# Patient Record
Sex: FEMALE | Race: WHITE | NOT HISPANIC OR LATINO | Employment: OTHER | URBAN - NONMETROPOLITAN AREA
[De-identification: names, ages, dates, MRNs, and addresses within clinical notes are randomized per-mention and may not be internally consistent; named-entity substitution may affect disease eponyms.]

---

## 2017-01-03 ENCOUNTER — ANTICOAGULATION - GICH (OUTPATIENT)
Dept: INTERNAL MEDICINE | Facility: OTHER | Age: 72
End: 2017-01-03

## 2017-01-03 DIAGNOSIS — Z79.01 LONG TERM CURRENT USE OF ANTICOAGULANT: ICD-10-CM

## 2017-01-03 LAB — INR - HISTORICAL: 2.7

## 2017-01-19 ENCOUNTER — COMMUNICATION - GICH (OUTPATIENT)
Dept: INTERNAL MEDICINE | Facility: OTHER | Age: 72
End: 2017-01-19

## 2017-01-19 DIAGNOSIS — E78.00 PURE HYPERCHOLESTEROLEMIA: ICD-10-CM

## 2017-01-19 DIAGNOSIS — I50.30 DIASTOLIC CONGESTIVE HEART FAILURE (H): ICD-10-CM

## 2017-01-19 DIAGNOSIS — E78.1 PURE HYPERGLYCERIDEMIA: ICD-10-CM

## 2017-01-20 ENCOUNTER — HISTORY (OUTPATIENT)
Dept: FAMILY MEDICINE | Facility: OTHER | Age: 72
End: 2017-01-20

## 2017-01-20 ENCOUNTER — OFFICE VISIT - GICH (OUTPATIENT)
Dept: FAMILY MEDICINE | Facility: OTHER | Age: 72
End: 2017-01-20

## 2017-01-20 DIAGNOSIS — S90.129A: ICD-10-CM

## 2017-01-24 ENCOUNTER — HISTORIC RESULTS (OUTPATIENT)
Dept: ADMINISTRATIVE | Age: 72
End: 2017-01-24

## 2017-02-02 ENCOUNTER — ANTICOAGULATION - GICH (OUTPATIENT)
Dept: INTERNAL MEDICINE | Facility: OTHER | Age: 72
End: 2017-02-02

## 2017-02-02 DIAGNOSIS — Z79.01 LONG TERM CURRENT USE OF ANTICOAGULANT: ICD-10-CM

## 2017-02-02 LAB — INR - HISTORICAL: 2.1

## 2017-02-06 ENCOUNTER — COMMUNICATION - GICH (OUTPATIENT)
Dept: INTERNAL MEDICINE | Facility: OTHER | Age: 72
End: 2017-02-06

## 2017-02-06 DIAGNOSIS — F33.42 MAJOR DEPRESSIVE DISORDER, RECURRENT, IN FULL REMISSION (H): ICD-10-CM

## 2017-02-06 DIAGNOSIS — I50.30 DIASTOLIC CONGESTIVE HEART FAILURE (H): ICD-10-CM

## 2017-02-06 DIAGNOSIS — E78.1 PURE HYPERGLYCERIDEMIA: ICD-10-CM

## 2017-02-06 DIAGNOSIS — I48.0 PAROXYSMAL ATRIAL FIBRILLATION (H): ICD-10-CM

## 2017-02-06 DIAGNOSIS — N39.41 URGE INCONTINENCE: ICD-10-CM

## 2017-02-06 DIAGNOSIS — E78.00 PURE HYPERCHOLESTEROLEMIA: ICD-10-CM

## 2017-02-07 ENCOUNTER — AMBULATORY - GICH (OUTPATIENT)
Dept: SCHEDULING | Facility: OTHER | Age: 72
End: 2017-02-07

## 2017-02-14 ENCOUNTER — ANTICOAGULATION - GICH (OUTPATIENT)
Dept: INTERNAL MEDICINE | Facility: OTHER | Age: 72
End: 2017-02-14

## 2017-02-14 ENCOUNTER — HISTORY (OUTPATIENT)
Dept: PEDIATRICS | Facility: OTHER | Age: 72
End: 2017-02-14

## 2017-02-14 ENCOUNTER — OFFICE VISIT - GICH (OUTPATIENT)
Dept: PEDIATRICS | Facility: OTHER | Age: 72
End: 2017-02-14

## 2017-02-14 DIAGNOSIS — R05.9 COUGH: ICD-10-CM

## 2017-02-14 DIAGNOSIS — Z79.01 LONG TERM CURRENT USE OF ANTICOAGULANT: ICD-10-CM

## 2017-02-14 DIAGNOSIS — R69 ILLNESS: ICD-10-CM

## 2017-02-14 LAB — INR - HISTORICAL: 3.6

## 2017-02-14 ASSESSMENT — PATIENT HEALTH QUESTIONNAIRE - PHQ9: SUM OF ALL RESPONSES TO PHQ QUESTIONS 1-9: 0

## 2017-03-03 ENCOUNTER — ANTICOAGULATION - GICH (OUTPATIENT)
Dept: INTERNAL MEDICINE | Facility: OTHER | Age: 72
End: 2017-03-03

## 2017-03-03 DIAGNOSIS — I48.0 PAROXYSMAL ATRIAL FIBRILLATION (H): ICD-10-CM

## 2017-03-03 DIAGNOSIS — Z79.01 LONG TERM CURRENT USE OF ANTICOAGULANT: ICD-10-CM

## 2017-03-03 LAB — INR - HISTORICAL: 3.5

## 2017-03-27 ENCOUNTER — ANTICOAGULATION - GICH (OUTPATIENT)
Dept: INTERNAL MEDICINE | Facility: OTHER | Age: 72
End: 2017-03-27

## 2017-03-27 DIAGNOSIS — I48.0 PAROXYSMAL ATRIAL FIBRILLATION (H): ICD-10-CM

## 2017-03-27 DIAGNOSIS — Z79.01 LONG TERM CURRENT USE OF ANTICOAGULANT: ICD-10-CM

## 2017-03-27 LAB — INR - HISTORICAL: 2.3

## 2017-04-19 ENCOUNTER — COMMUNICATION - GICH (OUTPATIENT)
Dept: INTERNAL MEDICINE | Facility: OTHER | Age: 72
End: 2017-04-19

## 2017-04-19 DIAGNOSIS — I48.0 PAROXYSMAL ATRIAL FIBRILLATION (H): ICD-10-CM

## 2017-04-19 DIAGNOSIS — I10 ESSENTIAL (PRIMARY) HYPERTENSION: ICD-10-CM

## 2017-04-24 ENCOUNTER — ANTICOAGULATION - GICH (OUTPATIENT)
Dept: INTERNAL MEDICINE | Facility: OTHER | Age: 72
End: 2017-04-24

## 2017-04-24 DIAGNOSIS — Z79.01 LONG TERM CURRENT USE OF ANTICOAGULANT: ICD-10-CM

## 2017-04-24 DIAGNOSIS — I48.0 PAROXYSMAL ATRIAL FIBRILLATION (H): ICD-10-CM

## 2017-04-24 LAB — INR - HISTORICAL: 2.2

## 2017-05-17 ENCOUNTER — COMMUNICATION - GICH (OUTPATIENT)
Dept: INTERNAL MEDICINE | Facility: OTHER | Age: 72
End: 2017-05-17

## 2017-05-17 DIAGNOSIS — M25.519 PAIN IN SHOULDER: ICD-10-CM

## 2017-05-18 ENCOUNTER — HOSPITAL ENCOUNTER (OUTPATIENT)
Dept: RADIOLOGY | Facility: OTHER | Age: 72
End: 2017-05-18
Attending: INTERNAL MEDICINE

## 2017-05-18 ENCOUNTER — HISTORY (OUTPATIENT)
Dept: RADIOLOGY | Facility: OTHER | Age: 72
End: 2017-05-18

## 2017-05-18 DIAGNOSIS — Z12.31 ENCOUNTER FOR SCREENING MAMMOGRAM FOR MALIGNANT NEOPLASM OF BREAST: ICD-10-CM

## 2017-05-19 ENCOUNTER — AMBULATORY - GICH (OUTPATIENT)
Dept: ORTHOPEDICS | Facility: OTHER | Age: 72
End: 2017-05-19

## 2017-05-19 DIAGNOSIS — M25.511 PAIN IN RIGHT SHOULDER: ICD-10-CM

## 2017-05-22 ENCOUNTER — AMBULATORY - GICH (OUTPATIENT)
Dept: RADIOLOGY | Facility: OTHER | Age: 72
End: 2017-05-22

## 2017-05-22 ENCOUNTER — ANTICOAGULATION - GICH (OUTPATIENT)
Dept: INTERNAL MEDICINE | Facility: OTHER | Age: 72
End: 2017-05-22

## 2017-05-22 ENCOUNTER — AMBULATORY - GICH (OUTPATIENT)
Dept: SCHEDULING | Facility: OTHER | Age: 72
End: 2017-05-22

## 2017-05-22 DIAGNOSIS — R92.8 OTHER ABNORMAL AND INCONCLUSIVE FINDINGS ON DIAGNOSTIC IMAGING OF BREAST: ICD-10-CM

## 2017-05-22 DIAGNOSIS — Z79.01 LONG TERM CURRENT USE OF ANTICOAGULANT: ICD-10-CM

## 2017-05-22 DIAGNOSIS — I48.0 PAROXYSMAL ATRIAL FIBRILLATION (H): ICD-10-CM

## 2017-05-22 LAB — INR - HISTORICAL: 1.9

## 2017-05-23 ENCOUNTER — HOSPITAL ENCOUNTER (OUTPATIENT)
Dept: RADIOLOGY | Facility: OTHER | Age: 72
End: 2017-05-23
Attending: INTERNAL MEDICINE

## 2017-05-23 ENCOUNTER — AMBULATORY - GICH (OUTPATIENT)
Dept: RADIOLOGY | Facility: OTHER | Age: 72
End: 2017-05-23

## 2017-05-23 ENCOUNTER — OFFICE VISIT - GICH (OUTPATIENT)
Dept: ORTHOPEDICS | Facility: OTHER | Age: 72
End: 2017-05-23

## 2017-05-23 ENCOUNTER — AMBULATORY - GICH (OUTPATIENT)
Dept: SCHEDULING | Facility: OTHER | Age: 72
End: 2017-05-23

## 2017-05-23 ENCOUNTER — HISTORY (OUTPATIENT)
Dept: ORTHOPEDICS | Facility: OTHER | Age: 72
End: 2017-05-23

## 2017-05-23 DIAGNOSIS — M75.81 OTHER SHOULDER LESIONS, RIGHT SHOULDER: ICD-10-CM

## 2017-05-23 DIAGNOSIS — M19.019 PRIMARY OSTEOARTHRITIS OF SHOULDER: ICD-10-CM

## 2017-05-23 DIAGNOSIS — M75.41 IMPINGEMENT SYNDROME OF RIGHT SHOULDER: ICD-10-CM

## 2017-05-23 DIAGNOSIS — R92.8 OTHER ABNORMAL AND INCONCLUSIVE FINDINGS ON DIAGNOSTIC IMAGING OF BREAST: ICD-10-CM

## 2017-05-23 DIAGNOSIS — M25.511 PAIN IN RIGHT SHOULDER: ICD-10-CM

## 2017-05-23 DIAGNOSIS — M25.519 PAIN IN SHOULDER: ICD-10-CM

## 2017-05-24 ENCOUNTER — COMMUNICATION - GICH (OUTPATIENT)
Dept: INTERNAL MEDICINE | Facility: OTHER | Age: 72
End: 2017-05-24

## 2017-05-24 ENCOUNTER — AMBULATORY - GICH (OUTPATIENT)
Dept: RADIOLOGY | Facility: OTHER | Age: 72
End: 2017-05-24

## 2017-05-24 DIAGNOSIS — M75.41 IMPINGEMENT SYNDROME OF RIGHT SHOULDER: ICD-10-CM

## 2017-05-24 DIAGNOSIS — M19.019 PRIMARY OSTEOARTHRITIS OF SHOULDER: ICD-10-CM

## 2017-05-24 DIAGNOSIS — M75.81 OTHER SHOULDER LESIONS, RIGHT SHOULDER: ICD-10-CM

## 2017-05-31 ENCOUNTER — COMMUNICATION - GICH (OUTPATIENT)
Dept: INTERNAL MEDICINE | Facility: OTHER | Age: 72
End: 2017-05-31

## 2017-06-06 ENCOUNTER — HOSPITAL ENCOUNTER (OUTPATIENT)
Dept: RADIOLOGY | Facility: OTHER | Age: 72
End: 2017-06-06
Attending: ORTHOPAEDIC SURGERY

## 2017-06-06 ENCOUNTER — ANTICOAGULATION - GICH (OUTPATIENT)
Dept: INTERNAL MEDICINE | Facility: OTHER | Age: 72
End: 2017-06-06

## 2017-06-06 DIAGNOSIS — Z79.01 LONG TERM CURRENT USE OF ANTICOAGULANT: ICD-10-CM

## 2017-06-06 DIAGNOSIS — M75.81 OTHER SHOULDER LESIONS, RIGHT SHOULDER: ICD-10-CM

## 2017-06-06 DIAGNOSIS — M19.019 PRIMARY OSTEOARTHRITIS OF SHOULDER: ICD-10-CM

## 2017-06-06 DIAGNOSIS — M75.41 IMPINGEMENT SYNDROME OF RIGHT SHOULDER: ICD-10-CM

## 2017-06-06 DIAGNOSIS — I48.0 PAROXYSMAL ATRIAL FIBRILLATION (H): ICD-10-CM

## 2017-06-06 LAB — INR - HISTORICAL: 1.1

## 2017-06-07 ENCOUNTER — COMMUNICATION - GICH (OUTPATIENT)
Dept: INTERNAL MEDICINE | Facility: OTHER | Age: 72
End: 2017-06-07

## 2017-06-07 DIAGNOSIS — N39.41 URGE INCONTINENCE: ICD-10-CM

## 2017-06-15 ENCOUNTER — ANTICOAGULATION - GICH (OUTPATIENT)
Dept: INTERNAL MEDICINE | Facility: OTHER | Age: 72
End: 2017-06-15

## 2017-06-15 ENCOUNTER — OFFICE VISIT - GICH (OUTPATIENT)
Dept: ORTHOPEDICS | Facility: OTHER | Age: 72
End: 2017-06-15

## 2017-06-15 ENCOUNTER — HISTORY (OUTPATIENT)
Dept: ORTHOPEDICS | Facility: OTHER | Age: 72
End: 2017-06-15

## 2017-06-15 DIAGNOSIS — M75.41 IMPINGEMENT SYNDROME OF RIGHT SHOULDER: ICD-10-CM

## 2017-06-15 DIAGNOSIS — M19.019 PRIMARY OSTEOARTHRITIS OF SHOULDER: ICD-10-CM

## 2017-06-15 DIAGNOSIS — Z79.01 LONG TERM CURRENT USE OF ANTICOAGULANT: ICD-10-CM

## 2017-06-15 DIAGNOSIS — I48.0 PAROXYSMAL ATRIAL FIBRILLATION (H): ICD-10-CM

## 2017-06-15 DIAGNOSIS — M75.121 COMPLETE TEAR OF RIGHT ROTATOR CUFF: ICD-10-CM

## 2017-06-15 LAB — INR - HISTORICAL: 1.5

## 2017-06-29 ENCOUNTER — COMMUNICATION - GICH (OUTPATIENT)
Dept: INTERNAL MEDICINE | Facility: OTHER | Age: 72
End: 2017-06-29

## 2017-06-29 ENCOUNTER — ANTICOAGULATION - GICH (OUTPATIENT)
Dept: INTERNAL MEDICINE | Facility: OTHER | Age: 72
End: 2017-06-29

## 2017-06-29 DIAGNOSIS — Z79.01 LONG TERM CURRENT USE OF ANTICOAGULANT: ICD-10-CM

## 2017-06-29 DIAGNOSIS — F33.42 MAJOR DEPRESSIVE DISORDER, RECURRENT, IN FULL REMISSION (H): ICD-10-CM

## 2017-06-29 DIAGNOSIS — I48.0 PAROXYSMAL ATRIAL FIBRILLATION (H): ICD-10-CM

## 2017-06-29 LAB — INR - HISTORICAL: 1.7

## 2017-07-13 ENCOUNTER — ANTICOAGULATION - GICH (OUTPATIENT)
Dept: INTERNAL MEDICINE | Facility: OTHER | Age: 72
End: 2017-07-13

## 2017-07-13 DIAGNOSIS — I48.0 PAROXYSMAL ATRIAL FIBRILLATION (H): ICD-10-CM

## 2017-07-13 DIAGNOSIS — Z79.01 LONG TERM CURRENT USE OF ANTICOAGULANT: ICD-10-CM

## 2017-07-13 LAB — INR - HISTORICAL: 3.3

## 2017-07-14 ENCOUNTER — HISTORY (OUTPATIENT)
Dept: PEDIATRICS | Facility: OTHER | Age: 72
End: 2017-07-14

## 2017-07-14 ENCOUNTER — AMBULATORY - GICH (OUTPATIENT)
Dept: PEDIATRICS | Facility: OTHER | Age: 72
End: 2017-07-14

## 2017-07-14 ENCOUNTER — OFFICE VISIT - GICH (OUTPATIENT)
Dept: PEDIATRICS | Facility: OTHER | Age: 72
End: 2017-07-14

## 2017-07-14 DIAGNOSIS — I45.81 LONG QT SYNDROME: ICD-10-CM

## 2017-07-14 DIAGNOSIS — I10 ESSENTIAL (PRIMARY) HYPERTENSION: ICD-10-CM

## 2017-07-14 DIAGNOSIS — I50.30 DIASTOLIC CONGESTIVE HEART FAILURE (H): ICD-10-CM

## 2017-07-14 DIAGNOSIS — Z11.59 ENCOUNTER FOR SCREENING FOR OTHER VIRAL DISEASES (CODE): ICD-10-CM

## 2017-07-14 DIAGNOSIS — M89.9 DISORDER OF BONE: ICD-10-CM

## 2017-07-14 DIAGNOSIS — E78.00 PURE HYPERCHOLESTEROLEMIA: ICD-10-CM

## 2017-07-14 DIAGNOSIS — N39.41 URGE INCONTINENCE: ICD-10-CM

## 2017-07-14 DIAGNOSIS — K21.9 GASTRO-ESOPHAGEAL REFLUX DISEASE WITHOUT ESOPHAGITIS: ICD-10-CM

## 2017-07-14 DIAGNOSIS — Z13.6 ENCOUNTER FOR SCREENING FOR CARDIOVASCULAR DISORDERS: ICD-10-CM

## 2017-07-14 DIAGNOSIS — N95.1 FEMALE CLIMACTERIC STATE: ICD-10-CM

## 2017-07-14 DIAGNOSIS — R73.01 IMPAIRED FASTING GLUCOSE: ICD-10-CM

## 2017-07-14 DIAGNOSIS — D64.9 ANEMIA: ICD-10-CM

## 2017-07-14 DIAGNOSIS — R09.89 OTHER SPECIFIED SYMPTOMS AND SIGNS INVOLVING THE CIRCULATORY AND RESPIRATORY SYSTEMS: ICD-10-CM

## 2017-07-14 DIAGNOSIS — E55.9 VITAMIN D DEFICIENCY: ICD-10-CM

## 2017-07-14 DIAGNOSIS — E78.1 PURE HYPERGLYCERIDEMIA: ICD-10-CM

## 2017-07-14 DIAGNOSIS — M85.80 OTHER SPECIFIED DISORDERS OF BONE DENSITY AND STRUCTURE, UNSPECIFIED SITE (CODE): ICD-10-CM

## 2017-07-14 DIAGNOSIS — Z12.11 ENCOUNTER FOR SCREENING FOR MALIGNANT NEOPLASM OF COLON: ICD-10-CM

## 2017-07-14 DIAGNOSIS — Z95.0 PRESENCE OF CARDIAC PACEMAKER: ICD-10-CM

## 2017-07-14 DIAGNOSIS — F33.42 MAJOR DEPRESSIVE DISORDER, RECURRENT, IN FULL REMISSION (H): ICD-10-CM

## 2017-07-14 DIAGNOSIS — I48.0 PAROXYSMAL ATRIAL FIBRILLATION (H): ICD-10-CM

## 2017-07-14 LAB
ABSOLUTE BASOPHILS - HISTORICAL: 0 THOU/CU MM
ABSOLUTE EOSINOPHILS - HISTORICAL: 0.1 THOU/CU MM
ABSOLUTE IMMATURE GRANULOCYTES(METAS,MYELOS,PROS) - HISTORICAL: 0 THOU/CU MM
ABSOLUTE LYMPHOCYTES - HISTORICAL: 2 THOU/CU MM (ref 0.9–2.9)
ABSOLUTE MONOCYTES - HISTORICAL: 0.7 THOU/CU MM
ABSOLUTE NEUTROPHILS - HISTORICAL: 4.5 THOU/CU MM (ref 1.7–7)
ANION GAP - HISTORICAL: 12 (ref 5–18)
BASOPHILS # BLD AUTO: 0.5 %
BUN SERPL-MCNC: 22 MG/DL (ref 7–25)
BUN/CREAT RATIO - HISTORICAL: 25
CALCIUM SERPL-MCNC: 9.1 MG/DL (ref 8.6–10.3)
CHLORIDE SERPLBLD-SCNC: 103 MMOL/L (ref 98–107)
CHOL/HDL RATIO - HISTORICAL: 2.72
CHOLESTEROL TOTAL: 215 MG/DL
CO2 SERPL-SCNC: 25 MMOL/L (ref 21–31)
CREAT SERPL-MCNC: 0.89 MG/DL (ref 0.7–1.3)
EOSINOPHIL NFR BLD AUTO: 1.9 %
ERYTHROCYTE [DISTWIDTH] IN BLOOD BY AUTOMATED COUNT: 14.7 % (ref 11.5–15.5)
ESTIMATED AVERAGE GLUCOSE: 111 MG/DL
GFR IF NOT AFRICAN AMERICAN - HISTORICAL: >60 ML/MIN/1.73M2
GLUCOSE SERPL-MCNC: 126 MG/DL (ref 70–105)
HCT VFR BLD AUTO: 37.4 % (ref 33–51)
HDLC SERPL-MCNC: 79 MG/DL (ref 23–92)
HEMOGLOBIN A1C MONITORING (POCT) - HISTORICAL: 5.5 % (ref 4–6.2)
HEMOGLOBIN: 11.8 G/DL (ref 12–16)
IMMATURE GRANULOCYTES(METAS,MYELOS,PROS) - HISTORICAL: 0.3 %
LDLC SERPL CALC-MCNC: 117 MG/DL
LYMPHOCYTES NFR BLD AUTO: 27.3 % (ref 20–44)
MCH RBC QN AUTO: 29.1 PG (ref 26–34)
MCHC RBC AUTO-ENTMCNC: 31.6 G/DL (ref 32–36)
MCV RBC AUTO: 92 FL (ref 80–100)
MONOCYTES NFR BLD AUTO: 8.9 %
NEUTROPHILS NFR BLD AUTO: 61.1 % (ref 42–72)
NON-HDL CHOLESTEROL - HISTORICAL: 136 MG/DL
PATIENT STATUS - HISTORICAL: ABNORMAL
PLATELET # BLD AUTO: 216 THOU/CU MM (ref 140–440)
PMV BLD: 10.7 FL (ref 6.5–11)
POTASSIUM SERPL-SCNC: 4 MMOL/L (ref 3.5–5.1)
RED BLOOD COUNT - HISTORICAL: 4.05 MIL/CU MM (ref 4–5.2)
SODIUM SERPL-SCNC: 140 MMOL/L (ref 133–143)
TRIGL SERPL-MCNC: 96 MG/DL
VITAMIN D TOTAL - HISTORICAL: 15.9 NG/ML
WHITE BLOOD COUNT - HISTORICAL: 7.3 THOU/CU MM (ref 4.5–11)

## 2017-07-14 ASSESSMENT — PATIENT HEALTH QUESTIONNAIRE - PHQ9: SUM OF ALL RESPONSES TO PHQ QUESTIONS 1-9: 1

## 2017-07-17 ENCOUNTER — COMMUNICATION - GICH (OUTPATIENT)
Dept: SURGERY | Facility: OTHER | Age: 72
End: 2017-07-17

## 2017-07-17 DIAGNOSIS — Z12.11 ENCOUNTER FOR SCREENING FOR MALIGNANT NEOPLASM OF COLON: ICD-10-CM

## 2017-07-17 LAB — HEPATITIS C ANTIBODY CATEGORY - HISTORICAL: NORMAL

## 2017-07-19 ENCOUNTER — COMMUNICATION - GICH (OUTPATIENT)
Dept: INTERNAL MEDICINE | Facility: OTHER | Age: 72
End: 2017-07-19

## 2017-07-20 ENCOUNTER — HOSPITAL ENCOUNTER (OUTPATIENT)
Dept: RADIOLOGY | Facility: OTHER | Age: 72
End: 2017-07-20
Attending: INTERNAL MEDICINE

## 2017-07-20 DIAGNOSIS — R09.89 OTHER SPECIFIED SYMPTOMS AND SIGNS INVOLVING THE CIRCULATORY AND RESPIRATORY SYSTEMS: ICD-10-CM

## 2017-07-20 DIAGNOSIS — Z13.6 ENCOUNTER FOR SCREENING FOR CARDIOVASCULAR DISORDERS: ICD-10-CM

## 2017-07-20 DIAGNOSIS — M89.9 DISORDER OF BONE: ICD-10-CM

## 2017-07-20 DIAGNOSIS — M85.80 OTHER SPECIFIED DISORDERS OF BONE DENSITY AND STRUCTURE, UNSPECIFIED SITE (CODE): ICD-10-CM

## 2017-07-24 ENCOUNTER — COMMUNICATION - GICH (OUTPATIENT)
Dept: PEDIATRICS | Facility: OTHER | Age: 72
End: 2017-07-24

## 2017-07-24 ENCOUNTER — COMMUNICATION - GICH (OUTPATIENT)
Dept: FAMILY MEDICINE | Facility: OTHER | Age: 72
End: 2017-07-24

## 2017-07-24 DIAGNOSIS — I48.0 PAROXYSMAL ATRIAL FIBRILLATION (H): ICD-10-CM

## 2017-07-24 DIAGNOSIS — Z79.01 LONG TERM CURRENT USE OF ANTICOAGULANT: ICD-10-CM

## 2017-07-24 DIAGNOSIS — N39.41 URGE INCONTINENCE: ICD-10-CM

## 2017-07-25 ENCOUNTER — AMBULATORY - GICH (OUTPATIENT)
Dept: PEDIATRICS | Facility: OTHER | Age: 72
End: 2017-07-25

## 2017-07-26 ENCOUNTER — COMMUNICATION - GICH (OUTPATIENT)
Dept: INTERNAL MEDICINE | Facility: OTHER | Age: 72
End: 2017-07-26

## 2017-07-31 ENCOUNTER — COMMUNICATION - GICH (OUTPATIENT)
Dept: INTERNAL MEDICINE | Facility: OTHER | Age: 72
End: 2017-07-31

## 2017-07-31 DIAGNOSIS — I48.0 PAROXYSMAL ATRIAL FIBRILLATION (H): ICD-10-CM

## 2017-07-31 DIAGNOSIS — Z79.01 LONG TERM CURRENT USE OF ANTICOAGULANT: ICD-10-CM

## 2017-08-01 ENCOUNTER — COMMUNICATION - GICH (OUTPATIENT)
Dept: INTERNAL MEDICINE | Facility: OTHER | Age: 72
End: 2017-08-01

## 2017-08-01 ENCOUNTER — ANTICOAGULATION - GICH (OUTPATIENT)
Dept: INTERNAL MEDICINE | Facility: OTHER | Age: 72
End: 2017-08-01

## 2017-08-01 DIAGNOSIS — I48.0 PAROXYSMAL ATRIAL FIBRILLATION (H): ICD-10-CM

## 2017-08-01 DIAGNOSIS — Z79.01 LONG TERM CURRENT USE OF ANTICOAGULANT: ICD-10-CM

## 2017-08-01 LAB — INR - HISTORICAL: 1.6

## 2017-08-03 ENCOUNTER — HISTORY (OUTPATIENT)
Dept: SURGERY | Facility: OTHER | Age: 72
End: 2017-08-03

## 2017-08-07 ENCOUNTER — COMMUNICATION - GICH (OUTPATIENT)
Dept: INTERNAL MEDICINE | Facility: OTHER | Age: 72
End: 2017-08-07

## 2017-08-07 DIAGNOSIS — E55.9 VITAMIN D DEFICIENCY: ICD-10-CM

## 2017-08-09 ENCOUNTER — ANTICOAGULATION - GICH (OUTPATIENT)
Dept: INTERNAL MEDICINE | Facility: OTHER | Age: 72
End: 2017-08-09

## 2017-08-09 DIAGNOSIS — Z79.01 LONG TERM CURRENT USE OF ANTICOAGULANT: ICD-10-CM

## 2017-08-09 DIAGNOSIS — I48.0 PAROXYSMAL ATRIAL FIBRILLATION (H): ICD-10-CM

## 2017-08-09 LAB — INR - HISTORICAL: 1.2

## 2017-08-10 ENCOUNTER — COMMUNICATION - GICH (OUTPATIENT)
Dept: SURGERY | Facility: OTHER | Age: 72
End: 2017-08-10

## 2017-08-10 ENCOUNTER — TRANSFERRED RECORDS (OUTPATIENT)
Dept: MULTI SPECIALTY CLINIC | Facility: CLINIC | Age: 72
End: 2017-08-10

## 2017-08-10 ENCOUNTER — HISTORY (OUTPATIENT)
Dept: SURGERY | Facility: OTHER | Age: 72
End: 2017-08-10

## 2017-08-10 ENCOUNTER — SURGERY (OUTPATIENT)
Dept: SURGERY | Facility: OTHER | Age: 72
End: 2017-08-10

## 2017-08-10 ENCOUNTER — HOSPITAL ENCOUNTER (OUTPATIENT)
Dept: SURGERY | Facility: OTHER | Age: 72
Discharge: HOME | End: 2017-08-10
Attending: SURGERY | Admitting: SURGERY

## 2017-08-14 ENCOUNTER — COMMUNICATION - GICH (OUTPATIENT)
Dept: INTERNAL MEDICINE | Facility: OTHER | Age: 72
End: 2017-08-14

## 2017-08-14 ENCOUNTER — ANTICOAGULATION - GICH (OUTPATIENT)
Dept: INTERNAL MEDICINE | Facility: OTHER | Age: 72
End: 2017-08-14

## 2017-08-14 DIAGNOSIS — I48.0 PAROXYSMAL ATRIAL FIBRILLATION (H): ICD-10-CM

## 2017-08-14 DIAGNOSIS — Z79.01 LONG TERM CURRENT USE OF ANTICOAGULANT: ICD-10-CM

## 2017-08-14 LAB — INR - HISTORICAL: 1.4

## 2017-08-15 ENCOUNTER — ANTICOAGULATION - GICH (OUTPATIENT)
Dept: INTERNAL MEDICINE | Facility: OTHER | Age: 72
End: 2017-08-15

## 2017-08-15 DIAGNOSIS — I48.0 PAROXYSMAL ATRIAL FIBRILLATION (H): ICD-10-CM

## 2017-08-15 DIAGNOSIS — Z79.01 LONG TERM CURRENT USE OF ANTICOAGULANT: ICD-10-CM

## 2017-08-15 LAB — INR - HISTORICAL: 1.6

## 2017-08-16 ENCOUNTER — SURGERY (OUTPATIENT)
Dept: SURGERY | Facility: OTHER | Age: 72
End: 2017-08-16

## 2017-08-16 ENCOUNTER — ANTICOAGULATION - GICH (OUTPATIENT)
Dept: INTERNAL MEDICINE | Facility: OTHER | Age: 72
End: 2017-08-16

## 2017-08-16 DIAGNOSIS — I48.0 PAROXYSMAL ATRIAL FIBRILLATION (H): ICD-10-CM

## 2017-08-16 DIAGNOSIS — Z79.01 LONG TERM CURRENT USE OF ANTICOAGULANT: ICD-10-CM

## 2017-08-16 LAB — INR - HISTORICAL: 1.9

## 2017-08-17 ENCOUNTER — ANTICOAGULATION - GICH (OUTPATIENT)
Dept: INTERNAL MEDICINE | Facility: OTHER | Age: 72
End: 2017-08-17

## 2017-08-17 DIAGNOSIS — Z79.01 LONG TERM CURRENT USE OF ANTICOAGULANT: ICD-10-CM

## 2017-08-17 DIAGNOSIS — I48.0 PAROXYSMAL ATRIAL FIBRILLATION (H): ICD-10-CM

## 2017-08-17 LAB — INR - HISTORICAL: 2

## 2017-08-18 ENCOUNTER — ANTICOAGULATION - GICH (OUTPATIENT)
Dept: INTERNAL MEDICINE | Facility: OTHER | Age: 72
End: 2017-08-18

## 2017-08-18 DIAGNOSIS — I48.0 PAROXYSMAL ATRIAL FIBRILLATION (H): ICD-10-CM

## 2017-08-18 DIAGNOSIS — Z79.01 LONG TERM CURRENT USE OF ANTICOAGULANT: ICD-10-CM

## 2017-08-18 LAB — INR - HISTORICAL: 2.1

## 2017-09-05 ENCOUNTER — ANTICOAGULATION - GICH (OUTPATIENT)
Dept: INTERNAL MEDICINE | Facility: OTHER | Age: 72
End: 2017-09-05

## 2017-09-05 DIAGNOSIS — Z79.01 LONG TERM CURRENT USE OF ANTICOAGULANT: ICD-10-CM

## 2017-09-05 DIAGNOSIS — I48.0 PAROXYSMAL ATRIAL FIBRILLATION (H): ICD-10-CM

## 2017-09-05 LAB — INR - HISTORICAL: 2.1

## 2017-09-21 ENCOUNTER — AMBULATORY - GICH (OUTPATIENT)
Dept: SCHEDULING | Facility: OTHER | Age: 72
End: 2017-09-21

## 2017-10-03 ENCOUNTER — ANTICOAGULATION - GICH (OUTPATIENT)
Dept: INTERNAL MEDICINE | Facility: OTHER | Age: 72
End: 2017-10-03

## 2017-10-03 DIAGNOSIS — I48.0 PAROXYSMAL ATRIAL FIBRILLATION (H): ICD-10-CM

## 2017-10-03 DIAGNOSIS — Z79.01 LONG TERM CURRENT USE OF ANTICOAGULANT: ICD-10-CM

## 2017-10-03 LAB — INR - HISTORICAL: 1.9

## 2017-10-13 ENCOUNTER — OFFICE VISIT - GICH (OUTPATIENT)
Dept: PEDIATRICS | Facility: OTHER | Age: 72
End: 2017-10-13

## 2017-10-13 ENCOUNTER — HISTORY (OUTPATIENT)
Dept: PEDIATRICS | Facility: OTHER | Age: 72
End: 2017-10-13

## 2017-10-13 DIAGNOSIS — I10 ESSENTIAL (PRIMARY) HYPERTENSION: ICD-10-CM

## 2017-10-13 DIAGNOSIS — Z01.818 ENCOUNTER FOR OTHER PREPROCEDURAL EXAMINATION: ICD-10-CM

## 2017-10-13 DIAGNOSIS — Z98.890 OTHER SPECIFIED POSTPROCEDURAL STATES: ICD-10-CM

## 2017-10-13 DIAGNOSIS — I48.0 PAROXYSMAL ATRIAL FIBRILLATION (H): ICD-10-CM

## 2017-10-13 DIAGNOSIS — Z86.79 PERSONAL HISTORY OF OTHER DISEASES OF THE CIRCULATORY SYSTEM (CODE): ICD-10-CM

## 2017-10-13 DIAGNOSIS — I50.30 DIASTOLIC CONGESTIVE HEART FAILURE (H): ICD-10-CM

## 2017-10-13 DIAGNOSIS — E55.9 VITAMIN D DEFICIENCY: ICD-10-CM

## 2017-10-13 DIAGNOSIS — I45.81 LONG QT SYNDROME: ICD-10-CM

## 2017-10-13 DIAGNOSIS — Z95.0 PRESENCE OF CARDIAC PACEMAKER: ICD-10-CM

## 2017-10-13 LAB
ANION GAP - HISTORICAL: 11 (ref 5–18)
BUN SERPL-MCNC: 17 MG/DL (ref 7–25)
BUN/CREAT RATIO - HISTORICAL: 20
CALCIUM SERPL-MCNC: 9.4 MG/DL (ref 8.6–10.3)
CHLORIDE SERPLBLD-SCNC: 104 MMOL/L (ref 98–107)
CO2 SERPL-SCNC: 27 MMOL/L (ref 21–31)
CREAT SERPL-MCNC: 0.83 MG/DL (ref 0.7–1.3)
GFR IF NOT AFRICAN AMERICAN - HISTORICAL: >60 ML/MIN/1.73M2
GLUCOSE SERPL-MCNC: 81 MG/DL (ref 70–105)
INR - HISTORICAL: 2.8
POTASSIUM SERPL-SCNC: 4.7 MMOL/L (ref 3.5–5.1)
PROTIME - HISTORICAL: 34.1 SEC (ref 11.9–15.2)
SODIUM SERPL-SCNC: 142 MMOL/L (ref 133–143)
VITAMIN D TOTAL - HISTORICAL: 32.6 NG/ML

## 2017-10-13 ASSESSMENT — PATIENT HEALTH QUESTIONNAIRE - PHQ9: SUM OF ALL RESPONSES TO PHQ QUESTIONS 1-9: 0

## 2017-10-29 ENCOUNTER — HOSPITAL ENCOUNTER (OUTPATIENT)
Dept: LAB | Facility: OTHER | Age: 72
End: 2017-10-29
Attending: INTERNAL MEDICINE | Admitting: INTERNAL MEDICINE

## 2017-10-29 DIAGNOSIS — I48.0 PAROXYSMAL ATRIAL FIBRILLATION (H): ICD-10-CM

## 2017-10-29 LAB
INR - HISTORICAL: 1.2
PROTIME - HISTORICAL: 14.1 SEC (ref 11.9–15.2)

## 2017-11-01 ENCOUNTER — COMMUNICATION - GICH (OUTPATIENT)
Dept: INTERNAL MEDICINE | Facility: OTHER | Age: 72
End: 2017-11-01

## 2017-11-02 ENCOUNTER — ANTICOAGULATION - GICH (OUTPATIENT)
Dept: INTERNAL MEDICINE | Facility: OTHER | Age: 72
End: 2017-11-02

## 2017-11-02 DIAGNOSIS — I48.0 PAROXYSMAL ATRIAL FIBRILLATION (H): ICD-10-CM

## 2017-11-02 DIAGNOSIS — Z79.01 LONG TERM CURRENT USE OF ANTICOAGULANT: ICD-10-CM

## 2017-11-02 LAB — INR - HISTORICAL: 1

## 2017-11-06 ENCOUNTER — ANTICOAGULATION - GICH (OUTPATIENT)
Dept: INTERNAL MEDICINE | Facility: OTHER | Age: 72
End: 2017-11-06

## 2017-11-06 DIAGNOSIS — Z79.01 LONG TERM CURRENT USE OF ANTICOAGULANT: ICD-10-CM

## 2017-11-06 DIAGNOSIS — I48.0 PAROXYSMAL ATRIAL FIBRILLATION (H): ICD-10-CM

## 2017-11-06 LAB — INR - HISTORICAL: 1.7

## 2017-11-07 ENCOUNTER — ANTICOAGULATION - GICH (OUTPATIENT)
Dept: INTERNAL MEDICINE | Facility: OTHER | Age: 72
End: 2017-11-07

## 2017-11-07 DIAGNOSIS — I48.0 PAROXYSMAL ATRIAL FIBRILLATION (H): ICD-10-CM

## 2017-11-07 DIAGNOSIS — Z79.01 LONG TERM CURRENT USE OF ANTICOAGULANT: ICD-10-CM

## 2017-11-07 LAB — INR - HISTORICAL: 2.6

## 2017-11-21 ENCOUNTER — ANTICOAGULATION - GICH (OUTPATIENT)
Dept: INTERNAL MEDICINE | Facility: OTHER | Age: 72
End: 2017-11-21

## 2017-11-21 DIAGNOSIS — I48.0 PAROXYSMAL ATRIAL FIBRILLATION (H): ICD-10-CM

## 2017-11-21 DIAGNOSIS — Z79.01 LONG TERM CURRENT USE OF ANTICOAGULANT: ICD-10-CM

## 2017-11-21 LAB — INR - HISTORICAL: 2.7

## 2017-12-07 ENCOUNTER — TRANSFERRED RECORDS (OUTPATIENT)
Dept: HEALTH INFORMATION MANAGEMENT | Facility: OTHER | Age: 72
End: 2017-12-07
Payer: COMMERCIAL

## 2017-12-19 ENCOUNTER — ANTICOAGULATION - GICH (OUTPATIENT)
Dept: INTERNAL MEDICINE | Facility: OTHER | Age: 72
End: 2017-12-19

## 2017-12-19 DIAGNOSIS — Z79.01 LONG TERM CURRENT USE OF ANTICOAGULANT: ICD-10-CM

## 2017-12-19 DIAGNOSIS — I48.0 PAROXYSMAL ATRIAL FIBRILLATION (H): ICD-10-CM

## 2017-12-19 LAB — INR - HISTORICAL: 2.5

## 2017-12-27 NOTE — PROGRESS NOTES
Patient Information     Patient Name MRN Sex Zoey Ortega [id number] Female 1945      Progress Notes by Bibiana Medrano R.T. (ARRT) at 2017  9:18 AM     Author:  [facility] Service:  (none) Author Type:  [employer] - Registered Radiologic Technologist     Filed:  2017  9:18 AM Date of Service:  2017  9:18 AM Status:  Signed     :  Bibiana Medrano R.T. (ARRT) ([facility] - Registered Radiologic Technologist)            [place] Protocol    A. Pre-procedure verification complete yes  1-relevant information / documentation available, reviewed and properly matched to the patient; 2-consent accurate and complete, 3-equipment and supplies available    B. Site marking complete Yes  Site marked if not in continuous attendance with patient    C. TIME OUT completed yes  Time Out was conducted just prior to starting procedure to verify the eight required elements: 1-patient identity, 2-consent accurate and complete, 3-position, 4-correct side/site marked (if applicable), 5-procedure, 6-relevant images / results properly labeled and displayed (if applicable), 7-antibiotics / irrigation fluids (if applicable), 8-safety precautions.

## 2017-12-27 NOTE — PROGRESS NOTES
Patient Information     Patient Name MRN Sex Zoey Ortega [id number] Female 1945      Progress Notes by Zena Green R.T. ([facility]) at 2017 10:30 AM     Author:  Zena Green R.T. ([facility] Service:  (none) Author Type:  RadTech - Registered Radiologic Technologist     Filed:  2017 11:27 AM Date of Service:  2017 10:30 AM Status:  Signed     :  Zena Green R.T. (RORYT) ([employer] - Registered Radiologic Technologist)            Falls Risk Criteria:    Age 65 and older or under age 4        Sensory deficits    Poor vision    Use of ambulatory aides    Impaired judgment    Unable to walk independently    Meets High Risk criteria for falls:  Yes               1.  Do you have dizziness or vertigo?    no                    2.  Do you need help standing or walking?   no                 3.  Have you fallen within the last 6 months?    no           4.  Has the patient been fasting?      yes       If any risks are marked Yes, the following interventions are utilized:    Do not leave patient unattended     Assist patient in the dressing room and bathroom    Have ambulatory aides available throughout procedure    Involve patient s family if available

## 2017-12-28 NOTE — TELEPHONE ENCOUNTER
Patient Information     Patient Name MRN Zoey Richard [id number] Female 1945      Telephone Encounter by Melissa Gonsalves at 2017  1:34 PM     Author:  Melissa Gonsalves Service:  (none) Author Type:  (none)     Filed:  2017  1:34 PM Encounter Date:  2017 Status:  Signed     :  Melissa Gonsalves            TDE - PT WOULD LIKE TO CANCEL SURGERY, PLEASE CALL PT IN REGARDS TO THIS CONCERN.        Melissa SAMUEL  ....................  2017   1:34 PM

## 2017-12-28 NOTE — TELEPHONE ENCOUNTER
Patient Information     Patient Name MRN Sex Zoey Ortega [id number] Female 1945      Telephone Encounter by Janessa Olguin RN at 2017 12:56 PM     Author:  Janessa Olguin RN Service:  (none) Author Type:  NURS- Registered Nurse     Filed:  2017 12:59 PM Encounter Date:  2017 Status:  Signed     :  Janessa Olguin RN (NURS- Registered Nurse)            Anticoagulant    Office visit in the past 12 months.    Last visit with Vijay Mackay MD   was on:17  Next visit with Vijay Mackay MD   is on: No future appointment listed with this provider  Next visit with Family Practice is on: No future appointment listed in this department    Lab tests:  PT/INR at least monthly    INR (no units)    Date Value   2017 3.3 (H)     HEMOGLOBIN                (g/dL)    Date Value   2017 11.8 (L)         Prescription refilled per RN Medication Refill Policy.................... Janessa Olguin RN ....................  2017   12:57 PM

## 2017-12-28 NOTE — PROGRESS NOTES
Patient Information     Patient Name MRN Sex Zoey Ortega [id number] Female 1945      Progress Notes by Bibiana Medrano R.T. (ARRT) at 2017  9:17 AM     Author:  [facility]. [facility] Service:  (none) Author Type:  [employer] - Registered Radiologic Technologist     Filed:  2017  9:18 AM Date of Service:  2017  9:17 AM Status:  Signed     :  Bibiana Medrano R.T. (ARRT) ([facility] - Registered Radiologic Technologist)            Falls Risk Criteria:    Age 65 and older or under age 4        Sensory deficits    Poor vision    Use of ambulatory aides    Impaired judgment    Unable to walk independently    Meets High Risk criteria for falls:  Yes             1.  Do you have dizziness or vertigo?    no                    2.  Do you need help standing or walking?   no                 3.  Have you fallen within the last 6 months?    no           4.  Has the patient been fasting?      no       If any risks are marked Yes, the following interventions are utilized:    Do not leave patient unattended     Assist patient in the dressing room and bathroom    Have ambulatory aides available throughout procedure    Involve patient s family if available

## 2017-12-28 NOTE — PROGRESS NOTES
Patient Information     Patient Name MRN Sex Zoey Ortega [id number] Female 1945      Progress Notes by Vijay Mackay MD at 10/13/2017 10:30 AM     Author:  Vijay Mackay MD Service:  (none) Author Type:  Physician     Filed:  10/13/2017  4:44 PM Encounter Date:  10/13/2017 Status:  Signed     :  Vijay Mackay MD (Physician)            See H&P

## 2017-12-28 NOTE — PROGRESS NOTES
Patient Information     Patient Name MRN Zoey Richard [id number] Female 1945      Progress Notes by Vijay Mackay MD at 2017  3:30 PM     Author:  Vijay Mackay MD Service:  (none) Author Type:  Physician     Filed:  2017  4:21 PM Encounter Date:  2017 Status:  Signed     :  Vijay Mackay MD (Physician)            Subjective  Zoey Jacobs is a 72 y.o. female who presents for medication management. She has a history of long QT syndrome, atrial fibrillation status post ablation and has a pacemaker and follows with Dr. Walker. She continues on warfarin anticoagulation. History of hyperlipidemia and continues on Lipitor which has been stable. History of hypertension stable with lisinopril. History of major depressive disorder which has been stable many years with Prozac. History of gastroesophageal reflux disease stable with omeprazole. History of stress urinary incontinence which has been stable on oxybutynin. She does daily weightbearing exercise. She's not been on a calcium or vitamin D supplement. She's got about an inch shorter over the last year. She thinks she is due for colonoscopy. No chest pains or dyspnea on exertion.    Problem List/PMH: reviewed in EMR, and made relevant updates today.  Medications: reviewed in EMR, and made relevant updates today.  Allergies: reviewed in EMR, and made relevant updates today.    Social Hx:  Social History       Substance Use Topics         Smoking status:  Former Smoker      Packs/day: 0.50      Years: 30.00      Types: Cigarettes      Start date: 1968      Quit date: 2002      Smokeless tobacco:  Never Used      Alcohol use  6.0 oz/week     10 Glasses of wine per week      Social History Narrative    Patient is a retired RN. She moved here from [place], [place] in the . Her  was an ED doc and worked in [employer] before retiring. They lived on [address]. Patient's   suddenly of a likely  "arrhythmia in his mid 60s. Patient's son was bipolar and ended up committing suicide after being convicted for murder. The patient has a daughter that works for [employer]. They recently traveled to the [facility] for a [employer] and have plans to go on a cruise in [place] to find polar bears.                I reviewed social history and made relevant updates today.    Family Hx:   Family History       Problem   Relation Age of Onset     Hypertension  Mother      Hyperlipidemia  Mother      elevated cholesterol       Stroke  Mother      She  at age 86.  She had stroke as a complication of carotid endarterectomy surgery.        Arthritis  Father      Cancer  Father      skin cancer       Stroke  Father      He is 87, has a history of CVA       Other  Father      Alzheimer's       Cancer-breast  Maternal Aunt 50     Other  Sister       of long QT syndrome.       Psychiatric illness  Son      Bipolar,  due to suicide       Cancer-colon  No Family History        Objective  Vitals: reviewed in EMR.  /66  Pulse 70  Ht 1.7 m (5' 6.93\")  Wt 84.1 kg (185 lb 6.4 oz)  SpO2 98%  Breastfeeding? No  BMI 29.1 kg/m2    Gen: Pleasant female, NAD.  HEENT: MMM, no OP erythema.   Neck: Supple, no JVD, faint carotid bruit auscultated on the left. No carotid bruit on the right.  CV: RRR no m/r/g.   Pulm: CTAB no w/r/r  Neuro: Grossly intact  Msk: No lower extremity edema.  Skin: No concerning lesions.  Psychiatric: Normal affect and insight. Does not appear anxious or depressed.    Diabetes Labs  Lab Results      Component  Value Date/Time    CHOL 199 2016 08:40 AM    HDL 67 2016 08:40 AM    LDLCHOL 103 (H) 2016 08:40 AM    TRIGLYCERIDE 147 2016 08:40 AM    CREATININE 0.76 2016 08:40 AM     bone density scan dated May 2, 2013 was personally reviewed with the patient today.    Assessment    ICD-10-CM    1. Osteopenia, unspecified " location M85.80 XR DXA BONE DENSITY 2 SITES AXIAL      VITAMIN D 25 (DEFICIENCY)      VITAMIN D 25 (DEFICIENCY)   2. HYPERCHOLESTEROLEMIA E78.00 atorvastatin (LIPITOR) 40 mg tablet      LIPID PANEL      LIPID PANEL   3. (HFpEF) heart failure with preserved ejection fraction (HC) I50.30 atorvastatin (LIPITOR) 40 mg tablet      BASIC METABOLIC PANEL      BASIC METABOLIC PANEL   4. Hypertriglyceridemia E78.1 atorvastatin (LIPITOR) 40 mg tablet      LIPID PANEL      LIPID PANEL   5. Vaginal dryness, menopausal N95.1 estradiol (ESTRACE) 0.1 mg/g vaginal cream   6. Major depressive disorder, recurrent, in full remission (HC) F33.42 FLUoxetine (PROZAC) 20 mg capsule   7. Gastroesophageal reflux disease, esophagitis presence not specified K21.9 omeprazole (PRILOSEC) 20 mg Delayed-Release capsule   8. Urge urinary incontinence N39.41 oxybutynin XL (DITROPAN XL) 10 mg CR tablet   9. Hypertension I10 lisinopril (PRINIVIL; ZESTRIL) 5 mg tablet   10. Paroxysmal atrial fibrillation (HC) I48.0 nadolol (CORGARD) 80 mg tablet   11. Screening for AAA (abdominal aortic aneurysm) Z13.6 US ABD AORTA SCREENING   12. Need for hepatitis C screening test Z11.59 ANTI HCV      ANTI HCV   13. Bruit of left carotid artery R09.89 US CAROTID DUPLEX BILATERAL   14. Long Q-T syndrome I45.81    15. Pacemaker Z95.0    16. Anemia, unspecified type D64.9 CBC WITH DIFFERENTIAL      CBC WITH DIFFERENTIAL      CBC WITH AUTO DIFFERENTIAL   17. Disorder of bone  M89.9 XR DXA BONE DENSITY 2 SITES AXIAL   18. Colon cancer screening Z12.11 COLONOSCOPY SCREENING-GICH       Plan   -- Expected clinical course discussed   -- Medications and their side effects discussed  Patient Instructions    -- Start Calcium + vitamin D   -- Daily weightbearing exercise   -- Schedule bone density scan     -- Anticoagulation clinic nurses to assist with bridging   -- Last warfarin dose 6 days before surgery, hold starting 5 days before surgery   -- Start enoxaparin 3 days before  surgery   -- Check INR day before surgery, if INR > 1.5 contact MD   -- Last dose of enoxaparin is the morning the day before surgery, hold starting evening before surgery   -- Resume warfarin and enoxaparin postoperatively   -- Stop enoxaparin when INR is therapeutic     -- Call for a prescription for Lovenox prior to your colonoscopy        Signed, Vijay Mackay MD  Internal Medicine & Pediatrics

## 2017-12-28 NOTE — PROGRESS NOTES
"Patient Information     Patient Name MRN Sex Zoey Ortega [id number] Female 1945      Progress Notes by Alexis Miranda DO at 6/15/2017  1:30 PM     Author:  Alexis Miranda DO Service:  (none) Author Type:  Physician     Filed:  6/15/2017  1:57 PM Encounter Date:  6/15/2017 Status:  Signed     :  Alexis Miranda DO (Physician)            PROGRESS NOTE    SUBJECTIVE:  Zoey Jacobs is here for evaluation in regards to right shoulder. She is still having the discomfort and does have to utilize ice at night. She underwent her CT arthrogram due to her pacemaker. Overall symptoms have worsened but she is still able to manage. She does utilize medications and ice as needed. And she has continued work on her range of motion exercises. Describes the pain as dull and achy.    OBJECTIVE:  /80  Ht 1.727 m (5' 8\")  Wt 79.4 kg (175 lb)  BMI 26.61 kg/m2 Body mass index is 26.61 kg/(m^2).  General Appearance: Pleasant female in good appearance, mood and affect.  Alert and orientated times three ( time, date and location).    Skin: Normal    Shoulder:  Motion: Patient has slight restricted motion in the last 5  of flexion and abduction. Passively I can stretch it all the way out. When checking her internal and external rotation there is restriction to her back pocket. External rotation does go to 50  with discomfort.  Hawkin's Sign: positive  Neer's Sign: positive  Cross body adduction:  positive  Drop arm test: positive  Weakness at waist level: positive  Bicipital testing: positive  Lift off test:  negative  Esquivel's test:  negative    Elbow:  Flexion: Normal  Extension: Normal    Hand:  Sensation: Normal  Radial and ulnar blood flow:  Normal    Eyes: Pupils are round.    Ears: Hearing: Intact    Heart: Good capillary refill and her hands.    Lungs: Clear.     Radiographic images from ,  where independently reviewed and discussed with the patient.      Xray: "     There is increased acromial hook with what appears to be postsurgical changes with impingement about the right shoulder. There is acromioclavicular arthritic changes. Humeral head is in appropriate position.    Exam: XR SHOULDER 3 VIEWS RIGHT  History: Right shoulder pain, unspecified chronicity  Technique: 3 views.  Findings: No acute fracture or dislocation is seen. Acromiohumeral distance is fairly normal. There is some mild degenerative change in the AC joint with some anterior hooking of the undersurface of the acromion process.    Impression: Mild degenerative change in the AC joint.  Electronically Signed By: Breanna Vizcarra M.D. on 5/23/2017 1:55 PM    CT ARTHROGRAM SHOULDER RIGHT  HISTORY: 72 yearsFemale with right shoulder pain weakness and limited range of motion. Right rotator cuff tendinitis  TECHNIQUE: After the administration of dilute intra-articular contrast, axial CT imaging of the right shoulder was performed. Coronal and sagittal reconstructions were obtained.  COMPARISON: None  FINDINGS: There is abnormal communication of contrast to the subdeltoid subacromial bursa. There is a full-thickness tear involving the supraspinatus tendon. Tendon elements are retracted to the superior convexity of the humeral head. The tear measures 1.4 cm AP dimension. There are rotator cuff is otherwise intact.  The biceps tendon and biceps labral anchor are intact. The labrum is intact.  Cartilage thickness of the glenohumeral articulation is normal.  There is mild/moderate osteoarthritic change of the acromioclavicular articulation with mild inferior osteophytic spurring of the distal clavicle.  IMPRESSION: Full-thickness rotator cuff tear of the supraspinatus tendon. Tendon elements are retracted to the superior convexity of the humeral head. AP dimension of the tear is is 1.4 cm.  There is mild-to-moderate osteoarthritic change of the acromioclavicular articulation.  Electronically Signed By: Davon MENDES  MAUREEN Le on 6/6/2017 12:00 PM    IMPRESSION:  Right rotator cuff tear.  Right impingement syndrome.  Right acromioclavicular arthritis.  History of pacemaker and she is on Coumadin    PLAN:  Risks, benefits, conservative, surgical and alternatives to treatment where discussed and the patient would like to proceed with consideration of surgery.  She continue with her range of motion exercises.  I utilized the model, poster board and handout help her understand shoulder anatomy and pathology especially in light of her CT arthrogram findings.  Follow-up after above.  Questions and concerns answered.  Preoperative education was performed today and she was given handouts.    I have discussed options with Zoey Jacobs the treatment for shoulder rotator cuff tear, which have included observation, physical therapy, corticosteroid injection versus shoulder rotator cuff repair. I discussed pros and cons of each approach, and at this point, Zoey Jacobs would like to proceed with rotator cuff shoulder surgery. We discussed surgery would be an outpatient procedure, you would be able to go home following the surgery. We will plan on arthroscopic versus mini open rotator cuff repair with anything else that needs to be done.  Surgical anesthesia would be general anesthesia with possible interscalene block to control pain following surgery.   I discussed following surgery Zoey Jacobs would be in a sling for eight weeks following surgery with gentle motion of the elbow, wrist maneuvers after surgery.  At four weeks following surgery  further motion and strengthening with the shoulder with physical therapy will commence and it is a step wise approach.   Full recovery from rotator cuff surgery may take a year. The goal will be pain relief. Complications were discussed including continued pain, stiffness in the shoulder, rare chance of neurovascular damage, potential chance of infection. If deep   Infection were to  "occur, further surgery may be needed with repeat washout in the operating room with possible need for treatment with antibiotics.    If tolerated use of of an EC-ASA 325mg is recommended for 30 days after surgery.    Risks, benefits, conservative, surgical, and alternatives of treatment were thoroughly outlined. No guarantees were given. Risks which do include, but are not limited to:  Scar, infection, decreased motion, damage to blood vessels, nerves and tendons, failure or need for further treatment, reaction to medications and anesthesia, blood clots, and the possibility of death where discussed.  She did verbalize an understanding of the above and that if a biceps tenotomy is performed they would have a \"sidney sign\" since the long head of the biceps would sit lower.  All questions and concerns were addressed.    Patient is set up for right shoulder arthroscopy with SAD,DCE, rotator cuff repair, biceps tenotomy and a block if it is felt appropriate after discussing with anesthesia.    Zoey Jacobs will need pre op clearance for management of cardiac disease and anti-coagulant utilization.    Follow up after surgery will be 3-7 days    All questions where answered to the patients satisfaction.    Alexis Miranda D.O.  Orthopaedic Surgeon    [address] [address]  Phone [phone] (KNEE)  Fax [phone]    This document was created using computer generated templates and voice activated software.    1:54 PM 6/15/2017            "

## 2017-12-28 NOTE — TELEPHONE ENCOUNTER
Patient Information     Patient Name MRN Zoey Richard [id number] Female 1945      Telephone Encounter by Radha James at 2017  4:15 PM     Author:  Radha James Service:  (none) Author Type:  (none)     Filed:  2017  4:24 PM Encounter Date:  2017 Status:  Signed     :  Radha James            Screening Questions for the Scheduling of Screening Colonoscopies   (If Colonoscopy is diagnostic, Provider should review the chart before scheduling.)  Are you younger than 50 or older than 80?  NO  Do you take aspirin or fish oil?  NO (if yes, tell patient to stop 1 week prior to Colonoscopy)  Do you take warfarin (Coumadin), clopidogrel (Plavix), apixaban (Eliquis), dabigatram (Pradaxa), rivaroxaban (Xarelto) or any blood thinner? WARFARIN   Do you use oxygen at home?  NO   Do you have kidney disease? NO NO  Are you on dialysis? NO   Have you had a stroke or heart attack in the last year? NO   Have you had a stent in your heart or any blood vessel in the last year? NO  Have you had a transplant of any organ? NO   Have you had a colonoscopy or upper endoscopy (EGD) before? YES          When?   - [facility]   Date of scheduled Colonoscopy. 08/10/2017  Provider [place]    [facility]  (PHONE # [phone])

## 2017-12-28 NOTE — OR POSTOP
Patient Information     Patient Name MRN Sex Zoey Ortega [id number] Female 1945      OR PostOp by Carrie Sherman RN at 8/10/2017 10:40 AM     Author:  Carrie Sherman RN Service:  (none) Author Type:  NURS- Registered Nurse     Filed:  8/10/2017 10:41 AM Date of Service:  8/10/2017 10:40 AM Status:  Signed     :  Carrie Sherman RN (NURS- Registered Nurse)            Discharge Note    Data:  Zoey Jacobs has been discharged home at 1033 via ambulatory accompanied by Registered Nurse.      Action:  Written discharge/follow-up instructions were provided to patient and friend. Prescriptions : None.  Belongings sent with patient. Medications from home sent with patient/family: Not Applicable  Equipment none .     Response:  Patient verbalized understanding of discharge instructions, reason for discharge, and necessary follow-up appointments.

## 2017-12-28 NOTE — TELEPHONE ENCOUNTER
Patient Information     Patient Name MRN Sex Zoey Ortega [id number] Female 1945      Telephone Encounter by Maryuri Vines RN at 2017  3:43 PM     Author:  Maryuri Vines RN Service:  (none) Author Type:  NURS- Registered Nurse     Filed:  2017  3:46 PM Encounter Date:  2017 Status:  Signed     :  Maryuri Vines RN (NURS- Registered Nurse)            Called Zoey, she had her shoulder surgery and is back on Warfarin and Lovenox.  Wanted to know when to check her INR.  [facility] appointment made for tomorrow, 10/02/17.

## 2017-12-28 NOTE — TELEPHONE ENCOUNTER
Patient Information     Patient Name MRN Sex Zoey Ortega [id number] Female 1945      Telephone Encounter by Elsa Vines RN at 2017  2:54 PM     Author:  Elsa Vines RN Service:  (none) Author Type:  NURS- Registered Nurse     Filed:  2017  3:07 PM Encounter Date:  2017 Status:  Signed     :  Elsa Vines RN (NURS- Registered Nurse)            Called Zoey, she would like Warfarin RX sent to [facility].  Requests 6 months refill.  Anticoagulant    Office visit in the past 12 months.    Last visit with JOSE PAK was on: 2017 in [facility]  Next visit with JOSE PAK is on: No future appointment listed with this provider  Next visit with Internal Medicine is on: 2017 in [facility]    Lab tests:  PT/INR at least monthly    INR (no units)    Date Value   2017 3.3 (H)     HEMOGLOBIN                (g/dL)    Date Value   2017 11.8 (L)     Prescription refilled per RN Medication Refill Policy.................... ELSA VINES RN ....................  2017   3:03 PM

## 2017-12-28 NOTE — OR ANESTHESIA
Patient Information     Patient Name MRN Sex Zoey Ortega [id number] Female 1945      OR Anesthesia by Bulmaro Ramsay CRNA at 8/10/2017  9:19 AM     Author:  Bulmaro Ramsay CRNA Service:  (none) Author Type:  NURS- Nurse Anesthetist     Filed:  8/10/2017  9:19 AM Date of Service:  8/10/2017  9:19 AM Status:  Signed     :  Bulmaro Ramsay CRNA (NURS- Nurse Anesthetist)            ANESTHESIAPREOP      PREANESTHETIC EXAM    Zoey Jacobs is a 72 y.o. female    /94  Pulse 70  Temp 97.7  F (36.5  C)  Resp 18  SpO2 98%  Breastfeeding? No  There is no height or weight on file to calculate BMI.    ALLERGIES    Ciprofloxacin; Neomycin; Sulfa (sulfonamide antibiotics); Tobramycin; Tape  [adhesive]; and Unknown-follow up needed (include details in comments)      PAST MEDICAL HISTORY    Past Medical History:     Diagnosis  Date     AC (acromioclavicular) joint arthritis 2017     Atrial fibrillation (HC)      Complete tear of right rotator cuff 6/15/2017     Depression      H/O scarlet fever     As a child      HTN (hypertension)      Hx of pregnancy     G2, P2      Hypercholesteremia      Impingement syndrome of right shoulder 2017     Insomnia      Right rotator cuff tendinitis 2017       Patient Active Problem List     Diagnosis  Code     HYPERCHOLESTEROLEMIA E78.00     VAGINITIS, ATROPHIC N95.2     DYSPHAGIA UNSPECIFIED R13.10     Major depressive disorder, recurrent episode, unspecified F33.9     CONJUNCTIVITIS, ALLERGIC, ACUTE H10.45     EDEMA OF EYELID, BILAT H02.849     Long Q-T syndrome I45.81     Insomnia, unspecified G47.00     Urge urinary incontinence N39.41     ACP (advance care planning) Z71.89     Abdominal pain R10.9     ED (stress urinary incontinence, female) N39.3     Cystocele N81.10     Hypertension I10     Pacemaker Z95.0     S/P ablation of atrial fibrillation Z98.890, Z86.79     (HFpEF) heart failure with preserved ejection fraction  (HC) I50.30     Anticoagulation monitoring, INR range 2-3 Z79.01     Paroxysmal atrial fibrillation (HC) I48.0     Right rotator cuff tendinitis M75.81     Impingement syndrome of right shoulder M75.41     AC (acromioclavicular) joint arthritis M19.019     Complete tear of right rotator cuff M75.121     Impaired fasting glucose R73.01     Vitamin D deficiency E55.9     Osteoporosis M81.0     Special screening for malignant neoplasms, colon Z12.11       Family History       Problem   Relation Age of Onset     Hypertension  Mother      Hyperlipidemia  Mother      elevated cholesterol       Stroke  Mother      She  at age 86.  She had stroke as a complication of carotid endarterectomy surgery.        Arthritis  Father      Cancer  Father      skin cancer       Stroke  Father      He is 87, has a history of CVA       Other  Father      Alzheimer's       Cancer-breast  Maternal Aunt 50     Other  Sister       of long QT syndrome.       Psychiatric illness  Son      Bipolar,  due to suicide       Cancer-colon  No Family History        Past Surgical History:      Procedure  Laterality Date     Arthroscopic surgery      Left knee       CARDIAC ELECTROPHYSIOLOGY STUDY AND ABLATION       COLONOSCOPY SCREENING      Normal, follow up in 10 years       EP STUDY /ABLATION      Cardiac ablation       PACEMAKER PLACEMENT      Post ablation, due to junctional rythym        ROTATOR CUFF REPAIR      Right shoulder       TONSIL AND ADENOIDECTOMY      As a child       TUBAL LIGATION         Major Anesthetic Reactions: none    PMH/PSH Reviewed      History     Smoking Status       Former Smoker      Packs/day: 0.50     Years: 30.00     Types: Cigarettes     Start date: 1968     Quit date: 2002   Smokeless Tobacco       Never Used      History    Alcohol Use      6.0 oz/week     10 Glasses of wine per week       Medications have been reviewed in coordination with proposed intra-procedure  medications.    Prescriptions Prior to Admission       Medication  Sig Dispense Refill     atorvastatin (LIPITOR) 40 mg tablet TAKE 0.5 TABLET BY MOUTH AT BEDTIME 45 tablet 4     enoxaparin (LOVENOX) 40 mg/0.4 mL injection Inject 40 mg subcutaneous every 12 hours. 12 Syringe 1     ergocalciferol (VITAMIN D2) 50,000 unit capsule Take 1 capsule by mouth once weekly. 12 capsule 0     estradiol (ESTRACE) 0.1 mg/g vaginal cream Uses 2 grams every other night 1 Tube 99     FLUoxetine (PROZAC) 20 mg capsule Take 1 capsule by mouth every morning. 90 capsule 4     lisinopril (PRINIVIL; ZESTRIL) 5 mg tablet Take 1 tablet by mouth once daily. 90 tablet 2     nadolol (CORGARD) 80 mg tablet Take 1 tablet by mouth 2 times daily. 180 tablet 4     naproxen sodium (ALEVE) 220 mg cap Take 1 tablet by mouth 2 times daily.       omeprazole (PRILOSEC) 20 mg Delayed-Release capsule Take 1 capsule by mouth once daily before a meal. (breakfast) 90 capsule 4     oxybutynin XL (DITROPAN XL) 10 mg CR tablet Take 1 tablet by mouth once daily 90 tablet 4     warfarin (COUMADIN) 5 mg tablet Take 1 tablet by mouth two  days a week and one-half  tablet by mouth five days a week 60 tablet 1       Recent Labs  No results found for this visit on 08/10/17.    NPO Status Noted:  Yes    Airway Class:  2    ASA Physical Status: 3    ANESTHETIC PLAN    Anesthetic Plan: Mac    The risks, benefits, and alternatives of the procedure were discussed.    Postop Note: Please see the chart for the postop note.    Bulmaro Ramsay CRNA ....................  8/10/2017   9:19 AM

## 2017-12-28 NOTE — TELEPHONE ENCOUNTER
Patient Information     Patient Name MRN Sex Zoey Ortega [id number] Female 1945      Telephone Encounter by Sheryl Mattson RN at 2017 10:17 AM     Author:  Sheryl Mattson RN Service:  (none) Author Type:  NURS- Registered Nurse     Filed:  2017 10:26 AM Encounter Date:  2017 Status:  Signed     :  Sheryl Mattson RN (NURS- Registered Nurse)            [facility] requested refill. Per system, Vitamin D noted as refilled on 17 for 12 capsules, take once weekly.  Refaxed RX to [facility]. Sheryl Mattson RN ....................  2017   10:23 AM

## 2017-12-28 NOTE — TELEPHONE ENCOUNTER
Patient Information     Patient Name MRN Zoey Richard [id number] Female 1945      Telephone Encounter by Vijay Mcakay MD at 8/3/2017  8:08 AM     Author:  Vijay Mackay MD Service:  (none) Author Type:  Physician     Filed:  8/3/2017  8:09 AM Encounter Date:  2017 Status:  Signed     :  Vijay Mackay MD (Physician)            Based on her CHADS2-VASc score of 4 (heart failure, hypertension, female gender, age) I recommend bridging anticoagulation with Lovenox. The dosing of 40 mg twice a day is correct.      ICD-10-CM    1. Paroxysmal atrial fibrillation (HC) I48.0 enoxaparin (LOVENOX) 40 mg/0.4 mL injection     Orders Placed This Encounter       enoxaparin (LOVENOX) 40 mg/0.4 mL injection      Sig: Inject 40 mg subcutaneous every 12 hours.     Dispense:  12 Syringe     Refill:  1        -- Anticoagulation clinic nurses to assist with bridging   -- Last warfarin dose 6 days before surgery, hold starting 5 days before surgery   -- Start enoxaparin 3 days before surgery   -- Check INR day before surgery, if INR > 1.5 contact MD   -- Last dose of enoxaparin is the morning the day before surgery, hold starting evening before surgery   -- Resume warfarin and enoxaparin postoperatively   -- Stop enoxaparin when INR is therapeutic    Signed, Vijay Mackay MD  Internal Medicine & Pediatrics

## 2017-12-28 NOTE — PATIENT INSTRUCTIONS
Patient Information     Patient Name MRN Zoey Richard [id number] Female 1945      Patient Instructions by Maryuri Vines RN at 2017 10:45 AM     Author:  Maryuri Vines RN Service:  (none) Author Type:  NURS- Registered Nurse     Filed:  2017 10:49 AM Encounter Date:  2017 Status:  Signed     :  Maryuri Vines RN (NURS- Registered Nurse)            2017 Details    Sun Mon Tue Wed Thu Fri Sat         1               2               3                 4               5               6               7               8               9               10                 11               12               13               14               15               16               17                 18               19               20               21               22               23               24                 25               26               27               28               29      2.5 mg   See details      30      5 mg           Date Details    This INR check               How to take your warfarin dose     To take:  2.5 mg Take half of a 5 mg tablet.    To take:  5 mg Take one of the 5 mg tablets.           2017 Details    Sun Mon Tue Wed Thu Fri Sat           1      2.5 mg           2      2.5 mg         3      5 mg         4      2.5 mg         5      5 mg         6      2.5 mg         7      5 mg         8      2.5 mg           9      2.5 mg         10      5 mg         11      2.5 mg         12      5 mg         13      2.5 mg         14               15                 16               17               18               19               20               21               22                 23               24               25               26               27               28               29                 30               31                     Date Details   No additional details    Date of next INR:  2017         How to take your warfarin dose      To take:  2.5 mg Take half of a 5 mg tablet.    To take:  5 mg Take one of the 5 mg tablets.             Description          Take extra 2.5 tomorrow and resume dose. Recheck in 2 weeks. Janessa Olguin RN    6/15/2017  1:21 PM                                  Anticoagulation Summary as of 6/29/2017     INR goal 2.0-3.0    Today's INR 1.7!    Next INR check 7/13/2017          Call your Anticoagulation Clinic at Dept: [phone]   if:   1. Any medications are started, stopped, or there is a change in dose.  2. You experience any bleeding that is not easily stopped or if it is recurrent.  3. You notice an increase in bruising or any bruising that does not heal.  4. You are scheduled for surgery, colonoscopy, dental extraction or any other procedure where you may need to stop your Coumadin (warfarin).

## 2017-12-28 NOTE — OR ANESTHESIA
Patient Information     Patient Name MRN Sex     Zoey Jacobs [id number] Female 1945      OR Anesthesia by Bulmaro Ramsay CRNA at 8/10/2017 10:13 AM     Author:  Bulmaro Ramsay CRNA Service:  (none) Author Type:  NURS- Nurse Anesthetist     Filed:  8/10/2017 10:14 AM Date of Service:  8/10/2017 10:13 AM Status:  Signed     :  Bulmaro Ramsay CRNA (NURS- Nurse Anesthetist)            Anesthesia Post Operative Care Note    Name: Zoey Jacobs  MRN:   [id number]  :    1945       Procedure Done:  See Surgeon Note   Case Cancelled for Anesthetic Reason:  No      Anesthesia Technique    Anesthetic Type:  MAC       MAC Type:  NC     Oral Trauma:  No    Intraoperative Course   Hemodynamics:  Stable    Ventilation Normal:  Yes Lung Sounds:  Normal      PACU Course        Nondepolarizer Used:       Reversed: N/A   Hemodynamics:  Stable      Hydration: Euvolemic   Temperature:  36.1 - 38.3      Mental Status:  Awake, alert, follows commands   Pain Management:  Adequate   Regional Block:  No   Anesthesia Complications:  None      Vital Signs:  Temp: 97.7  F (36.5  C)  Pulse: 70  BP: 145/61  Resp: 18  SpO2: 99 %    O2 Device: Room Air                  Active Lines:  Patient Lines/Drains/Airways Status    Active Line     Name: Placement date: Placement time: Site: Days:    PERIPHERAL VAD Right Wrist 22 08/10/17   0900   Wrist   less than 1                Intake & Output:       Labs:  No results for input(s): FE5VYISYBDA, VTV9VQHEDKFY, PHARTERIAL, KNW0DOYQPFHR, F7GCCSXGDSQK in the last 24 hours.    No results for input(s): MAGNESIUM in the last 24 hours.    No results for input(s): GLUCOSEMETER in the last 720 hours.        Bulmaro Ramsay CRNA ....................  8/10/2017   10:13 AM

## 2017-12-28 NOTE — PATIENT INSTRUCTIONS
Patient Information     Patient Name MRN Zoey Richard [id number] Female 1945      Patient Instructions by Janessa Olguin RN at 2017  3:30 PM     Author:  Janessa Olguin RN Service:  (none) Author Type:  NURS- Registered Nurse     Filed:  2017  3:25 PM Encounter Date:  2017 Status:  Signed     :  Janessa Olguin RN (NURS- Registered Nurse)            2017 Details    Sun Mon Tue Wed Thu Fri Sat       1               2               3               4               5                 6               7               8               9               10               11               12                 13               14               15               16               17               18      2.5 mg   See details      19      2.5 mg           20      2.5 mg         21      5 mg         22      2.5 mg         23      2.5 mg         24      5 mg         25      2.5 mg         26      2.5 mg           27      2.5 mg         28      5 mg         29      2.5 mg         30      2.5 mg         31      5 mg            Date Details    This INR check               How to take your warfarin dose     To take:  2.5 mg Take half of a 5 mg tablet.    To take:  5 mg Take one of the 5 mg tablets.           2017 Details    Sun Mon Tue Wed Thu Fri Sat          1      2.5 mg         2                 3               4               5               6               7               8               9                 10               11               12               13               14               15               16                 17               18               19               20               21               22               23                 24               25               26               27               28               29               30                Date Details   No additional details    Date of next INR:  2017         How to take your warfarin dose      To take:  2.5 mg Take half of a 5 mg tablet.             Description          Resume previous dose and recheck in 2 weeks. Janessa Olguin RN    8/18/2017  3:25 PM    Received phone call today 8/16/17, from [facility] in [place]. Patient is scheduled for surgery on 10/30/17 with Dr. Vences. INR needs to be 1.5 or less per RN who left message. It will be up to the primary doctor to decide if the patient will be bridged with Lovenox.                               Anticoagulation Summary as of 8/18/2017     INR goal 2.0-3.0    Today's INR 2.1    Next INR check 9/1/2017          Call your Anticoagulation Clinic at Dept: [phone]   if:   1. Any medications are started, stopped, or there is a change in dose.  2. You experience any bleeding that is not easily stopped or if it is recurrent.  3. You notice an increase in bruising or any bruising that does not heal.  4. You are scheduled for surgery, colonoscopy, dental extraction or any other procedure where you may need to stop your Coumadin (warfarin).

## 2017-12-28 NOTE — TELEPHONE ENCOUNTER
Patient Information     Patient Name MRN Sex Zoey Ortega [id number] Female 1945      Telephone Encounter by Maryuri Delgado at 2017  9:22 AM     Author:  Maryuri Delgado Service:  (none) Author Type:  (none)     Filed:  2017  9:30 AM Encounter Date:  2017 Status:  Signed     :  Maryuri Delgado            Please call patient - she has questions on dosing recommendations.

## 2017-12-28 NOTE — TELEPHONE ENCOUNTER
Patient Information     Patient Name MRN Zoey Richard [id number] Female 1945      Telephone Encounter by Taylor Corral at 2017 11:28 AM     Author:  Taylor Corral Service:  (none) Author Type:  (none)     Filed:  2017 11:30 AM Encounter Date:  2017 Status:  Signed     :  Taylor Corral            Spoke with patient she would like her Vitamin D 50,000 order sent to [facility]. Taylor Corral LPN......................2017 11:29 AM

## 2017-12-28 NOTE — TELEPHONE ENCOUNTER
Patient Information     Patient Name MRN Zoey Richard [id number] Female 1945      Telephone Encounter by Cee Emanuel RN at 2017 10:05 AM     Author:  Cee Emanuel RN Service:  (none) Author Type:  NURS- Registered Nurse     Filed:  2017 10:06 AM Encounter Date:  2017 Status:  Signed     :  Cee Emanuel RN (NURS- Registered Nurse)            Office visit in the past 12 months or per provider note.    Last visit with JOSE PAK was on: 2017 in [facility]  Next visit with JOSE PAK is on: No future appointment listed with this provider  Next visit with Internal Medicine is on: 06/15/2017 in [facility]    Max refill for 12 months from last office visit or per provider note.  Prescription refilled per RN Medication Refill Policy.................... CEE EMANUEL RN ....................  2017   10:05 AM

## 2017-12-28 NOTE — TELEPHONE ENCOUNTER
Patient Information     Patient Name MRN Zoey Richard [id number] Female 1945      Telephone Encounter by Janessa Olguin RN at 2017  3:42 PM     Author:  Janessa Olguin RN Service:  (none) Author Type:  NURS- Registered Nurse     Filed:  2017  3:47 PM Encounter Date:  2017 Status:  Signed     :  Janessa Olguin RN (NURS- Registered Nurse)            Spoke with patient about what Dr Haywood had replied.  Says she was told several years back she would need bridging. She is on warfarin for Afib. Told her Dr Mackay will be back on 8/3/17. Will send message to him if he wants Lovenox ordered for colonoscopy. Patient will wait for reply. Janessa Olguin RN    2017  3:45 PM

## 2017-12-28 NOTE — TELEPHONE ENCOUNTER
Patient Information     Patient Name MRN Zoey Richard [id number] Female 1945      Telephone Encounter by Janessa Olguin RN at 2017  2:07 PM     Author:  Janessa Olguin RN Service:  (none) Author Type:  NURS- Registered Nurse     Filed:  2017  2:20 PM Encounter Date:  2017 Status:  Signed     :  Janessa Olguin RN (NURS- Registered Nurse)            Patient is having colonoscopy done on 8/10/17 will need Lovenox to start on 17 for bridging. Dr Mackay told patient in OV visit on 17 she will need bridging for colonoscopy.  Please review and sign. Janessa Olguin RN    2017  2:08 PM

## 2017-12-28 NOTE — TELEPHONE ENCOUNTER
Patient Information     Patient Name MRN Sex Zoey Ortega [id number] Female 1945      Telephone Encounter by Fatoumata Schmitt RN at 2017  3:50 PM     Author:  Fatoumata Schmitt RN Service:  (none) Author Type:  NURS- Registered Nurse     Filed:  2017  3:57 PM Encounter Date:  2017 Status:  Signed     :  Fatoumata Schmitt RN (NURS- Registered Nurse)            prozac refilled on 17 #90 x 1 refills, refill request to early and patient due for annual review  Letter sent and My Chart message sent  Unable to complete prescription refill per RN Medication Refill Policy.................... FATOUMATA SCHMITT RN ....................  2017   3:52 PM

## 2017-12-28 NOTE — TELEPHONE ENCOUNTER
Patient Information     Patient Name MRN Zoey Richard [id number] Female 1945      Telephone Encounter by Ludin Haywood MD at 2017  2:50 PM     Author:  Ludin Haywood MD Service:  (none) Author Type:  Physician     Filed:  2017  2:53 PM Encounter Date:  2017 Status:  Signed     :  Ludin Haywood MD (Physician)            Unless I am missing something, it appears patient is only on warfarin due to atrial fibrillation.    I spoke with Dr. Cox, she states patient does not need Lovenox bridging prior to colonoscopy -- if warfarin is only used for atrial fibrillation.  She advised patient to hold warfarin for 3 doses, I believe this is the standard [facility] and [facility] colonoscopy protocol.    Dr. Cox stated that Lovenox shots prior to colonoscopy are only needed if patient has a mechanical heart valve.    Ludin Haywood MD

## 2017-12-28 NOTE — TELEPHONE ENCOUNTER
Patient Information     Patient Name MRN Zoey Richard [id number] Female 1945      Telephone Encounter by Letty Pedroza RN at 2017  2:10 PM     Author:  Letty Pedroza RN Service:  (none) Author Type:  NURS- Registered Nurse     Filed:  2017  2:10 PM Encounter Date:  2017 Status:  Signed     :  Letty Pedroza RN (NURS- Registered Nurse)            Request received from [facility] 17. Request responded to on 17 will disregard. LETTY PEDROZA RN ....................  2017   2:10 PM

## 2017-12-28 NOTE — PROCEDURES
Patient Information     Patient Name MRN Sex Zoey Ortega [id number] Female 1945      Procedures by Faith Cox MD at 8/10/2017 10:00 AM     Author:  Faith Cox MD Service:  (none) Author Type:  Physician     Filed:  8/10/2017 10:02 AM Date of Service:  8/10/2017 10:00 AM Status:  Signed     :  Faith Cox MD (Physician)        Pre-procedure Diagnoses:    1. Special screening for malignant neoplasms, colon [Z12.11]           Post-procedure Diagnoses:    1. Special screening for malignant neoplasms, colon [Z12.11]           Procedures:    1. COLONOSCOPY SCREENING [880917 (Custom)]               PROCEDURE NOTE    SURGEON: Faith Cox MD.    PRE-OP DIAGNOSIS:  Screening Colonoscopy      POST-OP DIAGNOSIS: normal colon    PROCEDURE:  Screening colonoscopy    ESTIMATED BLOOD LOSS: none    COMPLICATIONS:  None    SPECIMEN:  none    ANESTHESIA:  See anesthesia note, anesthesia requested due to: age more than 70    INDICATION FOR THE PROCEDURE: The patient is a 72 y.o. female. The patient has no complaints. I explained to the patient the risks, benefits and alternatives to screening colonoscopy for evaluating the colon for colon polyps and colon cancer. We specifically discussed the risks of bleeding, infection, perforation, potential inability to reach the cecum and the risks of sedation. The patient's questions were answered and the patient wished to proceed. Informed consent paperwork was completed.    PROCEDURE: The patient was taken to the endoscopy suite. Appropriate monitors were attached. The patient was placed in the left lateral decubitus position.Timeout was performed confirming the patient's identity and procedure to be performed.  After appropriate sedation was confirmed, digital rectal exam was performed.  There was normal tone and no gross abnormality was noted. The lubricated colonoscope was introduced into the anus the colon was insufflated with air. The prep quality was  adequate. Under direct visualization the scope was advanced to the cecum. The ileocecal valve was intubated and the terminal ileum inspected. No gross abnormality was noted. The scope was withdrawn back into the cecum. The mucosa of colon was inspected while withdrawing the scope. No abnormalities were noted. The scope was retroflexed in the rectum and the anorectal junction was inspected. No abnormalities were noted. The scope was returned to a neutral position and the colon was decompressed. The scope was removed. The patient tolerated the procedure with no immediately apparent complication. The patient was taken to recovery in stable condition.      FOLLOW UP:  RECOMMEND high fiber diet, follow up: No routine screening due to age greater than 80 at next screening interval.      Faith Cox MD

## 2017-12-28 NOTE — TELEPHONE ENCOUNTER
Patient Information     Patient Name MRN Sex Zoey Ortega [id number] Female 1945      Telephone Encounter by Michell Valladares at 2017  1:56 PM     Author:  Michell Valladares Service:  (none) Author Type:  (none)     Filed:  2017  1:57 PM Encounter Date:  2017 Status:  Signed     :  Michell Valladares            Pt will be going elsewhere to have her surgery. Went ahead and cancelled all appointments regarding this.     Michell Valladares CMA 2017 1:57 PM

## 2017-12-29 NOTE — PATIENT INSTRUCTIONS
Patient Information     Patient Name MRN Zoey Richard [id number] Female 1945      Patient Instructions by Janessa Olguin RN at 2017  1:00 PM     Author:  Janessa Olguin RN Service:  (none) Author Type:  NURS- Registered Nurse     Filed:  2017 12:59 PM Encounter Date:  2017 Status:  Signed     :  Janessa Olguin RN (NURS- Registered Nurse)            2017 Details    Sun Mon Tue Wed Thu Fri Sat        1               2               3               4                 5               6               7               8               9               10               11                 12               13               14               15               16               17               18                 19               20               21      2.5 mg   See details      22      5 mg         23      2.5 mg         24      5 mg         25      2.5 mg           26      2.5 mg         27      5 mg         28      2.5 mg         29      5 mg         30      2.5 mg            Date Details    This INR check               How to take your warfarin dose     To take:  2.5 mg Take half of a 5 mg tablet.    To take:  5 mg Take one of the 5 mg tablets.           2017 Details    Sun Mon Tue Wed Thu Fri Sat          1      5 mg         2      2.5 mg           3      2.5 mg         4      5 mg         5      2.5 mg         6      5 mg         7      2.5 mg         8      5 mg         9      2.5 mg           10      2.5 mg         11      5 mg         12      2.5 mg         13      5 mg         14      2.5 mg         15      5 mg         16      2.5 mg           17      2.5 mg         18      5 mg         19      2.5 mg         20               21               22               23                 24               25               26               27               28               29               30                 31                      Date Details   No additional  details    Date of next INR:  12/19/2017         How to take your warfarin dose     To take:  2.5 mg Take half of a 5 mg tablet.    To take:  5 mg Take one of the 5 mg tablets.             Description           Continue same dose and recheck in 4 weeks. ..............Janessa Olguin RN   11/21/2017    12:59 PM                                      Anticoagulation Summary as of 11/21/2017     INR goal 2.0-3.0    Today's INR 2.7    Next INR check 12/19/2017          Call your Anticoagulation Clinic at Dept: [phone]   if:   1. Any medications are started, stopped, or there is a change in dose.  2. You experience any bleeding that is not easily stopped or if it is recurrent.  3. You notice an increase in bruising or any bruising that does not heal.  4. You are scheduled for surgery, colonoscopy, dental extraction or any other procedure where you may need to stop your Coumadin (warfarin).  .

## 2017-12-29 NOTE — H&P
Patient Information     Patient Name MRN Sex Zoey Ortega [id number] Female 1945      H&P by Faith Cox MD at 8/10/2017  9:22 AM     Author:  Faith Cox MD Service:  (none) Author Type:  Physician     Filed:  8/10/2017  9:23 AM Date of Service:  8/10/2017  9:22 AM Status:  Signed     :  Faith Cox MD (Physician)            PRE-PROCEDURE NOTE    CHIEF COMPLAINT / REASON FOR PROCEDURE:  Need for screening colonoscopy.    PERTINENT HISTORY   Patient with no complaints. Previous colonoscopy -no polyps. No diarrhea, constipation, abdominal pain or rectal bleeding. No family history of colon polyps or colon cancer.    Past Medical History:     Diagnosis  Date     AC (acromioclavicular) joint arthritis 2017     Atrial fibrillation (HC)      Complete tear of right rotator cuff 6/15/2017     Depression      H/O scarlet fever     As a child      HTN (hypertension)      Hx of pregnancy     G2, P2      Hypercholesteremia      Impingement syndrome of right shoulder 2017     Insomnia      Right rotator cuff tendinitis 2017     Past Surgical History:      Procedure  Laterality Date     Arthroscopic surgery      Left knee       CARDIAC ELECTROPHYSIOLOGY STUDY AND ABLATION       COLONOSCOPY SCREENING      Normal, follow up in 10 years       EP STUDY /ABLATION      Cardiac ablation       PACEMAKER PLACEMENT      Post ablation, due to junctional rythym        ROTATOR CUFF REPAIR      Right shoulder       TONSIL AND ADENOIDECTOMY      As a child       TUBAL LIGATION       Other:  None  Bleeding tendencies:  Yes- on coumadin    Relevant Family History:  None    Relevant Social History:  None    A relevant review of systems was performed and was negative.    ALLERGIES/SENSITIVITIES:   Allergies      Allergen   Reactions     Ciprofloxacin  Other - Describe In Comment Field     Patient reports she has Long QT syndrome      Neomycin  Itching     Swelling , redness       Sulfa (Sulfonamide Antibiotics)  Nausea And Vomiting     Abdominal pain      Tobramycin  Rash     Tape  [Adhesive]  Rash     Reaction also to EKG lead pads.       Unknown-Follow Up Needed (Include Details In Comments)  Cardiac Arrest     Please verify with pharmacist prior to prescribing any medications due to her QT Syndrome         CURRENT MEDICATIONS:    Current Facility-Administered Medications        Medication  Dose Route Frequency Last Rate     lactated Ringers infusion  25 mL/hr Intravenous continuous 25 mL/hr (08/10/17 0900)     lidocaine (1%) injection 0.1-1 mL  0.1-1 mL Intra-Dermal one time prn       sodium chloride 0.9% 5 mL syringe (NORMAL SALINE)  5 mL Intravenous Each Time PRN        Prior to Admission medications          Medication Sig Start Date End Date Taking? Last Dose Authorizing Provider   atorvastatin (LIPITOR) 40 mg tablet TAKE 0.5 TABLET BY MOUTH AT BEDTIME 7/14/17 8/9/2017 at 2200 Vijay Mackay MD   enoxaparin (LOVENOX) 40 mg/0.4 mL injection Inject 40 mg subcutaneous every 12 hours. 8/3/17   8/9/2017 at 0700 Vijay Mackay MD   ergocalciferol (VITAMIN D2) 50,000 unit capsule Take 1 capsule by mouth once weekly. 8/7/17    Vijay Mackay MD   estradiol (ESTRACE) 0.1 mg/g vaginal cream Uses 2 grams every other night 7/14/17   More than one month ago at Unknown time Vijay Mackay MD   FLUoxetine (PROZAC) 20 mg capsule Take 1 capsule by mouth every morning. 7/14/17   8/10/2017 at 0700 Vijay Mackay MD   lisinopril (PRINIVIL; ZESTRIL) 5 mg tablet Take 1 tablet by mouth once daily. 7/14/17 8/9/2017 at 2200 Vijay Mackay MD   nadolol (CORGARD) 80 mg tablet Take 1 tablet by mouth 2 times daily. 7/14/17   8/10/2017 at 0700 Vijay Mackay MD   naproxen sodium (ALEVE) 220 mg cap Take 1 tablet by mouth 2 times daily. 7/14/17   8/2/17am Vijay Mackay MD   omeprazole (PRILOSEC) 20 mg Delayed-Release capsule Take 1 capsule by mouth once daily before a meal. (breakfast)  7/14/17   8/10/2017 at 0700 Vijay Mackay MD   oxybutynin XL (DITROPAN XL) 10 mg CR tablet Take 1 tablet by mouth once daily 7/25/17   8/10/2017 at 0700 Vijay Mackay MD   warfarin (COUMADIN) 5 mg tablet Take 1 tablet by mouth two  days a week and one-half  tablet by mouth five days a week 7/31/17 8/4/2017 at 1800 Vijay Mackay MD       PRE-SEDATION ASSESSMENT:    Lung Exam:  Normal  Heart Exam:  Normal    Comment(s):      IMPRESSION:  Need for screening colonoscopy.    PLAN:  I discussed screening colonoscopy with the patient. Anesthesia coverage requested due to age more than 70.    Faith Cox MD

## 2017-12-29 NOTE — PATIENT INSTRUCTIONS
Patient Information     Patient Name MRN Zoey Richard [id number] Female 1945      Patient Instructions by Janessa Olguin RN at 2017  3:15 PM     Author:  Janessa Olguin RN Service:  (none) Author Type:  NURS- Registered Nurse     Filed:  2017  3:16 PM Encounter Date:  2017 Status:  Signed     :  Janessa Olguin RN (NURS- Registered Nurse)            2017 Details    Sun  Fri Sat       1               2               3               4               5                 6               7               8               9      Hold   See details      10      5 mg         11      5 mg         12      5 mg           13      2.5 mg         14      5 mg         15               16               17               18               19                 20               21               22               23               24               25               26                 27               28               29               30               31                  Date Details    This INR check       Date of next INR:  2017         How to take your warfarin dose     To take:  2.5 mg Take half of a 5 mg tablet.    To take:  5 mg Take one of the 5 mg tablets.    Hold Do not take your warfarin dose. The details table to the right may include additional information.                  Description          Resume dose after procedure taking 5 mg x 3 days and 2.5 mg x 1 day recheck on 17. Janessa Olguin RN    2017  3:15 PM                                    Anticoagulation Summary as of 2017     INR goal 2.0-3.0    Today's INR 1.2    Next INR check 2017          Call your Anticoagulation Clinic at Dept: [phone]   if:   1. Any medications are started, stopped, or there is a change in dose.  2. You experience any bleeding that is not easily stopped or if it is recurrent.  3. You notice an increase in bruising or any bruising that does not heal.  4. You  are scheduled for surgery, colonoscopy, dental extraction or any other procedure where you may need to stop your Coumadin (warfarin).

## 2017-12-29 NOTE — PATIENT INSTRUCTIONS
Patient Information     Patient Name MRN Zoey Richard [id number] Female 1945      Patient Instructions by Elsa Vines RN at 2017 11:30 AM     Author:  Elsa Vines RN Service:  (none) Author Type:  NURS- Registered Nurse     Filed:  2017 11:36 AM Encounter Date:  2017 Status:  Signed     :  Elsa Vines RN (NURS- Registered Nurse)            2017 Details    Sun  Fri Sat           1                 2               3               4               5               6               7               8                 9               10               11               12               13      2.5 mg   See details      14      5 mg         15      2.5 mg           16      2.5 mg         17      5 mg         18      2.5 mg         19      2.5 mg         20      2.5 mg         21      5 mg         22      2.5 mg           23      2.5 mg         24      5 mg         25      2.5 mg         26      2.5 mg         27      2.5 mg         28               29                 30               31                     Date Details    This INR check       Date of next INR:  2017         How to take your warfarin dose     To take:  2.5 mg Take half of a 5 mg tablet.    To take:  5 mg Take one of the 5 mg tablets.             Description          Increase Warfarin/Coumadin and recheck INR in 2 weeks.  ELSA VINES RN ....................  2017   10:49 AM                                  Anticoagulation Summary as of 2017     INR goal 2.0-3.0    Today's INR 3.3!    Next INR check 2017          Call your Anticoagulation Clinic at Dept: [phone]   if:   1. Any medications are started, stopped, or there is a change in dose.  2. You experience any bleeding that is not easily stopped or if it is recurrent.  3. You notice an increase in bruising or any bruising that does not heal.  4. You are scheduled for surgery, colonoscopy, dental  extraction or any other procedure where you may need to stop your Coumadin (warfarin).

## 2017-12-29 NOTE — PATIENT INSTRUCTIONS
Patient Information     Patient Name MRN Zoey Richard [id number] Female 1945      Patient Instructions by Janessa Olguin RN at 6/15/2017  1:15 PM     Author:  Janessa Olguin RN Service:  (none) Author Type:  NURS- Registered Nurse     Filed:  6/15/2017  1:21 PM Encounter Date:  6/15/2017 Status:  Signed     :  Janessa Olguin RN (NURS- Registered Nurse)            2017 Details    Sun  Fri Sat         1               2               3                 4               5               6               7               8               9               10                 11               12               13               14               15      5 mg   See details      16      5 mg         17      2.5 mg           18      2.5 mg         19      5 mg         20      2.5 mg         21      2.5 mg         22      5 mg         23      2.5 mg         24      2.5 mg           25      2.5 mg         26      5 mg         27      2.5 mg         28      2.5 mg         29      5 mg         30                 Date Details   06/15 This INR check       Date of next INR:  2017         How to take your warfarin dose     To take:  2.5 mg Take half of a 5 mg tablet.    To take:  5 mg Take one of the 5 mg tablets.             Description          Take extra 2.5 tomorrow and resume dose. Recheck in 2 weeks. Janessa Olguin RN    6/15/2017  1:21 PM                                  Anticoagulation Summary as of 6/15/2017     INR goal 2.0-3.0    Today's INR 1.5    Next INR check 2017          Call your Anticoagulation Clinic at Dept: [phone]   if:   1. Any medications are started, stopped, or there is a change in dose.  2. You experience any bleeding that is not easily stopped or if it is recurrent.  3. You notice an increase in bruising or any bruising that does not heal.  4. You are scheduled for surgery, colonoscopy, dental extraction or any other procedure where you may need  to stop your Coumadin (warfarin).

## 2017-12-29 NOTE — PATIENT INSTRUCTIONS
Patient Information     Patient Name MRN Zoey Richard [id number] Female 1945      Patient Instructions by Vijay Mackay MD at 10/13/2017 10:30 AM     Author:  Vijay Mackay MD Service:  (none) Author Type:  Physician     Filed:  10/13/2017 10:52 AM Encounter Date:  10/13/2017 Status:  Signed     :  Vijay Mackay MD (Physician)             -- Hold aspirin, Advil/ibuprofen, Aleve/naproxen for 7 days before surgery   -- Acetaminophen (Tylenol) is okay   -- Hold vitamins and herbal remedies for 7 days before surgery     -- Anticoagulation clinic nurses to assist with bridging   -- Last warfarin dose 6 days before surgery, hold starting 5 days before surgery   -- Start enoxaparin 3 days before surgery   -- Check INR day before surgery, if INR > 1.5 contact MD   -- Last dose of enoxaparin is the morning the day before surgery, hold starting evening before surgery   -- Resume warfarin and enoxaparin postoperatively   -- Stop enoxaparin when INR is therapeutic

## 2017-12-29 NOTE — PATIENT INSTRUCTIONS
Patient Information     Patient Name MRN Zoey Richard [id number] Female 1945      Patient Instructions by Janessa Olguin RN at 2017  1:45 PM     Author:  Janessa Olguin RN Service:  (none) Author Type:  NURS- Registered Nurse     Filed:  2017  1:49 PM Encounter Date:  2017 Status:  Signed     :  Janessa Olguin RN (NURS- Registered Nurse)            2017 Details    Sun Mon Tue Wed Thu Fri Sat       1      2.5 mg   See details      2      2.5 mg         3      2.5 mg         4      5 mg         5      Hold           6      Hold         7      Hold         8      Hold         9      Hold         10      5 mg         11      5 mg         12      2.5 mg           13      2.5 mg         14      5 mg         15               16               17               18               19                 20               21               22               23               24               25               26                 27               28               29               30               31                  Date Details    This INR check       Date of next INR:  2017         How to take your warfarin dose     To take:  2.5 mg Take half of a 5 mg tablet.    To take:  5 mg Take one of the 5 mg tablets.    Hold Do not take your warfarin dose. The details table to the right may include additional information.                  Description          Stop taking warfarin  and start Lovenox on  last dose of lovenox  resume lovenox and warfarin on 8/10/17. Recheck INR 17. Janessa Olguin RN    2017  1:48 PM                                  Anticoagulation Summary as of 2017     INR goal 2.0-3.0    Today's INR 1.6    Next INR check 2017          Call your Anticoagulation Clinic at Dept: [phone]   if:   1. Any medications are started, stopped, or there is a change in dose.  2. You experience any bleeding that is not easily stopped or if it is  recurrent.  3. You notice an increase in bruising or any bruising that does not heal.  4. You are scheduled for surgery, colonoscopy, dental extraction or any other procedure where you may need to stop your Coumadin (warfarin).

## 2017-12-29 NOTE — PATIENT INSTRUCTIONS
Patient Information     Patient Name MRN Sex Zoey Ortega [id number] Female 1945      Patient Instructions by Janessa Olguin RN at 2017  9:00 AM     Author:  Janessa Olguin RN Service:  (none) Author Type:  NURS- Registered Nurse     Filed:  2017  9:02 AM Encounter Date:  2017 Status:  Signed     :  Janessa Olguin RN (NURS- Registered Nurse)            2017 Details    Sun Mon Tue Wed Thu Fri Sat         1               2               3                 4               5               6      2.5 mg   See details      7      2.5 mg         8      5 mg         9      2.5 mg         10      2.5 mg           11      2.5 mg         12      5 mg         13      2.5 mg         14      2.5 mg         15      5 mg         16               17                 18               19               20               21               22               23               24                 25               26               27               28               29               30                 Date Details    This INR check       Date of next INR:  6/15/2017         How to take your warfarin dose     To take:  2.5 mg Take half of a 5 mg tablet.    To take:  5 mg Take one of the 5 mg tablets.             Description          Continue same Warfarin dose and recheck in 1 month. Janessa Olguin RN    2017  11:20 AM                              Anticoagulation Summary as of 2017     INR goal 2.0-3.0    Today's INR 1.1    Next INR check 6/15/2017          Call your Anticoagulation Clinic at Dept: [phone]   if:   1. Any medications are started, stopped, or there is a change in dose.  2. You experience any bleeding that is not easily stopped or if it is recurrent.  3. You notice an increase in bruising or any bruising that does not heal.  4. You are scheduled for surgery, colonoscopy, dental extraction or any other procedure where you may need to stop your Coumadin (warfarin).

## 2017-12-29 NOTE — PATIENT INSTRUCTIONS
Patient Information     Patient Name MRN Zoey Richard [id number] Female 1945      Patient Instructions by Janessa Olguin RN at 2017 11:15 AM     Author:  Janessa Olguin RN Service:  (none) Author Type:  NURS- Registered Nurse     Filed:  2017 11:26 AM Encounter Date:  2017 Status:  Signed     :  Janessa Olguin RN (NURS- Registered Nurse)            2017 Details    Sun  Thu Fri Sat        1               2               3               4                 5               6               7      2.5 mg   See details      8      5 mg         9      2.5 mg         10      5 mg         11      2.5 mg           12      2.5 mg         13      5 mg         14      2.5 mg         15      5 mg         16      2.5 mg         17      5 mg         18      2.5 mg           19      2.5 mg         20      5 mg         21      2.5 mg         22               23               24               25                 26               27               28               29               30                  Date Details    This INR check       Date of next INR:  2017         How to take your warfarin dose     To take:  2.5 mg Take half of a 5 mg tablet.    To take:  5 mg Take one of the 5 mg tablets.             Description           Continue Lovenox tomorrow then d/c. Take 5 mg x 3 days and 2.5 mg x 4 days and recheck in 2 weeks. Janessa Olguin RN    2017  11:25 AM                                    Anticoagulation Summary as of 2017     INR goal 2.0-3.0    Today's INR 2.6    Next INR check 2017          Call your Anticoagulation Clinic at Dept: [phone]   if:   1. Any medications are started, stopped, or there is a change in dose.  2. You experience any bleeding that is not easily stopped or if it is recurrent.  3. You notice an increase in bruising or any bruising that does not heal.  4. You are scheduled for surgery, colonoscopy, dental  extraction or any other procedure where you may need to stop your Coumadin (warfarin).

## 2017-12-29 NOTE — PATIENT INSTRUCTIONS
Patient Information     Patient Name MRN Sex Zoey Ortega [id number] Female 1945      Patient Instructions by Janessa Olguin RN at 8/15/2017  3:30 PM     Author:  Janessa Olguin RN Service:  (none) Author Type:  NURS- Registered Nurse     Filed:  8/15/2017  3:36 PM Encounter Date:  8/15/2017 Status:  Signed     :  Janessa Olguin RN (NURS- Registered Nurse)            2017 Details    Sun  Sat       1               2               3               4               5                 6               7               8               9               10               11               12                 13               14               15      5 mg   See details      16      2.5 mg         17               18               19                 20               21               22               23               24               25               26                 27               28               29               30               31                  Date Details   08/15 This INR check       Date of next INR:  2017         How to take your warfarin dose     To take:  2.5 mg Take half of a 5 mg tablet.    To take:  5 mg Take one of the 5 mg tablets.             Description          Take 5 mg today and recheck on Wed. Janessa Olguin RN    8/15/2017  3:35 PM                                    Anticoagulation Summary as of 8/15/2017     INR goal 2.0-3.0    Today's INR 1.6    Next INR check 2017          Call your Anticoagulation Clinic at Dept: [phone]   if:   1. Any medications are started, stopped, or there is a change in dose.  2. You experience any bleeding that is not easily stopped or if it is recurrent.  3. You notice an increase in bruising or any bruising that does not heal.  4. You are scheduled for surgery, colonoscopy, dental extraction or any other procedure where you may need to stop your Coumadin (warfarin).

## 2017-12-29 NOTE — PATIENT INSTRUCTIONS
Patient Information     Patient Name MRN Zoey Richard [id number] Female 1945      Patient Instructions by Janessa Olguin RN at 2017  3:45 PM     Author:  Janessa Olguin RN Service:  (none) Author Type:  NURS- Registered Nurse     Filed:  2017  3:49 PM Encounter Date:  2017 Status:  Signed     :  Janessa Olguin RN (NURS- Registered Nurse)            2017 Details    Sun Mon Tue Wed Thu Fri Sat          1               2                 3               4               5      2.5 mg   See details      6      2.5 mg         7      5 mg         8      2.5 mg         9      2.5 mg           10      2.5 mg         11      5 mg         12      2.5 mg         13      2.5 mg         14      5 mg         15      2.5 mg         16      2.5 mg           17      2.5 mg         18      5 mg         19      2.5 mg         20      2.5 mg         21      5 mg         22      2.5 mg         23      2.5 mg           24      2.5 mg         25      5 mg         26      2.5 mg         27      2.5 mg         28      5 mg         29      2.5 mg         30      2.5 mg          Date Details    This INR check               How to take your warfarin dose     To take:  2.5 mg Take half of a 5 mg tablet.    To take:  5 mg Take one of the 5 mg tablets.           2017 Details    Sun Mon Tue Wed Thu Fri Sat     1      2.5 mg         2      5 mg         3      2.5 mg         4               5               6               7                 8               9               10               11               12               13               14                 15               16               17               18               19               20               21                 22               23               24               25               26               27               28                 29               30               31                    Date Details   No additional details     Date of next INR:  10/3/2017         How to take your warfarin dose     To take:  2.5 mg Take half of a 5 mg tablet.    To take:  5 mg Take one of the 5 mg tablets.             Description          Continue same Warfarin dose and recheck in 1 month.  Janessa Olguin RN   9/5/2017    3:49 PM     Received phone call today 8/16/17, from  in [place]. Patient is scheduled for surgery on 10/30/17 with Dr. Vences. INR needs to be 1.5 or less per RN who left message. It will be up to the primary doctor to decide if the patient will be bridged with Lovenox.                               Anticoagulation Summary as of 9/5/2017     INR goal 2.0-3.0    Today's INR 2.1    Next INR check 10/3/2017          Call your Anticoagulation Clinic at Dept: [phone]   if:   1. Any medications are started, stopped, or there is a change in dose.  2. You experience any bleeding that is not easily stopped or if it is recurrent.  3. You notice an increase in bruising or any bruising that does not heal.  4. You are scheduled for surgery, colonoscopy, dental extraction or any other procedure where you may need to stop your Coumadin (warfarin).

## 2017-12-29 NOTE — PATIENT INSTRUCTIONS
Patient Information     Patient Name MRN Zoey Richard [id number] Female 1945      Patient Instructions by Janessa Olguin RN at 10/3/2017  1:30 PM     Author:  Janessa Olguin RN Service:  (none) Author Type:  NURS- Registered Nurse     Filed:  10/3/2017  1:31 PM Encounter Date:  10/3/2017 Status:  Signed     :  Janessa Olguin RN (NURS- Registered Nurse)            2017 Details    Sun Mon Tue Wed Thu Fri Sat     1               2               3      2.5 mg   See details      4      5 mg         5      2.5 mg         6      5 mg         7      2.5 mg           8      2.5 mg         9      5 mg         10      2.5 mg         11      5 mg         12      2.5 mg         13      5 mg         14                 15               16               17               18               19               20               21                 22               23               24               25               26               27               28                 29               30               31                    Date Details   10/03 This INR check       Date of next INR:  10/13/2017         How to take your warfarin dose     To take:  2.5 mg Take half of a 5 mg tablet.    To take:  5 mg Take one of the 5 mg tablets.             Description          Increase dose and recheck in 2 weeks. .............Janessa Olguin RN.......... 10/3/2017  1:31 PM       Received phone call today 17, from [facility] in [place]. Patient is scheduled for surgery on 10/30/17 with Dr. Vences. INR needs to be 1.5 or less per RN who left message. It will be up to the primary doctor to decide if the patient will be bridged with Lovenox.                               Anticoagulation Summary as of 10/3/2017     INR goal 2.0-3.0    Today's INR 1.9    Next INR check 10/13/2017          Call your Anticoagulation Clinic at Dept: [phone]   if:   1. Any medications are started, stopped, or there is a change in  dose.  2. You experience any bleeding that is not easily stopped or if it is recurrent.  3. You notice an increase in bruising or any bruising that does not heal.  4. You are scheduled for surgery, colonoscopy, dental extraction or any other procedure where you may need to stop your Coumadin (warfarin).

## 2017-12-29 NOTE — H&P
"Patient Information     Patient Name MRN Sex Zoey Ortega [id number] Female 1945      H&P by Vijay Mackay MD at 10/13/2017 10:30 AM     Author:  Vijay Mackay MD Service:  (none) Author Type:  Physician     Filed:  10/13/2017  4:44 PM Encounter Date:  10/13/2017 Status:  Signed     :  Vijay Mackay MD (Physician)            PREOPERATIVE HISTORY & PHYSICAL  Date of Exam: 10/13/2017  Chief Complaint     Patient presents with       Pre-Op Exam      10/30/17 Dr. Traore Right rotator cuff repair       Nursing Notes:   Carla Beckford  10/13/2017 10:27 AM  Signed  Date of Surgery: 10/30/17  Type of Surgery: Right Rotator Cuff Repair  Surgeon: Dr. Alonzo Traore  Hospital:  [facility]  Fax: does not have.    Fever/Chills or other infectious symptoms in past month: NO  >10lb weight loss in past two months: NO  O2 SAT: 98%    Health Care Directive/Code status:  Yes  Hx of blood transfusions:   (NO)   Td up to date:  YES,   History of VRE/MRSA:  (NO) Date: N/A    Preoperative Evaluation: Obstructive Sleep Apnea screening    S: Snore -  Do you snore loudly? (louder than talking or loud enough to be heard through closed doors)(NO)  T: Tired - Do you often feel tired, fatigued, or sleepy during the daytime?(NO)  O: Observed - Has anyone ever observed you stop breathing during your sleep?(NO)  P: Pressure - Do you have or are you being treated for high blood pressure?(YES)  B: BMI - BMI greater than 35kg/m2?(NO)  A: Age - Age over 50 years old?(YES)  N: Neck - Neck circumference greater than 40 cm?(NO)  G: Gender - Gender: Male?(NO)    Total number of \"YES\" responses:  2    Scoring: Low risk of TAMMY 0-2  At Risk of TAMMY: >3 High Risk of TAMMY: 5-8    Carla Beckford LPN...................  10/13/2017   10:20 AM        HPI:  I was asked to see Ms. Zoey Jacobs by Dr. Sutton for preoperative management of atrial fibrillation.    Zoey Jacobs is a 72 y.o. female with a " history of atrial fibrillation, long QT syndrome, diastolic dysfunction here for preoperative examination. The most physically active she has been over the past 2 weeks was: Silver sneakers without chest pains.    Patient Active Problem List       Diagnosis  Date Noted     Pacemaker  01/07/2015     Priority: High      Placed after first ablation in 2011        Long Q-T syndrome  11/13/2012     Priority: High      PLEASE VERIFY WITH PHARMACIST PRIOR TO PRESCRIBING ANY MEDICATIONS DUE TO HER QT SYNDROME. Followed at [facility]        Special screening for malignant neoplasms, colon  08/09/2017     Osteoporosis  07/25/2017     Impaired fasting glucose  07/14/2017     Vitamin D deficiency  07/14/2017     Complete tear of right rotator cuff  06/15/2017     Right rotator cuff tendinitis  05/23/2017     Impingement syndrome of right shoulder  05/23/2017     AC (acromioclavicular) joint arthritis  05/23/2017     Paroxysmal atrial fibrillation (HC)  01/19/2016     Anticoagulation monitoring, INR range 2-3  05/21/2015     S/P ablation of atrial fibrillation  01/07/2015     (HFpEF) heart failure with preserved ejection fraction (HC)  01/07/2015     Hypertension  04/16/2014     ED (stress urinary incontinence, female)  04/15/2014     Cystocele  04/15/2014     Abdominal pain  03/17/2014     ACP (advance care planning)  01/02/2014     Urge urinary incontinence  04/29/2013     Insomnia, unspecified  01/07/2013     Major depressive disorder, recurrent episode, unspecified       controlled          EDEMA OF EYELID, BILAT  03/03/2011     CONJUNCTIVITIS, ALLERGIC, ACUTE  02/28/2011     DYSPHAGIA UNSPECIFIED  01/19/2011     HYPERCHOLESTEROLEMIA       VAGINITIS, ATROPHIC       Past Medical History:     Diagnosis  Date     AC (acromioclavicular) joint arthritis 5/23/2017     Atrial fibrillation (HC)      Complete tear of right rotator cuff 6/15/2017     Depression      H/O scarlet fever     As a child      HTN (hypertension)      Hx of  pregnancy     G2, P2      Hypercholesteremia      Impingement syndrome of right shoulder 5/23/2017     Insomnia      Right rotator cuff tendinitis 5/23/2017     Past Surgical History:      Procedure  Laterality Date     Arthroscopic surgery  2004    Left knee       CARDIAC ELECTROPHYSIOLOGY STUDY AND ABLATION  2015     COLONOSCOPY SCREENING  2007    Normal, follow up in 10 years       COLONOSCOPY SCREENING  08/10/2017    no follow up due to age being greater than 80 at next screening interval       EP STUDY /ABLATION  2011    Cardiac ablation       PACEMAKER PLACEMENT  2011    Post ablation, due to junctional rythym        ROTATOR CUFF REPAIR  2004    Right shoulder       TONSIL AND ADENOIDECTOMY      As a child       TUBAL LIGATION       Current Outpatient Prescriptions       Medication  Sig Dispense Refill     atorvastatin (LIPITOR) 40 mg tablet TAKE 0.5 TABLET BY MOUTH AT BEDTIME 45 tablet 4     calcium carbonate (CALCIUM 500) 500 mg calcium (1,250 mg) tablet Take 1 tablet by mouth once daily with a meal.  0     cyanocobalamin (VITAMIN B-12) 1,000 mcg tablet Take 1 tablet by mouth once daily.  0     enoxaparin (LOVENOX) 40 mg/0.4 mL injection Inject 40 mg subcutaneous every 12 hours. 12 Syringe 1     ergocalciferol (VITAMIN D2) 50,000 unit capsule Take 1 capsule by mouth once weekly. 12 capsule 0     estradiol (ESTRACE) 0.1 mg/g vaginal cream Uses 2 grams every other night 1 Tube 99     FLUoxetine (PROZAC) 20 mg capsule Take 1 capsule by mouth every morning. 90 capsule 4     lisinopril (PRINIVIL; ZESTRIL) 5 mg tablet Take 1 tablet by mouth once daily. 90 tablet 2     nadolol (CORGARD) 80 mg tablet Take 1 tablet by mouth 2 times daily. 180 tablet 4     naproxen sodium (ALEVE) 220 mg cap Take 1 tablet by mouth 2 times daily.       omeprazole (PRILOSEC) 20 mg Delayed-Release capsule Take 1 capsule by mouth once daily before a meal. (breakfast) 90 capsule 4     oxybutynin XL (DITROPAN XL) 10 mg CR tablet Take 1  tablet by mouth once daily 90 tablet 4     warfarin (COUMADIN) 5 mg tablet Take 1 tablet by mouth 3  days a week and one-half  tablet by mouth 4 days a week 60 tablet 1     No current facility-administered medications for this visit.      Medications have been reviewed by me and are current to the best of my knowledge and ability.    Allergies      Allergen   Reactions     Ciprofloxacin  Other - Describe In Comment Field     Patient reports she has Long QT syndrome      Neomycin  Itching     Swelling , redness      Sulfa (Sulfonamide Antibiotics)  Nausea And Vomiting     Abdominal pain      Tobramycin  Rash     Tape  [Adhesive]  Rash     Reaction also to EKG lead pads.       Unknown-Follow Up Needed (Include Details In Comments)  Cardiac Arrest     Please verify with pharmacist prior to prescribing any medications due to her QT Syndrome      Family History       Problem   Relation Age of Onset     Hypertension  Mother      Hyperlipidemia  Mother      elevated cholesterol       Stroke  Mother      She  at age 86.  She had stroke as a complication of carotid endarterectomy surgery.        Arthritis  Father      Cancer  Father      skin cancer       Stroke  Father      He is 87, has a history of CVA       Other  Father      Alzheimer's       Cancer-breast  Maternal Aunt 50     Other  Sister       of long QT syndrome.       Psychiatric illness  Son      Bipolar,  due to suicide       Cancer-colon  No Family History      Anesthesia Malignant Hyperthermia  No Family History      Social History       Substance Use Topics         Smoking status:  Former Smoker      Packs/day: 0.50      Years: 30.00      Types: Cigarettes      Start date: 1968      Quit date: 2002      Smokeless tobacco:  Never Used      Alcohol use  6.0 oz/week     10 Glasses of wine per week        REVIEW OF SYSTEMS:  Review of Systems:  Constitutional: Normal  Eyes: Normal  Ears/nose/mouth/throat: Normal  Cardiology/vascular:  "Normal  Respiratory: Normal  Psychiatric: Normal  GI: Normal  : Normal  Musculoskeletal: She's been having right shoulder pain for which she is going to have surgery.  Neurological: Normal  Endocrine: Normal  Hematological/lymphatic: Normal  Integumentary: Normal  Allergy/immunizations: Normal    EXAM:   Vitals: reviewed in EMR.  /74  Pulse 94  Temp 96.9  F (36.1  C) (Tympanic)   Resp 18  Ht 1.702 m (5' 7\")  Wt 83.8 kg (184 lb 12.8 oz)  SpO2 98%  BMI 28.94 kg/m2    Gen: Pleasant female, NAD.  HEENT: MMM, no OP erythema.   Neck: Supple, no JVD, no bruits.  CV: RRR no m/r/g.   Pulm: CTAB no w/r/r  Neuro: Grossly intact  Msk: No lower extremity edema.  Skin: No concerning lesions.  Psychiatric: Normal affect and insight. Does not appear anxious or depressed.      DIAGNOSTICS:   EKG:  Normal sinus rhythm  prolongation of QTc, 511 ms    Labs:  Results for orders placed or performed in visit on 10/13/17      BASIC METABOLIC PANEL      Result  Value Ref Range    SODIUM 142 133 - 143 mmol/L    POTASSIUM 4.7 3.5 - 5.1 mmol/L    CHLORIDE 104 98 - 107 mmol/L    CO2,TOTAL 27 21 - 31 mmol/L    ANION GAP 11 5 - 18                    GLUCOSE 81 70 - 105 mg/dL    CALCIUM 9.4 8.6 - 10.3 mg/dL    BUN 17 7 - 25 mg/dL    CREATININE 0.83 0.70 - 1.30 mg/dL    BUN/CREAT RATIO           20                    GFR if African American >60 >60 ml/min/1.73m2    GFR if not African American >60 >60 ml/min/1.73m2   VITAMIN D 25 (DEFICIENCY)      Result  Value Ref Range    VITAMIN D TOTAL AFL 32.6   ng/mL   PROTIME-INR      Result  Value Ref Range    INR 2.8 (H) <1.3    PROTIME 34.1 (H) 11.9 - 15.2 sec         IMPRESSION:     ICD-10-CM    1. Preop examination Z01.818 NH ELECTROCARDIOGRAM TRACING   2. Paroxysmal atrial fibrillation (HC) I48.0 enoxaparin (LOVENOX) 40 mg/0.4 mL injection      PROTIME-INR      PROTIME-INR      EKG 12 LEAD UNIT PERFORMED      PROTIME-INR   3. Long Q-T syndrome I45.81 BASIC METABOLIC PANEL      EKG 12 LEAD " UNIT PERFORMED      BASIC METABOLIC PANEL   4. Pacemaker Z95.0    5. Hypertension I10 BASIC METABOLIC PANEL      BASIC METABOLIC PANEL   6. S/P ablation of atrial fibrillation Z98.890      Z86.79    7. (HFpEF) heart failure with preserved ejection fraction (HC) I50.30    8. Vitamin D deficiency E55.9 VITAMIN D 25 (DEFICIENCY)      VITAMIN D 25 (DEFICIENCY)     For above listed surgery and anesthesia:   Patient is at increased risk for perioperative complications based on atrial fibrillation, chronic warfarin anticoagulation, long QT syndrome, status post pacemaker placement.  Her CHADS2-VASc score is 3 (hypertension, female, age). I recommend bridging anticoagulation to reduce the risk of stroke. We discussed risk of stroke related to holding anticoagulation with atrial fibrillation.    RECOMMENDATIONS:   proceed without further diagnostic evaluation  Patient is on chronic pain medications: No.  Patient is on antiplatelet/anticoagulation: No.  Other medications that need adjustment perioperatively: Yes,  and plan is see below.   -- I'd recommend consideration of pharmacy consult with new medications during hospitalization given long QT syndrome  Patient Instructions    -- Hold aspirin, Advil/ibuprofen, Aleve/naproxen for 7 days before surgery   -- Acetaminophen (Tylenol) is okay   -- Hold vitamins and herbal remedies for 7 days before surgery     -- Anticoagulation clinic nurses to assist with bridging   -- Last warfarin dose 6 days before surgery, hold starting 5 days before surgery   -- Start enoxaparin 3 days before surgery   -- Check INR day before surgery, if INR > 1.5 contact MD   -- Last dose of enoxaparin is the morning the day before surgery, hold starting evening before surgery   -- Resume warfarin and enoxaparin postoperatively   -- Stop enoxaparin when INR is therapeutic      Signed, Vijay Mackay MD  Internal Medicine & Pediatrics    Cc Dr. Traore, [facility] orthopedics department

## 2017-12-29 NOTE — PATIENT INSTRUCTIONS
Patient Information     Patient Name MRN Zoey Richard [id number] Female 1945      Patient Instructions by Elsa Vines RN at 2017 11:15 AM     Author:  Elsa Vines RN Service:  (none) Author Type:  NURS- Registered Nurse     Filed:  2017 11:18 AM Encounter Date:  2017 Status:  Signed     :  Elsa Vines RN (NURS- Registered Nurse)            2017 Details    Sun  Fri Sat       1               2               3               4               5                 6               7               8               9               10               11               12                 13               14               15               16               17      5 mg   See details      18      5 mg         19                 20               21               22               23               24               25               26                 27               28               29               30               31                  Date Details    This INR check       Date of next INR:  2017         How to take your warfarin dose     To take:  5 mg Take one of the 5 mg tablets.             Description          Take 5 mg today and recheck tomorrow.  ELSA VINES RN ....................  2017   11:17 AM  Received phone call today 17, from [facility] in [place]. Patient is scheduled for surgery on 10/30/17 with Dr. Vences. INR needs to be 1.5 or less per RN who left message. It will be up to the primary doctor to decide if the patient will be bridged with Lovenox.                               Anticoagulation Summary as of 2017     INR goal 2.0-3.0    Today's INR 2.0    Next INR check 2017          Call your Anticoagulation Clinic at Dept: [phone]   if:   1. Any medications are started, stopped, or there is a change in dose.  2. You experience any bleeding that is not easily stopped or if it is recurrent.  3. You  notice an increase in bruising or any bruising that does not heal.  4. You are scheduled for surgery, colonoscopy, dental extraction or any other procedure where you may need to stop your Coumadin (warfarin).

## 2017-12-29 NOTE — PATIENT INSTRUCTIONS
Patient Information     Patient Name MRN Zoey Richard [id number] Female 1945      Patient Instructions by Elsa Vines RN at 2017 11:45 AM     Author:  Elsa Vines RN Service:  (none) Author Type:  NURS- Registered Nurse     Filed:  2017 11:45 AM Encounter Date:  2017 Status:  Signed     :  Elsa Vines RN (NURS- Registered Nurse)            2017 Details    Sun  Fri Sat        1               2               3               4                 5               6      7.5 mg   See details      7      2.5 mg         8               9               10               11                 12               13               14               15               16               17               18                 19               20               21               22               23               24               25                 26               27               28               29               30                  Date Details    This INR check       Date of next INR:  2017         How to take your warfarin dose     To take:  2.5 mg Take half of a 5 mg tablet.    To take:  7.5 mg Take one and a half of the 5 mg tablets.             Description          Take 7.5 mg today recheck INR tomorrow.  Continue Lovenox.  ELSA VINES RN ....................  2017   11:44 AM                                  Anticoagulation Summary as of 2017     INR goal 2.0-3.0    Today's INR 1.7!    Next INR check 2017          Call your Anticoagulation Clinic at Dept: [phone]   if:   1. Any medications are started, stopped, or there is a change in dose.  2. You experience any bleeding that is not easily stopped or if it is recurrent.  3. You notice an increase in bruising or any bruising that does not heal.  4. You are scheduled for surgery, colonoscopy, dental extraction or any other procedure where you may need to stop your Coumadin  (warfarin).

## 2017-12-29 NOTE — PATIENT INSTRUCTIONS
Patient Information     Patient Name MRN Sex Zoey Ortega [id number] Female 1945      Patient Instructions by Janessa Olguin RN at 2017  3:45 PM     Author:  Janessa Olguin RN Service:  (none) Author Type:  NURS- Registered Nurse     Filed:  2017  3:53 PM Encounter Date:  2017 Status:  Signed     :  Janessa Olguin RN (NURS- Registered Nurse)            2017 Details    Sun  Fri Sat       1               2               3               4               5                 6               7               8               9               10               11               12                 13               14      7.5 mg   See details      15      2.5 mg         16               17               18               19                 20               21               22               23               24               25               26                 27               28               29               30               31                  Date Details    This INR check       Date of next INR:  8/15/2017         How to take your warfarin dose     To take:  2.5 mg Take half of a 5 mg tablet.    To take:  7.5 mg Take one and a half of the 5 mg tablets.             Description          Take 7.5 mg today and recheck on Tues. Janessa Olguin RN    2017  3:51 PM                                      Anticoagulation Summary as of 2017     INR goal 2.0-3.0    Today's INR 1.4    Next INR check 8/15/2017          Call your Anticoagulation Clinic at Dept: [phone]   if:   1. Any medications are started, stopped, or there is a change in dose.  2. You experience any bleeding that is not easily stopped or if it is recurrent.  3. You notice an increase in bruising or any bruising that does not heal.  4. You are scheduled for surgery, colonoscopy, dental extraction or any other procedure where you may need to stop your Coumadin (warfarin).

## 2017-12-29 NOTE — PATIENT INSTRUCTIONS
Patient Information     Patient Name MRN Sex Zoey Ortega [id number] Female 1945      Patient Instructions by Vijay Mackay MD at 2017  3:30 PM     Author:  Vijay Mackay MD  Service:  (none) Author Type:  Physician     Filed:  2017  4:00 PM  Encounter Date:  2017 Status:  Addendum     :  Vijay Mackay MD (Physician)        Related Notes: Original Note by Vijay Mackay MD (Physician) filed at 2017  3:48 PM             -- Start Calcium + vitamin D   -- Daily weightbearing exercise   -- Schedule bone density scan     -- Anticoagulation clinic nurses to assist with bridging   -- Last warfarin dose 6 days before surgery, hold starting 5 days before surgery   -- Start enoxaparin 3 days before surgery   -- Check INR day before surgery, if INR > 1.5 contact MD   -- Last dose of enoxaparin is the morning the day before surgery, hold starting evening before surgery   -- Resume warfarin and enoxaparin postoperatively   -- Stop enoxaparin when INR is therapeutic     -- Call for a prescription for Lovenox prior to your colonoscopy

## 2017-12-29 NOTE — PATIENT INSTRUCTIONS
Patient Information     Patient Name MRN Sex Zoey Ortega [id number] Female 1945      Patient Instructions by Janessa Olguin RN at 2017  8:15 AM     Author:  Janessa Olguin RN  Service:  (none) Author Type:  NURS- Registered Nurse     Filed:  2017  8:20 AM  Encounter Date:  2017 Status:  Addendum     :  Janessa Olguin RN (NURS- Registered Nurse)        Related Notes: Original Note by Janessa Olguin RN (NURS- Registered Nurse) filed at 2017  8:20 AM            2017 Details    Sun Mon Tue Wed Thu Fri Sat       1               2               3               4               5                 6               7               8               9               10               11               12                 13               14               15               16      2.5 mg   See details      17      2.5 mg         18               19                 20               21               22               23               24               25               26                 27               28               29               30               31                  Date Details    This INR check       Date of next INR:  2017         How to take your warfarin dose     To take:  2.5 mg Take half of a 5 mg tablet.             Description          Take 2.5 mg today and recheck tomorrow. Lashaun Vu, RN at 0818.                                 Anticoagulation Summary as of 2017     INR goal 2.0-3.0    Today's INR 1.9    Next INR check 2017          Call your Anticoagulation Clinic at Dept: [phone]   if:   1. Any medications are started, stopped, or there is a change in dose.  2. You experience any bleeding that is not easily stopped or if it is recurrent.  3. You notice an increase in bruising or any bruising that does not heal.  4. You are scheduled for surgery, colonoscopy, dental extraction or any other procedure where you may need to stop your  Coumadin (warfarin).

## 2017-12-30 NOTE — NURSING NOTE
"Patient Information     Patient Name MRN Zoey Richard [id number] Female 1945      Nursing Note by Carla Beckford at 10/13/2017 10:30 AM     Author:  Carla Beckford Service:  (none) Author Type:  (none)     Filed:  10/13/2017 10:27 AM Encounter Date:  10/13/2017 Status:  Signed     :  Carla Beckford            Date of Surgery: 10/30/17  Type of Surgery: Right Rotator Cuff Repair  Surgeon: Dr. Alonzo Traore  Hospital:  [facility]  Fax: does not have.    Fever/Chills or other infectious symptoms in past month: NO  >10lb weight loss in past two months: NO  O2 SAT: 98%    Health Care Directive/Code status:  Yes  Hx of blood transfusions:   (NO)   Td up to date:  YES,   History of VRE/MRSA:  (NO) Date: N/A    Preoperative Evaluation: Obstructive Sleep Apnea screening    S: Snore -  Do you snore loudly? (louder than talking or loud enough to be heard through closed doors)(NO)  T: Tired - Do you often feel tired, fatigued, or sleepy during the daytime?(NO)  O: Observed - Has anyone ever observed you stop breathing during your sleep?(NO)  P: Pressure - Do you have or are you being treated for high blood pressure?(YES)  B: BMI - BMI greater than 35kg/m2?(NO)  A: Age - Age over 50 years old?(YES)  N: Neck - Neck circumference greater than 40 cm?(NO)  G: Gender - Gender: Male?(NO)    Total number of \"YES\" responses:  2    Scoring: Low risk of TAMMY 0-2  At Risk of TAMMY: >3 High Risk of TAMMY: 5-8    Carla Beckford LPN...................  10/13/2017   10:20 AM            "

## 2017-12-30 NOTE — NURSING NOTE
Patient Information     Patient Name MRN Zoey Richard [id number] Female 1945      Nursing Note by Carla Larkin at 2017  3:30 PM     Author:  Carla Larkin Service:  (none) Author Type:  (none)     Filed:  2017  3:34 PM Encounter Date:  2017 Status:  Signed     :  Carla Larkin            Patient presents for an annual medication review.  Carla Larkin CMA (AAMA)................ 2017 3:25 PM

## 2017-12-30 NOTE — NURSING NOTE
Patient Information     Patient Name MRN Sex Zoey Ortega [id number] Female 1945      Nursing Note by Michell Valladares at 6/15/2017  1:30 PM     Author:  Michell Valladares Service:  (none) Author Type:  (none)     Filed:  6/15/2017  1:28 PM Encounter Date:  6/15/2017 Status:  Signed     :  Michell Valladares            Pt presents for a follow up on her right shoulder. Here to review her CT results.    Michell Valladares CMA 6/15/2017 1:27 PM

## 2018-01-02 NOTE — PATIENT INSTRUCTIONS
Patient Information     Patient Name MRN Zoey Richard [id number] Female 1945      Patient Instructions by Janessa Olguin RN at 1/3/2017 12:30 PM     Author:  Janessa Olguin RN Service:  (none) Author Type:  NURS- Registered Nurse     Filed:  1/3/2017 12:25 PM Encounter Date:  1/3/2017 Status:  Signed     :  Janessa Olguin RN (NURS- Registered Nurse)            2017 Details    Sun Mon Tue Wed Thu Fri Sat     1               2               3      2.5 mg   See details      4      2.5 mg         5      2.5 mg         6      5 mg         7      2.5 mg           8      2.5 mg         9      5 mg         10      2.5 mg         11      2.5 mg         12      2.5 mg         13      5 mg         14      2.5 mg           15      2.5 mg         16      5 mg         17      2.5 mg         18      2.5 mg         19      2.5 mg         20      5 mg         21      2.5 mg           22      2.5 mg         23      5 mg         24      2.5 mg         25      2.5 mg         26      2.5 mg         27      5 mg         28      2.5 mg           29      2.5 mg         30      5 mg         31      2.5 mg              Date Details    This INR check       Date of next INR:  2017         How to take your warfarin dose     To take:  2.5 mg Take half of a 5 mg tablet.    To take:  5 mg Take one of the 5 mg tablets.             Description          Continue same Warfarin dose and recheck in 1 month. Janessa Olguin RN    1/3/2017  12:24 PM                                        Anticoagulation Summary as of 1/3/2017     INR goal 2.0-3.0    Today's INR 2.7    Next INR check 2017          Call your Anticoagulation Clinic at Dept: [phone]   if:   1. Any medications are started, stopped, or there is a change in dose.  2. You experience any bleeding that is not easily stopped or if it is recurrent.  3. You notice an increase in bruising or any bruising that does not heal.  You are  scheduled for surgery, colonoscopy, dental extraction or any other procedure where you may need to stop your Coumadin (warfarin).

## 2018-01-03 NOTE — PATIENT INSTRUCTIONS
Patient Information     Patient Name MRN Zoey Richard [id number] Female 1945      Patient Instructions by Greta Castañeda at 3/3/2017 12:30 PM     Author:  Greta Castañeda Service:  (none) Author Type:  NURS- Registered Nurse     Filed:  3/3/2017 12:33 PM Encounter Date:  3/3/2017 Status:  Signed     :  Greta Castañeda (NURS- Registered Nurse)            2017 Details    Sun  Fri Sat        1               2               3      2.5 mg   See details      4      2.5 mg           5      2.5 mg         6      2.5 mg         7      2.5 mg         8      2.5 mg         9      5 mg         10      2.5 mg         11      2.5 mg           12      2.5 mg         13      5 mg         14      2.5 mg         15      2.5 mg         16      5 mg         17      2.5 mg         18      2.5 mg           19      2.5 mg         20      5 mg         21      2.5 mg         22      2.5 mg         23      5 mg         24      2.5 mg         25                 26               27               28               29               30               31                 Date Details    This INR check       Date of next INR:  3/24/2017         How to take your warfarin dose     To take:  2.5 mg Take half of a 5 mg tablet.    To take:  5 mg Take one of the 5 mg tablets.             Description          Take 2.5mg on 3/6 and then resume normal dosing- (5mg on Monday and  & 2.5mg rest of the days)  Recheck in 3 weeks. Greta Castañeda RN    3/3/2017  12:32 PM                                Anticoagulation Summary as of 3/3/2017     INR goal 2.0-3.0    Today's INR 3.5    Next INR check 3/24/2017          Call your Anticoagulation Clinic at Dept: [phone]   if:   1. Any medications are started, stopped, or there is a change in dose.  2. You experience any bleeding that is not easily stopped or if it is recurrent.  3. You notice an increase in bruising or any bruising that does not  heal.  You are scheduled for surgery, colonoscopy, dental extraction or any other procedure where you may need to stop your Coumadin (warfarin).

## 2018-01-03 NOTE — PATIENT INSTRUCTIONS
Patient Information     Patient Name MRN Zoey Richard [id number] Female 1945      Patient Instructions by Maryuri Vines RN at 2017 12:00 PM     Author:  Maryuri Vines RN Service:  (none) Author Type:  NURS- Registered Nurse     Filed:  2017 12:24 PM Encounter Date:  2017 Status:  Signed     :  Maryuri Vines RN (NURS- Registered Nurse)            2017 Details    Sun Mon Tue Wed Thu Fri Sat        1               2      2.5 mg   See details      3      5 mg         4      2.5 mg           5      2.5 mg         6      5 mg         7      2.5 mg         8      2.5 mg         9      2.5 mg         10      5 mg         11      2.5 mg           12      2.5 mg         13      5 mg         14      2.5 mg         15      2.5 mg         16      2.5 mg         17      5 mg         18      2.5 mg           19      2.5 mg         20      5 mg         21      2.5 mg         22      2.5 mg         23      2.5 mg         24      5 mg         25      2.5 mg           26      2.5 mg         27      5 mg         28      2.5 mg              Date Details    This INR check               How to take your warfarin dose     To take:  2.5 mg Take half of a 5 mg tablet.    To take:  5 mg Take one of the 5 mg tablets.           2017 Details    Sun Mon Tue Wed Thu Fri Sat        1      2.5 mg         2      2.5 mg         3               4                 5               6               7               8               9               10               11                 12               13               14               15               16               17               18                 19               20               21               22               23               24               25                 26               27               28               29               30               31                 Date Details   No additional details    Date of next INR:  3/2/2017          How to take your warfarin dose     To take:  2.5 mg Take half of a 5 mg tablet.             Description          Continue same Warfarin dose and recheck in 1 month.   ELSA RAZO RN ....................  2/2/2017   12:24 PM                                  Anticoagulation Summary as of 2/2/2017     INR goal 2.0-3.0    Today's INR 2.1    Next INR check 3/2/2017          Call your Anticoagulation Clinic at Dept: [phone]   if:   1. Any medications are started, stopped, or there is a change in dose.  2. You experience any bleeding that is not easily stopped or if it is recurrent.  3. You notice an increase in bruising or any bruising that does not heal.  You are scheduled for surgery, colonoscopy, dental extraction or any other procedure where you may need to stop your Coumadin (warfarin).

## 2018-01-03 NOTE — NURSING NOTE
Patient Information     Patient Name MRN Zoey Richard [id number] Female 1945      Nursing Note by Carla Beckford at 2017  3:15 PM     Author:  Carla Beckford Service:  (none) Author Type:  (none)     Filed:  2017  3:27 PM Encounter Date:  2017 Status:  Signed     :  Carla Beckford            Patient presents to clinic for F/U on visit for walking pneumonia on 17.  Carla Beckford LPN ....................  2017   3:18 PM

## 2018-01-03 NOTE — PROGRESS NOTES
Patient Information     Patient Name MRN Sex Zoey Ortega [id number] Female 1945      Progress Notes by Stormy Greenfield NP at 2017  3:15 PM     Author:  Stormy Greenfield NP  Service:  (none) Author Type:  PHYS- Nurse Practitioner     Filed:  2017  4:25 PM  Encounter Date:  2017 Status:  Addendum     :  Stormy Greenfield NP (PHYS- Nurse Practitioner)        Related Notes: Original Note by Stormy Greenfield NP (PHYS- Nurse Practitioner) filed at 2017  4:24 PM            SUBJECTIVE:    Zoey Jacobs is a 71 y.o. female who presents for Toe pain and bruising.     Toe Pain/problem    The incident occurred 5 to 7 days ago (7 days ago). The incident occurred at home. There was no injury mechanism (denies trauma or shoes that are too tight). The pain is present in the right toes and left toes. The quality of the pain is described as aching (Slight ache). The pain is at a severity of 1/10 (when touched). The patient is experiencing no pain. The pain has been intermittent since onset. Pertinent negatives include no inability to bear weight, loss of motion, loss of sensation, muscle weakness, numbness or tingling. She reports no foreign bodies present. Nothing aggravates the symptoms. She has tried nothing for the symptoms. The treatment provided no relief.     Noted the Great toes had red/bruised spots on the tip. Then it moved to the 3rd toe on the LT foot. No trauma, no injury. No drainage, no redness, no treatment.     Current Outpatient Prescriptions on File Prior to Visit       Medication  Sig Dispense Refill     atorvastatin (LIPITOR) 40 mg tablet TAKE 0.5 TABLET BY MOUTH AT BEDTIME 45 tablet 1     codeine-guaFENesin (ROBITUSSIN AC) ( mg in 5 mL) Take 10 mL by mouth every 4 hours if needed. 150 mL 0     estradiol (ESTRACE) 0.1 mg/g vaginal cream Uses 2 grams every other night 1 Tube 99     FLUoxetine (PROZAC) 20 mg capsule Take 1 capsule by mouth every morning.  90 capsule 4     lisinopril (PRINIVIL; ZESTRIL) 5 mg tablet Take 1 tablet by mouth once daily. 90 tablet 3     nadolol (CORGARD) 80 mg tablet Take 1 tablet by mouth 2 times daily. 180 tablet 3     NAPROXEN SODIUM (ALEVE ORAL) Take 1 tablet by mouth 2 times daily.       naproxen sodium (ALEVE) 220 mg cap Take  by mouth.       omeprazole (PRILOSEC) 20 mg Delayed-Release capsule Take 1 capsule by mouth once daily before a meal. (breakfast) 90 capsule 4     oxybutynin XL (DITROPAN XL) 10 mg CR tablet Take 1 tablet by mouth once daily. 90 tablet 4     sucralfate (CARAFATE) 1 gram tablet Take 1 tablet by mouth 4 times daily before meals and at bedtime. 300 tablet 11     warfarin (COUMADIN) 5 mg tablet Take 5 mg x two days/week (Mondays and Fridays), and 2.5 mg x five days/week. 90 tablet 0     No current facility-administered medications on file prior to visit.        REVIEW OF SYSTEMS:  Review of Systems   Neurological: Negative for tingling and numbness.       OBJECTIVE:  /70  Temp 98.7  F (37.1  C) (Tympanic)  Resp 16    EXAM:   Physical Exam   Constitutional: She is well-developed, well-nourished, and in no distress.   HENT:   Head: Normocephalic and atraumatic.   Eyes: Conjunctivae are normal.   Cardiovascular: Normal rate.    Pulmonary/Chest: Effort normal. No respiratory distress.   Musculoskeletal:   Bilateral Great toes: have bruises on the tips of them. The LT 3rd toe has the same appearance on the tip. Appears bruised. Mildly tender on the skin. No drainage noted.    Skin: Skin is warm and dry.   Nursing note and vitals reviewed.      ASSESSMENT/PLAN:    ICD-10-CM    1. Contusion of toe without damage to nail, unspecified toe, initial encounter S90.129A AMB CONSULT TO PODIATRY/ANKLE AND FOOT SURGERY        Plan:  Advised that is looks like the toes have been in shoes that are too tight or continuous rubbing of the toes. She denies this, states that is not what it is. Went over that it does not look  infectious. Advised she see a podiatrist on Monday if not better. Change shoes, no injury to the toes.   I explained my diagnostic considerations and recommendations to the patient, who voiced understanding and agreement with the treatment plan. All questions were answered. We discussed potential side effects of any prescribed or recommended therapies, as well as expectations for response to treatments. She was advised to contact our office if there is no improvement or worsening of conditions or symptoms.  If s/s worsen or persist, patient will either come back or follow up with PCP.       KHANG GOFF NP ....................  1/20/2017   4:23 PM

## 2018-01-03 NOTE — PROGRESS NOTES
Patient Information     Patient Name MRN Sex Zoey Ortega [id number] Female 1945      Progress Notes by Vijay Mackay MD at 2017  3:15 PM     Author:  Vijay Mackay MD Service:  (none) Author Type:  Physician     Filed:  2017  5:50 PM Encounter Date:  2017 Status:  Signed     :  Vijay Mackay MD (Physician)            Subjective  Zoey Jacobs is a 71 y.o. female who presents for follow-up urgent care. On 2017 she became ill and was seen by Dr. Bernardino Jasso at the [facility] in [facility]. See scanned documents. She had developed increasing cough associated with low-grade fevers with MAXIMUM TEMPERATURE 99 Fahrenheit. Sinus drainage has been present. Chest x-ray which was obtained revealed hazy densities, and she was prescribed doxycycline. She is feeling better, on day 8 of 10 for doxycycline. She continues to cough and has sinus drainage. She tells me the pharmacist verified doxycycline is okay to use with prolonged QT syndrome.    Problem List/PMH: reviewed in EMR, and made relevant updates today.  Medications: reviewed in EMR, and made relevant updates today.  Allergies: reviewed in EMR, and made relevant updates today.    Social Hx:  Social History        Substance Use Topics          Smoking status:   Former Smoker      Packs/day:  1.00      Types:  Cigarettes      Start date:  1968      Quit date:  2002      Smokeless tobacco:   Never Used      Alcohol use   4.2 oz/week     7 Standard drinks or equivalent per week        Comment: glass of wine every night       Social History Narrative    Patient is a retired RN. She moved here from [place], [place] in the . Her  was an ED doc and worked in [employer] before retiring. They lived on [address]. Patient's   suddenly of a likely arrhythmia in his mid 60s. Patient's son was bipolar and ended up committing suicide after being convicted for murder. The  patient has a daughter that works for [employer]. They recently traveled to the [facility] for a safari and have plans to go on a cruise in [place] to find polar bears.                I reviewed social history and made relevant updates today.    Family Hx:   Family History       Problem   Relation Age of Onset     Hypertension  Mother      Hyperlipidemia  Mother      elevated cholesterol       Stroke  Mother      She  at age 86.  She had stroke as a complication of carotid endarterectomy surgery.        Arthritis  Father      Cancer  Father      skin cancer       Stroke  Father      He is 87, has a history of CVA       Other  Father      Alzheimer's       Cancer-breast  Maternal Aunt 50     Other  Sister       of long QT syndrome.       Psychiatric illness  Son      Bipolar,  due to suicide       Cancer-colon  No Family History        Objective  Vitals: reviewed in EMR.    Visit Vitals       /80     Pulse 80     Temp 96.5  F (35.8  C) (Tympanic)     Wt 80.3 kg (177 lb)     SpO2 95%     Breastfeeding No     BMI 26.91 kg/m2       Gen: Pleasant female, NAD.  HEENT: MMM, no OP erythema.   Neck: Supple, no JVD, no bruits.  CV: RRR no m/r/g.   Pulm: Clear with end expiratory wheezing. No increased work of breathing  GI: Soft, NT, ND. No HSM. No masses. Bowel sounds present.  Neuro: Grossly intact  Msk: No lower extremity edema.  Skin: No concerning lesions.  Psychiatric: Normal affect and insight. Does not appear anxious or depressed.      Assessment    ICD-10-CM    1. Influenza-like illness R69    2. Cough R05 MA PULSE OXIMETRY SINGLE DETERMINATION       While I don't have the chest x-ray or lab work to review, based on clinical history it is certainly possible that she had influenza now with post-influenza syndrome with some wheezing and cough. Differential diagnosis also includes community-acquired pneumonia, COPD, allergic rhinosinusitis with  reactive airway disease, gastroesophageal reflux disease with aspiration, others. Either way I recommend continuing and completing the antibiotics with close follow-up if symptoms persist or worsen.    Plan   -- Expected clinical course discussed   -- Medications and their side effects discussed  Patient Instructions    -- Consider albuterol if cough/wheezing persists   -- Consider pulmonary function testing     -- Use nasal saline spray and/or Neti pot (with distilled water)   -- Salt water gargle a few times per day for sore throat   -- For nasal congestion, okay to use Afrin 2 sprays both nostrils daily, no more than 3-4 days then stop as can cause rebound congestion   -- Drink warm liquids (eg apple juice, tea, chicken soup)   -- Look for benzocaine sore throat drops   -- Honey mixed with hot water or tea for cough   -- Over-the-counter cough/cold medications not recommended   -- Okay to use acetaminophen (Tylenol) and ibuprofen (Advil)   -- Watch for dehydration, try to stay hydrated   -- If symptoms are not improving over 7-10 days, or worse at any point return for evaluation.          Signed, Vijay Mackay MD  Internal Medicine & Pediatrics

## 2018-01-03 NOTE — TELEPHONE ENCOUNTER
Patient Information     Patient Name MRN Sex Zoey Ortega [id number] Female 1945      Telephone Encounter by Cee Emanuel RN at 2017  3:23 PM     Author:  Cee Emanuel RN Service:  (none) Author Type:  NURS- Registered Nurse     Filed:  2017  3:27 PM Encounter Date:  2017 Status:  Signed     :  Cee Emanuel RN (NURS- Registered Nurse)            Anticoagulant    Office visit in the past 12 months.    Last visit with JOSE PAK was on: 2016 in [facility]  Next visit with JOSE PAK is on: No future appointment listed with this provider  Next visit with Internal Medicine is on: 2017 in [facility]    Lab tests:  PT/INR at least monthly    INR (no units)    Date Value   2017 2.1 (H)     HEMOGLOBIN                (g/dL)    Date Value   2016 11.7 (L)     No components found for: CBC      Max refills 6 months.  Statins    Office visit in the past 12 months.      Lab testing requirements:   Repeat lipids 6-8 weeks after dosage or drug change.    Last Lipids:  Chol: 199    2016  T    2016  HDL:   67    2016  LDL:  103    2016  LDL DIRECT:  No results found in past 5 years    .    Concommitant use of fibrates and statins-If it is an addition to the medication list, review note and/or discuss with provider.  If already on medication list, refill.    Max refills 12 months from last office visit.    Depression-in adults 18 and over  SSRI    Office visit in the past 12 months or as indicated in chart.  Should have clinic visit 1-2 months after initial prescription.      Max refills 12 months from last office visit or per providers notes.  Office visit in the past 12 months or per provider note.      Max refill for 12 months from last office visit or per provider note.  Prescription refilled per RN Medication Refill Policy.................... CEE EMANUEL RN ....................  2017   3:24  PM

## 2018-01-03 NOTE — ADDENDUM NOTE
Patient Information     Patient Name MRN Zoey Richard [id number] Female 1945      Addendum Note by Khang Greenfield NP at 2017  4:25 PM     Author:  Khang Greenfield NP Service:  (none) Author Type:  PHYS- Nurse Practitioner     Filed:  2017  4:25 PM Encounter Date:  2017 Status:  Signed     :  Khang Greenfield NP (PHYS- Nurse Practitioner)       Addended by: KHANG GREENFIELD on: 2017 04:25 PM        Modules accepted: Orders

## 2018-01-03 NOTE — PATIENT INSTRUCTIONS
Patient Information     Patient Name MRN Zoey Richard [id number] Female 1945      Patient Instructions by Janessa Olguin RN at 2017 11:00 AM     Author:  Janessa Olguin RN Service:  (none) Author Type:  NURS- Registered Nurse     Filed:  2017 11:06 AM Encounter Date:  2017 Status:  Signed     :  Janessa Olguin RN (NURS- Registered Nurse)            2017 Details    Sun Mon Tue Wed Thu Fri Sat        1               2               3               4                 5               6               7               8               9               10               11                 12               13               14      Hold   See details      15      2.5 mg         16      2.5 mg         17      5 mg         18      2.5 mg           19      2.5 mg         20      5 mg         21      2.5 mg         22      2.5 mg         23      2.5 mg         24      5 mg         25      2.5 mg           26      2.5 mg         27      5 mg         28      2.5 mg              Date Details    This INR check               How to take your warfarin dose     To take:  2.5 mg Take half of a 5 mg tablet.    To take:  5 mg Take one of the 5 mg tablets.    Hold Do not take your warfarin dose. The details table to the right may include additional information.                2017 Details    Sun Mon Tue Wed Thu Fri Sat        1      2.5 mg         2      2.5 mg         3      5 mg         4                 5               6               7               8               9               10               11                 12               13               14               15               16               17               18                 19               20               21               22               23               24               25                 26               27               28               29               30               31                 Date Details   No  additional details    Date of next INR:  3/3/2017         How to take your warfarin dose     To take:  2.5 mg Take half of a 5 mg tablet.    To take:  5 mg Take one of the 5 mg tablets.             Description          Continue same Warfarin dose and recheck in 1 month.   ELSA RAZO RN ....................  2/2/2017   12:24 PM                                  Anticoagulation Summary as of 2/14/2017     INR goal 2.0-3.0    Today's INR 3.6    Next INR check 3/3/2017          Call your Anticoagulation Clinic at Dept: [phone]   if:   1. Any medications are started, stopped, or there is a change in dose.  2. You experience any bleeding that is not easily stopped or if it is recurrent.  3. You notice an increase in bruising or any bruising that does not heal.  You are scheduled for surgery, colonoscopy, dental extraction or any other procedure where you may need to stop your Coumadin (warfarin).

## 2018-01-03 NOTE — PATIENT INSTRUCTIONS
Patient Information     Patient Name MRN Zoey Richard [id number] Female 1945      Patient Instructions by Vijay Mackay MD at 2017  3:15 PM     Author:  Vijay Mackay MD Service:  (none) Author Type:  Physician     Filed:  2017  3:42 PM Encounter Date:  2017 Status:  Signed     :  Vijay Mackay MD (Physician)             -- Consider albuterol if cough/wheezing persists   -- Consider pulmonary function testing     -- Use nasal saline spray and/or Neti pot (with distilled water)   -- Salt water gargle a few times per day for sore throat   -- For nasal congestion, okay to use Afrin 2 sprays both nostrils daily, no more than 3-4 days then stop as can cause rebound congestion   -- Drink warm liquids (eg apple juice, tea, chicken soup)   -- Look for benzocaine sore throat drops   -- Honey mixed with hot water or tea for cough   -- Over-the-counter cough/cold medications not recommended   -- Okay to use acetaminophen (Tylenol) and ibuprofen (Advil)   -- Watch for dehydration, try to stay hydrated   -- If symptoms are not improving over 7-10 days, or worse at any point return for evaluation.

## 2018-01-03 NOTE — PATIENT INSTRUCTIONS
Patient Information     Patient Name MRN Zoey Richard [id number] Female 1945      Patient Instructions by Stormy Greenfield NP at 2017  3:15 PM     Author:  Stormy Greenfield NP Service:  (none) Author Type:  PHYS- Nurse Practitioner     Filed:  2017  3:38 PM Encounter Date:  2017 Status:  Signed     :  Stormy Greenfield NP (PHYS- Nurse Practitioner)            Thank you for choosing [facility] and [facility] for your care.     You are advised to contact our office if there is no improvement or if there is worsening of conditions or symptoms, either come back or follow up with your primary care provider.     You were seen in the [facility]. This is for urgent care needs. If you have other questions or concerns please see your primary care provider.     See a Podiatrist on Monday or Tuesday     Stormy Greenfield RN, MSN, FNP  [facility]

## 2018-01-03 NOTE — TELEPHONE ENCOUNTER
Patient Information     Patient Name MRN Zoey Richard [id number] Female 1945      Telephone Encounter by Cee Emanuel RN at 2017 10:44 AM     Author:  Cee Emanuel RN Service:  (none) Author Type:  NURS- Registered Nurse     Filed:  2017 10:45 AM Encounter Date:  2017 Status:  Signed     :  Cee Emanuel RN (NURS- Registered Nurse)            Statins    Office visit in the past 12 months.    Last visit with JOSE PAK was on: 2016 in [facility]  Next visit with JOSE PAK is on: No future appointment listed with this provider  Next visit with Internal Medicine is on: 2017 in [facility]    Lab testing requirements: .  Repeat lipids 6-8 weeks after dosage or drug change.    Last Lipids:  Chol: 199    2016  T    2016  HDL:   67    2016  LDL:  103    2016  LDL DIRECT:  No results found in past 5 years    .    Concommitant use of fibrates and statins-If it is an addition to the medication list, review note and/or discuss with provider.  If already on medication list, refill.    Max refills 12 months from last office visit.    Prescription refilled per RN Medication Refill Policy.................... CEE EMANUEL RN ....................  2017   10:44 AM

## 2018-01-04 NOTE — TELEPHONE ENCOUNTER
Patient Information     Patient Name MRN Zoey Richard [id number] Female 1945      Telephone Encounter by Cee Emanuel RN at 2017  3:06 PM     Author:  Cee Emanuel RN Service:  (none) Author Type:  NURS- Registered Nurse     Filed:  2017  3:09 PM Encounter Date:  2017 Status:  Signed     :  Cee Emanuel RN (NURS- Registered Nurse)            Beta Blockers   Patient is switching to [facility].  Office visit in the past 12 months or per provider note.    Last visit with JOSE PAK was on: 2017 in [facility]  Next visit with JOSE PAK is on: No future appointment listed with this provider  Next visit with Internal Medicine is on: 2017 in [facility]    Max refill for 12 months from last office visit or per provider note.  Ace Inhibitors    Office visit in the past 12 months or per provider note.      Lab test requirements:  Creatinine and Potassium annually, if ordering lab, order BMP.  CREATININE (mg/dL)    Date Value   2016 0.76     POTASSIUM (mmol/L)    Date Value   2016 4.2       Max refill for 12 months from last office visit or per provider note  Prescription refilled per RN Medication Refill Policy.................... CEE EMANUEL RN ....................  2017   3:07 PM

## 2018-01-04 NOTE — PATIENT INSTRUCTIONS
Patient Information     Patient Name MRN Zoey Richard [id number] Female 1945      Patient Instructions by Maryuri Vines RN at 2017 11:45 AM     Author:  Maryuri Vines RN Service:  (none) Author Type:  NURS- Registered Nurse     Filed:  2017 11:47 AM Encounter Date:  2017 Status:  Signed     :  Maryuri Vines RN (NURS- Registered Nurse)            2017 Details    Sun Mon Tue Wed Thu Fri Sat           1                 2               3               4               5               6               7               8                 9               10               11               12               13               14               15                 16               17               18               19               20               21               22                 23               24      5 mg   See details      25      2.5 mg         26      2.5 mg         27      5 mg         28      2.5 mg         29      2.5 mg           30      2.5 mg                Date Details    This INR check               How to take your warfarin dose     To take:  2.5 mg Take half of a 5 mg tablet.    To take:  5 mg Take one of the 5 mg tablets.           May 2017 Details    Sun Mon Tue Wed Thu Fri Sat      1      5 mg         2      2.5 mg         3      2.5 mg         4      5 mg         5      2.5 mg         6      2.5 mg           7      2.5 mg         8      5 mg         9      2.5 mg         10      2.5 mg         11      5 mg         12      2.5 mg         13      2.5 mg           14      2.5 mg         15      5 mg         16      2.5 mg         17      2.5 mg         18      5 mg         19      2.5 mg         20      2.5 mg           21      2.5 mg         22      5 mg         23               24               25               26               27                 28               29               30               31                   Date Details   No additional  details    Date of next INR:  5/22/2017         How to take your warfarin dose     To take:  2.5 mg Take half of a 5 mg tablet.    To take:  5 mg Take one of the 5 mg tablets.             Description          Continue same Warfarin dose and recheck in 1 month. ELSA RAZO RN ....................  4/24/2017   11:47 AM                              Anticoagulation Summary as of 4/24/2017     INR goal 2.0-3.0    Today's INR 2.2    Next INR check 5/22/2017          Call your Anticoagulation Clinic at Dept: [phone]   if:   1. Any medications are started, stopped, or there is a change in dose.  2. You experience any bleeding that is not easily stopped or if it is recurrent.  3. You notice an increase in bruising or any bruising that does not heal.  4. You are scheduled for surgery, colonoscopy, dental extraction or any other procedure where you may need to stop your Coumadin (warfarin).

## 2018-01-04 NOTE — PATIENT INSTRUCTIONS
Patient Information     Patient Name MRN Zoey Richard [id number] Female 1945      Patient Instructions by Letty Pedroza RN at 3/27/2017  1:15 PM     Author:  Letty Pedroza RN Service:  (none) Author Type:  NURS- Registered Nurse     Filed:  3/27/2017  1:37 PM Encounter Date:  3/27/2017 Status:  Signed     :  Letty Pedroza RN (NURS- Registered Nurse)            2017 Details    Sun Mon Tue Wed Thu Fri Sat        1               2               3               4                 5               6               7               8             9               10               11                 12               13               14               15               16               17               18                 19               20               21               22               23               24               25                 26               27      5 mg   See details      28      2.5 mg         29      2.5 mg         30      5 mg         31      2.5 mg           Date Details    This INR check 2.3               How to take your warfarin dose     To take:  2.5 mg Take half of a 5 mg tablet.    To take:  5 mg Take one of the 5 mg tablets.           2017 Details    Sun Mon Tue Wed Thu Fri Sat           1      2.5 mg           2      2.5 mg         3      5 mg         4      2.5 mg         5      2.5 mg         6      5 mg         7      2.5 mg         8      2.5 mg           9      2.5 mg         10      5 mg         11      2.5 mg         12      2.5 mg         13      5 mg         14      2.5 mg         15      2.5 mg           16      2.5 mg         17      5 mg         18      2.5 mg         19      2.5 mg         20      5 mg         21      2.5 mg         22      2.5 mg           23      2.5 mg         24      5 mg         25               26               27               28               29                 30                      Date Details   No additional details     Date of next INR:  4/24/2017         How to take your warfarin dose     To take:  2.5 mg Take half of a 5 mg tablet.    To take:  5 mg Take one of the 5 mg tablets.             Description          Continue same Warfarin dose and recheck in 1 month.  PANKAJ ALLEN RN   3/27/2017    1:37 PM                                Anticoagulation Summary as of 3/27/2017     INR goal 2.0-3.0    Today's INR 2.3    Next INR check 4/24/2017          Call your Anticoagulation Clinic at Dept: [phone]   if:   1. Any medications are started, stopped, or there is a change in dose.  2. You experience any bleeding that is not easily stopped or if it is recurrent.  3. You notice an increase in bruising or any bruising that does not heal.  You are scheduled for surgery, colonoscopy, dental extraction or any other procedure where you may need to stop your Coumadin (warfarin).

## 2018-01-05 NOTE — TELEPHONE ENCOUNTER
Patient Information     Patient Name MRN Zoey Richard [id number] Female 1945      Telephone Encounter by Carla Beckford at 2017  1:24 PM     Author:  Carla Beckford Service:  (none) Author Type:  (none)     Filed:  2017  1:25 PM Encounter Date:  2017 Status:  Signed     :  Carla Beckford            Patient notified of the information below after verification of name and date of birth.   Carla Beckford LPN ....................  2017   1:25 PM

## 2018-01-05 NOTE — PROGRESS NOTES
Patient Information     Patient Name MRN Sex Zoey Ortega [id number] Female 1945      Progress Notes by Alexis Miranda DO at 2017  1:45 PM     Author:  Alexis Miranda DO Service:  (none) Author Type:  Physician     Filed:  2017  2:21 PM Encounter Date:  2017 Status:  Signed     :  Alexis Miranda DO (Physician)            Zoey Jacobs was seen in consultation for Vijay Mackay MD for a chief complaint of pain with right shoulder.    CHIEF COMPLAINT: Zoey Jacobs is a 72 y.o.  female  Chief Complaint     Patient presents with       Consult      rt shoulder pain         HISTORY OF PRESENTING INJURY   History of presenting injury, for the past 6 months patient has had increased pain about her right shoulder. This is the shoulder that she had surgery on  by Dr. Roberts. The pain is more bothersome with increased activities and with things overhead or with reaching. She has tried ice/massage/heat with minimal relief. She is here today to discuss options.  Description of pain:  moderate aching, discomfort and increased pain with activity   Radiation of pain: no  Pain course: gradually worsening  Worse with: overhead reaching, pushing and pulling  Improved by: OTC meds and rest  History of injection: No  Any PT: No    PAST MEDICAL HISTORY:  Past Medical History:     Diagnosis  Date     Atrial fibrillation (HC)      Depression      H/O scarlet fever     As a child      HTN (hypertension)      Hx of pregnancy     G2, P2      Hypercholesteremia      Insomnia        PAST SURGICAL HISTORY:  Past Surgical History:      Procedure  Laterality Date     Arthroscopic surgery      Left knee       COLONOSCOPY SCREENING  2007    Normal, follow up in 10 years       EP STUDY /ABLATION  2011    Cardiac ablation       PACEMAKER PLACEMENT      Post ablation, due to junctional rythym        ROTATOR CUFF REPAIR  2004    Right shoulder       TONSIL AND  ADENOIDECTOMY      As a child       TUBAL LIGATION         ORTHOPEDIC FRACTURES AND BROKEN BONES:  As above.    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       Unknown-Follow Up Needed (Include Details In Comments)  Cardiac Arrest     Please verify with pharmacist prior to prescribing any medications due to her QT Syndrome        CURRENT MEDICATIONS:  Current Outpatient Prescriptions       Medication  Sig Dispense Refill     atorvastatin (LIPITOR) 40 mg tablet TAKE 0.5 TABLET BY MOUTH AT BEDTIME 45 tablet 1     estradiol (ESTRACE) 0.1 mg/g vaginal cream Uses 2 grams every other night 1 Tube 99     FLUoxetine (PROZAC) 20 mg capsule Take 1 capsule by mouth every morning. 90 capsule 1     lisinopril (PRINIVIL; ZESTRIL) 5 mg tablet Take 1 tablet by mouth once daily. 90 tablet 2     nadolol (CORGARD) 80 mg tablet Take 1 tablet by mouth 2 times daily. 180 tablet 2     naproxen sodium (ALEVE) 220 mg cap Take  by mouth.       omeprazole (PRILOSEC) 20 mg Delayed-Release capsule Take 1 capsule by mouth once daily before a meal. (breakfast) 90 capsule 4     oxybutynin XL (DITROPAN XL) 10 mg CR tablet Take 1 tablet by mouth once daily. 90 tablet 1     warfarin (COUMADIN) 5 mg tablet Take 5 mg x two days/week (Mondays and Thursdays), and 2.5 mg x five days/week. 90 tablet 0     No current facility-administered medications for this visit.      Medications have been reviewed by me and are current to the best of my knowledge and ability.      SOCIAL HISTORY:  Marital Status:   Children: Yes  Occupation: Retired nurse.  Alcohol use:  Yes  Tobacco use: Smoker: no, smoked for 20 years and  "quit.  Are you or have you used illicit drugs:  no    FAMILY HISTORY:  Family History       Problem   Relation Age of Onset     Hypertension  Mother      Hyperlipidemia  Mother      elevated cholesterol       Stroke  Mother      She  at age 86.  She had stroke as a complication of carotid endarterectomy surgery.        Arthritis  Father      Cancer  Father      skin cancer       Stroke  Father      He is 87, has a history of CVA       Other  Father      Alzheimer's       Cancer-breast  Maternal Aunt 50     Other  Sister       of long QT syndrome.       Psychiatric illness  Son      Bipolar,  due to suicide       Cancer-colon  No Family History        REVIEW OF SYSTEMS:  The review of systems as documented in the HPI and on the intake questionnaire, completed by the patient on 2017 have been reviewed by myself and the pertinent positives and negatives addressed.  The remainder of the complete review of systems was non-contributory.    PHYSICAL EXAM:   /60  Ht 1.727 m (5' 8\")  Wt 79.4 kg (175 lb)  BMI 26.61 kg/m2 Body mass index is 26.61 kg/(m^2).    General Appearance: Pleasant female in good appearance, mood and affect.  Alert and orientated times three ( time, date and location).    Skin: Normal    Shoulder:  Motion: Patient has slight restricted motion in the last 5  of flexion and abduction. Passively I can stretch it all the way out. When checking her internal and external rotation there is restriction to her back pocket. External rotation does go to 50  with discomfort.  Hawkin's Sign: positive  Neer's Sign: positive  Cross body adduction:  positive  Drop arm test: positive  Weakness at waist level: positive  Bicipital testing: positive  Lift off test:  negative  Esquivel's test:  negative    Elbow:  Flexion: Normal  Extension: Normal    Hand:  Sensation: Normal  Radial and ulnar blood flow:  Normal    Eyes: Pupils are round.    Ears: Hearing: Intact    Heart: Good capillary refill and " her hands.    Lungs: Clear.     Radiographic images from 5/23 where independently reviewed and discussed with the patient.      Xray:     There is increased acromial hook with what appears to be postsurgical changes with impingement about the right shoulder. There is acromioclavicular arthritic changes. Humeral head is in appropriate position.    Exam: XR SHOULDER 3 VIEWS RIGHT  History: Right shoulder pain, unspecified chronicity  Technique: 3 views.  Findings: No acute fracture or dislocation is seen. Acromiohumeral distance is fairly normal. There is some mild degenerative change in the AC joint with some anterior hooking of the undersurface of the acromion process.    Impression: Mild degenerative change in the AC joint.  Electronically Signed By: Breanna Vizcarra M.D. on 5/23/2017 1:55 PM    IMPRESSION:  Right rotator cuff tendinitis with probable partial tear.  Right impingement syndrome.  Right acromioclavicular arthritis.    PLAN:  Risks, benefits, conservative, surgical and alternatives to treatment where discussed and the patient would like to proceed with conservative measures and further workup.  She was instructed on range of motion exercises.  I will order a CT arthrogram due to her pacemaker. She is on Coumadin.  I utilized the model, poster board and handout help her understand shoulder anatomy and pathology.  Follow-up after above.  Questions and concerns answered.    Alexis Miranda D.O.  Orthopaedic Surgeon    [facility] and [address] [place] [address]  Phone [phone] (KNEE)  Fax [phone]    This document was created using computer generated templates and voice activated software.    2:14 PM 5/23/2017

## 2018-01-05 NOTE — NURSING NOTE
Patient Information     Patient Name MRN Sex Zoey Ortega [id number] Female 1945      Nursing Note by Michell Valladares at 2017  1:45 PM     Author:  Michell Valladares Service:  (none) Author Type:  (none)     Filed:  2017  1:11 PM Encounter Date:  2017 Status:  Signed     :  Michell Valladares            Pt presents for a consult on her right shoulder pain. Referred by Dr. Mackay.    Michell Valladares CMA 2017 1:10 PM

## 2018-01-05 NOTE — PROGRESS NOTES
Patient Information     Patient Name MRN Sex Zoey Ortega [id number] Female 1945      Progress Notes by Tiera Linda R.T. (ARRT) at 2017  3:26 PM     Author:  Tiera Linda R.T. (ARRT) Service:  (none) Author Type:  (none)     Filed:  2017  3:26 PM Date of Service:  2017  3:26 PM Status:  Signed     :  Tiera Linda R.T. (RORYT) ([facility] - Registered Radiologic Technologist)            Falls Risk Criteria:    Age 65 and older or under age 4        Sensory deficits    Poor vision    Use of ambulatory aides    Impaired judgment    Unable to walk independently    Meets High Risk criteria for falls:  Yes               1.  Do you have dizziness or vertigo?    no                    2.  Do you need help standing or walking?   no                 3.  Have you fallen within the last 6 months?    no           4.  Has the patient been fasting?      no       If any risks are marked Yes, the following interventions are utilized:    Do not leave patient unattended     Assist patient in the dressing room and bathroom    Have ambulatory aides available throughout procedure    Involve patient s family if available

## 2018-01-05 NOTE — TELEPHONE ENCOUNTER
Patient Information     Patient Name MRN Zoey Richard [id number] Female 1945      Telephone Encounter by Vijay Mackay MD at 2017  1:17 PM     Author:  Vijay Mackay MD Service:  (none) Author Type:  Physician     Filed:  2017  1:20 PM Encounter Date:  2017 Status:  Signed     :  Vijay Mackay MD (Physician)            On atrial fibrillation with warfarin anticoagulation for CHADS2-VASc score of 3, however status post ablation with low burden of atrial fibrillation. Please relay that a break in anticoagulation does lead to an increased risk of stroke during that period of time.     -- I think it's okay for her to hold her warfarin for 5 days as instructed, resume when you get home.   -- Any concerns she should schedule an office visit to discuss    Vijay Anderson MD  Internal Medicine & Pediatrics

## 2018-01-05 NOTE — TELEPHONE ENCOUNTER
Patient Information     Patient Name MRN Zoey Richard [id number] Female 1945      Telephone Encounter by Mayra Harris at 2017  9:40 AM     Author:  Mayra Harris Service:  (none) Author Type:  NURS- Student Practical Nurse     Filed:  2017  9:41 AM Encounter Date:  2017 Status:  Signed     :  Mayra Harris (NURS- Student Practical Nurse)            Called and informed patient that referral for ortho was sent and they would be contacting her to schedule apt.  Mayra Harris LPN............................ 2017 9:41 AM

## 2018-01-05 NOTE — TELEPHONE ENCOUNTER
Patient Information     Patient Name MRN Sex Zoey Ortega [id number] Female 1945      Telephone Encounter by Sheryl Domingo RN at 2017  2:47 PM     Author:  Sheryl Domingo RN Service:  (none) Author Type:  NURS- Registered Nurse     Filed:  2017  2:51 PM Encounter Date:  2017 Status:  Signed     :  Sheryl Domingo RN (NURS- Registered Nurse)            Geri from [facility] called to let us know the pt is having a arthrogram injection on 17 at 0945.  She is holding her warfarin for 5 days prior to this.  She needs to have her INR checked just before injection and it needs to be 1.4 or below.  Geri was wanting us to schedule pt for just before procedure.  Let scheduling know and they called pt and made an appointment for pt at 0900 on 17.    Sheryl Domingo RN 2017 2:50 PM

## 2018-01-05 NOTE — PATIENT INSTRUCTIONS
Patient Information     Patient Name MRN Zoey Richard [id number] Female 1945      Patient Instructions by Janessa Olguin RN at 2017 11:45 AM     Author:  Janessa Olguin RN Service:  (none) Author Type:  NURS- Registered Nurse     Filed:  2017 11:20 AM Encounter Date:  2017 Status:  Signed     :  Janessa Olguin RN (NURS- Registered Nurse)            May 2017 Details    Sun Mon Tue Wed Thu Fri Sat      1               2               3               4               5               6                 7               8               9               10               11               12               13                 14               15               16               17               18               19               20                 21               22      5 mg   See details      23      2.5 mg         24      2.5 mg         25      5 mg         26      2.5 mg         27      2.5 mg           28      2.5 mg         29      5 mg         30      2.5 mg         31      2.5 mg             Date Details    This INR check               How to take your warfarin dose     To take:  2.5 mg Take half of a 5 mg tablet.    To take:  5 mg Take one of the 5 mg tablets.           2017 Details    Sun Mon Tue Wed Thu Fri Sat         1      5 mg         2      2.5 mg         3      2.5 mg           4      2.5 mg         5      5 mg         6      2.5 mg         7      2.5 mg         8      5 mg         9      2.5 mg         10      2.5 mg           11      2.5 mg         12      5 mg         13      2.5 mg         14      2.5 mg         15      5 mg         16      2.5 mg         17      2.5 mg           18      2.5 mg         19      5 mg         20               21               22               23               24                 25               26               27               28               29               30                 Date Details   No additional details    Date  of next INR:  6/19/2017         How to take your warfarin dose     To take:  2.5 mg Take half of a 5 mg tablet.    To take:  5 mg Take one of the 5 mg tablets.             Description          Continue same Warfarin dose and recheck in 1 month. Janessa Olguin RN    5/22/2017  11:20 AM                              Anticoagulation Summary as of 5/22/2017     INR goal 2.0-3.0    Today's INR 1.9    Next INR check 6/19/2017          Call your Anticoagulation Clinic at Dept: [phone]   if:   1. Any medications are started, stopped, or there is a change in dose.  2. You experience any bleeding that is not easily stopped or if it is recurrent.  3. You notice an increase in bruising or any bruising that does not heal.  4. You are scheduled for surgery, colonoscopy, dental extraction or any other procedure where you may need to stop your Coumadin (warfarin).

## 2018-01-05 NOTE — TELEPHONE ENCOUNTER
Patient Information     Patient Name MRN Zoey Richard [id number] Female 1945      Telephone Encounter by Carla Beckford at 2017  8:29 AM     Author:  Carla Beckford Service:  (none) Author Type:  (none)     Filed:  2017  8:35 AM Encounter Date:  2017 Status:  Signed     :  Carla Beckford            Patient calling regarding right shoulder pain that has been going on for over 6 months.  Rates pain at a 2/3 when not doing much and after raking then the pain is at a 7/8.  She is wondering if she should come see you or if she could have a referral for ortho?  Carla Beckford LPN ....................  2017   8:35 AM

## 2018-01-05 NOTE — TELEPHONE ENCOUNTER
Patient Information     Patient Name MRN Sex Zoey Ortega [id number] Female 1945      Telephone Encounter by Gosselin, Norma J at 2017 11:05 AM     Author:  Gosselin, Norma J Service:  (none) Author Type:  (none)     Filed:  2017 11:09 AM Encounter Date:  2017 Status:  Signed     :  Gosselin, Norma J            CT arthrogram of right shoulder on . They instructed her to hold her Coumadin for 5 days prior. she wanted to know if you agree with this.  What are your thoughts?  Norma J Gosselin LPN....................  2017   11:06 AM

## 2018-01-07 ENCOUNTER — COMMUNICATION - GICH (OUTPATIENT)
Dept: PEDIATRICS | Facility: OTHER | Age: 73
End: 2018-01-07

## 2018-01-07 DIAGNOSIS — I10 ESSENTIAL (PRIMARY) HYPERTENSION: ICD-10-CM

## 2018-01-09 ENCOUNTER — COMMUNICATION - GICH (OUTPATIENT)
Dept: INTERNAL MEDICINE | Facility: OTHER | Age: 73
End: 2018-01-09

## 2018-01-09 DIAGNOSIS — I48.0 PAROXYSMAL ATRIAL FIBRILLATION (H): ICD-10-CM

## 2018-01-09 DIAGNOSIS — Z79.01 LONG TERM CURRENT USE OF ANTICOAGULANT: ICD-10-CM

## 2018-01-15 ENCOUNTER — ANTICOAGULATION - GICH (OUTPATIENT)
Dept: INTERNAL MEDICINE | Facility: OTHER | Age: 73
End: 2018-01-15

## 2018-01-15 DIAGNOSIS — I48.0 PAROXYSMAL ATRIAL FIBRILLATION (H): ICD-10-CM

## 2018-01-15 DIAGNOSIS — Z79.01 LONG TERM CURRENT USE OF ANTICOAGULANT: ICD-10-CM

## 2018-01-15 LAB — INR - HISTORICAL: 2

## 2018-01-26 VITALS — DIASTOLIC BLOOD PRESSURE: 70 MMHG | SYSTOLIC BLOOD PRESSURE: 132 MMHG | TEMPERATURE: 98.7 F | RESPIRATION RATE: 16 BRPM

## 2018-01-26 VITALS
HEIGHT: 68 IN | SYSTOLIC BLOOD PRESSURE: 150 MMHG | DIASTOLIC BLOOD PRESSURE: 60 MMHG | WEIGHT: 175 LBS | BODY MASS INDEX: 26.52 KG/M2

## 2018-01-26 VITALS
HEIGHT: 67 IN | WEIGHT: 184.8 LBS | RESPIRATION RATE: 18 BRPM | DIASTOLIC BLOOD PRESSURE: 80 MMHG | SYSTOLIC BLOOD PRESSURE: 136 MMHG | OXYGEN SATURATION: 95 % | BODY MASS INDEX: 27.52 KG/M2 | SYSTOLIC BLOOD PRESSURE: 144 MMHG | DIASTOLIC BLOOD PRESSURE: 74 MMHG | SYSTOLIC BLOOD PRESSURE: 132 MMHG | WEIGHT: 175 LBS | OXYGEN SATURATION: 98 % | HEIGHT: 68 IN | DIASTOLIC BLOOD PRESSURE: 80 MMHG | BODY MASS INDEX: 26.52 KG/M2 | HEART RATE: 94 BPM | HEART RATE: 80 BPM | TEMPERATURE: 96.9 F | TEMPERATURE: 96.5 F | BODY MASS INDEX: 29 KG/M2 | WEIGHT: 177 LBS

## 2018-01-26 VITALS
SYSTOLIC BLOOD PRESSURE: 140 MMHG | WEIGHT: 185.4 LBS | BODY MASS INDEX: 29.1 KG/M2 | DIASTOLIC BLOOD PRESSURE: 66 MMHG | OXYGEN SATURATION: 98 % | HEART RATE: 70 BPM | HEIGHT: 67 IN

## 2018-01-31 ASSESSMENT — PATIENT HEALTH QUESTIONNAIRE - PHQ9
SUM OF ALL RESPONSES TO PHQ QUESTIONS 1-9: 0
SUM OF ALL RESPONSES TO PHQ QUESTIONS 1-9: 1
SUM OF ALL RESPONSES TO PHQ QUESTIONS 1-9: 0

## 2018-02-02 DIAGNOSIS — I48.0 PAROXYSMAL ATRIAL FIBRILLATION (H): Primary | ICD-10-CM

## 2018-02-11 PROBLEM — Z79.01 LONG TERM (CURRENT) USE OF ANTICOAGULANTS: Status: ACTIVE | Noted: 2018-02-11

## 2018-02-11 PROBLEM — I48.91 UNSPECIFIED ATRIAL FIBRILLATION: Status: ACTIVE | Noted: 2018-02-11

## 2018-02-12 NOTE — PATIENT INSTRUCTIONS
Patient Information     Patient Name MRN Zoey Richard [id number] Female 1945      Patient Instructions by Janessa Olguin RN at 2017 11:15 AM     Author:  Janessa Olguin RN Service:  (none) Author Type:  NURS- Registered Nurse     Filed:  2017 11:15 AM Encounter Date:  2017 Status:  Signed     :  Janessa Olguin RN (NURS- Registered Nurse)            2017 Details    Sun Mon Tue Wed Thu Fri Sat          1               2                 3               4               5               6               7               8               9                 10               11               12               13               14               15               16                 17               18               19      2.5 mg   See details      20      5 mg         21      2.5 mg         22      5 mg         23      2.5 mg           24      2.5 mg         25      5 mg         26      2.5 mg         27      5 mg         28      2.5 mg         29      5 mg         30      2.5 mg           31      2.5 mg                Date Details    This INR check               How to take your warfarin dose     To take:  2.5 mg Take half of a 5 mg tablet.    To take:  5 mg Take one of the 5 mg tablets.           2018 Details    Sun Mon Tue Wed Thu Fri Sat      1      5 mg         2      2.5 mg         3      5 mg         4      2.5 mg         5      5 mg         6      2.5 mg           7      2.5 mg         8      5 mg         9      2.5 mg         10      5 mg         11      2.5 mg         12      5 mg         13      2.5 mg           14      2.5 mg         15      5 mg         16      2.5 mg         17               18               19               20                 21               22               23               24               25               26               27                 28               29               30               31                   Date Details    No additional details    Date of next INR:  1/16/2018         How to take your warfarin dose     To take:  2.5 mg Take half of a 5 mg tablet.    To take:  5 mg Take one of the 5 mg tablets.             Description           Continue same dose and recheck in 4 weeks. ..............Janessa Olguin RN    12/19/2017  11:14 AM                                        Anticoagulation Summary as of 12/19/2017     INR goal 2.0-3.0    Today's INR 2.5    Next INR check 1/16/2018          Call your Anticoagulation Clinic at Dept: [phone]   if:   1. Any medications are started, stopped, or there is a change in dose.  2. You experience any bleeding that is not easily stopped or if it is recurrent.  3. You notice an increase in bruising or any bruising that does not heal.  4. You are scheduled for surgery, colonoscopy, dental extraction or any other procedure where you may need to stop your Coumadin (warfarin).

## 2018-02-12 NOTE — TELEPHONE ENCOUNTER
Patient Information     Patient Name MRN Zoey Richard [id number] Female 1945      Telephone Encounter by Janessa Olguin RN at 2018  2:01 PM     Author:  Janessa Olguin RN Service:  (none) Author Type:  NURS- Registered Nurse     Filed:  2018  2:03 PM Encounter Date:  2018 Status:  Signed     :  Janessa Olguin RN (NURS- Registered Nurse)            Anticoagulant    Office visit in the past 12 months.    Last visit with JOSE PAK was on: 10/13/2017 in [facility]  Next visit with JOSE PAK is on: No future appointment listed with this provider  Next visit with Internal Medicine is on: 01/15/2018 in [facility]    Lab tests:  PT/INR at least monthly    INR (no units)    Date Value   2017 2.5 (H)     HEMOGLOBIN                (g/dL)    Date Value   2017 11.8 (L)           Prescription refilled per RN Medication Refill Policy.................... Janessa Olguin RN ....................  2018   2:02 PM

## 2018-02-12 NOTE — TELEPHONE ENCOUNTER
Patient Information     Patient Name MRN Sex Zoey Ortega [id number] Female 1945      Telephone Encounter by Sheryl Mattson RN at 1/10/2018  9:29 AM     Author:  Sheryl Mattson RN Service:  (none) Author Type:  NURS- Registered Nurse     Filed:  1/10/2018  9:34 AM Encounter Date:  2018 Status:  Signed     :  Sheryl Mattson RN (NURS- Registered Nurse)              Ace Inhibitors    Office visit in the past 12 months or per provider note.    Last visit with JSOE PAK was on: 10/13/2017 in [facility]  Next visit with JOSE PAK is on: No future appointment listed with this provider  Next visit with Internal Med Pediatrics is on: No future appointment listed in this department    Lab test requirements:  Creatinine and Potassium annually, if ordering lab, order BMP.  CREATININE (mg/dL)    Date Value   10/13/2017 0.83     POTASSIUM (mmol/L)    Date Value   10/13/2017 4.7       Max refill for 12 months from last office visit or per provider note.   Mail order pharmacy requesting earlier. Chart reviewed and request addressed.   Prescription refilled per RN Medication Refill Policy.................... Sheryl Mattson RN ....................  1/10/2018   9:30 AM

## 2018-02-13 ENCOUNTER — TRANSFERRED RECORDS (OUTPATIENT)
Dept: HEALTH INFORMATION MANAGEMENT | Facility: OTHER | Age: 73
End: 2018-02-13

## 2018-02-13 ENCOUNTER — HISTORIC RESULTS (OUTPATIENT)
Dept: ADMINISTRATIVE | Age: 73
End: 2018-02-13

## 2018-02-13 ENCOUNTER — DOCUMENTATION ONLY (OUTPATIENT)
Dept: FAMILY MEDICINE | Facility: OTHER | Age: 73
End: 2018-02-13

## 2018-02-13 PROBLEM — M75.41 IMPINGEMENT SYNDROME OF RIGHT SHOULDER: Status: ACTIVE | Noted: 2017-05-23

## 2018-02-13 PROBLEM — E78.00 HYPERCHOLESTEROLEMIA: Status: ACTIVE | Noted: 2018-02-13

## 2018-02-13 PROBLEM — Z12.11 SPECIAL SCREENING FOR MALIGNANT NEOPLASMS, COLON: Status: ACTIVE | Noted: 2017-08-09

## 2018-02-13 PROBLEM — M75.81 RIGHT ROTATOR CUFF TENDINITIS: Status: ACTIVE | Noted: 2017-05-23

## 2018-02-13 PROBLEM — M19.019 AC (ACROMIOCLAVICULAR) JOINT ARTHRITIS: Status: ACTIVE | Noted: 2017-05-23

## 2018-02-13 PROBLEM — M75.121 COMPLETE TEAR OF RIGHT ROTATOR CUFF: Status: ACTIVE | Noted: 2017-06-15

## 2018-02-13 PROBLEM — F33.9 MAJOR DEPRESSIVE DISORDER, RECURRENT EPISODE (H): Status: ACTIVE | Noted: 2018-02-13

## 2018-02-13 PROBLEM — I48.0 PAROXYSMAL ATRIAL FIBRILLATION (H): Status: ACTIVE | Noted: 2018-02-11

## 2018-02-13 PROBLEM — R73.01 IMPAIRED FASTING GLUCOSE: Status: ACTIVE | Noted: 2017-07-14

## 2018-02-13 PROBLEM — M81.0 OSTEOPOROSIS: Status: ACTIVE | Noted: 2017-07-25

## 2018-02-13 PROBLEM — E55.9 VITAMIN D DEFICIENCY: Status: ACTIVE | Noted: 2017-07-14

## 2018-02-13 PROBLEM — Z79.01 ANTICOAGULATION MONITORING, INR RANGE 2-3: Status: ACTIVE | Noted: 2018-02-11

## 2018-02-13 PROBLEM — N95.2 VAGINITIS, ATROPHIC: Status: ACTIVE | Noted: 2018-02-13

## 2018-02-13 RX ORDER — ERGOCALCIFEROL 1.25 MG/1
50000 CAPSULE, LIQUID FILLED ORAL WEEKLY
COMMUNITY
Start: 2017-08-07 | End: 2018-11-08 | Stop reason: DRUGHIGH

## 2018-02-13 RX ORDER — COVID-19 ANTIGEN TEST
1 KIT MISCELLANEOUS 2 TIMES DAILY WITH MEALS
COMMUNITY
Start: 2017-07-14 | End: 2021-09-13

## 2018-02-13 RX ORDER — IBUPROFEN 200 MG
500 CAPSULE ORAL
COMMUNITY
Start: 2017-10-13 | End: 2023-03-09 | Stop reason: DRUGHIGH

## 2018-02-13 RX ORDER — ATORVASTATIN CALCIUM 40 MG/1
0.5 TABLET, FILM COATED ORAL AT BEDTIME
COMMUNITY
Start: 2017-07-14 | End: 2018-06-15

## 2018-02-13 RX ORDER — OXYBUTYNIN CHLORIDE 10 MG/1
10 TABLET, EXTENDED RELEASE ORAL DAILY
COMMUNITY
Start: 2017-07-25 | End: 2018-08-29

## 2018-02-13 RX ORDER — WARFARIN SODIUM 5 MG/1
TABLET ORAL
COMMUNITY
Start: 2018-01-09 | End: 2018-07-12

## 2018-02-13 RX ORDER — ESTRADIOL 0.1 MG/G
2 CREAM VAGINAL
COMMUNITY
Start: 2017-07-14 | End: 2018-11-08

## 2018-02-13 RX ORDER — NADOLOL 80 MG/1
80 TABLET ORAL
COMMUNITY
Start: 2017-07-14 | End: 2018-11-08

## 2018-02-13 RX ORDER — LISINOPRIL 5 MG/1
5 TABLET ORAL DAILY
COMMUNITY
Start: 2018-01-10 | End: 2018-05-08

## 2018-02-13 NOTE — PATIENT INSTRUCTIONS
Patient Information     Patient Name MRN Zoey Richard [id number] Female 1945      Patient Instructions by Maryuri Vines RN at 1/15/2018 12:00 PM     Author:  Maryuri Vines RN Service:  (none) Author Type:  NURS- Registered Nurse     Filed:  1/15/2018 12:02 PM Encounter Date:  1/15/2018 Status:  Signed     :  Maryuri Vines RN (NURS- Registered Nurse)            2018 Details    Sun Mon Tue Wed Thu Fri Sat      1               2               3               4               5               6                 7               8               9               10               11               12               13                 14               15      5 mg   See details      16      5 mg         17      5 mg         18      2.5 mg         19      5 mg         20      2.5 mg           21      2.5 mg         22      5 mg         23      2.5 mg         24      5 mg         25      2.5 mg         26      5 mg         27      2.5 mg           28      2.5 mg         29      5 mg         30      2.5 mg         31      5 mg             Date Details   01/15 This INR check               How to take your warfarin dose     To take:  2.5 mg Take half of a 5 mg tablet.    To take:  5 mg Take one of the 5 mg tablets.           2018 Details    Sun Mon Tue Wed Thu Fri Sat         1      2.5 mg         2      5 mg         3      2.5 mg           4      2.5 mg         5      5 mg         6      2.5 mg         7      5 mg         8      2.5 mg         9               10                 11               12               13               14               15               16               17                 18               19               20               21               22               23               24                 25               26               27               28                   Date Details   No additional details    Date of next INR:  2018         How to take your  warfarin dose     To take:  2.5 mg Take half of a 5 mg tablet.    To take:  5 mg Take one of the 5 mg tablets.             Description           Take extra Warfarin tomorrow (5 mg instead of 2.5 mg), then continue same dose and recheck in three weeks.  ELSA RAZO RN ....................  1/15/2018   12:01 PM                                    Anticoagulation Summary as of 1/15/2018     INR goal 2.0-3.0    Today's INR 2.0    Next INR check 2/8/2018          Call your Anticoagulation Clinic at Dept: [phone]   if:   1. Any medications are started, stopped, or there is a change in dose.  2. You experience any bleeding that is not easily stopped or if it is recurrent.  3. You notice an increase in bruising or any bruising that does not heal.  4. You are scheduled for surgery, colonoscopy, dental extraction or any other procedure where you may need to stop your Coumadin (warfarin).

## 2018-02-21 ENCOUNTER — ANTICOAGULATION THERAPY VISIT (OUTPATIENT)
Dept: ANTICOAGULATION | Facility: OTHER | Age: 73
End: 2018-02-21
Attending: INTERNAL MEDICINE
Payer: MEDICARE

## 2018-02-21 DIAGNOSIS — Z79.01 ANTICOAGULATION MONITORING, INR RANGE 2-3: ICD-10-CM

## 2018-02-21 DIAGNOSIS — I48.0 PAROXYSMAL ATRIAL FIBRILLATION (H): ICD-10-CM

## 2018-02-21 LAB — INR POINT OF CARE: 4.6 (ref 2–3)

## 2018-02-21 PROCEDURE — 36416 COLLJ CAPILLARY BLOOD SPEC: CPT | Mod: ZL

## 2018-02-21 PROCEDURE — 99207 ZZC NO CHARGE NURSE ONLY: CPT

## 2018-02-21 PROCEDURE — 85610 PROTHROMBIN TIME: CPT | Mod: QW,ZL

## 2018-02-21 NOTE — MR AVS SNAPSHOT
Zoey DON Reynaldo   2/21/2018 1:15 PM   Anticoagulation Therapy Visit    Description:  72 year old female   Provider:  [facility]   Department:  [facility]           INR as of 2/21/2018     Today's INR 4.6!      Anticoagulation Summary as of 2/21/2018     INR goal 2.0-3.0   Today's INR 4.6!   Full instructions 2/21: Hold; 2/23: 2.5 mg; Otherwise 5 mg on Mon, Fri; 2.5 mg all other days   Next INR check 2/26/2018    Indications   Anticoagulation monitoring  INR range 2-3 [Z79.01]  Paroxysmal atrial fibrillation (H) [I48.0]         Description     Hold today then 2.5 mg x 4 day and recheck on 2/26/18. Janessa Olguin RN    2/21/2018  1:22 PM        February 2018 Details    Sun Mon Tue Wed Thu Fri Sat         1               2               3                 4               5               6               7               8               9               10                 11               12               13               14               15               16               17                 18               19               20               21      Hold   See details      22      2.5 mg         23      2.5 mg         24      2.5 mg           25      2.5 mg         26            27               28                   Date Details   02/21 This INR check       Date of next INR:  2/26/2018         How to take your warfarin dose     To take:  2.5 mg Take 0.5 of a 5 mg tablet.    To take:  5 mg Take 1 of the 5 mg tablets.    Hold Do not take your warfarin dose. See the Details table to the right for additional instructions.

## 2018-02-21 NOTE — PROGRESS NOTES
ANTICOAGULATION FOLLOW-UP CLINIC VISIT    Patient Name:  Zoey Jacobs  Date:  2/21/2018  Contact Type:  Face to Face    SUBJECTIVE:     Patient Findings     Positives Other complaints (had cardioversion done on 2/16/18 warfarin dose was increased)           OBJECTIVE    INR Protime   Date Value Ref Range Status   02/21/2018 4.6 (A) 2 - 3 Final       ASSESSMENT / PLAN  INR assessment SUPRA    Recheck INR In: 5 DAYS    INR Location Clinic      Anticoagulation Summary as of 2/21/2018     INR goal 2.0-3.0   Today's INR 4.6!   Maintenance plan 5 mg (5 mg x 1) on Mon, Fri; 2.5 mg (5 mg x 0.5) all other days   Full instructions 2/21: Hold; 2/23: 2.5 mg; Otherwise 5 mg on Mon, Fri; 2.5 mg all other days   Weekly total 22.5 mg   Plan last modified Janessa Olguin RN (2/21/2018)   Next INR check 2/26/2018   Target end date Indefinite    Indications   Anticoagulation monitoring  INR range 2-3 [Z79.01]  Paroxysmal atrial fibrillation (H) [I48.0]         Anticoagulation Episode Summary     INR check location     Preferred lab     Send INR reminders to  INR    Comments       Anticoagulation Care Providers     Provider Role Specialty Phone number    Vijay Mackay MD Responsible Pediatrics [phone]            See the Encounter Report to view Anticoagulation Flowsheet and Dosing Calendar (Go to Encounters tab in chart review, and find the Anticoagulation Therapy Visit)        Janessa Olguin, RN

## 2018-02-26 ENCOUNTER — ANTICOAGULATION THERAPY VISIT (OUTPATIENT)
Dept: ANTICOAGULATION | Facility: OTHER | Age: 73
End: 2018-02-26
Attending: INTERNAL MEDICINE
Payer: MEDICARE

## 2018-02-26 DIAGNOSIS — Z79.01 ANTICOAGULATION MONITORING, INR RANGE 2-3: ICD-10-CM

## 2018-02-26 DIAGNOSIS — I48.0 PAROXYSMAL ATRIAL FIBRILLATION (H): ICD-10-CM

## 2018-02-26 LAB — INR POINT OF CARE: 2.6 (ref 2–3)

## 2018-02-26 PROCEDURE — 85610 PROTHROMBIN TIME: CPT | Mod: QW,ZL

## 2018-02-26 PROCEDURE — 99207 ZZC NO CHARGE NURSE ONLY: CPT

## 2018-02-26 PROCEDURE — 36416 COLLJ CAPILLARY BLOOD SPEC: CPT | Mod: ZL

## 2018-02-26 NOTE — MR AVS SNAPSHOT
Zoey ARIAN Jacobs   2/26/2018 11:45 AM   Anticoagulation Therapy Visit    Description:  73 year old female   Provider:  [facility]   Department:  [facility]           INR as of 2/26/2018     Today's INR 2.6      Anticoagulation Summary as of 2/26/2018     INR goal 2.0-3.0   Today's INR 2.6   Full instructions 5 mg on Fri; 2.5 mg all other days   Next INR check 3/5/2018    Indications   Anticoagulation monitoring  INR range 2-3 [Z79.01]  Paroxysmal atrial fibrillation (H) [I48.0]         Description     Decrease Warfarin/Coumadin and recheck INR in 1 week.  Maryuri Vines ....................  2/26/2018   11:58 AM        February 2018 Details    Sun Mon Tue Wed Thu Fri Sat         1               2               3                 4               5               6               7               8               9               10                 11               12               13               14               15               16               17                 18               19               20               21               22               23               24                 25               26      2.5 mg   See details      27      2.5 mg         28      2.5 mg             Date Details   02/26 This INR check               How to take your warfarin dose     To take:  2.5 mg Take 0.5 of a 5 mg tablet.           March 2018 Details    Sun Mon Tue Wed Thu Fri Sat         1      2.5 mg         2      5 mg         3      2.5 mg           4      2.5 mg         5            6               7               8               9               10                 11               12               13               14               15               16               17                 18               19               20               21               22               23               24                 25               26               27               28               29               30               31                 Date Details   No additional details    Date of next INR:  3/5/2018         How to take your warfarin dose     To take:  2.5 mg Take 0.5 of a 5 mg tablet.    To take:  5 mg Take 1 of the 5 mg tablets.

## 2018-02-26 NOTE — PROGRESS NOTES
ANTICOAGULATION FOLLOW-UP CLINIC VISIT    Patient Name:  Zoey Jacobs  Date:  2/26/2018  Contact Type:  Face to Face    SUBJECTIVE:     Patient Findings     Positives No Problem Findings           OBJECTIVE    INR Protime   Date Value Ref Range Status   02/26/2018 2.6 2.0 - 3.0 Final       ASSESSMENT / PLAN  INR assessment THER    Recheck INR In: 1 WEEK    INR Location Clinic      Anticoagulation Summary as of 2/26/2018     INR goal 2.0-3.0   Today's INR 2.6   Maintenance plan 5 mg (5 mg x 1) on Fri; 2.5 mg (5 mg x 0.5) all other days   Full instructions 5 mg on Fri; 2.5 mg all other days   Weekly total 20 mg   Plan last modified Maryuri Vines RN (2/26/2018)   Next INR check 3/5/2018   Priority INR   Target end date Indefinite    Indications   Anticoagulation monitoring  INR range 2-3 [Z79.01]  Paroxysmal atrial fibrillation (H) [I48.0]         Anticoagulation Episode Summary     INR check location     Preferred lab     Send INR reminders to  INR    Comments       Anticoagulation Care Providers     Provider Role Specialty Phone number    Vijay Mackay MD [facility] [phone]            See the Encounter Report to view Anticoagulation Flowsheet and Dosing Calendar (Go to Encounters tab in chart review, and find the Anticoagulation Therapy Visit)        Maryuri Vines RN

## 2018-02-28 ENCOUNTER — HISTORIC RESULTS (OUTPATIENT)
Dept: ADMINISTRATIVE | Age: 73
End: 2018-02-28

## 2018-03-05 ENCOUNTER — ANTICOAGULATION THERAPY VISIT (OUTPATIENT)
Dept: ANTICOAGULATION | Facility: OTHER | Age: 73
End: 2018-03-05
Attending: INTERNAL MEDICINE
Payer: MEDICARE

## 2018-03-05 DIAGNOSIS — I48.0 PAROXYSMAL ATRIAL FIBRILLATION (H): ICD-10-CM

## 2018-03-05 DIAGNOSIS — Z79.01 ANTICOAGULATION MONITORING, INR RANGE 2-3: ICD-10-CM

## 2018-03-05 LAB — INR POINT OF CARE: 1.9 (ref 2–3)

## 2018-03-05 PROCEDURE — 85610 PROTHROMBIN TIME: CPT | Mod: QW,ZL

## 2018-03-05 PROCEDURE — 99207 ZZC NO CHARGE NURSE ONLY: CPT

## 2018-03-05 PROCEDURE — 36416 COLLJ CAPILLARY BLOOD SPEC: CPT | Mod: ZL

## 2018-03-05 NOTE — PROGRESS NOTES
ANTICOAGULATION FOLLOW-UP CLINIC VISIT    Patient Name:  Zoey Jacobs  Date:  3/5/2018  Contact Type:  Face to Face    SUBJECTIVE:     Patient Findings     Positives No Problem Findings           OBJECTIVE    INR Protime   Date Value Ref Range Status   03/05/2018 1.9 (A) 2 - 3 Final       ASSESSMENT / PLAN  INR assessment SUB    Recheck INR In: 2 WEEKS    INR Location Clinic      Anticoagulation Summary as of 3/5/2018     INR goal 2.0-3.0   Today's INR 1.9!   Maintenance plan 5 mg (5 mg x 1) on Mon, Fri; 2.5 mg (5 mg x 0.5) all other days   Full instructions 5 mg on Mon, Fri; 2.5 mg all other days   Weekly total 22.5 mg   Plan last modified Janessa Olguin, GENEVA (3/5/2018)   Next INR check 3/19/2018   Priority INR   Target end date Indefinite    Indications   Anticoagulation monitoring  INR range 2-3 [Z79.01]  Paroxysmal atrial fibrillation (H) [I48.0]         Anticoagulation Episode Summary     INR check location     Preferred lab     Send INR reminders to  INR    Comments       Anticoagulation Care Providers     Provider Role Specialty Phone number    Vijay Mackay MD [facility] [phone]            See the Encounter Report to view Anticoagulation Flowsheet and Dosing Calendar (Go to Encounters tab in chart review, and find the Anticoagulation Therapy Visit)        Janessa Olguin, RN

## 2018-03-05 NOTE — MR AVS SNAPSHOT
Zoey Jacobs   3/5/2018 11:15 AM   Anticoagulation Therapy Visit    Description:  73 year old female   Provider:  [facility]   Department:  [facility]           INR as of 3/5/2018     Today's INR 1.9!      Anticoagulation Summary as of 3/5/2018     INR goal 2.0-3.0   Today's INR 1.9!   Full instructions 5 mg on Mon, Fri; 2.5 mg all other days   Next INR check 3/19/2018    Indications   Anticoagulation monitoring  INR range 2-3 [Z79.01]  Paroxysmal atrial fibrillation (H) [I48.0]         Description     Increase dose and recheck in 2 weeks. .............Janessa Olguin RN.......... 3/5/2018  11:14 AM        Your next Anticoagulation Clinic appointment(s)     Mar 05, 2018 11:15 AM CST   Anticoagulation Visit with [facility]   [facility] ([facility])    [address]  [facility] [address]   [phone]              March 2018 Details    Sun Mon Tue Wed Thu Fri Sat         1               2               3                 4               5      5 mg   See details      6      2.5 mg         7      2.5 mg         8      2.5 mg         9      5 mg         10      2.5 mg           11      2.5 mg         12      5 mg         13      2.5 mg         14      2.5 mg         15      2.5 mg         16      5 mg         17      2.5 mg           18      2.5 mg         19            20               21               22               23               24                 25               26               27               28               29               30               31                Date Details   03/05 This INR check       Date of next INR:  3/19/2018         How to take your warfarin dose     To take:  2.5 mg Take 0.5 of a 5 mg tablet.    To take:  5 mg Take 1 of the 5 mg tablets.

## 2018-03-19 ENCOUNTER — ANTICOAGULATION THERAPY VISIT (OUTPATIENT)
Dept: ANTICOAGULATION | Facility: OTHER | Age: 73
End: 2018-03-19
Attending: INTERNAL MEDICINE
Payer: MEDICARE

## 2018-03-19 ENCOUNTER — MEDICAL CORRESPONDENCE (OUTPATIENT)
Dept: HEALTH INFORMATION MANAGEMENT | Facility: OTHER | Age: 73
End: 2018-03-19

## 2018-03-19 DIAGNOSIS — I48.0 PAROXYSMAL ATRIAL FIBRILLATION (H): ICD-10-CM

## 2018-03-19 DIAGNOSIS — Z79.01 ANTICOAGULATION MONITORING, INR RANGE 2-3: ICD-10-CM

## 2018-03-19 LAB — INR POINT OF CARE: 2 (ref 3–3)

## 2018-03-19 PROCEDURE — 99207 ZZC NO CHARGE NURSE ONLY: CPT

## 2018-03-19 PROCEDURE — 85610 PROTHROMBIN TIME: CPT | Mod: QW,ZL

## 2018-03-19 PROCEDURE — 36416 COLLJ CAPILLARY BLOOD SPEC: CPT | Mod: ZL

## 2018-03-19 NOTE — MR AVS SNAPSHOT
Zoey ARIAN Jacobs   3/19/2018 11:45 AM   Anticoagulation Therapy Visit    Description:  73 year old female   Provider:  [facility]   Department:  [facility]           INR as of 3/19/2018     Today's INR 2.0      Anticoagulation Summary as of 3/19/2018     INR goal 2.0-3.0   Today's INR 2.0   Full instructions 5 mg on Mon, Wed, Fri; 2.5 mg all other days   Next INR check 4/9/2018    Indications   Anticoagulation monitoring  INR range 2-3 [Z79.01]  Paroxysmal atrial fibrillation (H) [I48.0]         Description     Increase Warfarin/Coumadin and recheck INR in 3 weeks.   Maryuri Vines ....................  3/19/2018   11:47 AM          March 2018 Details    Sun Mon Tue Wed Thu Fri Sat         1               2               3                 4               5               6               7               8               9               10                 11               12               13               14               15               16               17                 18               19      5 mg   See details      20      2.5 mg         21      5 mg         22      2.5 mg         23      5 mg         24      2.5 mg           25      2.5 mg         26      5 mg         27      2.5 mg         28      5 mg         29      2.5 mg         30      5 mg         31      2.5 mg          Date Details   03/19 This INR check               How to take your warfarin dose     To take:  2.5 mg Take 0.5 of a 5 mg tablet.    To take:  5 mg Take 1 of the 5 mg tablets.           April 2018 Details    Sun Mon Tue Wed Thu Fri Sat     1      2.5 mg         2      5 mg         3      2.5 mg         4      5 mg         5      2.5 mg         6      5 mg         7      2.5 mg           8      2.5 mg         9            10               11               12               13               14                 15               16               17               18               19               20               21                  22               23               24               25               26               27               28                 29               30                     Date Details   No additional details    Date of next INR:  4/9/2018         How to take your warfarin dose     To take:  2.5 mg Take 0.5 of a 5 mg tablet.    To take:  5 mg Take 1 of the 5 mg tablets.

## 2018-03-19 NOTE — PROGRESS NOTES
ANTICOAGULATION FOLLOW-UP CLINIC VISIT    Patient Name:  Zoey Jacobs  Date:  3/19/2018  Contact Type:  Face to Face    SUBJECTIVE:     Patient Findings     Positives No Problem Findings           OBJECTIVE    INR Protime   Date Value Ref Range Status   03/19/2018 2.0 (A) 3.0 - 3.0 Final       ASSESSMENT / PLAN  INR assessment THER    Recheck INR In: 3 WEEKS    INR Location Clinic      Anticoagulation Summary as of 3/19/2018     INR goal 2.0-3.0   Today's INR 2.0   Maintenance plan 5 mg (5 mg x 1) on Mon, Wed, Fri; 2.5 mg (5 mg x 0.5) all other days   Full instructions 5 mg on Mon, Wed, Fri; 2.5 mg all other days   Weekly total 25 mg   Plan last modified Maryuri Vines RN (3/19/2018)   Next INR check 4/9/2018   Priority INR   Target end date Indefinite    Indications   Anticoagulation monitoring  INR range 2-3 [Z79.01]  Paroxysmal atrial fibrillation (H) [I48.0]         Anticoagulation Episode Summary     INR check location     Preferred lab     Send INR reminders to  INR    Comments       Anticoagulation Care Providers     Provider Role Specialty Phone number    Vijay Mackay MD [facility] [phone]            See the Encounter Report to view Anticoagulation Flowsheet and Dosing Calendar (Go to Encounters tab in chart review, and find the Anticoagulation Therapy Visit)        Maryuri Vines RN

## 2018-03-29 ENCOUNTER — HOSPITAL ENCOUNTER (OUTPATIENT)
Dept: NUCLEAR MEDICINE | Facility: OTHER | Age: 73
End: 2018-03-29
Attending: INTERNAL MEDICINE
Payer: MEDICARE

## 2018-03-29 ENCOUNTER — HOSPITAL ENCOUNTER (OUTPATIENT)
Dept: GENERAL RADIOLOGY | Facility: OTHER | Age: 73
Discharge: HOME | End: 2018-03-29
Attending: INTERNAL MEDICINE | Admitting: INTERNAL MEDICINE
Payer: MEDICARE

## 2018-03-29 DIAGNOSIS — I27.20 PULMONARY HYPERTENSION (H): ICD-10-CM

## 2018-03-29 DIAGNOSIS — I27.20 PULMONARY HYPERTENSION (H): Primary | ICD-10-CM

## 2018-03-29 LAB
ALBUMIN SERPL-MCNC: 4.1 G/DL (ref 3.5–5.7)
ALP SERPL-CCNC: 144 U/L (ref 34–104)
ALT SERPL W P-5'-P-CCNC: 42 U/L (ref 7–52)
AST SERPL W P-5'-P-CCNC: 55 U/L (ref 13–39)
BILIRUB DIRECT SERPL-MCNC: 0.1 MG/DL (ref 0–0.2)
BILIRUB SERPL-MCNC: 0.6 MG/DL (ref 0.3–1)
CREAT SERPL-MCNC: 0.87 MG/DL (ref 0.6–1.2)
GFR SERPL CREATININE-BSD FRML MDRD: 64 ML/MIN/1.7M2
NT-PROBNP SERPL-MCNC: 309 PG/ML (ref 0–100)
POTASSIUM SERPL-SCNC: 4 MMOL/L (ref 3.5–5.1)
PROT SERPL-MCNC: 6.3 G/DL (ref 6.4–8.9)

## 2018-03-29 PROCEDURE — 78582 LUNG VENTILAT&PERFUS IMAGING: CPT

## 2018-03-29 PROCEDURE — 34300033 ZZH RX 343

## 2018-03-29 PROCEDURE — 86038 ANTINUCLEAR ANTIBODIES: CPT

## 2018-03-29 PROCEDURE — 83880 ASSAY OF NATRIURETIC PEPTIDE: CPT | Mod: GZ

## 2018-03-29 PROCEDURE — 80076 HEPATIC FUNCTION PANEL: CPT

## 2018-03-29 PROCEDURE — 71046 X-RAY EXAM CHEST 2 VIEWS: CPT

## 2018-03-29 PROCEDURE — 84132 ASSAY OF SERUM POTASSIUM: CPT

## 2018-03-29 PROCEDURE — 82565 ASSAY OF CREATININE: CPT

## 2018-03-29 PROCEDURE — A9567 TECHNETIUM TC-99M AEROSOL: HCPCS

## 2018-03-29 PROCEDURE — 86039 ANTINUCLEAR ANTIBODIES (ANA): CPT

## 2018-03-29 PROCEDURE — 36415 COLL VENOUS BLD VENIPUNCTURE: CPT

## 2018-03-29 PROCEDURE — A9540 TC99M MAA: HCPCS

## 2018-03-29 RX ADMIN — KIT FOR THE PREPARATION OF TECHNETIUM TC 99M PENTETATE 32.1 MILLICURIE: 20 INJECTION, POWDER, LYOPHILIZED, FOR SOLUTION INTRAVENOUS; RESPIRATORY (INHALATION) at 11:10

## 2018-03-29 RX ADMIN — Medication 5.16 MILLICURIE: at 11:35

## 2018-03-30 LAB
ANA PAT SER IF-IMP: ABNORMAL
ANA SER QL IF: POSITIVE
ANA TITR SER IF: ABNORMAL {TITER}

## 2018-04-04 ENCOUNTER — HOSPITAL ENCOUNTER (OUTPATIENT)
Dept: RESPIRATORY THERAPY | Facility: OTHER | Age: 73
Discharge: HOME | End: 2018-04-04
Attending: INTERNAL MEDICINE | Admitting: INTERNAL MEDICINE
Payer: MEDICARE

## 2018-04-04 DIAGNOSIS — I27.20 PULMONARY HYPERTENSION (H): Primary | ICD-10-CM

## 2018-04-04 PROCEDURE — 94729 DIFFUSING CAPACITY: CPT | Mod: 26 | Performed by: INTERNAL MEDICINE

## 2018-04-04 PROCEDURE — 94726 PLETHYSMOGRAPHY LUNG VOLUMES: CPT

## 2018-04-04 PROCEDURE — 94010 BREATHING CAPACITY TEST: CPT | Performed by: INTERNAL MEDICINE

## 2018-04-04 PROCEDURE — 94729 DIFFUSING CAPACITY: CPT

## 2018-04-04 PROCEDURE — 94200 LUNG FUNCTION TEST (MBC/MVV): CPT | Mod: XU

## 2018-04-04 PROCEDURE — 94726 PLETHYSMOGRAPHY LUNG VOLUMES: CPT | Mod: 26 | Performed by: INTERNAL MEDICINE

## 2018-04-04 PROCEDURE — 94200 LUNG FUNCTION TEST (MBC/MVV): CPT | Mod: 26 | Performed by: INTERNAL MEDICINE

## 2018-04-04 PROCEDURE — 40000275 ZZH STATISTIC RCP TIME EA 10 MIN

## 2018-04-04 PROCEDURE — 94010 BREATHING CAPACITY TEST: CPT

## 2018-04-05 LAB
DLCOCOR-%PRED-PRE: 103 %
DLCOCOR-PRE: 22.27 ML/MIN/MMHG
DLCOUNC-%PRED-PRE: 96 %
DLCOUNC-PRE: 20.85 ML/MIN/MMHG
DLCOUNC-PRED: 21.6 ML/MIN/MMHG
ERV-%PRED-PRE: 8 %
ERV-PRE: 0.06 L
ERV-PRED: 0.67 L
EXPTIME-PRE: 7.59 SEC
FEF2575-%PRED-PRE: 66 %
FEF2575-PRE: 1.27 L/SEC
FEF2575-PRED: 1.9 L/SEC
FEFMAX-%PRED-PRE: 97 %
FEFMAX-PRE: 5.83 L/SEC
FEFMAX-PRED: 5.96 L/SEC
FEV1-%PRED-PRE: 77 %
FEV1-PRE: 1.84 L
FEV1FEV6-PRE: 74 %
FEV1FEV6-PRED: 79 %
FEV1FVC-PRE: 75 %
FEV1FVC-PRED: 75 %
FEV1SVC-PRE: 67 %
FEV1SVC-PRED: 69 %
FIFMAX-PRE: 1.55 L/SEC
FRCPLETH-%PRED-PRE: 85 %
FRCPLETH-PRE: 2.48 L
FRCPLETH-PRED: 2.91 L
FVC-%PRED-PRE: 78 %
FVC-PRE: 2.44 L
FVC-PRED: 3.1 L
GAW-%PRED-PRE: 37 %
GAW-PRE: 0.39 L/S/CMH2O
GAW-PRED: 1.03 L/S/CMH2O
IC-%PRED-PRE: 97 %
IC-PRE: 2.7 L
IC-PRED: 2.77 L
MVV-%PRED-PRE: 90 %
MVV-PRE: 82 L/MIN
MVV-PRED: 91 L/MIN
RVPLETH-%PRED-PRE: 106 %
RVPLETH-PRE: 2.42 L
RVPLETH-PRED: 2.27 L
SGAW-%PRED-PRE: 152 %
SGAW-PRE: 0.15 1/CMH2O*S
SGAW-PRED: 0.1 1/CMH2O*S
SRAW-%PRED-PRE: 164 %
SRAW-PRE: 7.85 CMH2O*S
SRAW-PRED: 4.76 CMH2O*S
TLCPLETH-%PRED-PRE: 93 %
TLCPLETH-PRE: 5.17 L
TLCPLETH-PRED: 5.53 L
VA-%PRED-PRE: 80 %
VA-PRE: 4.56 L
VC-%PRED-PRE: 80 %
VC-PRE: 2.76 L
VC-PRED: 3.44 L

## 2018-04-09 ENCOUNTER — ANTICOAGULATION THERAPY VISIT (OUTPATIENT)
Dept: ANTICOAGULATION | Facility: OTHER | Age: 73
End: 2018-04-09
Attending: INTERNAL MEDICINE
Payer: MEDICARE

## 2018-04-09 DIAGNOSIS — Z79.01 ANTICOAGULATION MONITORING, INR RANGE 2-3: ICD-10-CM

## 2018-04-09 DIAGNOSIS — I48.0 PAROXYSMAL ATRIAL FIBRILLATION (H): ICD-10-CM

## 2018-04-09 LAB — INR POINT OF CARE: 2.5 (ref 2–3)

## 2018-04-09 PROCEDURE — 85610 PROTHROMBIN TIME: CPT | Mod: QW,ZL

## 2018-04-09 PROCEDURE — 36416 COLLJ CAPILLARY BLOOD SPEC: CPT | Mod: ZL

## 2018-04-09 NOTE — PROGRESS NOTES
ANTICOAGULATION FOLLOW-UP CLINIC VISIT    Patient Name:  Zoey Jacobs  Date:  4/9/2018  Contact Type:  Face to Face    SUBJECTIVE:     Patient Findings     Positives No Problem Findings           OBJECTIVE    INR Protime   Date Value Ref Range Status   04/09/2018 2.5 2.0 - 3.0 Final       ASSESSMENT / PLAN  INR assessment THER    Recheck INR In: 4 WEEKS    INR Location Clinic      Anticoagulation Summary as of 4/9/2018     INR goal 2.0-3.0   Today's INR 2.5   Maintenance plan 5 mg (5 mg x 1) on Mon, Wed, Fri; 2.5 mg (5 mg x 0.5) all other days   Full instructions 5 mg on Mon, Wed, Fri; 2.5 mg all other days   Weekly total 25 mg   No change documented Maryuri Vines RN   Plan last modified Maryuri Vines RN (3/19/2018)   Next INR check 5/7/2018   Priority INR   Target end date Indefinite    Indications   Anticoagulation monitoring  INR range 2-3 [Z79.01]  Paroxysmal atrial fibrillation (H) [I48.0]         Anticoagulation Episode Summary     INR check location     Preferred lab     Send INR reminders to  INR    Comments       Anticoagulation Care Providers     Provider Role Specialty Phone number    Vijay Mackay MD Responsible Pediatrics [phone]            See the Encounter Report to view Anticoagulation Flowsheet and Dosing Calendar (Go to Encounters tab in chart review, and find the Anticoagulation Therapy Visit)        Maryuri Vines RN

## 2018-04-09 NOTE — MR AVS SNAPSHOT
Zoey DON Reynaldo   4/9/2018 11:30 AM   Anticoagulation Therapy Visit    Description:  73 year old female   Provider:  [facility]   Department:  [facility]           INR as of 4/9/2018     Today's INR 2.5      Anticoagulation Summary as of 4/9/2018     INR goal 2.0-3.0   Today's INR 2.5   Full instructions 5 mg on Mon, Wed, Fri; 2.5 mg all other days   Next INR check 5/7/2018    Indications   Anticoagulation monitoring  INR range 2-3 [Z79.01]  Paroxysmal atrial fibrillation (H) [I48.0]         Description     Continue same dose of Coumadin/Warfarinand recheck INR in 4 weeks.          April 2018 Details    Sun Mon Tue Wed Thu Fri Sat     1               2               3               4               5               6               7                 8               9      5 mg   See details      10      2.5 mg         11      5 mg         12      2.5 mg         13      5 mg         14      2.5 mg           15      2.5 mg         16      5 mg         17      2.5 mg         18      5 mg         19      2.5 mg         20      5 mg         21      2.5 mg           22      2.5 mg         23      5 mg         24      2.5 mg         25      5 mg         26      2.5 mg         27      5 mg         28      2.5 mg           29      2.5 mg         30      5 mg               Date Details   04/09 This INR check               How to take your warfarin dose     To take:  2.5 mg Take 0.5 of a 5 mg tablet.    To take:  5 mg Take 1 of the 5 mg tablets.           May 2018 Details    Sun Mon Tue Wed Thu Fri Sat       1      2.5 mg         2      5 mg         3      2.5 mg         4      5 mg         5      2.5 mg           6      2.5 mg         7            8               9               10               11               12                 13               14               15               16               17               18               19                 20               21               22               23                24               25               26                 27               28               29               30               31                  Date Details   No additional details    Date of next INR:  5/7/2018         How to take your warfarin dose     To take:  2.5 mg Take 0.5 of a 5 mg tablet.    To take:  5 mg Take 1 of the 5 mg tablets.

## 2018-05-07 ENCOUNTER — ANTICOAGULATION THERAPY VISIT (OUTPATIENT)
Dept: ANTICOAGULATION | Facility: OTHER | Age: 73
End: 2018-05-07
Attending: INTERNAL MEDICINE
Payer: MEDICARE

## 2018-05-07 LAB — INR POINT OF CARE: 2.9 (ref 2–3)

## 2018-05-07 PROCEDURE — 85610 PROTHROMBIN TIME: CPT | Mod: QW

## 2018-05-07 PROCEDURE — 99207 ZZC NO CHARGE NURSE ONLY: CPT

## 2018-05-07 NOTE — MR AVS SNAPSHOT
Zoey ARIAN Jacobs   5/7/2018 11:30 AM   Anticoagulation Therapy Visit    Description:  73 year old female   Provider:  [facility]   Department:  [facility]           INR as of 5/7/2018     Today's INR 2.9      Anticoagulation Summary as of 5/7/2018     INR goal 2.0-3.0   Today's INR 2.9   Full instructions 5/14: 2.5 mg; Otherwise 5 mg on Mon, Wed, Fri; 2.5 mg all other days   Next INR check 5/21/2018    Indications   Anticoagulation monitoring  INR range 2-3 [Z79.01]  Paroxysmal atrial fibrillation (H) [I48.0]         Description     Continue same dose of Coumadin/Warfarinand recheck INR in 4 weeks. Having procedure on 05/16/18, needs INR less than 3.0, so will take 1/2 dose on 05/14/16.        May 2018 Details    Sun Mon Tue Wed Thu Fri Sat       1               2               3               4               5                 6               7      5 mg   See details      8      2.5 mg         9      5 mg         10      2.5 mg         11      5 mg         12      2.5 mg           13      2.5 mg         14      2.5 mg         15      2.5 mg         16      5 mg         17      2.5 mg         18      5 mg         19      2.5 mg           20      2.5 mg         21            22               23               24               25               26                 27               28               29               30               31                  Date Details   05/07 This INR check       Date of next INR:  5/21/2018         How to take your warfarin dose     To take:  2.5 mg Take 0.5 of a 5 mg tablet.    To take:  5 mg Take 1 of the 5 mg tablets.

## 2018-05-07 NOTE — PROGRESS NOTES
ANTICOAGULATION FOLLOW-UP CLINIC VISIT    Patient Name:  Zoey Jacobs  Date:  5/7/2018  Contact Type:  Face to Face    SUBJECTIVE:     Patient Findings     Positives No Problem Findings           OBJECTIVE    INR Protime   Date Value Ref Range Status   05/07/2018 2.9 2.0 - 3.0 Final       ASSESSMENT / PLAN  INR assessment THER    Recheck INR In: 3 WEEKS    INR Location Clinic      Anticoagulation Summary as of 5/7/2018     INR goal 2.0-3.0   Today's INR 2.9   Maintenance plan 5 mg (5 mg x 1) on Mon, Wed, Fri; 2.5 mg (5 mg x 0.5) all other days   Full instructions 5/14: 2.5 mg; Otherwise 5 mg on Mon, Wed, Fri; 2.5 mg all other days   Weekly total 25 mg   Plan last modified Maryuri Vines RN (3/19/2018)   Next INR check 5/28/2018   Priority INR   Target end date Indefinite    Indications   Anticoagulation monitoring  INR range 2-3 [Z79.01]  Paroxysmal atrial fibrillation (H) [I48.0]         Anticoagulation Episode Summary     INR check location     Preferred lab     Send INR reminders to  INR    Comments       Anticoagulation Care Providers     Provider Role Specialty Phone number    Vijay Mackay MD Responsible Pediatrics [phone]            See the Encounter Report to view Anticoagulation Flowsheet and Dosing Calendar (Go to Encounters tab in chart review, and find the Anticoagulation Therapy Visit)        Maryuri Vines RN

## 2018-05-08 DIAGNOSIS — I10 ESSENTIAL HYPERTENSION: Primary | ICD-10-CM

## 2018-05-11 RX ORDER — LISINOPRIL 5 MG/1
TABLET ORAL
Qty: 90 TABLET | Refills: 1 | Status: SHIPPED | OUTPATIENT
Start: 2018-05-11 | End: 2018-10-03

## 2018-05-11 NOTE — TELEPHONE ENCOUNTER
Lisinopril  LOV-10/13/2017  Last creatinine and potassium- 03/29/2018  Prescription refilled per RN Medication RefillPolicy.................... Megan Berkowitz .Amarilis..................  5/11/2018   3:03 PM

## 2018-05-15 ENCOUNTER — TRANSFERRED RECORDS (OUTPATIENT)
Dept: HEALTH INFORMATION MANAGEMENT | Facility: OTHER | Age: 73
End: 2018-05-15

## 2018-05-30 ENCOUNTER — HOSPITAL ENCOUNTER (OUTPATIENT)
Dept: MAMMOGRAPHY | Facility: OTHER | Age: 73
Discharge: HOME | End: 2018-05-30
Attending: INTERNAL MEDICINE | Admitting: INTERNAL MEDICINE
Payer: MEDICARE

## 2018-05-30 DIAGNOSIS — Z12.31 VISIT FOR SCREENING MAMMOGRAM: ICD-10-CM

## 2018-05-30 PROCEDURE — 77067 SCR MAMMO BI INCL CAD: CPT

## 2018-06-04 ENCOUNTER — ANTICOAGULATION THERAPY VISIT (OUTPATIENT)
Dept: ANTICOAGULATION | Facility: OTHER | Age: 73
End: 2018-06-04
Attending: INTERNAL MEDICINE
Payer: MEDICARE

## 2018-06-04 DIAGNOSIS — I48.0 PAROXYSMAL ATRIAL FIBRILLATION (H): ICD-10-CM

## 2018-06-04 DIAGNOSIS — Z79.01 ANTICOAGULATION MONITORING, INR RANGE 2-3: ICD-10-CM

## 2018-06-04 LAB — INR POINT OF CARE: 2.8 (ref 0.86–1.14)

## 2018-06-04 PROCEDURE — 85610 PROTHROMBIN TIME: CPT | Mod: QW,ZL

## 2018-06-04 NOTE — PROGRESS NOTES
ANTICOAGULATION FOLLOW-UP CLINIC VISIT    Patient Name:  Zoey Jacobs  Date:  6/4/2018  Contact Type:  Face to Face    SUBJECTIVE:     Patient Findings     Positives No Problem Findings           OBJECTIVE    INR Protime   Date Value Ref Range Status   06/04/2018 2.8 (A) 0.86 - 1.14 Final       ASSESSMENT / PLAN  INR assessment THER    Recheck INR In: 4 WEEKS    INR Location Clinic      Anticoagulation Summary as of 6/4/2018     INR goal 2.0-3.0   Today's INR 2.8   Warfarin maintenance plan 5 mg (5 mg x 1) on Mon, Wed, Fri; 2.5 mg (5 mg x 0.5) all other days   Full warfarin instructions 5 mg on Mon, Wed, Fri; 2.5 mg all other days   Weekly warfarin total 25 mg   No change documented Daphne Guevara RN   Plan last modified Maryuri Vines RN (3/19/2018)   Next INR check 7/2/2018   Priority INR   Target end date Indefinite    Indications   Anticoagulation monitoring  INR range 2-3 [Z79.01]  Paroxysmal atrial fibrillation (H) [I48.0]         Anticoagulation Episode Summary     INR check location     Preferred lab     Send INR reminders to  INR    Comments       Anticoagulation Care Providers     Provider Role Specialty Phone number    Vijay Mackay MD Responsible Pediatrics [phone]            See the Encounter Report to view Anticoagulation Flowsheet and Dosing Calendar (Go to Encounters tab in chart review, and find the Anticoagulation Therapy Visit)        Daphne Guevara, RN

## 2018-06-04 NOTE — MR AVS SNAPSHOT
Zoey Jaocbs   6/4/2018 12:00 PM   Anticoagulation Therapy Visit    Description:  73 year old female   Provider:  [facility]   Department:  [facility]           INR as of 6/4/2018     Today's INR 2.8      Anticoagulation Summary as of 6/4/2018     INR goal 2.0-3.0   Today's INR 2.8   Full warfarin instructions 5 mg on Mon, Wed, Fri; 2.5 mg all other days   Next INR check 7/2/2018    Indications   Anticoagulation monitoring  INR range 2-3 [Z79.01]  Paroxysmal atrial fibrillation (H) [I48.0]         Description     Continue same dose and recheck in 1 month 7/2/2018.  Daphne Guevara RN  ....................  6/4/2018   11:57 AM        Your next Anticoagulation Clinic appointment(s)     Jun 04, 2018 12:00 PM CDT   Anticoagulation Visit with [facility]   [facility] ([facility]    [address]  [facility] [address]   [phone]              June 2018 Details    Sun Mon Tue Wed Thu Fri Sat          1               2                 3               4      5 mg   See details      5      2.5 mg         6      5 mg         7      2.5 mg         8      5 mg         9      2.5 mg           10      2.5 mg         11      5 mg         12      2.5 mg         13      5 mg         14      2.5 mg         15      5 mg         16      2.5 mg           17      2.5 mg         18      5 mg         19      2.5 mg         20      5 mg         21      2.5 mg         22      5 mg         23      2.5 mg           24      2.5 mg         25      5 mg         26      2.5 mg         27      5 mg         28      2.5 mg         29      5 mg         30      2.5 mg          Date Details   06/04 This INR check               How to take your warfarin dose     To take:  2.5 mg Take 0.5 of a 5 mg tablet.    To take:  5 mg Take 1 of the 5 mg tablets.           July 2018 Details    Sun Mon Tue Wed Thu Fri Sat     1      2.5 mg         2            3               4               5                6               7                 8               9               10               11               12               13               14                 15               16               17               18               19               20               21                 22               23               24               25               26               27               28                 29               30               31                    Date Details   No additional details    Date of next INR:  7/2/2018         How to take your warfarin dose     To take:  2.5 mg Take 0.5 of a 5 mg tablet.    To take:  5 mg Take 1 of the 5 mg tablets.

## 2018-06-15 DIAGNOSIS — E78.00 HYPERCHOLESTEROLEMIA: Primary | ICD-10-CM

## 2018-06-18 RX ORDER — ATORVASTATIN CALCIUM 40 MG/1
TABLET, FILM COATED ORAL
Qty: 45 TABLET | Refills: 0 | Status: SHIPPED | OUTPATIENT
Start: 2018-06-18 | End: 2018-10-03

## 2018-06-18 NOTE — TELEPHONE ENCOUNTER
Prescription approved per [facility] Refill Protocol.  LOV: 10/13/17  Last lipid 7/14/17  Fatoumata Schmitt RN on 6/18/2018 at 2:52 PM

## 2018-07-02 ENCOUNTER — ANTICOAGULATION THERAPY VISIT (OUTPATIENT)
Dept: ANTICOAGULATION | Facility: OTHER | Age: 73
End: 2018-07-02
Attending: INTERNAL MEDICINE
Payer: MEDICARE

## 2018-07-02 DIAGNOSIS — I48.0 PAROXYSMAL ATRIAL FIBRILLATION (H): ICD-10-CM

## 2018-07-02 DIAGNOSIS — Z79.01 ANTICOAGULATION MONITORING, INR RANGE 2-3: ICD-10-CM

## 2018-07-02 LAB — INR POINT OF CARE: 2.5 (ref 2–3)

## 2018-07-02 PROCEDURE — 85610 PROTHROMBIN TIME: CPT | Mod: QW

## 2018-07-02 NOTE — MR AVS SNAPSHOT
Zoey ARIAN Jacobs   7/2/2018 12:00 PM   Anticoagulation Therapy Visit    Description:  73 year old female   Provider:  [facility]   Department:  [facility]           INR as of 7/2/2018     Today's INR 2.5      Anticoagulation Summary as of 7/2/2018     INR goal 2.0-3.0   Today's INR 2.5   Full warfarin instructions 5 mg on Mon, Wed, Fri; 2.5 mg all other days   Next INR check 7/30/2018    Indications   Anticoagulation monitoring  INR range 2-3 [Z79.01]  Paroxysmal atrial fibrillation (H) [I48.0]         Description     Continue same dose of Coumadin/Warfarinand recheck INR in 4 weeks.  Maryuri Vines ....................  7/2/2018   12:08 PM        July 2018 Details    Sun Mon Tue Wed Thu Fri Sat     1               2      5 mg   See details      3      2.5 mg         4      5 mg         5      2.5 mg         6      5 mg         7      2.5 mg           8      2.5 mg         9      5 mg         10      2.5 mg         11      5 mg         12      2.5 mg         13      5 mg         14      2.5 mg           15      2.5 mg         16      5 mg         17      2.5 mg         18      5 mg         19      2.5 mg         20      5 mg         21      2.5 mg           22      2.5 mg         23      5 mg         24      2.5 mg         25      5 mg         26      2.5 mg         27      5 mg         28      2.5 mg           29      2.5 mg         30            31                    Date Details   07/02 This INR check       Date of next INR:  7/30/2018         How to take your warfarin dose     To take:  2.5 mg Take 0.5 of a 5 mg tablet.    To take:  5 mg Take 1 of the 5 mg tablets.

## 2018-07-02 NOTE — PROGRESS NOTES
ANTICOAGULATION FOLLOW-UP CLINIC VISIT    Patient Name:  Zoey Jacobs  Date:  7/2/2018  Contact Type:  Face to Face    SUBJECTIVE:     Patient Findings     Positives Change in medications (Started Sildenafil)           OBJECTIVE    INR Protime   Date Value Ref Range Status   07/02/2018 2.5 2.0 - 3.0 Final       ASSESSMENT / PLAN  INR assessment THER    Recheck INR In: 4 WEEKS    INR Location Clinic      Anticoagulation Summary as of 7/2/2018     INR goal 2.0-3.0   Today's INR 2.5   Warfarin maintenance plan 5 mg (5 mg x 1) on Mon, Wed, Fri; 2.5 mg (5 mg x 0.5) all other days   Full warfarin instructions 5 mg on Mon, Wed, Fri; 2.5 mg all other days   Weekly warfarin total 25 mg   No change documented aMryuri Vines RN   Plan last modified Maryuri Vines RN (3/19/2018)   Next INR check 7/30/2018   Priority INR   Target end date Indefinite    Indications   Anticoagulation monitoring  INR range 2-3 [Z79.01]  Paroxysmal atrial fibrillation (H) [I48.0]         Anticoagulation Episode Summary     INR check location     Preferred lab     Send INR reminders to  INR    Comments       Anticoagulation Care Providers     Provider Role Specialty Phone number    Vijay Mackay MD Responsible Pediatrics [phone]            See the Encounter Report to view Anticoagulation Flowsheet and Dosing Calendar (Go to Encounters tab in chart review, and find the Anticoagulation Therapy Visit)        Maryuri Vines RN

## 2018-07-12 DIAGNOSIS — I48.0 PAROXYSMAL ATRIAL FIBRILLATION (H): Primary | ICD-10-CM

## 2018-07-12 RX ORDER — WARFARIN SODIUM 5 MG/1
TABLET ORAL
Qty: 90 TABLET | Refills: 1 | Status: SHIPPED | OUTPATIENT
Start: 2018-07-12 | End: 2018-11-08

## 2018-07-12 NOTE — TELEPHONE ENCOUNTER
Prescription approved per [facility] Refill Protocol.  Janessa Olguin RN    7/12/2018  1:04 PM

## 2018-07-23 NOTE — PROGRESS NOTES
Patient Information     Patient Name  Zoey Jacobs MRN  [id number] Sex  Female   1945      Letter by Ramirez Schmitt at      Author:  Ramirez Schmitt Service:  (none) Author Type:  (none)    Filed:   Date of Service:   Status:  (Other)       [facility]  [address]  [place] [address]  [phone]         Zoey Jacobs   [address] [place] [address]      May 24, 2017  Date of Breast Imagin2017  3:10 PM    Dear Ms. Jacobs:    We are pleased to inform you that the result of your recent breast imaging examination is normal/benign (not cancer).    A report of your results was sent to your health care provider(s).    Your images will become part of your medical file here at [facility] and will be available for your continuing care. You are responsible for informing any new health care provider or breast imaging facility of the date and location of this examination.    Although mammography is the most accurate method for early detection, not all cancers are found through mammography. If you notice any new changes in your breast(s) please inform your health care provider without delay.    Thank you for choosing [facility] to participate in your healthcare needs.         [facility] Recommendations for Early Breast Cancer Detection   in Women without Symptoms  When to start having mammograms to screen for breast cancer, and how often to have them, is a personal decision. It should be based on your preferences, your values and your risk for developing breast cancer. [facility] recommends that you and your health care provider together determine when mammograms are right for you.    [facility] recommends the following guidelines for women who have an average risk for breast cancer, based on American Cancer Society guidelines:    Age 40 to 44: Mammograms are optional.      Age 45 to 54: Have a mammogram every year.           Age 55 and older: Have a mammogram every year, or transition to having one every 2 years. Continue to have mammograms as long as your health is good.    If you have a higher than average risk for breast cancer, your health care provider may recommend a different schedule.

## 2018-07-24 NOTE — PROGRESS NOTES
Patient Information     Patient Name  Zoey Jacobs MRN  [id number] Sex  Female   1945      Letter by Sheryl Oliver MD at      Author:  Sheryl Oliver MD Service:  (none) Author Type:  (none)    Filed:   Date of Service:   Status:  (Other)       [facility]  [address]  [place] [address]  [phone]         Zoey Jacobs   [address] [place] [address]      May 22, 2017  Date of Breast Imagin2017  3:44 PM    Dear Ms. Jacobs:    Your recent breast imaging examination showed a finding that requires additional imaging studies for a complete evaluation. Most findings are benign (not cancer). Please call [phone] to schedule an appointment for these tests if you have not already done so.    A report of your results was sent to your health care provider(s).    Your images will become part of your medical file here at [facility] and will be available for your continuing care. You are responsible for informing any new health care provider or breast imaging facility of the date and location of this examination.    Although mammography is the most accurate method for early detection, not all cancers are found through mammography. If you notice any new changes in your breast(s) please inform your health care provider without delay.    Thank you for choosing [facility] to participate in your healthcare needs.       [facility] Recommendations for Early Breast Cancer Detection   in Women without Symptoms  When to start having mammograms to screen for breast cancer, and how often to have them, is a personal decision. It should be based on your preferences, your values and your risk for developing breast cancer. [facility] recommends that you and your health care provider together determine when mammograms are right for you.    [facility] recommends the following  guidelines for women who have an average risk for breast cancer, based on American Cancer Society guidelines:    Age 40 to 44: Mammograms are optional.     Age 45 to 54: Have a mammogram every year.    Age 55 and older: Have a mammogram every year, or transition to having one every 2 years. Continue to have mammograms as long as your health is good.    If you have a higher than average risk for breast cancer, your health care provider may recommend a different schedule.

## 2018-07-24 NOTE — PROGRESS NOTES
Patient Information     Patient Name  Zoey Jacobs MRN  [id number] Sex  Female   1945      Letter by Faith Cox MD at      Author:  Faith Cox MD Service:  (none) Author Type:  (none)    Filed:   Encounter Date:  8/10/2017 Status:  (Other)           Zoey Jacobs  [address] [place] [address]          August 10, 2017    Dear Ms. Jacobs:    This letter is in regards to your colonoscopy that was done by Faith Cox MD on 8/10/17.  Your colonoscopy was normal.  There were no polyps or other abnormalities found. No follow up necessary due to age being greater than 80 at next screening interval unless you have problems.      Dr. Faith Cox MD also recommends a high fiber diet. A fiber supplement such as Metamucil, FiberCon, or Citrucel is recommended. Generic forms of these supplements are fine. These are available over the counter.     If you have any questions, please call the Surgery department at [phone].  A copy of your colonoscopy will be placed in your electronic chart for your primary care provider's review.    Sincerely,    General Surgery    Reviewed and electronically signed by provider.

## 2018-07-24 NOTE — PROGRESS NOTES
Patient Information     Patient Name  Zoey Jacobs MRN  [id number] Sex  Female   1945      Letter by Vijay Mackay MD at      Author:  Vijay Mackay MD Service:  (none) Author Type:  (none)    Filed:   Encounter Date:  2017 Status:  (Other)           Zoey Jacobs  [address] [place] [address]          2017    Dear Ms. Jacobs:    A refill of Prozac was requested by your pharmacy.    Additional refills require an office visit with Vijay Mackay MD for annual review.   Please call the clinic at [phone] to schedule your appointment.    Thank you,    The Refill Nurse  [facility]

## 2018-08-13 ENCOUNTER — ANTICOAGULATION THERAPY VISIT (OUTPATIENT)
Dept: ANTICOAGULATION | Facility: OTHER | Age: 73
End: 2018-08-13
Attending: INTERNAL MEDICINE
Payer: MEDICARE

## 2018-08-13 DIAGNOSIS — Z79.01 ANTICOAGULATION MONITORING, INR RANGE 2-3: ICD-10-CM

## 2018-08-13 DIAGNOSIS — I48.0 PAROXYSMAL ATRIAL FIBRILLATION (H): ICD-10-CM

## 2018-08-13 LAB — INR POINT OF CARE: 2.3 (ref 2–3)

## 2018-08-13 PROCEDURE — 85610 PROTHROMBIN TIME: CPT | Mod: QW

## 2018-08-13 NOTE — PROGRESS NOTES
ANTICOAGULATION FOLLOW-UP CLINIC VISIT    Patient Name:  Zoey Jacobs  Date:  8/13/2018  Contact Type:  Face to Face    SUBJECTIVE:     Patient Findings     Positives No Problem Findings           OBJECTIVE    INR Protime   Date Value Ref Range Status   08/13/2018 2.3 2.0 - 3.0 Final       ASSESSMENT / PLAN  INR assessment THER    Recheck INR In: 4 WEEKS    INR Location Clinic      Anticoagulation Summary as of 8/13/2018     INR goal 2.0-3.0   Today's INR 2.3   Warfarin maintenance plan 5 mg (5 mg x 1) on Mon, Wed, Fri; 2.5 mg (5 mg x 0.5) all other days   Full warfarin instructions 5 mg on Mon, Wed, Fri; 2.5 mg all other days   Weekly warfarin total 25 mg   No change documented Maryuri Vines RN   Plan last modified Maryuri Vines RN (3/19/2018)   Next INR check 9/10/2018   Priority INR   Target end date Indefinite    Indications   Anticoagulation monitoring  INR range 2-3 [Z79.01]  Paroxysmal atrial fibrillation (H) [I48.0]         Anticoagulation Episode Summary     INR check location     Preferred lab     Send INR reminders to  INR    Comments       Anticoagulation Care Providers     Provider Role Specialty Phone number    Vijay Mackay MD Responsible Pediatrics [phone]            See the Encounter Report to view Anticoagulation Flowsheet and Dosing Calendar (Go to Encounters tab in chart review, and find the Anticoagulation Therapy Visit)        Maryuri Vines RN

## 2018-08-13 NOTE — MR AVS SNAPSHOT
Zoey ARIAN Jacobs   8/13/2018 2:45 PM   Anticoagulation Therapy Visit    Description:  73 year old female   Provider:  [facility]   Department:  [facility]           INR as of 8/13/2018     Today's INR 2.3      Anticoagulation Summary as of 8/13/2018     INR goal 2.0-3.0   Today's INR 2.3   Full warfarin instructions 5 mg on Mon, Wed, Fri; 2.5 mg all other days   Next INR check 9/10/2018    Indications   Anticoagulation monitoring  INR range 2-3 [Z79.01]  Paroxysmal atrial fibrillation (H) [I48.0]         Description     Continue same dose of Coumadin/Warfarinand recheck INR in 4 weeks.  Maryuri Vines ....................  8/13/2018   2:46 PM          August 2018 Details    Sun Mon Tue Wed Thu Fri Sat        1               2               3               4                 5               6               7               8               9               10               11                 12               13      5 mg   See details      14      2.5 mg         15      5 mg         16      2.5 mg         17      5 mg         18      2.5 mg           19      2.5 mg         20      5 mg         21      2.5 mg         22      5 mg         23      2.5 mg         24      5 mg         25      2.5 mg           26      2.5 mg         27      5 mg         28      2.5 mg         29      5 mg         30      2.5 mg         31      5 mg           Date Details   08/13 This INR check               How to take your warfarin dose     To take:  2.5 mg Take 0.5 of a 5 mg tablet.    To take:  5 mg Take 1 of the 5 mg tablets.           September 2018 Details    Sun Mon Tue Wed Thu Fri Sat           1      2.5 mg           2      2.5 mg         3      5 mg         4      2.5 mg         5      5 mg         6      2.5 mg         7      5 mg         8      2.5 mg           9      2.5 mg         10            11               12               13               14               15                 16               17               18                19               20               21               22                 23               24               25               26               27               28               29                 30                      Date Details   No additional details    Date of next INR:  9/10/2018         How to take your warfarin dose     To take:  2.5 mg Take 0.5 of a 5 mg tablet.    To take:  5 mg Take 1 of the 5 mg tablets.

## 2018-09-10 ENCOUNTER — ANTICOAGULATION THERAPY VISIT (OUTPATIENT)
Dept: ANTICOAGULATION | Facility: OTHER | Age: 73
End: 2018-09-10
Attending: INTERNAL MEDICINE
Payer: MEDICARE

## 2018-09-10 DIAGNOSIS — I48.0 PAROXYSMAL ATRIAL FIBRILLATION (H): ICD-10-CM

## 2018-09-10 DIAGNOSIS — Z79.01 ANTICOAGULATION MONITORING, INR RANGE 2-3: ICD-10-CM

## 2018-09-10 LAB — INR POINT OF CARE: 2.2 (ref 2–3)

## 2018-09-10 PROCEDURE — 85610 PROTHROMBIN TIME: CPT | Mod: QW

## 2018-09-10 NOTE — PROGRESS NOTES
ANTICOAGULATION FOLLOW-UP CLINIC VISIT    Patient Name:  Zoey Jacobs  Date:  9/10/2018  Contact Type:  Face to Face    SUBJECTIVE:     Patient Findings     Positives No Problem Findings           OBJECTIVE    INR Protime   Date Value Ref Range Status   09/10/2018 2.2 2.0 - 3.0 Final       ASSESSMENT / PLAN  INR assessment THER    Recheck INR In: 4 WEEKS    INR Location Clinic      Anticoagulation Summary as of 9/10/2018     INR goal 2.0-3.0   Today's INR 2.2   Warfarin maintenance plan 5 mg (5 mg x 1) on Mon, Wed, Fri; 2.5 mg (5 mg x 0.5) all other days   Full warfarin instructions 5 mg on Mon, Wed, Fri; 2.5 mg all other days   Weekly warfarin total 25 mg   Plan last modified Maryuri Vines RN (3/19/2018)   Next INR check 10/8/2018   Priority INR   Target end date Indefinite    Indications   Anticoagulation monitoring  INR range 2-3 [Z79.01]  Paroxysmal atrial fibrillation (H) [I48.0]         Anticoagulation Episode Summary     INR check location     Preferred lab     Send INR reminders to  INR    Comments       Anticoagulation Care Providers     Provider Role Specialty Phone number    Vijay Mackay MD [facility] Pediatrics [phone]            See the Encounter Report to view Anticoagulation Flowsheet and Dosing Calendar (Go to Encounters tab in chart review, and find the Anticoagulation Therapy Visit)        Maryuri Vines RN

## 2018-09-10 NOTE — MR AVS SNAPSHOT
Zoey ARIAN Jacobs   9/10/2018 12:00 PM   Anticoagulation Therapy Visit    Description:  73 year old female   Provider:  [facility]   Department:  [facility]           INR as of 9/10/2018     Today's INR 2.2      Anticoagulation Summary as of 9/10/2018     INR goal 2.0-3.0   Today's INR 2.2   Full warfarin instructions 5 mg on Mon, Wed, Fri; 2.5 mg all other days   Next INR check 10/8/2018    Indications   Anticoagulation monitoring  INR range 2-3 [Z79.01]  Paroxysmal atrial fibrillation (H) [I48.0]         Description     Continue same dose of Coumadin/Warfarinand recheck INR in 4 weeks.  Maryuri Vines ....................  9/10/2018   12:05 PM          September 2018 Details    Sun Mon Tue Wed Thu Fri Sat           1                 2               3               4               5               6               7               8                 9               10      5 mg   See details      11      2.5 mg         12      5 mg         13      2.5 mg         14      5 mg         15      2.5 mg           16      2.5 mg         17      5 mg         18      2.5 mg         19      5 mg         20      2.5 mg         21      5 mg         22      2.5 mg           23      2.5 mg         24      5 mg         25      2.5 mg         26      5 mg         27      2.5 mg         28      5 mg         29      2.5 mg           30      2.5 mg                Date Details   09/10 This INR check               How to take your warfarin dose     To take:  2.5 mg Take 0.5 of a 5 mg tablet.    To take:  5 mg Take 1 of the 5 mg tablets.           October 2018 Details    Sun Mon Tue Wed Thu Fri Sat      1      5 mg         2      2.5 mg         3      5 mg         4      2.5 mg         5      5 mg         6      2.5 mg           7      2.5 mg         8            9               10               11               12               13                 14               15               16               17               18                19               20                 21               22               23               24               25               26               27                 28               29               30               31                   Date Details   No additional details    Date of next INR:  10/8/2018         How to take your warfarin dose     To take:  2.5 mg Take 0.5 of a 5 mg tablet.    To take:  5 mg Take 1 of the 5 mg tablets.

## 2018-10-03 DIAGNOSIS — I10 ESSENTIAL HYPERTENSION: ICD-10-CM

## 2018-10-03 DIAGNOSIS — N39.41 URGE URINARY INCONTINENCE: ICD-10-CM

## 2018-10-03 DIAGNOSIS — E78.00 HYPERCHOLESTEROLEMIA: ICD-10-CM

## 2018-10-03 NOTE — LETTER
October 8, 2018      Zoey Jacobs  [address] [place] [address]      A refill request was received from your pharmacy for Lisinopril, Lipitor and Oxybutynin.    Additional refills require an office visit with Dr. Mackay for annual review and labs.    Please call [phone] to schedule appointment.      Sincerely,      Refill Nurse

## 2018-10-08 ENCOUNTER — ANTICOAGULATION THERAPY VISIT (OUTPATIENT)
Dept: ANTICOAGULATION | Facility: OTHER | Age: 73
End: 2018-10-08
Attending: INTERNAL MEDICINE
Payer: MEDICARE

## 2018-10-08 DIAGNOSIS — I48.0 PAROXYSMAL ATRIAL FIBRILLATION (H): ICD-10-CM

## 2018-10-08 DIAGNOSIS — Z79.01 ANTICOAGULATION MONITORING, INR RANGE 2-3: ICD-10-CM

## 2018-10-08 LAB — INR POINT OF CARE: 2.6 (ref 2–3)

## 2018-10-08 PROCEDURE — 85610 PROTHROMBIN TIME: CPT | Mod: QW

## 2018-10-08 RX ORDER — OXYBUTYNIN CHLORIDE 10 MG/1
TABLET, EXTENDED RELEASE ORAL
Qty: 90 TABLET | OUTPATIENT
Start: 2018-10-08

## 2018-10-08 RX ORDER — ATORVASTATIN CALCIUM 40 MG/1
TABLET, FILM COATED ORAL
Qty: 45 TABLET | Refills: 0 | Status: SHIPPED | OUTPATIENT
Start: 2018-10-08 | End: 2018-11-08

## 2018-10-08 RX ORDER — LISINOPRIL 5 MG/1
TABLET ORAL
Qty: 90 TABLET | Refills: 0 | Status: SHIPPED | OUTPATIENT
Start: 2018-10-08 | End: 2018-11-08

## 2018-10-08 NOTE — MR AVS SNAPSHOT
Zoey ARIAN Jacobs   10/8/2018 12:00 PM   Anticoagulation Therapy Visit    Description:  73 year old female   Provider:  [facility]   Department:  [facility]           INR as of 10/8/2018     Today's INR 2.6      Anticoagulation Summary as of 10/8/2018     INR goal 2.0-3.0   Today's INR 2.6   Full warfarin instructions 5 mg on Mon, Wed, Fri; 2.5 mg all other days   Next INR check 11/5/2018    Indications   Anticoagulation monitoring  INR range 2-3 [Z79.01]  Paroxysmal atrial fibrillation (H) [I48.0]         Description     Continue same dose of Coumadin/Warfarinand recheck INR in 4 weeks.  Maryuri Vines ....................  10/8/2018   12:03 PM          October 2018 Details    Sun Mon Tue Wed Thu Fri Sat      1               2               3               4               5               6                 7               8      5 mg   See details      9      2.5 mg         10      5 mg         11      2.5 mg         12      5 mg         13      2.5 mg           14      2.5 mg         15      5 mg         16      2.5 mg         17      5 mg         18      2.5 mg         19      5 mg         20      2.5 mg           21      2.5 mg         22      5 mg         23      2.5 mg         24      5 mg         25      2.5 mg         26      5 mg         27      2.5 mg           28      2.5 mg         29      5 mg         30      2.5 mg         31      5 mg             Date Details   10/08 This INR check               How to take your warfarin dose     To take:  2.5 mg Take 0.5 of a 5 mg tablet.    To take:  5 mg Take 1 of the 5 mg tablets.           November 2018 Details    Sun Mon Tue Wed Thu Fri Sat         1      2.5 mg         2      5 mg         3      2.5 mg           4      2.5 mg         5            6               7               8               9               10                 11               12               13               14               15               16               17                 18                19               20               21               22               23               24                 25               26               27               28               29               30                 Date Details   No additional details    Date of next INR:  11/5/2018         How to take your warfarin dose     To take:  2.5 mg Take 0.5 of a 5 mg tablet.    To take:  5 mg Take 1 of the 5 mg tablets.

## 2018-10-08 NOTE — PROGRESS NOTES
ANTICOAGULATION FOLLOW-UP CLINIC VISIT    Patient Name:  Zoey Jacobs  Date:  10/8/2018  Contact Type:  Face to Face    SUBJECTIVE:     Patient Findings     Positives No Problem Findings           OBJECTIVE    INR Protime   Date Value Ref Range Status   10/08/2018 2.6 2.0 - 3.0 Final       ASSESSMENT / PLAN  INR assessment THER    Recheck INR In: 4 WEEKS    INR Location Clinic      Anticoagulation Summary as of 10/8/2018     INR goal 2.0-3.0   Today's INR 2.6   Warfarin maintenance plan 5 mg (5 mg x 1) on Mon, Wed, Fri; 2.5 mg (5 mg x 0.5) all other days   Full warfarin instructions 5 mg on Mon, Wed, Fri; 2.5 mg all other days   Weekly warfarin total 25 mg   No change documented Maryuri Vines RN   Plan last modified Maryuri Vines RN (3/19/2018)   Next INR check 11/5/2018   Priority INR   Target end date Indefinite    Indications   Anticoagulation monitoring  INR range 2-3 [Z79.01]  Paroxysmal atrial fibrillation (H) [I48.0]         Anticoagulation Episode Summary     INR check location     Preferred lab     Send INR reminders to  INR    Comments       Anticoagulation Care Providers     Provider Role Specialty Phone number    Vijay Mackay MD Responsible Pediatrics [phone]            See the Encounter Report to view Anticoagulation Flowsheet and Dosing Calendar (Go to Encounters tab in chart review, and find the Anticoagulation Therapy Visit)        Maryuri Vines RN

## 2018-10-08 NOTE — TELEPHONE ENCOUNTER
Prescription approved per [facility] Refill Protocol.    LOV:10/13/17    Patient is due for annual review and labs.    Letter sent   Graciela refill given.    Oxybutynin refilled on 8/30/18 #[phone]  Fatoumata Schmitt RN on 10/8/2018 at 9:47 AM

## 2018-11-05 ENCOUNTER — ANTICOAGULATION THERAPY VISIT (OUTPATIENT)
Dept: ANTICOAGULATION | Facility: OTHER | Age: 73
End: 2018-11-05
Attending: INTERNAL MEDICINE
Payer: MEDICARE

## 2018-11-05 DIAGNOSIS — I48.0 PAROXYSMAL ATRIAL FIBRILLATION (H): ICD-10-CM

## 2018-11-05 DIAGNOSIS — Z79.01 ANTICOAGULATION MONITORING, INR RANGE 2-3: ICD-10-CM

## 2018-11-05 LAB — INR POINT OF CARE: 2.5 (ref 2–3)

## 2018-11-05 PROCEDURE — 85610 PROTHROMBIN TIME: CPT | Mod: QW,ZL

## 2018-11-05 NOTE — MR AVS SNAPSHOT
Zoey ARIAN Jacobs   11/5/2018 12:00 PM   Anticoagulation Therapy Visit    Description:  73 year old female   Provider:  [facility]   Department:  [facility]           INR as of 11/5/2018     Today's INR 2.5      Anticoagulation Summary as of 11/5/2018     INR goal 2.0-3.0   Today's INR 2.5   Full warfarin instructions 5 mg on Mon, Wed, Fri; 2.5 mg all other days   Next INR check 12/10/2018    Indications   Anticoagulation monitoring  INR range 2-3 [Z79.01]  Paroxysmal atrial fibrillation (H) [I48.0]         Description     Continue same dose of Coumadin/Warfarinand recheck INR in 5 weeks.  Maryuri Vines ....................  11/5/2018   12:07 PM          November 2018 Details    Sun Mon Tue Wed Thu Fri Sat         1               2               3                 4               5      5 mg   See details      6      2.5 mg         7      5 mg         8      2.5 mg         9      5 mg         10      2.5 mg           11      2.5 mg         12      5 mg         13      2.5 mg         14      5 mg         15      2.5 mg         16      5 mg         17      2.5 mg           18      2.5 mg         19      5 mg         20      2.5 mg         21      5 mg         22      2.5 mg         23      5 mg         24      2.5 mg           25      2.5 mg         26      5 mg         27      2.5 mg         28      5 mg         29      2.5 mg         30      5 mg           Date Details   11/05 This INR check               How to take your warfarin dose     To take:  2.5 mg Take 0.5 of a 5 mg tablet.    To take:  5 mg Take 1 of the 5 mg tablets.           December 2018 Details    Sun Mon Tue Wed Thu Fri Sat           1      2.5 mg           2      2.5 mg         3      5 mg         4      2.5 mg         5      5 mg         6      2.5 mg         7      5 mg         8      2.5 mg           9      2.5 mg         10            11               12               13               14               15                 16                17               18               19               20               21               22                 23               24               25               26               27               28               29                 30               31                     Date Details   No additional details    Date of next INR:  12/10/2018         How to take your warfarin dose     To take:  2.5 mg Take 0.5 of a 5 mg tablet.    To take:  5 mg Take 1 of the 5 mg tablets.

## 2018-11-08 ENCOUNTER — OFFICE VISIT (OUTPATIENT)
Dept: PEDIATRICS | Facility: OTHER | Age: 73
End: 2018-11-08
Attending: INTERNAL MEDICINE
Payer: COMMERCIAL

## 2018-11-08 VITALS
HEART RATE: 76 BPM | SYSTOLIC BLOOD PRESSURE: 136 MMHG | WEIGHT: 191 LBS | BODY MASS INDEX: 29.98 KG/M2 | RESPIRATION RATE: 16 BRPM | DIASTOLIC BLOOD PRESSURE: 72 MMHG | TEMPERATURE: 97.6 F | HEIGHT: 67 IN

## 2018-11-08 DIAGNOSIS — E78.00 HYPERCHOLESTEROLEMIA: ICD-10-CM

## 2018-11-08 DIAGNOSIS — N39.41 URGE URINARY INCONTINENCE: ICD-10-CM

## 2018-11-08 DIAGNOSIS — I27.20 PULMONARY HYPERTENSION (H): ICD-10-CM

## 2018-11-08 DIAGNOSIS — F39 MOOD DISORDER (H): ICD-10-CM

## 2018-11-08 DIAGNOSIS — I50.30 (HFPEF) HEART FAILURE WITH PRESERVED EJECTION FRACTION (H): Primary | ICD-10-CM

## 2018-11-08 DIAGNOSIS — I48.0 PAROXYSMAL ATRIAL FIBRILLATION (H): ICD-10-CM

## 2018-11-08 DIAGNOSIS — R73.01 IMPAIRED FASTING GLUCOSE: ICD-10-CM

## 2018-11-08 DIAGNOSIS — I10 ESSENTIAL HYPERTENSION: ICD-10-CM

## 2018-11-08 DIAGNOSIS — Z13.29 SCREENING FOR THYROID DISORDER: ICD-10-CM

## 2018-11-08 DIAGNOSIS — I45.81 LONG Q-T SYNDROME: ICD-10-CM

## 2018-11-08 DIAGNOSIS — Z13.0 SCREENING, ANEMIA, DEFICIENCY, IRON: ICD-10-CM

## 2018-11-08 DIAGNOSIS — K21.9 GASTROESOPHAGEAL REFLUX DISEASE, ESOPHAGITIS PRESENCE NOT SPECIFIED: ICD-10-CM

## 2018-11-08 PROCEDURE — G0463 HOSPITAL OUTPT CLINIC VISIT: HCPCS

## 2018-11-08 PROCEDURE — 99214 OFFICE O/P EST MOD 30 MIN: CPT | Performed by: INTERNAL MEDICINE

## 2018-11-08 RX ORDER — WARFARIN SODIUM 5 MG/1
TABLET ORAL
Qty: 90 TABLET | Refills: 1 | Status: SHIPPED | OUTPATIENT
Start: 2018-11-08 | End: 2019-08-09

## 2018-11-08 RX ORDER — SILDENAFIL CITRATE 20 MG/1
TABLET ORAL 3 TIMES DAILY
COMMUNITY
Start: 2018-11-01 | End: 2024-08-15

## 2018-11-08 RX ORDER — OXYBUTYNIN CHLORIDE 10 MG/1
10 TABLET, EXTENDED RELEASE ORAL DAILY
Qty: 90 TABLET | Refills: 3 | Status: SHIPPED | OUTPATIENT
Start: 2018-11-08 | End: 2019-11-04

## 2018-11-08 RX ORDER — LISINOPRIL 5 MG/1
5 TABLET ORAL DAILY
Qty: 90 TABLET | Refills: 3 | Status: SHIPPED | OUTPATIENT
Start: 2018-11-08 | End: 2019-09-27

## 2018-11-08 RX ORDER — NADOLOL 80 MG/1
TABLET ORAL
Qty: 135 TABLET | Refills: 3 | Status: SHIPPED | OUTPATIENT
Start: 2018-11-08 | End: 2019-04-06

## 2018-11-08 RX ORDER — ATORVASTATIN CALCIUM 40 MG/1
TABLET, FILM COATED ORAL
Qty: 45 TABLET | Refills: 3 | Status: SHIPPED | OUTPATIENT
Start: 2018-11-08 | End: 2019-11-04

## 2018-11-08 RX ORDER — FUROSEMIDE 20 MG
20 TABLET ORAL 2 TIMES DAILY
Qty: 180 TABLET | Refills: 3 | Status: SHIPPED | OUTPATIENT
Start: 2018-11-08 | End: 2019-09-27

## 2018-11-08 ASSESSMENT — PAIN SCALES - GENERAL: PAINLEVEL: NO PAIN (0)

## 2018-11-08 NOTE — MR AVS SNAPSHOT
After Visit Summary   11/8/2018    Zoey Jacobs    MRN: [id number]           Patient Information     Date Of Birth          1945        Visit Information        Provider Department      11/8/2018 2:45 PM Vijay Mackay MD [facility]        Today's Diagnoses     (HFpEF) heart failure with preserved ejection fraction (H)    -  1    Urge urinary incontinence        Essential hypertension        Impaired fasting glucose        Pulmonary hypertension (H)        Paroxysmal atrial fibrillation (H)        Hypercholesterolemia        Mood disorder (H)        Gastroesophageal reflux disease, esophagitis presence not specified        Long Q-T syndrome        Screening for thyroid disorder        Screening, anemia, deficiency, iron          Care Instructions     -- Schedule fasting lab visit   -- Consider a sleep study          Follow-ups after your visit        Your next 10 appointments already scheduled     Dec 10, 2018 12:15 PM CST   Anticoagulation Visit with  ANTI COAG 2   [facility]    [address]  [place] [id number] [phone]              Future tests that were ordered for you today     Open Future Orders        Priority Expected Expires Ordered    Hemoglobin A1c Routine  11/9/2019 11/8/2018    Comprehensive metabolic panel Routine  11/9/2019 11/8/2018    CBC with platelets Routine  11/9/2019 11/8/2018    Lipid Profile Routine  11/9/2019 11/8/2018    Thyrotropin GH Routine  11/9/2019 11/8/2018            Who to contact     If you have questions or need follow up information about today's clinic visit or your schedule please contact [facility] directly at [phone].  Normal or non-critical lab and imaging results will be communicated to you by MyChart, letter or phone within 4 business days after the clinic has received the results. If you do not hear from us within  "7 days, please contact the clinic through compropago or phone. If you have a critical or abnormal lab result, we will notify you by phone as soon as possible.  Submit refill requests through compropago or call your pharmacy and they will forward the refill request to us. Please allow 3 business days for your refill to be completed.          Additional Information About Your Visit        Care EveryWhere ID     This is your Care EveryWhere ID. This could be used by other organizations to access your [place] medical records  [id number]        Your Vitals Were     Pulse Temperature Respirations Height Breastfeeding? BMI (Body Mass Index)    76 97.6  F (36.4  C) (Tympanic) 16 5' 6.75\" (1.695 m) No 30.14 kg/m2       Blood Pressure from Last 3 Encounters:   11/08/18 136/72   10/13/17 132/74   07/14/17 140/66    Weight from Last 3 Encounters:   11/08/18 191 lb (86.6 kg)   10/13/17 184 lb 12.8 oz (83.8 kg)   07/14/17 185 lb 6.4 oz (84.1 kg)                 Today's Medication Changes          These changes are accurate as of 11/8/18  3:38 PM.  If you have any questions, ask your nurse or doctor.               Start taking these medicines.        Dose/Directions    furosemide 20 MG tablet   Commonly known as:  LASIX   Used for:  (HFpEF) heart failure with preserved ejection fraction (H)   Started by:  Vijay Mackay MD        Dose:  20 mg   Take 1 tablet (20 mg) by mouth 2 times daily   Quantity:  180 tablet   Refills:  3         These medicines have changed or have updated prescriptions.        Dose/Directions    lisinopril 5 MG tablet   Commonly known as:  PRINIVIL/ZESTRIL   This may have changed:  See the new instructions.   Used for:  Essential hypertension   Changed by:  Vijay Mackay MD        Dose:  5 mg   Take 1 tablet (5 mg) by mouth daily   Quantity:  90 tablet   Refills:  3       nadolol 80 MG tablet   Commonly known as:  CORGARD   This may have changed:    - how much to take  - how to take this   Used " for:  Paroxysmal atrial fibrillation (H)   Changed by:  Vijay Mackay MD        Takes 1.5 tablets twice daily   Quantity:  135 tablet   Refills:  3       oxybutynin 10 MG 24 hr tablet   Commonly known as:  DITROPAN-XL   This may have changed:  See the new instructions.   Used for:  Urge urinary incontinence   Changed by:  Vijay Mackay MD        Dose:  10 mg   Take 1 tablet (10 mg) by mouth daily   Quantity:  90 tablet   Refills:  3         Stop taking these medicines if you haven't already. Please contact your care team if you have questions.     enoxaparin 40 MG/0.4ML injection   Commonly known as:  LOVENOX   Stopped by:  Vijay Mackay MD           vitamin D2 65811 UNIT capsule   Commonly known as:  ERGOCALCIFEROL   Stopped by:  Vijay Mackay MD                Where to get your medicines      These medications were sent to [facility] - [address], [facility] [address]     Phone:  [phone]     atorvastatin 40 MG tablet    FLUoxetine 20 MG capsule    furosemide 20 MG tablet    lisinopril 5 MG tablet    nadolol 80 MG tablet    omeprazole 20 MG CR capsule    oxybutynin 10 MG 24 hr tablet    warfarin 5 MG tablet                Primary Care Provider Office Phone # Fax #    Vijay Mackay [phone] [address] [facility] [address]        Equal Access to Services     [facility] AH: Hadii aad ku hadasho Soomaali, waaxda luqadaha, qaybta kaalmada adeegyada, shruti dick hayebony lux . So [facility] [phone].    ATENCIÓN: Si habla español, tiene a wei disposición servicios gratuitos de asistencia lingüística. Jennifer al [phone].    We comply with applicable federal civil rights laws and [place] laws. We do not discriminate on the basis of race, color, national origin, age, disability, sex, sexual orientation, or gender identity.            Thank you!      Thank you for choosing [facility] AND [facility]  for your care. Our goal is always to provide you with excellent care. Hearing back from our patients is one way we can continue to improve our services. Please take a few minutes to complete the written survey that you may receive in the mail after your visit with us. Thank you!             Your Updated Medication List - Protect others around you: Learn how to safely use, store and throw away your medicines at www.disposemymeds.org.          This list is accurate as of 11/8/18  3:38 PM.  Always use your most recent med list.                   Brand Name Dispense Instructions for use Diagnosis    atorvastatin 40 MG tablet    LIPITOR    45 tablet    TAKE ONE-HALF TABLET BY  MOUTH AT BEDTIME    Hypercholesterolemia       calcium carbonate 500 mg (elemental) 1250 (500 Ca) MG Tabs tablet    OSCAL 500     Take 500 mg by mouth daily with food        FLUoxetine 20 MG capsule    PROzac    90 capsule    TAKE 1 CAPSULE BY MOUTH  EVERY MORNING    Mood disorder (H)       furosemide 20 MG tablet    LASIX    180 tablet    Take 1 tablet (20 mg) by mouth 2 times daily    (HFpEF) heart failure with preserved ejection fraction (H)       lisinopril 5 MG tablet    PRINIVIL/ZESTRIL    90 tablet    Take 1 tablet (5 mg) by mouth daily    Essential hypertension       nadolol 80 MG tablet    CORGARD    135 tablet    Takes 1.5 tablets twice daily    Paroxysmal atrial fibrillation (H)       naproxen sodium 220 MG capsule      Take 1 tablet by mouth 2 times daily (with meals)        omeprazole 20 MG CR capsule    priLOSEC    90 capsule    TAKE 1 CAPSULE BY MOUTH  ONCE DAILY BEFORE A MEAL    Gastroesophageal reflux disease, esophagitis presence not specified       oxybutynin 10 MG 24 hr tablet    DITROPAN-XL    90 tablet    Take 1 tablet (10 mg) by mouth daily    Urge urinary incontinence       PRESERVISION AREDS 2 PO      Take by mouth 2 times daily        RA VITAMIN B-12 TR 1000 MCG Tbcr    Generic drug:  cyanocobalamin      Take 1,000 mcg by mouth daily        sildenafil 20 MG tablet    REVATIO     3 times daily        VITAMIN D (CHOLECALCIFEROL) PO      Take 1,000 Units by mouth daily        warfarin 5 MG tablet    COUMADIN    90 tablet    Take 5 mg x three days/week and 2.5 mg x four days/week or as directed by protime clinic.    Paroxysmal atrial fibrillation (H)

## 2018-11-08 NOTE — NURSING NOTE
"Chief Complaint   Patient presents with     Recheck Medication       Initial /72  Pulse 76  Temp 97.6  F (36.4  C) (Tympanic)  Resp 16  Ht 5' 6.75\" (1.695 m)  Wt 191 lb (86.6 kg)  Breastfeeding? No  BMI 30.14 kg/m2 Estimated body mass index is 30.14 kg/(m^2) as calculated from the following:    Height as of this encounter: 5' 6.75\" (1.695 m).    Weight as of this encounter: 191 lb (86.6 kg).  Medication Reconciliation: complete    Sarah Beach LPN  "

## 2018-11-09 DIAGNOSIS — Z13.29 SCREENING FOR THYROID DISORDER: ICD-10-CM

## 2018-11-09 DIAGNOSIS — Z13.0 SCREENING, ANEMIA, DEFICIENCY, IRON: ICD-10-CM

## 2018-11-09 DIAGNOSIS — I27.20 PULMONARY HYPERTENSION (H): ICD-10-CM

## 2018-11-09 DIAGNOSIS — I10 ESSENTIAL HYPERTENSION: ICD-10-CM

## 2018-11-09 DIAGNOSIS — R73.01 IMPAIRED FASTING GLUCOSE: ICD-10-CM

## 2018-11-09 DIAGNOSIS — E78.00 HYPERCHOLESTEROLEMIA: ICD-10-CM

## 2018-11-09 LAB
ALBUMIN SERPL-MCNC: 4.3 G/DL (ref 3.5–5.7)
ALP SERPL-CCNC: 128 U/L (ref 34–104)
ALT SERPL W P-5'-P-CCNC: 23 U/L (ref 7–52)
ANION GAP SERPL CALCULATED.3IONS-SCNC: 7 MMOL/L (ref 3–14)
AST SERPL W P-5'-P-CCNC: 32 U/L (ref 13–39)
BILIRUB SERPL-MCNC: 0.4 MG/DL (ref 0.3–1)
BUN SERPL-MCNC: 14 MG/DL (ref 7–25)
CALCIUM SERPL-MCNC: 9.4 MG/DL (ref 8.6–10.3)
CHLORIDE SERPL-SCNC: 104 MMOL/L (ref 98–107)
CHOLEST SERPL-MCNC: 208 MG/DL
CO2 SERPL-SCNC: 31 MMOL/L (ref 21–31)
CREAT SERPL-MCNC: 0.79 MG/DL (ref 0.6–1.2)
ERYTHROCYTE [DISTWIDTH] IN BLOOD BY AUTOMATED COUNT: 15.6 % (ref 10–15)
GFR SERPL CREATININE-BSD FRML MDRD: 71 ML/MIN/1.7M2
GLUCOSE SERPL-MCNC: 93 MG/DL (ref 70–105)
HBA1C MFR BLD: 5.2 % (ref 4–6)
HCT VFR BLD AUTO: 38.3 % (ref 35–47)
HDLC SERPL-MCNC: 75 MG/DL (ref 23–92)
HGB BLD-MCNC: 12.1 G/DL (ref 11.7–15.7)
LDLC SERPL CALC-MCNC: 112 MG/DL
MCH RBC QN AUTO: 28.7 PG (ref 26.5–33)
MCHC RBC AUTO-ENTMCNC: 31.6 G/DL (ref 31.5–36.5)
MCV RBC AUTO: 91 FL (ref 78–100)
NONHDLC SERPL-MCNC: 133 MG/DL
PLATELET # BLD AUTO: 212 10E9/L (ref 150–450)
POTASSIUM SERPL-SCNC: 4.2 MMOL/L (ref 3.5–5.1)
PROT SERPL-MCNC: 6.5 G/DL (ref 6.4–8.9)
RBC # BLD AUTO: 4.21 10E12/L (ref 3.8–5.2)
SODIUM SERPL-SCNC: 142 MMOL/L (ref 134–144)
TRIGL SERPL-MCNC: 106 MG/DL
TSH SERPL DL<=0.05 MIU/L-ACNC: 4 IU/ML (ref 0.34–5.6)
WBC # BLD AUTO: 4.2 10E9/L (ref 4–11)

## 2018-11-09 PROCEDURE — 84443 ASSAY THYROID STIM HORMONE: CPT | Performed by: INTERNAL MEDICINE

## 2018-11-09 PROCEDURE — 80061 LIPID PANEL: CPT | Performed by: INTERNAL MEDICINE

## 2018-11-09 PROCEDURE — 80053 COMPREHEN METABOLIC PANEL: CPT | Performed by: INTERNAL MEDICINE

## 2018-11-09 PROCEDURE — 36415 COLL VENOUS BLD VENIPUNCTURE: CPT | Performed by: INTERNAL MEDICINE

## 2018-11-09 PROCEDURE — 85027 COMPLETE CBC AUTOMATED: CPT | Performed by: INTERNAL MEDICINE

## 2018-11-09 PROCEDURE — 83036 HEMOGLOBIN GLYCOSYLATED A1C: CPT | Performed by: INTERNAL MEDICINE

## 2018-11-09 NOTE — PROGRESS NOTES
Subjective  Zoey Jacobs is a 73 year old female who presents for medication management.  She has a history of hyperlipidemia which is stable on Lipitor.  History of mood disorder which is stable on Prozac.  History of hypertension which is stable on lisinopril.  History of atrial fibrillation which is stable with nadolol, warfarin.  History of overactive bladder which is stable with oxybutynin.  History of gastroesophageal reflux disease which is stable with omeprazole.  She is been feeling really great.  She recently saw her cardiologist who also added sildenafil.  She is not really been having any shortness of breath with exertion.  She continues to travel and do everything she wants to do.  She does not think she snores or stops breathing at night.    Problem List/PMH: reviewed in EMR, and made relevant updates today.  Medications: reviewed in EMR, and made relevant updates today.  Allergies: reviewed in EMR, and made relevant updates today.    Social Hx:  Social History   Substance Use Topics     Smoking status: Former Smoker     Packs/day: 0.50     Years: 30.00     Types: Cigarettes     Start date: 1968     Quit date: 2002     Smokeless tobacco: Never Used     Alcohol use 6.0 oz/week     Social History     Social History Narrative    Patient is a retired RN. She moved here from [place], [place] in the . Her  was an ED doc and worked in [employer] before retiring. They lived on [address]. Patient's   suddenly of a likely arrhythmia in his mid 60s. Patient's son was bi    polar and ended up committing suicide after being convicted for murder. The patient has a [place] that works for [employer]. They recently traveled to the [facility] for a [place] and have plans to go     on a cruise in [place] to find polar bears.     I reviewed social history and made relevant updates today.    Family Hx:   Family History   Problem Relation Age  "of Onset     Hypertension Mother      Hypertension     Hyperlipidemia Mother      Hyperlipidemia,elevated cholesterol     Other - See Comments Mother      Stroke,She  at age 86.  She had stroke as a complication of carotid endarterectomy surgery.     Arthritis Father      Arthritis     Cancer Father      Cancer,skin cancer     Other - See Comments Father      Stroke,He is 87, has a history of CVA     Other - See Comments Father      Alzheimer's     Breast Cancer Maternal Aunt 50     Cancer-breast     Other - See Comments Sister       of long QT syndrome.     Other - See Comments Son      Psychiatric illness,Bipolar,  due to suicide     Colon Cancer No family hx of      Cancer-colon     Anesthesia Reaction No family hx of      Anesthesia Malignant Hyperthermia       Objective  Vitals: reviewed in EMR.  /72  Pulse 76  Temp 97.6  F (36.4  C) (Tympanic)  Resp 16  Ht 5' 6.75\" (1.695 m)  Wt 191 lb (86.6 kg)  Breastfeeding? No  BMI 30.14 kg/m2    Gen: Pleasant female, NAD.  HEENT: MMM, no OP erythema.   Neck: Supple, no JVD, no bruits.  CV: RRR no m/r/g.   Pulm: CTAB no w/r/r  Neuro: Grossly intact  Msk: No lower extremity edema.  Skin: No concerning lesions.  Psychiatric: Normal affect and insight. Does not appear anxious or depressed.    Labs:  Lab Results   Component Value Date    WBC 4.2 2018    HGB 12.1 2018    HCT 38.3 2018     2018    CHOL 208 (H) 2018    TRIG 106 2018    HDL 75 2018    ALT 23 2018    AST 32 2018     2018    BUN 14 2018    CO2 31 2018    INR 2.5 2018         Assessment    ICD-10-CM    1. (HFpEF) heart failure with preserved ejection fraction (H) I50.30 furosemide (LASIX) 20 MG tablet   2. Urge urinary incontinence N39.41 oxybutynin (DITROPAN-XL) 10 MG 24 hr tablet   3. Essential hypertension I10 lisinopril (PRINIVIL/ZESTRIL) 5 MG tablet     Comprehensive metabolic panel   4. Impaired " fasting glucose R73.01 Hemoglobin A1c     Comprehensive metabolic panel   5. Pulmonary hypertension (H) I27.20 Comprehensive metabolic panel   6. Paroxysmal atrial fibrillation (H) I48.0 nadolol (CORGARD) 80 MG tablet     warfarin (COUMADIN) 5 MG tablet   7. Hypercholesterolemia E78.00 atorvastatin (LIPITOR) 40 MG tablet     Lipid Profile   8. Mood disorder (H) F39 FLUoxetine (PROZAC) 20 MG capsule   9. Gastroesophageal reflux disease, esophagitis presence not specified K21.9 omeprazole (PRILOSEC) 20 MG CR capsule   10. Long Q-T syndrome I45.81    11. Screening for thyroid disorder Z13.29 Thyrotropin GH   12. Screening, anemia, deficiency, iron Z13.0 CBC with platelets     Orders Placed This Encounter   Procedures     Hemoglobin A1c     Comprehensive metabolic panel     CBC with platelets     Lipid Profile     Thyrotropin GH       It appears that her sildenafil has been added due to pulmonary hypertension.  We discussed the possibility for testing for obstructive sleep apnea.  She plans to discuss this with her cardiologist first.    Plan   -- Expected clinical course discussed   -- Medications and their side effects discussed  Patient Instructions    -- Schedule fasting lab visit   -- Consider a sleep study     --Medications reviewed, renewed   --Screening lab work as outlined above   --Immunizations were reviewed, up-to-date.  Recommend shingles vaccination.    Signed, Vijay Mackay MD  Internal Medicine & Pediatrics

## 2018-12-10 ENCOUNTER — ANTICOAGULATION THERAPY VISIT (OUTPATIENT)
Dept: ANTICOAGULATION | Facility: OTHER | Age: 73
End: 2018-12-10
Attending: INTERNAL MEDICINE
Payer: MEDICARE

## 2018-12-10 ENCOUNTER — APPOINTMENT (OUTPATIENT)
Dept: GENERAL RADIOLOGY | Facility: OTHER | Age: 73
End: 2018-12-10
Attending: PHYSICIAN ASSISTANT
Payer: MEDICARE

## 2018-12-10 ENCOUNTER — HOSPITAL ENCOUNTER (EMERGENCY)
Facility: OTHER | Age: 73
Discharge: HOME | End: 2018-12-10
Attending: PHYSICIAN ASSISTANT | Admitting: PHYSICIAN ASSISTANT
Payer: MEDICARE

## 2018-12-10 VITALS
HEIGHT: 68 IN | RESPIRATION RATE: 15 BRPM | SYSTOLIC BLOOD PRESSURE: 142 MMHG | TEMPERATURE: 97.8 F | DIASTOLIC BLOOD PRESSURE: 76 MMHG | OXYGEN SATURATION: 96 % | BODY MASS INDEX: 27.28 KG/M2 | HEART RATE: 70 BPM | WEIGHT: 180 LBS

## 2018-12-10 DIAGNOSIS — I48.0 PAROXYSMAL ATRIAL FIBRILLATION (H): ICD-10-CM

## 2018-12-10 DIAGNOSIS — H81.13 BENIGN PAROXYSMAL POSITIONAL VERTIGO, BILATERAL: ICD-10-CM

## 2018-12-10 DIAGNOSIS — H83.09: ICD-10-CM

## 2018-12-10 DIAGNOSIS — R42 DIZZINESS: ICD-10-CM

## 2018-12-10 DIAGNOSIS — Z79.01 ANTICOAGULATION MONITORING, INR RANGE 2-3: ICD-10-CM

## 2018-12-10 LAB
ALBUMIN SERPL-MCNC: 4.2 G/DL (ref 3.5–5.7)
ALBUMIN UR-MCNC: NEGATIVE MG/DL
ALP SERPL-CCNC: 103 U/L (ref 34–104)
ALT SERPL W P-5'-P-CCNC: 17 U/L (ref 7–52)
ANION GAP SERPL CALCULATED.3IONS-SCNC: 7 MMOL/L (ref 3–14)
APPEARANCE UR: CLEAR
AST SERPL W P-5'-P-CCNC: 22 U/L (ref 13–39)
BASOPHILS # BLD AUTO: 0 10E9/L (ref 0–0.2)
BASOPHILS NFR BLD AUTO: 0.7 %
BILIRUB SERPL-MCNC: 0.6 MG/DL (ref 0.3–1)
BILIRUB UR QL STRIP: NEGATIVE
BUN SERPL-MCNC: 21 MG/DL (ref 7–25)
CALCIUM SERPL-MCNC: 9.5 MG/DL (ref 8.6–10.3)
CHLORIDE SERPL-SCNC: 105 MMOL/L (ref 98–107)
CO2 SERPL-SCNC: 29 MMOL/L (ref 21–31)
COLOR UR AUTO: YELLOW
CREAT SERPL-MCNC: 0.84 MG/DL (ref 0.6–1.2)
DIFFERENTIAL METHOD BLD: ABNORMAL
EOSINOPHIL # BLD AUTO: 0.1 10E9/L (ref 0–0.7)
EOSINOPHIL NFR BLD AUTO: 2 %
ERYTHROCYTE [DISTWIDTH] IN BLOOD BY AUTOMATED COUNT: 16.5 % (ref 10–15)
GFR SERPL CREATININE-BSD FRML MDRD: 66 ML/MIN/1.7M2
GLUCOSE SERPL-MCNC: 105 MG/DL (ref 70–105)
GLUCOSE UR STRIP-MCNC: NEGATIVE MG/DL
HCT VFR BLD AUTO: 37.1 % (ref 35–47)
HGB BLD-MCNC: 12 G/DL (ref 11.7–15.7)
HGB UR QL STRIP: NEGATIVE
IMM GRANULOCYTES # BLD: 0 10E9/L (ref 0–0.4)
IMM GRANULOCYTES NFR BLD: 0.2 %
INR POINT OF CARE: 2.2 (ref 2–3)
INR PPP: 2.07 (ref 0–1.3)
KETONES UR STRIP-MCNC: NEGATIVE MG/DL
LEUKOCYTE ESTERASE UR QL STRIP: NEGATIVE
LYMPHOCYTES # BLD AUTO: 1.3 10E9/L (ref 0.8–5.3)
LYMPHOCYTES NFR BLD AUTO: 23.1 %
MCH RBC QN AUTO: 29.2 PG (ref 26.5–33)
MCHC RBC AUTO-ENTMCNC: 32.3 G/DL (ref 31.5–36.5)
MCV RBC AUTO: 90 FL (ref 78–100)
MONOCYTES # BLD AUTO: 0.6 10E9/L (ref 0–1.3)
MONOCYTES NFR BLD AUTO: 10.1 %
NEUTROPHILS # BLD AUTO: 3.5 10E9/L (ref 1.6–8.3)
NEUTROPHILS NFR BLD AUTO: 63.9 %
NITRATE UR QL: NEGATIVE
NT-PROBNP SERPL-MCNC: 306 PG/ML (ref 0–100)
PH UR STRIP: 6 PH (ref 5–9)
PLATELET # BLD AUTO: 197 10E9/L (ref 150–450)
POTASSIUM SERPL-SCNC: 4 MMOL/L (ref 3.5–5.1)
PROT SERPL-MCNC: 6.5 G/DL (ref 6.4–8.9)
RBC # BLD AUTO: 4.11 10E12/L (ref 3.8–5.2)
SODIUM SERPL-SCNC: 141 MMOL/L (ref 134–144)
SOURCE: NORMAL
SP GR UR STRIP: 1.01 (ref 1–1.03)
TROPONIN I SERPL-MCNC: <0.03 UG/L (ref 0–0.03)
UROBILINOGEN UR STRIP-ACNC: 0.2 EU/DL (ref 0.2–1)
WBC # BLD AUTO: 5.5 10E9/L (ref 4–11)

## 2018-12-10 PROCEDURE — 85610 PROTHROMBIN TIME: CPT | Mod: QW,ZL

## 2018-12-10 PROCEDURE — 84484 ASSAY OF TROPONIN QUANT: CPT | Performed by: PHYSICIAN ASSISTANT

## 2018-12-10 PROCEDURE — 71045 X-RAY EXAM CHEST 1 VIEW: CPT

## 2018-12-10 PROCEDURE — 81003 URINALYSIS AUTO W/O SCOPE: CPT | Performed by: PHYSICIAN ASSISTANT

## 2018-12-10 PROCEDURE — 96361 HYDRATE IV INFUSION ADD-ON: CPT | Performed by: PHYSICIAN ASSISTANT

## 2018-12-10 PROCEDURE — 93005 ELECTROCARDIOGRAM TRACING: CPT | Performed by: PHYSICIAN ASSISTANT

## 2018-12-10 PROCEDURE — 93010 ELECTROCARDIOGRAM REPORT: CPT | Performed by: INTERNAL MEDICINE

## 2018-12-10 PROCEDURE — 99285 EMERGENCY DEPT VISIT HI MDM: CPT | Mod: 25 | Performed by: PHYSICIAN ASSISTANT

## 2018-12-10 PROCEDURE — 96360 HYDRATION IV INFUSION INIT: CPT | Performed by: PHYSICIAN ASSISTANT

## 2018-12-10 PROCEDURE — 80053 COMPREHEN METABOLIC PANEL: CPT | Performed by: PHYSICIAN ASSISTANT

## 2018-12-10 PROCEDURE — 99284 EMERGENCY DEPT VISIT MOD MDM: CPT | Mod: Z6 | Performed by: PHYSICIAN ASSISTANT

## 2018-12-10 PROCEDURE — 85610 PROTHROMBIN TIME: CPT | Performed by: PHYSICIAN ASSISTANT

## 2018-12-10 PROCEDURE — 25000128 H RX IP 250 OP 636: Performed by: PHYSICIAN ASSISTANT

## 2018-12-10 PROCEDURE — 83880 ASSAY OF NATRIURETIC PEPTIDE: CPT | Performed by: PHYSICIAN ASSISTANT

## 2018-12-10 PROCEDURE — 25000132 ZZH RX MED GY IP 250 OP 250 PS 637: Mod: GY | Performed by: PHYSICIAN ASSISTANT

## 2018-12-10 PROCEDURE — A9270 NON-COVERED ITEM OR SERVICE: HCPCS | Mod: GY | Performed by: PHYSICIAN ASSISTANT

## 2018-12-10 PROCEDURE — 85025 COMPLETE CBC W/AUTO DIFF WBC: CPT | Performed by: PHYSICIAN ASSISTANT

## 2018-12-10 RX ORDER — MECLIZINE HCL 12.5 MG 12.5 MG/1
50 TABLET ORAL 3 TIMES DAILY PRN
Qty: 30 TABLET | Refills: 0 | Status: SHIPPED | OUTPATIENT
Start: 2018-12-10 | End: 2019-11-04

## 2018-12-10 RX ORDER — MECLIZINE HCL 12.5 MG 12.5 MG/1
25 TABLET ORAL ONCE
Status: COMPLETED | OUTPATIENT
Start: 2018-12-10 | End: 2018-12-10

## 2018-12-10 RX ORDER — SODIUM CHLORIDE 9 MG/ML
INJECTION, SOLUTION INTRAVENOUS CONTINUOUS
Status: DISCONTINUED | OUTPATIENT
Start: 2018-12-10 | End: 2018-12-10 | Stop reason: HOSPADM

## 2018-12-10 RX ADMIN — MECLIZINE 25 MG: 12.5 TABLET ORAL at 15:14

## 2018-12-10 RX ADMIN — MECLIZINE 25 MG: 12.5 TABLET ORAL at 14:05

## 2018-12-10 RX ADMIN — SODIUM CHLORIDE: 900 INJECTION, SOLUTION INTRAVENOUS at 14:06

## 2018-12-10 ASSESSMENT — ENCOUNTER SYMPTOMS
SINUS PRESSURE: 0
BACK PAIN: 0
CHEST TIGHTNESS: 0
EYE REDNESS: 0
SEIZURES: 0
NAUSEA: 0
CONFUSION: 0
DIZZINESS: 1
COUGH: 0
VOMITING: 0
RHINORRHEA: 0
ABDOMINAL PAIN: 0
BRUISES/BLEEDS EASILY: 0
TREMORS: 0
SHORTNESS OF BREATH: 0
HEMATURIA: 0
LIGHT-HEADEDNESS: 0
FACIAL ASYMMETRY: 0
FEVER: 0
PALPITATIONS: 0
SINUS PAIN: 0
DIFFICULTY URINATING: 0
ADENOPATHY: 0
HEADACHES: 0
CHILLS: 0
WEAKNESS: 0
SLEEP DISTURBANCE: 0
WOUND: 0
APPETITE CHANGE: 0
TROUBLE SWALLOWING: 0

## 2018-12-10 ASSESSMENT — MIFFLIN-ST. JEOR: SCORE: 1369.97

## 2018-12-10 NOTE — PROGRESS NOTES
ANTICOAGULATION FOLLOW-UP CLINIC VISIT    Patient Name:  Zoey Jacobs  Date:  12/10/2018  Contact Type:  Face to Face    SUBJECTIVE:     Patient Findings     Positives:   No Problem Findings    Comments:   Feeling dizzy today           OBJECTIVE    INR Protime   Date Value Ref Range Status   12/10/2018 2.2 (A) 2.0 - 3.0 Final       ASSESSMENT / PLAN  INR assessment THER    Recheck INR In: 4 WEEKS    INR Location Clinic      Anticoagulation Summary  As of 12/10/2018    INR goal:   2.0-3.0   TTR:   69.1 % (6.3 y)   INR used for dosin.2 (12/10/2018)   Warfarin maintenance plan:   5 mg (5 mg x 1) every Mon, Wed, Fri; 2.5 mg (5 mg x 0.5) all other days   Full warfarin instructions:   5 mg every Mon, Wed, Fri; 2.5 mg all other days   Weekly warfarin total:   25 mg   No change documented:   Maryuri Vines RN   Plan last modified:   Maryuri Vines RN (3/19/2018)   Next INR check:   2019   Priority:   INR   Target end date:   Indefinite    Indications    Anticoagulation monitoring  INR range 2-3 [Z79.01]  Paroxysmal atrial fibrillation (H) [I48.0]             Anticoagulation Episode Summary     INR check location:       Preferred lab:       Send INR reminders to:    INR    Comments:         Anticoagulation Care Providers     Provider Role Specialty Phone number    Vijay Mackay MD [facility] [phone]            See the Encounter Report to view Anticoagulation Flowsheet and Dosing Calendar (Go to Encounters tab in chart review, and find the Anticoagulation Therapy Visit)        Maryuri Vines RN

## 2018-12-10 NOTE — ED PROVIDER NOTES
History     Chief Complaint   Patient presents with     Dizziness     This is a 73-year-old female who had an episode of dizziness that started around noon today.  She reports increased dizziness with positional changes.  Eyes any history of vertigo in the past.  Denies any recent sinus issues, no flulike symptoms, no nausea or vomiting.              Problem List:    Patient Active Problem List    Diagnosis Date Noted     Pulmonary hypertension (H) 11/08/2018     Priority: Medium     Hypercholesterolemia 02/13/2018     Priority: Medium     Major depressive disorder, recurrent episode (H) 02/13/2018     Priority: Medium     Overview:   controlled       Vaginitis, atrophic 02/13/2018     Priority: Medium     Anticoagulation monitoring, INR range 2-3 02/11/2018     Priority: Medium     Paroxysmal atrial fibrillation (H) 02/11/2018     Priority: Medium     Special screening for malignant neoplasms, colon 08/09/2017     Priority: Medium     Osteoporosis 07/25/2017     Priority: Medium     Impaired fasting glucose 07/14/2017     Priority: Medium     Vitamin D deficiency 07/14/2017     Priority: Medium     Complete tear of right rotator cuff 06/15/2017     Priority: Medium     AC (acromioclavicular) joint arthritis 05/23/2017     Priority: Medium     Impingement syndrome of right shoulder 05/23/2017     Priority: Medium     Right rotator cuff tendinitis 05/23/2017     Priority: Medium     (HFpEF) heart failure with preserved ejection fraction (H) 01/07/2015     Priority: Medium     Pacemaker 01/07/2015     Priority: Medium     Overview:   Placed after first ablation in 2011       S/P ablation of atrial fibrillation 01/07/2015     Priority: Medium     Hypertension 04/16/2014     Priority: Medium     Cystocele 04/15/2014     Priority: Medium     ED (stress urinary incontinence, female) 04/15/2014     Priority: Medium     Abdominal pain 03/17/2014     Priority: Medium     ACP (advance care planning) 01/02/2014      Priority: Medium     Urge urinary incontinence 04/29/2013     Priority: Medium     Insomnia 01/07/2013     Priority: Medium     Long Q-T syndrome 11/13/2012     Priority: Medium     Overview:   PLEASE VERIFY WITH PHARMACIST PRIOR TO PRESCRIBING ANY MEDICATIONS DUE TO HER QT SYNDROME. [facility]       Edema of eyelid 03/03/2011     Priority: Medium     Other chronic allergic conjunctivitis 02/28/2011     Priority: Medium     Dysphagia 01/19/2011     Priority: Medium        Past Medical History:    Past Medical History:   Diagnosis Date     Atrial fibrillation (H)      Complete tear of right rotator cuff      Essential (primary) hypertension      Impingement syndrome of right shoulder      Insomnia      Long Q-T syndrome 2010     Major depressive disorder, single episode      Other shoulder lesions, right shoulder      Personal history of other infectious and parasitic diseases      Personal history of other medical treatment (CODE)      Primary osteoarthritis of shoulder      Pure hypercholesterolemia        Past Surgical History:    Past Surgical History:   Procedure Laterality Date     ARTHROSCOPY SHOULDER ROTATOR CUFF REPAIR      2004,Right shoulder     COLONOSCOPY      2007,Normal, follow up in 10 years     COLONOSCOPY      08/10/2017,no follow up due to age being greater than 80 at next screening interval     IMPLANT PACEMAKER      2011,Post ablation, due to junctional rythym     LAPAROSCOPIC TUBAL LIGATION      No Comments Provided     OTHER SURGICAL HISTORY      2004,[id number],OTHER,Left knee     OTHER SURGICAL HISTORY      2011,[id number],EP STUDY /ABLATION,Cardiac ablation     OTHER SURGICAL HISTORY      2015,[id number],CARDIAC ELECTROPHYSIOLOGY STUDY AND ABLATION     TONSILLECTOMY, ADENOIDECTOMY, COMBINED      As a child       Family History:    Family History   Problem Relation Age of Onset     Hypertension Mother         Hypertension     Hyperlipidemia Mother         Hyperlipidemia,elevated cholesterol      Other - See Comments Mother         Stroke,She  at age 86.  She had stroke as a complication of carotid endarterectomy surgery.     Arthritis Father         Arthritis     Cancer Father         Cancer,skin cancer     Other - See Comments Father         Stroke,He is 87, has a history of CVA     Other - See Comments Father         Alzheimer's     Breast Cancer Maternal Aunt 50        Cancer-breast     Other - See Comments Sister          of long QT syndrome.     Other - See Comments Son         Psychiatric illness,Bipolar,  due to suicide     Colon Cancer No family hx of         Cancer-colon     Anesthesia Reaction No family hx of         Anesthesia Malignant Hyperthermia       Social History:  Marital Status:   [5]  Social History     Tobacco Use     Smoking status: Former Smoker     Packs/day: 0.50     Years: 30.00     Pack years: 15.00     Types: Cigarettes     Start date: 1968     Last attempt to quit: 2002     Years since quittin.6     Smokeless tobacco: Never Used   Substance Use Topics     Alcohol use: Yes     Alcohol/week: 1.2 oz     Types: 2 Glasses of wine per week     Comment: every day     Drug use: Unknown     Types: Other     Comment: Drug use: No        Medications:      atorvastatin (LIPITOR) 40 MG tablet   calcium carbonate (OSCAL 500) 1250 (500 CA) MG TABS tablet   cyanocobalamin (RA VITAMIN B-12 TR) 1000 MCG TBCR   FLUoxetine (PROZAC) 20 MG capsule   furosemide (LASIX) 20 MG tablet   lisinopril (PRINIVIL/ZESTRIL) 5 MG tablet   Multiple Vitamins-Minerals (PRESERVISION AREDS 2 PO)   nadolol (CORGARD) 80 MG tablet   naproxen sodium 220 MG capsule   omeprazole (PRILOSEC) 20 MG CR capsule   oxybutynin (DITROPAN-XL) 10 MG 24 hr tablet   sildenafil (REVATIO) 20 MG tablet   VITAMIN D, CHOLECALCIFEROL, PO   warfarin (COUMADIN) 5 MG tablet         Review of Systems   Constitutional: Negative for appetite change, chills and fever.   HENT: Negative for congestion, dental problem,  "rhinorrhea, sinus pressure, sinus pain, tinnitus and trouble swallowing.    Eyes: Negative for redness and visual disturbance.   Respiratory: Negative for cough, chest tightness and shortness of breath.    Cardiovascular: Negative for chest pain and palpitations.   Gastrointestinal: Negative for abdominal pain, nausea and vomiting.   Genitourinary: Negative for difficulty urinating and hematuria.   Musculoskeletal: Negative for back pain.   Skin: Negative for rash and wound.   Neurological: Positive for dizziness. Negative for tremors, seizures, syncope, facial asymmetry, weakness, light-headedness and headaches.   Hematological: Negative for adenopathy. Does not bruise/bleed easily.   Psychiatric/Behavioral: Negative for confusion, sleep disturbance and suicidal ideas.       Physical Exam   BP: 142/81  Pulse: 70  Temp: 97.8  F (36.6  C)  Resp: 12  Height: 172.7 cm (5' 8\")  Weight: 81.6 kg (180 lb)  SpO2: 97 %      Physical Exam   Constitutional: She appears well-developed and well-nourished. No distress.   HENT:   Head: Normocephalic and atraumatic.   Right Ear: External ear normal.   Left Ear: External ear normal.   Nose: Nose normal.   Eyes: Conjunctivae and EOM are normal. Right eye exhibits no discharge. Left eye exhibits no discharge. No scleral icterus.   Neck: Neck supple.   Cardiovascular: Normal rate and regular rhythm.   Pulmonary/Chest: Effort normal.   Abdominal: Soft. She exhibits no mass. There is no tenderness. There is no rebound and no guarding.   Musculoskeletal: She exhibits no deformity.   Lymphadenopathy:     She has no cervical adenopathy.   Neurological: She is alert. She displays normal reflexes. Coordination normal.   Skin: Skin is warm and dry. Capillary refill takes less than 2 seconds. No rash noted. She is not diaphoretic.   Psychiatric: She has a normal mood and affect. Her behavior is normal. Judgment and thought content normal.       ED Course        Procedures               EKG " Interpretation:      Interpreted by Branden Chowdhury  Time reviewed: 1350  Symptoms at time of EKG: Dizziness  Rhythm: paced  Rate: Normal  Axis: Normal  Ectopy: none  Conduction: normal  ST Segments/ T Waves: Non-specific ST-T wave changes  Q Waves: none  Comparison to prior: This is a change from her previous EKG which showed atrial flutter.  On 10/13/2017.    Clinical Impression: no acute changes             Results for orders placed or performed during the hospital encounter of 12/10/18 (from the past 24 hour(s))   CBC with platelets differential   Result Value Ref Range    WBC 5.5 4.0 - 11.0 10e9/L    RBC Count 4.11 3.8 - 5.2 10e12/L    Hemoglobin 12.0 11.7 - 15.7 g/dL    Hematocrit 37.1 35.0 - 47.0 %    MCV 90 78 - 100 fl    MCH 29.2 26.5 - 33.0 pg    MCHC 32.3 31.5 - 36.5 g/dL    RDW 16.5 (H) 10.0 - 15.0 %    Platelet Count 197 150 - 450 10e9/L    Diff Method Automated Method     % Neutrophils 63.9 %    % Lymphocytes 23.1 %    % Monocytes 10.1 %    % Eosinophils 2.0 %    % Basophils 0.7 %    % Immature Granulocytes 0.2 %    Absolute Neutrophil 3.5 1.6 - 8.3 10e9/L    Absolute Lymphocytes 1.3 0.8 - 5.3 10e9/L    Absolute Monocytes 0.6 0.0 - 1.3 10e9/L    Absolute Eosinophils 0.1 0.0 - 0.7 10e9/L    Absolute Basophils 0.0 0.0 - 0.2 10e9/L    Abs Immature Granulocytes 0.0 0 - 0.4 10e9/L   Comprehensive metabolic panel   Result Value Ref Range    Sodium 141 134 - 144 mmol/L    Potassium 4.0 3.5 - 5.1 mmol/L    Chloride 105 98 - 107 mmol/L    Carbon Dioxide 29 21 - 31 mmol/L    Anion Gap 7 3 - 14 mmol/L    Glucose 105 70 - 105 mg/dL    Urea Nitrogen 21 7 - 25 mg/dL    Creatinine 0.84 0.60 - 1.20 mg/dL    GFR Estimate 66 >60 mL/min/1.7m2    GFR Estimate If Black 80 >60 mL/min/1.7m2    Calcium 9.5 8.6 - 10.3 mg/dL    Bilirubin Total 0.6 0.3 - 1.0 mg/dL    Albumin 4.2 3.5 - 5.7 g/dL    Protein Total 6.5 6.4 - 8.9 g/dL    Alkaline Phosphatase 103 34 - 104 U/L    ALT 17 7 - 52 U/L    AST 22 13 - 39 U/L   Troponin I    Result Value Ref Range    Troponin I ES <0.030 0.000 - 0.034 ug/L   Nt probnp inpatient (BNP)   Result Value Ref Range    N-Terminal Pro BNP Inpatient 306 (H) 0 - 100 pg/mL   INR   Result Value Ref Range    INR 2.07 (H) 0 - 1.3   XR Chest Port 1 View    Narrative    PROCEDURE:  XR CHEST PORT 1 VW    HISTORY: dizziness; dizziness. .    COMPARISON:  3/29/2018    FINDINGS:    The cardiomediastinal contours are enlarged, unchanged.  No focal consolidation, effusion or pneumothorax.      Impression    IMPRESSION:  Stable portable chest.      BYRON OLIVERA MD   *UA reflex to Microscopic   Result Value Ref Range    Color Urine Yellow     Appearance Urine Clear     Glucose Urine Negative NEG^Negative mg/dL    Bilirubin Urine Negative NEG^Negative    Ketones Urine Negative NEG^Negative mg/dL    Specific Gravity Urine 1.015 1.000 - 1.030    Blood Urine Negative NEG^Negative    pH Urine 6.0 5.0 - 9.0 pH    Protein Albumin Urine Negative NEG^Negative mg/dL    Urobilinogen Urine 0.2 0.2 - 1.0 EU/dL    Nitrite Urine Negative NEG^Negative    Leukocyte Esterase Urine Negative NEG^Negative    Source Midstream Urine        Medications   sodium chloride 0.9% infusion (not administered)   meclizine (ANTIVERT) tablet 25 mg (not administered)       Assessments & Plan (with Medical Decision Making)     I have reviewed the nursing notes.    I have reviewed the findings, diagnosis, plan and need for follow up with the patient.      Current Discharge Medication List          Final diagnoses:   Acute viral labyrinthitis   Benign paroxysmal positional vertigo, bilateral   Dizziness   Afebrile.  Vital signs stable.  Orthostatic vitals are unremarkable.  EKG shows atrial paced rhythm with prolonged AV conduction.  Nonspecific ST and T wave abnormality prolonged QT.  This is a change from her previous EKG on 10/13/2017 which showed atrial flutter.  Troponin is normal.  BNP is slightly elevated at 306.  But when comparing this to her  previous this is her normal range.  CBC is unremarkable.  CMP is normal.  UA shows no signs of UTI.  She was given meclizine orally in the ER.  After a while this seemed to help somewhat but not entirely.  She was given an additional 25 mg orally and this did seem to help somewhat.  She was noted to ambulating without any unsteadiness.  Discussed that this could be viral labyrinthitis.  I discussed continued monitoring and close follow-up with her primary care provider in 5-7 days for further evaluation.  Sooner if there is any other concerns problems or questions.    12/10/2018   [facility]     Branden Stephenson PA-C  12/10/18 1747

## 2018-12-10 NOTE — ED AVS SNAPSHOT
[facility]  [address]  [place] [address]  Phone:  [phone]  Fax:  [phone]                                    Zoey Jacobs   MRN: [id number]    Department:  [facility]   Date of Visit:  12/10/2018           After Visit Summary Signature Page    I have received my discharge instructions, and my questions have been answered. I have discussed any challenges I see with this plan with the nurse or doctor.    ..........................................................................................................................................  Patient/Patient Representative Signature      ..........................................................................................................................................  Patient Representative Print Name and Relationship to Patient    ..................................................               ................................................  Date                                   Time    ..........................................................................................................................................  Reviewed by Signature/Title    ...................................................              ..............................................  Date                                               Time          22EPIC Rev 08/18

## 2019-01-02 ENCOUNTER — TRANSFERRED RECORDS (OUTPATIENT)
Dept: HEALTH INFORMATION MANAGEMENT | Facility: OTHER | Age: 74
End: 2019-01-02

## 2019-01-07 ENCOUNTER — ANTICOAGULATION THERAPY VISIT (OUTPATIENT)
Dept: ANTICOAGULATION | Facility: OTHER | Age: 74
End: 2019-01-07
Attending: INTERNAL MEDICINE
Payer: MEDICARE

## 2019-01-07 DIAGNOSIS — Z79.01 ANTICOAGULATION MONITORING, INR RANGE 2-3: ICD-10-CM

## 2019-01-07 DIAGNOSIS — I48.0 PAROXYSMAL ATRIAL FIBRILLATION (H): ICD-10-CM

## 2019-01-07 LAB — INR POINT OF CARE: 1.9 (ref 2–3)

## 2019-01-07 PROCEDURE — 85610 PROTHROMBIN TIME: CPT | Mod: QW,ZL

## 2019-01-07 NOTE — PROGRESS NOTES
ANTICOAGULATION FOLLOW-UP CLINIC VISIT    Patient Name:  Zoey Jacobs  Date:  2019  Contact Type:  Face to Face    SUBJECTIVE:     Patient Findings     Positives:   Change in medications (tapering Sildenafil), Change in diet/appetite (salad yesterday), Missed doses           OBJECTIVE    INR Protime   Date Value Ref Range Status   2019 1.9 (A) 2.0 - 3.0 Final       ASSESSMENT / PLAN  INR assessment THER    Recheck INR In: 4 WEEKS    INR Location Clinic      Anticoagulation Summary  As of 2019    INR goal:   2.0-3.0   TTR:   69.1 % (6.3 y)   INR used for dosin.9! (2019)   Warfarin maintenance plan:   5 mg (5 mg x 1) every Mon, Wed, Fri; 2.5 mg (5 mg x 0.5) all other days   Full warfarin instructions:   : 5 mg; Otherwise 5 mg every Mon, Wed, Fri; 2.5 mg all other days   Weekly warfarin total:   25 mg   Plan last modified:   Maryuri Vines RN (3/19/2018)   Next INR check:   2019   Priority:   INR   Target end date:   Indefinite    Indications    Anticoagulation monitoring  INR range 2-3 [Z79.01]  Paroxysmal atrial fibrillation (H) [I48.0]             Anticoagulation Episode Summary     INR check location:       Preferred lab:       Send INR reminders to:    INR    Comments:         Anticoagulation Care Providers     Provider Role Specialty Phone number    Vijay Mackay MD [facility] [phone]            See the Encounter Report to view Anticoagulation Flowsheet and Dosing Calendar (Go to Encounters tab in chart review, and find the Anticoagulation Therapy Visit)        Maryuri Vines RN

## 2019-01-14 ENCOUNTER — DOCUMENTATION ONLY (OUTPATIENT)
Dept: OTHER | Facility: CLINIC | Age: 74
End: 2019-01-14

## 2019-02-04 ENCOUNTER — ANTICOAGULATION THERAPY VISIT (OUTPATIENT)
Dept: ANTICOAGULATION | Facility: OTHER | Age: 74
End: 2019-02-04
Attending: INTERNAL MEDICINE
Payer: MEDICARE

## 2019-02-04 ENCOUNTER — TELEPHONE (OUTPATIENT)
Dept: PEDIATRICS | Facility: OTHER | Age: 74
End: 2019-02-04

## 2019-02-04 DIAGNOSIS — I48.0 PAROXYSMAL ATRIAL FIBRILLATION (H): Primary | ICD-10-CM

## 2019-02-04 DIAGNOSIS — J31.0 CHRONIC RHINITIS: Primary | ICD-10-CM

## 2019-02-04 DIAGNOSIS — Z79.01 ANTICOAGULATION MONITORING, INR RANGE 2-3: ICD-10-CM

## 2019-02-04 LAB — INR POINT OF CARE: 2.4 (ref 2–3)

## 2019-02-04 PROCEDURE — 85610 PROTHROMBIN TIME: CPT | Mod: QW,ZL

## 2019-02-04 NOTE — PROGRESS NOTES
ANTICOAGULATION FOLLOW-UP CLINIC VISIT    Patient Name:  Zoey Jacobs  Date:  2019  Contact Type:  Face to Face    SUBJECTIVE:     Patient Findings     Positives:   No Problem Findings           OBJECTIVE    INR Protime   Date Value Ref Range Status   2019 2.4 (A) 2.0 - 3.0 Final       ASSESSMENT / PLAN  INR assessment THER    Recheck INR In: 5 WEEKS    INR Location Clinic      Anticoagulation Summary  As of 2019    INR goal:   2.0-3.0   TTR:   69.2 % (6.4 y)   INR used for dosin.4 (2019)   Warfarin maintenance plan:   5 mg (5 mg x 1) every Mon, Wed, Fri; 2.5 mg (5 mg x 0.5) all other days   Full warfarin instructions:   5 mg every Mon, Wed, Fri; 2.5 mg all other days   Weekly warfarin total:   25 mg   No change documented:   Maryuri Vines RN   Plan last modified:   Maryuri Vines RN (3/19/2018)   Next INR check:   3/11/2019   Priority:   INR   Target end date:   Indefinite    Indications    Anticoagulation monitoring  INR range 2-3 [Z79.01]  Paroxysmal atrial fibrillation (H) [I48.0]             Anticoagulation Episode Summary     INR check location:       Preferred lab:       Send INR reminders to:    INR    Comments:         Anticoagulation Care Providers     Provider Role Specialty Phone number    Vijay Mackay MD [facility] [phone]            See the Encounter Report to view Anticoagulation Flowsheet and Dosing Calendar (Go to Encounters tab in chart review, and find the Anticoagulation Therapy Visit)        Maryuri Vines RN

## 2019-02-04 NOTE — TELEPHONE ENCOUNTER
Pt is wondering if can have a referral for ENT for nasal infections.  Has been seen in the past for this infection by Dr. Copeland in [place].  Would you like to see her in the office?  Carla Beckford LPN ....................  2/4/2019   3:03 PM

## 2019-02-04 NOTE — TELEPHONE ENCOUNTER
Patient notified that referral was placed.  Carla Beckford LPN ....................  2/4/2019   4:08 PM

## 2019-03-05 ENCOUNTER — OFFICE VISIT (OUTPATIENT)
Dept: OTOLARYNGOLOGY | Facility: OTHER | Age: 74
End: 2019-03-05
Attending: OTOLARYNGOLOGY
Payer: MEDICARE

## 2019-03-05 DIAGNOSIS — J31.0 CHRONIC RHINITIS: Primary | ICD-10-CM

## 2019-03-05 PROCEDURE — G0463 HOSPITAL OUTPT CLINIC VISIT: HCPCS

## 2019-03-08 NOTE — PROGRESS NOTES
LAKE PRICE    74 Y old Female, : 1945    Account Number: [id number]    [id number] [address], [place] [id number]    Home: [phone]     Guarantor: [place] Insurance: [facility] MEDICA PRIME SOLUTION Payer ID: [id number]   PCP: Vijay Mackay MD    Appointment Facility: [facility]      2019 Progress Notes: Andrew Armenta MD       Reason for Appointment   1. CHRONIC RHINITIS   2. Chronic rhinitis   History of Present Illness   HPI:   The patient is a 74-year-old female who is seen Dr. Copeland in the past regarding inflammation in her nose. She has been prescribed Bactroban in the past that has helped dramatically. She complains of intermittent crusting and bleeding high in her left nasal vault.   Examination   General Examination:  Nasal-her septum is midline. She does have some crusting along the anterior edge of her middle turbinate on the patient's left. There is no purulence. There are no polyps.   Oral cavity oropharynx-free of lesion   Neck-no masses or adenopathy   Head neck integument-Clear   General-the patient appears well and in no distress   Neuro-there are no focal cranial nerve deficits.     Assessments   1. Chronic rhinitis - J31.0 (Primary)   Treatment   1. Chronic rhinitis   Start Mupirocin Calcium Ointment, 2 %, 1 application, Nasally, Twice a day, 10 days, 15 Gram, Refills 0     2. Others   Notes: She was given a refill of her Bactroban ointment to use twice daily for up to 2-3 weeks at a time with as the crusting and drainage occurs. She is welcome to follow up as needed.  Procedures  [ ].          Follow Up   prn               Electronically signed by ANDREW ARMENTA MD on 2019 at 08:59 AM CST   Sign off status: Completed          [facility]  [address] [place] [address]  Tel: [phone]  Fax:           Patient: LAKE PRICE : 1945 Progress Note: Andrew Armenta MD 2019      Note generated by  Inter[employer] EMR/PM Software ([email or web address])

## 2019-03-11 ENCOUNTER — ANTICOAGULATION THERAPY VISIT (OUTPATIENT)
Dept: ANTICOAGULATION | Facility: OTHER | Age: 74
End: 2019-03-11
Attending: INTERNAL MEDICINE
Payer: MEDICARE

## 2019-03-11 DIAGNOSIS — Z79.01 ANTICOAGULATION MONITORING, INR RANGE 2-3: ICD-10-CM

## 2019-03-11 DIAGNOSIS — I48.0 PAROXYSMAL ATRIAL FIBRILLATION (H): ICD-10-CM

## 2019-03-11 LAB — INR POINT OF CARE: 2.3 (ref 0.86–1.14)

## 2019-03-11 PROCEDURE — 85610 PROTHROMBIN TIME: CPT | Mod: QW,ZL

## 2019-03-11 NOTE — PROGRESS NOTES
ANTICOAGULATION FOLLOW-UP CLINIC VISIT    Patient Name:  Zoey Jacobs  Date:  3/11/2019  Contact Type:  Face to Face    SUBJECTIVE:     Patient Findings     Positives:   No Problem Findings           OBJECTIVE    INR Protime   Date Value Ref Range Status   2019 2.3 (A) 0.86 - 1.14 Final       ASSESSMENT / PLAN  INR assessment THER    Recheck INR In: 6 WEEKS    INR Location Clinic      Anticoagulation Summary  As of 3/11/2019    INR goal:   2.0-3.0   TTR:   69.6 % (6.5 y)   INR used for dosin.3 (3/11/2019)   Warfarin maintenance plan:   5 mg (5 mg x 1) every Mon, Wed, Fri; 2.5 mg (5 mg x 0.5) all other days   Full warfarin instructions:   5 mg every Mon, Wed, Fri; 2.5 mg all other days   Weekly warfarin total:   25 mg   No change documented:   Maryuri Vines RN   Plan last modified:   Maryuri Vines RN (3/19/2018)   Next INR check:   4/15/2019   Priority:   INR   Target end date:   Indefinite    Indications    Anticoagulation monitoring  INR range 2-3 [Z79.01]  Paroxysmal atrial fibrillation (H) [I48.0]             Anticoagulation Episode Summary     INR check location:       Preferred lab:       Send INR reminders to:    INR    Comments:         Anticoagulation Care Providers     Provider Role Specialty Phone number    Vijay Mackay MD [facility] [phone]            See the Encounter Report to view Anticoagulation Flowsheet and Dosing Calendar (Go to Encounters tab in chart review, and find the Anticoagulation Therapy Visit)        Maryuri Vines RN

## 2019-04-06 DIAGNOSIS — I48.0 PAROXYSMAL ATRIAL FIBRILLATION (H): ICD-10-CM

## 2019-04-09 RX ORDER — NADOLOL 80 MG/1
TABLET ORAL
Qty: 270 TABLET | Refills: 1 | Status: SHIPPED | OUTPATIENT
Start: 2019-04-09 | End: 2019-09-27

## 2019-04-09 NOTE — TELEPHONE ENCOUNTER
.Prescription approved per [facility] Refill Protocol.  LOV: 11/8/18  BP: 136/72    Fatoumata Schmitt RN on 4/9/2019 at 3:00 PM

## 2019-04-15 ENCOUNTER — ANTICOAGULATION THERAPY VISIT (OUTPATIENT)
Dept: ANTICOAGULATION | Facility: OTHER | Age: 74
End: 2019-04-15
Attending: INTERNAL MEDICINE
Payer: MEDICARE

## 2019-04-15 DIAGNOSIS — Z79.01 ANTICOAGULATION MONITORING, INR RANGE 2-3: ICD-10-CM

## 2019-04-15 DIAGNOSIS — I48.0 PAROXYSMAL ATRIAL FIBRILLATION (H): ICD-10-CM

## 2019-04-15 LAB — INR POINT OF CARE: 2.3 (ref 0.86–1.14)

## 2019-04-15 PROCEDURE — 85610 PROTHROMBIN TIME: CPT | Mod: QW

## 2019-04-15 NOTE — PROGRESS NOTES
ANTICOAGULATION FOLLOW-UP CLINIC VISIT    Patient Name:  Zoey Jacobs  Date:  4/15/2019  Contact Type:  Face to Face    SUBJECTIVE:     Patient Findings            OBJECTIVE    INR Protime   Date Value Ref Range Status   04/15/2019 2.3 (A) 0.86 - 1.14 Final       ASSESSMENT / PLAN  INR assessment THER    Recheck INR In: 6 WEEKS    INR Location Clinic      Anticoagulation Summary  As of 4/15/2019    INR goal:   2.0-3.0   TTR:   70.1 % (6.6 y)   INR used for dosin.3 (4/15/2019)   Warfarin maintenance plan:   5 mg (5 mg x 1) every Mon, Wed, Fri; 2.5 mg (5 mg x 0.5) all other days   Full warfarin instructions:   5 mg every Mon, Wed, Fri; 2.5 mg all other days   Weekly warfarin total:   25 mg   No change documented:   Maryuri Vines RN   Plan last modified:   Maryuri Vines RN (3/19/2018)   Next INR check:   2019   Priority:   INR   Target end date:   Indefinite    Indications    Anticoagulation monitoring  INR range 2-3 [Z79.01]  Paroxysmal atrial fibrillation (H) [I48.0]             Anticoagulation Episode Summary     INR check location:       Preferred lab:       Send INR reminders to:    INR    Comments:         Anticoagulation Care Providers     Provider Role Specialty Phone number    Vijay Mackay MD [facility] [phone]            See the Encounter Report to view Anticoagulation Flowsheet and Dosing Calendar (Go to Encounters tab in chart review, and find the Anticoagulation Therapy Visit)        Maryuri Vines RN

## 2019-05-30 ENCOUNTER — ANTICOAGULATION THERAPY VISIT (OUTPATIENT)
Dept: ANTICOAGULATION | Facility: OTHER | Age: 74
End: 2019-05-30
Attending: INTERNAL MEDICINE
Payer: MEDICARE

## 2019-05-30 DIAGNOSIS — I48.0 PAROXYSMAL ATRIAL FIBRILLATION (H): ICD-10-CM

## 2019-05-30 DIAGNOSIS — Z79.01 ANTICOAGULATION MONITORING, INR RANGE 2-3: ICD-10-CM

## 2019-05-30 LAB — INR POINT OF CARE: 2 (ref 0.86–1.14)

## 2019-05-30 PROCEDURE — 85610 PROTHROMBIN TIME: CPT | Mod: QW,ZL

## 2019-06-07 ENCOUNTER — HOSPITAL ENCOUNTER (OUTPATIENT)
Dept: MAMMOGRAPHY | Facility: OTHER | Age: 74
Discharge: HOME | End: 2019-06-07
Attending: INTERNAL MEDICINE | Admitting: INTERNAL MEDICINE
Payer: MEDICARE

## 2019-06-07 ENCOUNTER — DOCUMENTATION ONLY (OUTPATIENT)
Dept: OTHER | Facility: CLINIC | Age: 74
End: 2019-06-07

## 2019-06-07 DIAGNOSIS — Z12.31 VISIT FOR SCREENING MAMMOGRAM: ICD-10-CM

## 2019-06-07 PROCEDURE — 77063 BREAST TOMOSYNTHESIS BI: CPT

## 2019-06-27 ENCOUNTER — ANTICOAGULATION THERAPY VISIT (OUTPATIENT)
Dept: ANTICOAGULATION | Facility: OTHER | Age: 74
End: 2019-06-27
Attending: INTERNAL MEDICINE
Payer: MEDICARE

## 2019-06-27 DIAGNOSIS — I48.0 PAROXYSMAL ATRIAL FIBRILLATION (H): ICD-10-CM

## 2019-06-27 DIAGNOSIS — Z79.01 ANTICOAGULATION MONITORING, INR RANGE 2-3: ICD-10-CM

## 2019-06-27 LAB
INR POINT OF CARE: 2.4 (ref 0.86–1.14)
INR POINT OF CARE: 2.4 (ref 0.86–1.14)

## 2019-06-27 PROCEDURE — 85610 PROTHROMBIN TIME: CPT | Mod: QW,ZL

## 2019-06-27 NOTE — PROGRESS NOTES
ANTICOAGULATION FOLLOW-UP CLINIC VISIT    Patient Name:  Zoey Fan  Date:  2019  Contact Type:  Face to Face    SUBJECTIVE:  Patient Findings         Clinical Outcomes     Negatives:   Major bleeding event, Thromboembolic event, Anticoagulation-related hospital admission, Anticoagulation-related ED visit, Anticoagulation-related fatality           OBJECTIVE    INR Protime   Date Value Ref Range Status   2019 2.4 (A) 0.86 - 1.14 Final   2019 2.4 (A) 0.86 - 1.14 Final       ASSESSMENT / PLAN  INR assessment THER    Recheck INR In: 6 WEEKS    INR Location Clinic      Anticoagulation Summary  As of 2019    INR goal:   2.0-3.0   TTR:   71.0 % (6.8 y)   INR used for dosin.4 (2019)   Warfarin maintenance plan:   5 mg (5 mg x 1) every Mon, Wed, Fri; 2.5 mg (5 mg x 0.5) all other days   Full warfarin instructions:   5 mg every Mon, Wed, Fri; 2.5 mg all other days   Weekly warfarin total:   25 mg   Plan last modified:   Maryuri Vines RN (3/19/2018)   Next INR check:   2019   Priority:   INR   Target end date:   Indefinite    Indications    Anticoagulation monitoring  INR range 2-3 [Z79.01]  Paroxysmal atrial fibrillation (H) [I48.0]             Anticoagulation Episode Summary     INR check location:       Preferred lab:       Send INR reminders to:    INR    Comments:         Anticoagulation Care Providers     Provider Role Specialty Phone number    Vijay Mackay MD [facility] [phone]            See the Encounter Report to view Anticoagulation Flowsheet and Dosing Calendar (Go to Encounters tab in chart review, and find the Anticoagulation Therapy Visit)        Maryuri Vines RN

## 2019-08-08 ENCOUNTER — ANTICOAGULATION THERAPY VISIT (OUTPATIENT)
Dept: ANTICOAGULATION | Facility: OTHER | Age: 74
End: 2019-08-08
Attending: INTERNAL MEDICINE
Payer: MEDICARE

## 2019-08-08 DIAGNOSIS — I48.0 PAROXYSMAL ATRIAL FIBRILLATION (H): ICD-10-CM

## 2019-08-08 DIAGNOSIS — Z79.01 ANTICOAGULATION MONITORING, INR RANGE 2-3: ICD-10-CM

## 2019-08-08 LAB — INR POINT OF CARE: 3.9 (ref 0.86–1.14)

## 2019-08-08 PROCEDURE — 85610 PROTHROMBIN TIME: CPT | Mod: QW,ZL

## 2019-08-08 NOTE — PROGRESS NOTES
ANTICOAGULATION FOLLOW-UP CLINIC VISIT    Patient Name:  Zoey Fan  Date:  8/8/2019  Contact Type:  Face to Face    SUBJECTIVE:  Patient Findings     Positives:   Bruising, Other complaints    Comments:   Had a cold that is almost gone now.          Clinical Outcomes     Negatives:   Major bleeding event, Thromboembolic event, Anticoagulation-related hospital admission, Anticoagulation-related ED visit, Anticoagulation-related fatality    Comments:   Had a cold that is almost gone now.             OBJECTIVE    INR Protime   Date Value Ref Range Status   08/08/2019 3.9 (A) 0.86 - 1.14 Final       ASSESSMENT / PLAN  INR assessment SUPRA    Recheck INR In: 1 WEEK    INR Location Clinic      Anticoagulation Summary  As of 8/8/2019    INR goal:   2.0-3.0   TTR:   70.4 % (6.9 y)   INR used for dosing:   3.9! (8/8/2019)   Warfarin maintenance plan:   5 mg (5 mg x 1) every Mon, Wed, Fri; 2.5 mg (5 mg x 0.5) all other days   Full warfarin instructions:   8/8: Hold; Otherwise 5 mg every Mon, Wed, Fri; 2.5 mg all other days   Weekly warfarin total:   25 mg   Plan last modified:   Maryuri Vines RN (3/19/2018)   Next INR check:   8/15/2019   Priority:   INR   Target end date:   Indefinite    Indications    Anticoagulation monitoring  INR range 2-3 [Z79.01]  Paroxysmal atrial fibrillation (H) [I48.0]             Anticoagulation Episode Summary     INR check location:       Preferred lab:       Send INR reminders to:    INR    Comments:         Anticoagulation Care Providers     Provider Role Specialty Phone number    Vijay Mackay MD [facility] [phone]            See the Encounter Report to view Anticoagulation Flowsheet and Dosing Calendar (Go to Encounters tab in chart review, and find the Anticoagulation Therapy Visit)      Neelima Camarillo RN

## 2019-08-09 ENCOUNTER — DOCUMENTATION ONLY (OUTPATIENT)
Dept: OTHER | Facility: CLINIC | Age: 74
End: 2019-08-09

## 2019-08-09 DIAGNOSIS — I48.0 PAROXYSMAL ATRIAL FIBRILLATION (H): ICD-10-CM

## 2019-08-09 RX ORDER — WARFARIN SODIUM 5 MG/1
TABLET ORAL
Qty: 90 TABLET | Refills: 1 | Status: SHIPPED | OUTPATIENT
Start: 2019-08-09 | End: 2019-11-04

## 2019-08-09 NOTE — TELEPHONE ENCOUNTER
"Requested Prescriptions   Pending Prescriptions Disp Refills     warfarin (COUMADIN) 5 MG tablet [Pharmacy Med Name: WARFARIN  5MG  TAB] 50 tablet      Sig: TAKE 5 MG BY MOUTH DAILY  THREE DAYS PER WEEK AND 2.5 MG DAILY FOUR DAYS PER WEEK OR AS DIRECTED BY [facility]  CLINIC.       Vitamin K Antagonists Failed - 8/9/2019  2:16 PM        Failed - INR is within goal in the past 6 weeks     Confirm INR is within goal in the past 6 weeks.     Recent Labs   Lab Test 08/08/19   INR 3.9*                       Passed - Recent (12 mo) or future (30 days) visit within the authorizing provider's specialty     Patient had office visit in the last 12 months or has a visit in the next 30 days with authorizing provider or within the authorizing provider's specialty.  See \"Patient Info\" tab in inbasket, or \"Choose Columns\" in Meds & Orders section of the refill encounter.              Passed - Medication is active on med list        Passed - Patient is 18 years of age or older        Passed - Patient is not pregnant        Passed - No positive pregnancy on file in past 12 months        Prescription approved per [facility] Refill Protocol.    "

## 2019-08-12 ENCOUNTER — TRANSFERRED RECORDS (OUTPATIENT)
Dept: HEALTH INFORMATION MANAGEMENT | Facility: OTHER | Age: 74
End: 2019-08-12

## 2019-08-15 ENCOUNTER — ANTICOAGULATION THERAPY VISIT (OUTPATIENT)
Dept: ANTICOAGULATION | Facility: OTHER | Age: 74
End: 2019-08-15
Attending: INTERNAL MEDICINE
Payer: MEDICARE

## 2019-08-15 DIAGNOSIS — I48.0 PAROXYSMAL ATRIAL FIBRILLATION (H): ICD-10-CM

## 2019-08-15 DIAGNOSIS — Z79.01 ANTICOAGULATION MONITORING, INR RANGE 2-3: ICD-10-CM

## 2019-08-15 LAB — INR POINT OF CARE: 2.2 (ref 0.86–1.14)

## 2019-08-15 PROCEDURE — 85610 PROTHROMBIN TIME: CPT | Mod: QW,ZL

## 2019-08-15 NOTE — PROGRESS NOTES
ANTICOAGULATION FOLLOW-UP CLINIC VISIT    Patient Name:  Zoey Fan  Date:  8/15/2019  Contact Type:  Face to Face    SUBJECTIVE:  Patient Findings         Clinical Outcomes     Negatives:   Major bleeding event, Thromboembolic event, Anticoagulation-related hospital admission, Anticoagulation-related ED visit, Anticoagulation-related fatality           OBJECTIVE    INR Protime   Date Value Ref Range Status   08/15/2019 2.2 (A) 0.86 - 1.14 Final       ASSESSMENT / PLAN  INR assessment THER    Recheck INR In: 4 WEEKS    INR Location Clinic      Anticoagulation Summary  As of 8/15/2019    INR goal:   2.0-3.0   TTR:   70.4 % (6.9 y)   INR used for dosin.2 (8/15/2019)   Warfarin maintenance plan:   5 mg (5 mg x 1) every Mon, Wed, Fri; 2.5 mg (5 mg x 0.5) all other days   Full warfarin instructions:   5 mg every Mon, Wed, Fri; 2.5 mg all other days   Weekly warfarin total:   25 mg   No change documented:   Maryuri Vines RN   Plan last modified:   Maryuri Vines RN (3/19/2018)   Next INR check:   2019   Priority:   INR   Target end date:   Indefinite    Indications    Anticoagulation monitoring  INR range 2-3 [Z79.01]  Paroxysmal atrial fibrillation (H) [I48.0]             Anticoagulation Episode Summary     INR check location:       Preferred lab:       Send INR reminders to:    INR    Comments:         Anticoagulation Care Providers     Provider Role Specialty Phone number    Vijay Mackay MD [facility] [phone]            See the Encounter Report to view Anticoagulation Flowsheet and Dosing Calendar (Go to Encounters tab in chart review, and find the Anticoagulation Therapy Visit)        Maryuri Vines RN

## 2019-09-16 ENCOUNTER — ANTICOAGULATION THERAPY VISIT (OUTPATIENT)
Dept: ANTICOAGULATION | Facility: OTHER | Age: 74
End: 2019-09-16
Attending: INTERNAL MEDICINE
Payer: MEDICARE

## 2019-09-16 DIAGNOSIS — I48.0 PAROXYSMAL ATRIAL FIBRILLATION (H): ICD-10-CM

## 2019-09-16 DIAGNOSIS — Z79.01 ANTICOAGULATION MONITORING, INR RANGE 2-3: ICD-10-CM

## 2019-09-16 LAB — INR POINT OF CARE: 2.5 (ref 0.86–1.14)

## 2019-09-16 PROCEDURE — 85610 PROTHROMBIN TIME: CPT | Mod: QW,ZL

## 2019-09-16 NOTE — PROGRESS NOTES
ANTICOAGULATION FOLLOW-UP CLINIC VISIT    Patient Name:  Zoey Fan  Date:  2019  Contact Type:  Face to Face    SUBJECTIVE:  Patient Findings         Clinical Outcomes     Negatives:   Major bleeding event, Thromboembolic event, Anticoagulation-related hospital admission, Anticoagulation-related ED visit, Anticoagulation-related fatality           OBJECTIVE    INR Protime   Date Value Ref Range Status   2019 2.5 (A) 0.86 - 1.14 Final       ASSESSMENT / PLAN  INR assessment THER    Recheck INR In: 6 WEEKS    INR Location Clinic      Anticoagulation Summary  As of 2019    INR goal:   2.0-3.0   TTR:   70.7 % (7 y)   INR used for dosin.5 (2019)   Warfarin maintenance plan:   5 mg (5 mg x 1) every Mon, Wed, Fri; 2.5 mg (5 mg x 0.5) all other days   Full warfarin instructions:   5 mg every Mon, Wed, Fri; 2.5 mg all other days   Weekly warfarin total:   25 mg   Plan last modified:   Maryuri Vines RN (3/19/2018)   Next INR check:   10/28/2019   Priority:   INR   Target end date:   Indefinite    Indications    Anticoagulation monitoring  INR range 2-3 [Z79.01]  Paroxysmal atrial fibrillation (H) [I48.0]             Anticoagulation Episode Summary     INR check location:       Preferred lab:       Send INR reminders to:    INR    Comments:         Anticoagulation Care Providers     Provider Role Specialty Phone number    Vijay Mackay MD [facility] [phone]            See the Encounter Report to view Anticoagulation Flowsheet and Dosing Calendar (Go to Encounters tab in chart review, and find the Anticoagulation Therapy Visit)        Maryuri Vines RN

## 2019-09-27 DIAGNOSIS — I48.0 PAROXYSMAL ATRIAL FIBRILLATION (H): ICD-10-CM

## 2019-09-27 DIAGNOSIS — I50.30 (HFPEF) HEART FAILURE WITH PRESERVED EJECTION FRACTION (H): ICD-10-CM

## 2019-09-27 DIAGNOSIS — I10 ESSENTIAL HYPERTENSION: ICD-10-CM

## 2019-09-27 NOTE — LETTER
October 1, 2019      Zoey Fan  [address] [place] [address]        Dear MsAmarilis Quezadasam,     Your pharmacy has requested a refill of some medications.  This request is being addressed.     According to our records, in November you will be due for annual medication management and labs with Dr. Vijay Mackay.     Your health is very important to us. Please contact our scheduling line at [phone] to set up this appointment at your earliest convenience, and before your next medication refills are needed.     Thank you for choosing [facility] for your health care needs.     Sincerely,        The Refill Nurse  [facility]

## 2019-10-01 RX ORDER — FUROSEMIDE 20 MG
TABLET ORAL
Qty: 180 TABLET | Refills: 1 | Status: SHIPPED | OUTPATIENT
Start: 2019-10-01 | End: 2019-11-04

## 2019-10-01 RX ORDER — NADOLOL 80 MG/1
TABLET ORAL
Qty: 270 TABLET | Refills: 1 | Status: SHIPPED | OUTPATIENT
Start: 2019-10-01 | End: 2019-11-04

## 2019-10-01 RX ORDER — LISINOPRIL 5 MG/1
TABLET ORAL
Qty: 90 TABLET | Refills: 1 | Status: SHIPPED | OUTPATIENT
Start: 2019-10-01 | End: 2019-11-04

## 2019-10-01 NOTE — TELEPHONE ENCOUNTER
Prescription approved per [facility] Refill Protocol.  Last blood pressures with cardiology visits were in range. Sheryl Mattson RN on 10/1/2019 at 9:41 AM

## 2019-10-28 ENCOUNTER — ANTICOAGULATION THERAPY VISIT (OUTPATIENT)
Dept: ANTICOAGULATION | Facility: OTHER | Age: 74
End: 2019-10-28
Attending: INTERNAL MEDICINE
Payer: MEDICARE

## 2019-10-28 DIAGNOSIS — I48.0 PAROXYSMAL ATRIAL FIBRILLATION (H): ICD-10-CM

## 2019-10-28 DIAGNOSIS — Z79.01 ANTICOAGULATION MONITORING, INR RANGE 2-3: ICD-10-CM

## 2019-10-28 LAB — INR POINT OF CARE: 3.3 (ref 0.86–1.14)

## 2019-10-28 PROCEDURE — 85610 PROTHROMBIN TIME: CPT | Mod: QW,ZL

## 2019-10-28 NOTE — PROGRESS NOTES
ANTICOAGULATION FOLLOW-UP CLINIC VISIT    Patient Name:  Zoey Fan  Date:  10/28/2019  Contact Type:  Face to Face    SUBJECTIVE:  Patient Findings         Clinical Outcomes     Negatives:   Major bleeding event, Thromboembolic event, Anticoagulation-related hospital admission, Anticoagulation-related ED visit, Anticoagulation-related fatality           OBJECTIVE    INR Protime   Date Value Ref Range Status   10/28/2019 3.3 (A) 0.86 - 1.14 Final       ASSESSMENT / PLAN  INR assessment SUPRA    Recheck INR In: 4 WEEKS    INR Location Clinic      Anticoagulation Summary  As of 10/28/2019    INR goal:   2.0-3.0   TTR:   70.6 % (7.1 y)   INR used for dosing:   3.3! (10/28/2019)   Warfarin maintenance plan:   5 mg (5 mg x 1) every Mon, Wed, Fri; 2.5 mg (5 mg x 0.5) all other days   Full warfarin instructions:   5 mg every Mon, Wed, Fri; 2.5 mg all other days   Weekly warfarin total:   25 mg   No change documented:   Maryuri Vines RN   Plan last modified:   Maryuri Vines RN (3/19/2018)   Next INR check:   11/25/2019   Priority:   INR   Target end date:   Indefinite    Indications    Anticoagulation monitoring  INR range 2-3 [Z79.01]  Paroxysmal atrial fibrillation (H) [I48.0]             Anticoagulation Episode Summary     INR check location:       Preferred lab:       Send INR reminders to:    INR    Comments:         Anticoagulation Care Providers     Provider Role Specialty Phone number    Vijay Mackay MD [facility] [phone]            See the Encounter Report to view Anticoagulation Flowsheet and Dosing Calendar (Go to Encounters tab in chart review, and find the Anticoagulation Therapy Visit)        Maryuri Vines RN

## 2019-11-04 ENCOUNTER — OFFICE VISIT (OUTPATIENT)
Dept: PEDIATRICS | Facility: OTHER | Age: 74
End: 2019-11-04
Attending: INTERNAL MEDICINE
Payer: COMMERCIAL

## 2019-11-04 VITALS
WEIGHT: 179 LBS | RESPIRATION RATE: 16 BRPM | BODY MASS INDEX: 28.09 KG/M2 | TEMPERATURE: 97.5 F | SYSTOLIC BLOOD PRESSURE: 134 MMHG | HEART RATE: 70 BPM | OXYGEN SATURATION: 95 % | DIASTOLIC BLOOD PRESSURE: 70 MMHG | HEIGHT: 67 IN

## 2019-11-04 DIAGNOSIS — I10 ESSENTIAL HYPERTENSION: ICD-10-CM

## 2019-11-04 DIAGNOSIS — F39 MOOD DISORDER (H): ICD-10-CM

## 2019-11-04 DIAGNOSIS — R06.83 SNORING: ICD-10-CM

## 2019-11-04 DIAGNOSIS — I48.0 PAROXYSMAL ATRIAL FIBRILLATION (H): ICD-10-CM

## 2019-11-04 DIAGNOSIS — I45.81 LONG Q-T SYNDROME: Primary | Chronic | ICD-10-CM

## 2019-11-04 DIAGNOSIS — I27.20 PULMONARY HYPERTENSION (H): ICD-10-CM

## 2019-11-04 DIAGNOSIS — E78.00 HYPERCHOLESTEROLEMIA: ICD-10-CM

## 2019-11-04 DIAGNOSIS — N39.41 URGE URINARY INCONTINENCE: ICD-10-CM

## 2019-11-04 DIAGNOSIS — K21.9 GASTROESOPHAGEAL REFLUX DISEASE, ESOPHAGITIS PRESENCE NOT SPECIFIED: ICD-10-CM

## 2019-11-04 DIAGNOSIS — M72.0 DUPUYTREN CONTRACTURE: ICD-10-CM

## 2019-11-04 DIAGNOSIS — I50.32 CHRONIC HEART FAILURE WITH PRESERVED EJECTION FRACTION (H): ICD-10-CM

## 2019-11-04 PROCEDURE — G0463 HOSPITAL OUTPT CLINIC VISIT: HCPCS

## 2019-11-04 PROCEDURE — 99214 OFFICE O/P EST MOD 30 MIN: CPT | Performed by: INTERNAL MEDICINE

## 2019-11-04 RX ORDER — LISINOPRIL 5 MG/1
5 TABLET ORAL DAILY
Qty: 90 TABLET | Refills: 3 | Status: SHIPPED | OUTPATIENT
Start: 2019-11-04 | End: 2020-12-30

## 2019-11-04 RX ORDER — NADOLOL 80 MG/1
TABLET ORAL
Qty: 270 TABLET | Refills: 3 | Status: SHIPPED | OUTPATIENT
Start: 2019-11-04 | End: 2020-11-19

## 2019-11-04 RX ORDER — WARFARIN SODIUM 5 MG/1
TABLET ORAL
Qty: 90 TABLET | Refills: 1 | Status: SHIPPED | OUTPATIENT
Start: 2019-11-04 | End: 2019-11-25

## 2019-11-04 RX ORDER — FUROSEMIDE 20 MG
20 TABLET ORAL 2 TIMES DAILY
Qty: 180 TABLET | Refills: 3 | Status: SHIPPED | OUTPATIENT
Start: 2019-11-04 | End: 2021-10-26

## 2019-11-04 RX ORDER — OXYBUTYNIN CHLORIDE 10 MG/1
10 TABLET, EXTENDED RELEASE ORAL DAILY
Qty: 90 TABLET | Refills: 3 | Status: SHIPPED | OUTPATIENT
Start: 2019-11-04 | End: 2020-10-14

## 2019-11-04 RX ORDER — ATORVASTATIN CALCIUM 40 MG/1
TABLET, FILM COATED ORAL
Qty: 45 TABLET | Refills: 3 | Status: SHIPPED | OUTPATIENT
Start: 2019-11-04 | End: 2020-10-15

## 2019-11-04 ASSESSMENT — MIFFLIN-ST. JEOR: SCORE: 1340.6

## 2019-11-04 ASSESSMENT — PATIENT HEALTH QUESTIONNAIRE - PHQ9: SUM OF ALL RESPONSES TO PHQ QUESTIONS 1-9: 0

## 2019-11-04 ASSESSMENT — PAIN SCALES - GENERAL: PAINLEVEL: NO PAIN (0)

## 2019-11-04 NOTE — NURSING NOTE
Patient presents to clinic medication review today.  Carla Lauren LPN ....................  11/4/2019   11:00 AM    No LMP recorded. Patient is postmenopausal.  Medication Reconciliation: complete    Carla Lauren LPN  11/4/2019 11:00 AM

## 2019-11-04 NOTE — PROGRESS NOTES
Subjective  Zoey Fan is a 74 year old female who presents for medication management.  She has a history of long QT syndrome, paroxysmal atrial fibrillation and chronic diastolic heart failure and continues on warfarin anticoagulation, furosemide, nadolol, atorvastatin.  She follows with Dr. Tamayo of cardiology.  She has a history of hypertension which is well-controlled on lisinopril, torsemide.  History of overactive bladder which is stable on oxybutynin.  History of mood disorder which is stable on fluoxetine.  History of gastroesophageal reflux disease which is stable on omeprazole.  She has some tightness on the palm of her hand and she wants me to look at that.  If not hurting her preventing her from doing anything.  No chest pains or palpitations.  She wants to know more about why she is been getting injections into the knee and if she needs to do anything different about that.    Problem List/PMH: reviewed in EMR, and made relevant updates today.  Medications: reviewed in EMR, and made relevant updates today.  Allergies: reviewed in EMR, and made relevant updates today.    Social Hx:  Social History     Tobacco Use     Smoking status: Former Smoker     Packs/day: 0.50     Years: 30.00     Pack years: 15.00     Types: Cigarettes     Start date: 1968     Last attempt to quit: 2002     Years since quittin.5     Smokeless tobacco: Never Used   Substance Use Topics     Alcohol use: Yes     Alcohol/week: 2.0 standard drinks     Types: 2 Glasses of wine per week     Comment: every day     Drug use: Unknown     Types: Other     Comment: Drug use: No     Social History     Patient does not qualify to have social determinant information on file (likely too young).   Social History Narrative    Patient is a retired RN. She moved here from [place], [place] in the . Her  was an ED doc and worked in [email or web address] before retiring. They lived on [place]. Patient's   suddenly of a likely  "arrhythmia in his mid 60s. Patient's son was bi    polar and ended up committing suicide after being convicted for murder. The patient has a daughter that works for [employer]. They recently traveled to the [facility] for a IPDIA and have plans to go     on a cruise in [place] to find polar bears.        Remarried in May 2019.     I reviewed social history and made relevant updates today.    Family Hx:   Family History   Problem Relation Age of Onset     Hypertension Mother         Hypertension     Hyperlipidemia Mother         Hyperlipidemia,elevated cholesterol     Other - See Comments Mother         Stroke,She  at age 86.  She had stroke as a complication of carotid endarterectomy surgery.     Arthritis Father         Arthritis     Cancer Father         Cancer,skin cancer     Other - See Comments Father         Stroke,He is 87, has a history of CVA/Alzheimer's     Breast Cancer Maternal Aunt 50        Cancer-breast     Other - See Comments Sister          of long QT syndrome.     Other - See Comments Son         Psychiatric illness,Bipolar,  due to suicide     Colon Cancer No family hx of         Cancer-colon     Anesthesia Reaction No family hx of         Anesthesia Malignant Hyperthermia       Objective  Vitals: reviewed in EMR.  /70 (BP Location: Right arm, Patient Position: Sitting, Cuff Size: Adult Large)   Pulse 70   Temp 97.5  F (36.4  C) (Tympanic)   Resp 16   Ht 1.695 m (5' 6.75\")   Wt 81.2 kg (179 lb)   SpO2 95%   Breastfeeding? No   BMI 28.25 kg/m      Gen: Pleasant female, NAD.  HEENT: MMM, no OP erythema.   Neck: Supple, no JVD, no bruits.  CV: RRR no m/r/g.   Pulm: CTAB no w/r/r  Neuro: Grossly intact  Msk: Nodule on the palm of the hand  Skin: No concerning lesions.  Psychiatric: Normal affect and insight. Does not appear anxious or depressed.    Labs:  Lab Results   Component Value Date    WBC 5.5 12/10/2018    HGB 12.0 " 12/10/2018    HCT 37.1 12/10/2018     12/10/2018    CHOL 208 (H) 11/09/2018    TRIG 106 11/09/2018    HDL 75 11/09/2018    ALT 17 12/10/2018    AST 22 12/10/2018     12/10/2018    BUN 21 12/10/2018    CO2 29 12/10/2018    INR 3.3 (A) 10/28/2019       Glucose   Date Value Ref Range Status   12/10/2018 105 70 - 105 mg/dL Final   11/09/2018 93 70 - 105 mg/dL Final     Hemoglobin A1C   Date Value Ref Range Status   11/09/2018 5.2 4.0 - 6.0 % Final     Creatinine   Date Value Ref Range Status   12/10/2018 0.84 0.60 - 1.20 mg/dL Final   11/09/2018 0.79 0.60 - 1.20 mg/dL Final     LDL Cholesterol Calculated   Date Value Ref Range Status   11/09/2018 112 (H) <100 mg/dL Final     Comment:     Above desirable:  100-129 mg/dl  Borderline High:  130-159 mg/dL  High:             160-189 mg/dL  Very high:       >189 mg/dl         X-ray of the knee reviewed via care everywhere dated June 20, 2019 revealing grade 4 osteoarthritis      Assessment    ICD-10-CM    1. Long Q-T syndrome I45.81    2. Paroxysmal atrial fibrillation (H) I48.0 warfarin ANTICOAGULANT (COUMADIN) 5 MG tablet     nadolol (CORGARD) 80 MG tablet   3. Urge urinary incontinence N39.41 oxybutynin ER (DITROPAN-XL) 10 MG 24 hr tablet   4. Gastroesophageal reflux disease, esophagitis presence not specified K21.9 omeprazole (PRILOSEC) 20 MG DR capsule   5. Hypercholesterolemia E78.00 atorvastatin (LIPITOR) 40 MG tablet     Lipid Profile   6. Mood disorder (H) F39 FLUoxetine (PROZAC) 20 MG capsule   7. Chronic heart failure with preserved ejection fraction (H) I50.32 furosemide (LASIX) 20 MG tablet     Basic Metabolic Panel   8. Essential hypertension I10 lisinopril (PRINIVIL/ZESTRIL) 5 MG tablet   9. Dupuytren contracture M72.0    10. Snoring R06.83 SLEEP EVALUATION & MANAGEMENT REFERRAL - [facility] and [facility] [phone] (Age 13 and up)   11. Pulmonary hypertension (H) I27.20 SLEEP EVALUATION & MANAGEMENT REFERRAL - ADULT  -[facility] [phone] (Age 13 and up)     Orders Placed This Encounter   Procedures     Lipid Profile     Basic Metabolic Panel     SLEEP EVALUATION & MANAGEMENT REFERRAL - ADULT -[facility] and [facility] [phone] (Age 13 and up)       I believe the nodule is consistent with Dupuytren's contracture however differential diagnosis also includes trigger finger.  We discussed the possibility for orthopedics referral decided against it.  Medications were reviewed, renewed.  Screening and surveillance lab work is obtained as outlined above.  Colon cancer screening is up-to-date.  Mammography is up-to-date.    She snores and has afib.  I recommend a sleep study.    Plan   -- Expected clinical course discussed   -- Medications and their side effects discussed  Patient Instructions    -- Schedule fasting lab visit   -- Let me know if you want a referral back to Ortho for knee or hand   -- Pneumonia 23 after 3/2020   -- Get shingles series      Return in about 1 year (around 11/4/2020) for medication management.    Signed, Vijay Mackay MD, FAAP, FACP  Internal Medicine & Pediatrics

## 2019-11-04 NOTE — PATIENT INSTRUCTIONS
-- Schedule fasting lab visit   -- Let me know if you want a referral back to Ortho for knee or hand   -- Pneumonia 23 after 3/2020   -- Get shingles series

## 2019-11-13 ENCOUNTER — TELEPHONE (OUTPATIENT)
Dept: PEDIATRICS | Facility: OTHER | Age: 74
End: 2019-11-13

## 2019-11-13 NOTE — TELEPHONE ENCOUNTER
Regarding order for sleep study. States there is nothing in the notes regarding Pt's sleep. Pls call.

## 2019-11-14 NOTE — TELEPHONE ENCOUNTER
Pt needs chart addended for 11/4/2019 with more info for sleep study, so it can be approved! Thank you.  Agustin Lion LPN on 11/14/2019 at 9:15 AM

## 2019-11-14 NOTE — TELEPHONE ENCOUNTER
Called [facility], will have RALF fax chart over.  Agustin Lion LPN on 11/14/2019 at 9:49 AM'

## 2019-11-25 ENCOUNTER — ANTICOAGULATION THERAPY VISIT (OUTPATIENT)
Dept: ANTICOAGULATION | Facility: OTHER | Age: 74
End: 2019-11-25
Attending: INTERNAL MEDICINE
Payer: MEDICARE

## 2019-11-25 DIAGNOSIS — I48.0 PAROXYSMAL ATRIAL FIBRILLATION (H): ICD-10-CM

## 2019-11-25 DIAGNOSIS — Z79.01 ANTICOAGULATION MONITORING, INR RANGE 2-3: ICD-10-CM

## 2019-11-25 LAB — INR POINT OF CARE: 3.5 (ref 0.86–1.14)

## 2019-11-25 PROCEDURE — 85610 PROTHROMBIN TIME: CPT | Mod: QW,ZL

## 2019-11-25 RX ORDER — WARFARIN SODIUM 5 MG/1
TABLET ORAL
Qty: 90 TABLET | Refills: 1 | COMMUNITY
Start: 2019-11-25 | End: 2020-08-25

## 2019-11-25 NOTE — PROGRESS NOTES
ANTICOAGULATION FOLLOW-UP CLINIC VISIT    Patient Name:  Zoey Fan  Date:  11/25/2019  Contact Type:  Face to Face    SUBJECTIVE:  Patient Findings         Clinical Outcomes     Negatives:   Major bleeding event, Thromboembolic event, Anticoagulation-related hospital admission, Anticoagulation-related ED visit, Anticoagulation-related fatality           OBJECTIVE    INR Protime   Date Value Ref Range Status   11/25/2019 3.5 (A) 0.86 - 1.14 Final       ASSESSMENT / PLAN  INR assessment SUPRA    Recheck INR In: 3 WEEKS    INR Location Clinic      Anticoagulation Summary  As of 11/25/2019    INR goal:   2.0-3.0   TTR:   76.0 % (1 y)   INR used for dosing:   3.5! (11/25/2019)   Warfarin maintenance plan:   5 mg (5 mg x 1) every Mon, Fri; 2.5 mg (5 mg x 0.5) all other days   Full warfarin instructions:   5 mg every Mon, Fri; 2.5 mg all other days   Weekly warfarin total:   22.5 mg   Plan last modified:   Maryuri Vines RN (11/25/2019)   Next INR check:   12/16/2019   Priority:   INR   Target end date:   Indefinite    Indications    Anticoagulation monitoring  INR range 2-3 [Z79.01]  Paroxysmal atrial fibrillation (H) [I48.0]             Anticoagulation Episode Summary     INR check location:       Preferred lab:       Send INR reminders to:    INR    Comments:         Anticoagulation Care Providers     Provider Role Specialty Phone number    Vijay Mackay MD [facility] [phone]            See the Encounter Report to view Anticoagulation Flowsheet and Dosing Calendar (Go to Encounters tab in chart review, and find the Anticoagulation Therapy Visit)        Maryuri Vines RN

## 2019-12-03 ENCOUNTER — THERAPY VISIT (OUTPATIENT)
Dept: SLEEP MEDICINE | Facility: OTHER | Age: 74
End: 2019-12-03
Attending: INTERNAL MEDICINE
Payer: MEDICARE

## 2019-12-03 DIAGNOSIS — R06.83 HABITUAL SNORING: Primary | ICD-10-CM

## 2019-12-03 PROCEDURE — 95810 POLYSOM 6/> YRS 4/> PARAM: CPT | Mod: TC

## 2019-12-16 ENCOUNTER — ANTICOAGULATION THERAPY VISIT (OUTPATIENT)
Dept: ANTICOAGULATION | Facility: OTHER | Age: 74
End: 2019-12-16
Attending: INTERNAL MEDICINE
Payer: MEDICARE

## 2019-12-16 DIAGNOSIS — I48.0 PAROXYSMAL ATRIAL FIBRILLATION (H): ICD-10-CM

## 2019-12-16 DIAGNOSIS — Z79.01 ANTICOAGULATION MONITORING, INR RANGE 2-3: ICD-10-CM

## 2019-12-16 LAB — INR POINT OF CARE: 2.8 (ref 0.86–1.14)

## 2019-12-16 PROCEDURE — 85610 PROTHROMBIN TIME: CPT | Mod: QW,ZL

## 2019-12-16 NOTE — PROGRESS NOTES
ANTICOAGULATION FOLLOW-UP CLINIC VISIT    Patient Name:  Zoey Fan  Date:  2019  Contact Type:  Face to Face    SUBJECTIVE:  Patient Findings     Positives:   Extra doses (took previous dose and did not decrease Warfarin last 2 weeks.)        Clinical Outcomes     Negatives:   Major bleeding event, Thromboembolic event, Anticoagulation-related hospital admission, Anticoagulation-related ED visit, Anticoagulation-related fatality           OBJECTIVE    INR Protime   Date Value Ref Range Status   2019 2.8 (A) 0.86 - 1.14 Final       ASSESSMENT / PLAN  INR assessment THER    Recheck INR In: 4 WEEKS    INR Location Clinic      Anticoagulation Summary  As of 2019    INR goal:   2.0-3.0   TTR:   71.9 % (1 y)   INR used for dosin.8 (2019)   Warfarin maintenance plan:   5 mg (5 mg x 1) every Mon, Wed, Fri; 2.5 mg (5 mg x 0.5) all other days   Full warfarin instructions:   5 mg every Mon, Wed, Fri; 2.5 mg all other days   Weekly warfarin total:   25 mg   Plan last modified:   Maryuri Vines RN (2019)   Next INR check:   2020   Priority:   INR   Target end date:   Indefinite    Indications    Anticoagulation monitoring  INR range 2-3 [Z79.01]  Paroxysmal atrial fibrillation (H) [I48.0]             Anticoagulation Episode Summary     INR check location:       Preferred lab:       Send INR reminders to:    INR    Comments:         Anticoagulation Care Providers     Provider Role Specialty Phone number    Vijay Mackay MD [facility] [phone]            See the Encounter Report to view Anticoagulation Flowsheet and Dosing Calendar (Go to Encounters tab in chart review, and find the Anticoagulation Therapy Visit)        Maryuri Vines RN

## 2019-12-20 ENCOUNTER — HOSPITAL ENCOUNTER (EMERGENCY)
Facility: OTHER | Age: 74
Discharge: HOME | End: 2019-12-20
Attending: PHYSICIAN ASSISTANT | Admitting: PHYSICIAN ASSISTANT
Payer: MEDICARE

## 2019-12-20 ENCOUNTER — APPOINTMENT (OUTPATIENT)
Dept: CT IMAGING | Facility: OTHER | Age: 74
End: 2019-12-20
Attending: PHYSICIAN ASSISTANT
Payer: MEDICARE

## 2019-12-20 ENCOUNTER — APPOINTMENT (OUTPATIENT)
Dept: GENERAL RADIOLOGY | Facility: OTHER | Age: 74
End: 2019-12-20
Attending: PHYSICIAN ASSISTANT
Payer: MEDICARE

## 2019-12-20 VITALS
BODY MASS INDEX: 25.76 KG/M2 | WEIGHT: 170 LBS | DIASTOLIC BLOOD PRESSURE: 82 MMHG | HEIGHT: 68 IN | RESPIRATION RATE: 16 BRPM | HEART RATE: 71 BPM | OXYGEN SATURATION: 97 % | TEMPERATURE: 96.2 F | SYSTOLIC BLOOD PRESSURE: 138 MMHG

## 2019-12-20 DIAGNOSIS — S52.122A LEFT RADIAL HEAD FRACTURE: ICD-10-CM

## 2019-12-20 DIAGNOSIS — R79.1 ELEVATED INR: ICD-10-CM

## 2019-12-20 DIAGNOSIS — S09.90XA CLOSED HEAD INJURY, INITIAL ENCOUNTER: ICD-10-CM

## 2019-12-20 DIAGNOSIS — Z79.01 LONG TERM CURRENT USE OF ANTICOAGULANT THERAPY: ICD-10-CM

## 2019-12-20 LAB
ALBUMIN SERPL-MCNC: 4.4 G/DL (ref 3.5–5.7)
ALP SERPL-CCNC: 134 U/L (ref 34–104)
ALT SERPL W P-5'-P-CCNC: 29 U/L (ref 7–52)
ANION GAP SERPL CALCULATED.3IONS-SCNC: 8 MMOL/L (ref 3–14)
AST SERPL W P-5'-P-CCNC: 36 U/L (ref 13–39)
BASOPHILS # BLD AUTO: 0 10E9/L (ref 0–0.2)
BASOPHILS NFR BLD AUTO: 0.8 %
BILIRUB SERPL-MCNC: 0.4 MG/DL (ref 0.3–1)
BUN SERPL-MCNC: 17 MG/DL (ref 7–25)
CALCIUM SERPL-MCNC: 9.2 MG/DL (ref 8.6–10.3)
CHLORIDE SERPL-SCNC: 103 MMOL/L (ref 98–107)
CO2 SERPL-SCNC: 28 MMOL/L (ref 21–31)
CREAT SERPL-MCNC: 0.93 MG/DL (ref 0.6–1.2)
DIFFERENTIAL METHOD BLD: ABNORMAL
EOSINOPHIL # BLD AUTO: 0.1 10E9/L (ref 0–0.7)
EOSINOPHIL NFR BLD AUTO: 2.5 %
ERYTHROCYTE [DISTWIDTH] IN BLOOD BY AUTOMATED COUNT: 16.1 % (ref 10–15)
GFR SERPL CREATININE-BSD FRML MDRD: 59 ML/MIN/{1.73_M2}
GLUCOSE SERPL-MCNC: 99 MG/DL (ref 70–105)
HCT VFR BLD AUTO: 34.7 % (ref 35–47)
HGB BLD-MCNC: 10.7 G/DL (ref 11.7–15.7)
IMM GRANULOCYTES # BLD: 0 10E9/L (ref 0–0.4)
IMM GRANULOCYTES NFR BLD: 0.2 %
INR PPP: 3.65 (ref 0–1.3)
LYMPHOCYTES # BLD AUTO: 1.1 10E9/L (ref 0.8–5.3)
LYMPHOCYTES NFR BLD AUTO: 20.6 %
MCH RBC QN AUTO: 27.3 PG (ref 26.5–33)
MCHC RBC AUTO-ENTMCNC: 30.8 G/DL (ref 31.5–36.5)
MCV RBC AUTO: 89 FL (ref 78–100)
MONOCYTES # BLD AUTO: 0.4 10E9/L (ref 0–1.3)
MONOCYTES NFR BLD AUTO: 8.6 %
NEUTROPHILS # BLD AUTO: 3.4 10E9/L (ref 1.6–8.3)
NEUTROPHILS NFR BLD AUTO: 67.3 %
PLATELET # BLD AUTO: 242 10E9/L (ref 150–450)
POTASSIUM SERPL-SCNC: 4.2 MMOL/L (ref 3.5–5.1)
PROT SERPL-MCNC: 6.6 G/DL (ref 6.4–8.9)
RBC # BLD AUTO: 3.92 10E12/L (ref 3.8–5.2)
SODIUM SERPL-SCNC: 139 MMOL/L (ref 134–144)
TROPONIN I SERPL-MCNC: 4.3 PG/ML
WBC # BLD AUTO: 5.1 10E9/L (ref 4–11)

## 2019-12-20 PROCEDURE — 85610 PROTHROMBIN TIME: CPT | Performed by: PHYSICIAN ASSISTANT

## 2019-12-20 PROCEDURE — 25800030 ZZH RX IP 258 OP 636: Performed by: PHYSICIAN ASSISTANT

## 2019-12-20 PROCEDURE — 99285 EMERGENCY DEPT VISIT HI MDM: CPT | Mod: 25 | Performed by: PHYSICIAN ASSISTANT

## 2019-12-20 PROCEDURE — 85025 COMPLETE CBC W/AUTO DIFF WBC: CPT | Performed by: PHYSICIAN ASSISTANT

## 2019-12-20 PROCEDURE — 93010 ELECTROCARDIOGRAM REPORT: CPT | Performed by: INTERNAL MEDICINE

## 2019-12-20 PROCEDURE — 73080 X-RAY EXAM OF ELBOW: CPT | Mod: LT

## 2019-12-20 PROCEDURE — 40000282 ZZH STATISTIC TRAUMA - NO PRIOR: Performed by: PHYSICIAN ASSISTANT

## 2019-12-20 PROCEDURE — 70450 CT HEAD/BRAIN W/O DYE: CPT

## 2019-12-20 PROCEDURE — 93005 ELECTROCARDIOGRAM TRACING: CPT | Performed by: PHYSICIAN ASSISTANT

## 2019-12-20 PROCEDURE — 36415 COLL VENOUS BLD VENIPUNCTURE: CPT | Performed by: PHYSICIAN ASSISTANT

## 2019-12-20 PROCEDURE — 25000132 ZZH RX MED GY IP 250 OP 250 PS 637: Mod: GY | Performed by: PHYSICIAN ASSISTANT

## 2019-12-20 PROCEDURE — 84484 ASSAY OF TROPONIN QUANT: CPT | Performed by: PHYSICIAN ASSISTANT

## 2019-12-20 PROCEDURE — 99284 EMERGENCY DEPT VISIT MOD MDM: CPT | Mod: Z6 | Performed by: PHYSICIAN ASSISTANT

## 2019-12-20 PROCEDURE — 80053 COMPREHEN METABOLIC PANEL: CPT | Performed by: PHYSICIAN ASSISTANT

## 2019-12-20 PROCEDURE — 72125 CT NECK SPINE W/O DYE: CPT

## 2019-12-20 RX ORDER — HYDROCODONE BITARTRATE AND ACETAMINOPHEN 5; 325 MG/1; MG/1
1 TABLET ORAL ONCE
Status: COMPLETED | OUTPATIENT
Start: 2019-12-20 | End: 2019-12-20

## 2019-12-20 RX ORDER — HYDROCODONE BITARTRATE AND ACETAMINOPHEN 5; 325 MG/1; MG/1
1 TABLET ORAL EVERY 4 HOURS PRN
Qty: 20 TABLET | Refills: 0 | Status: SHIPPED | OUTPATIENT
Start: 2019-12-20 | End: 2021-07-19

## 2019-12-20 RX ORDER — SODIUM CHLORIDE 9 MG/ML
INJECTION, SOLUTION INTRAVENOUS CONTINUOUS
Status: DISCONTINUED | OUTPATIENT
Start: 2019-12-20 | End: 2019-12-20 | Stop reason: HOSPADM

## 2019-12-20 RX ADMIN — HYDROCODONE BITARTRATE AND ACETAMINOPHEN 1 TABLET: 5; 325 TABLET ORAL at 13:13

## 2019-12-20 RX ADMIN — SODIUM CHLORIDE: 9 INJECTION, SOLUTION INTRAVENOUS at 11:41

## 2019-12-20 RX ADMIN — Medication 1 MG: at 13:13

## 2019-12-20 ASSESSMENT — ENCOUNTER SYMPTOMS
CHILLS: 0
HEMATURIA: 0
DIZZINESS: 0
DIARRHEA: 0
ADENOPATHY: 0
BRUISES/BLEEDS EASILY: 0
PHOTOPHOBIA: 0
WEAKNESS: 0
CONSTIPATION: 0
FACIAL SWELLING: 0
BACK PAIN: 0
SHORTNESS OF BREATH: 0
FATIGUE: 0
VOMITING: 0
ABDOMINAL PAIN: 0
FEVER: 0
CHEST TIGHTNESS: 0
HEADACHES: 0
NAUSEA: 0
SORE THROAT: 0
NECK PAIN: 0
LIGHT-HEADEDNESS: 0
EYE PAIN: 0
CONFUSION: 0
WOUND: 0

## 2019-12-20 ASSESSMENT — MIFFLIN-ST. JEOR: SCORE: 1311.67

## 2019-12-20 NOTE — ED TRIAGE NOTES
Patient fell while changing light bulb at home.  Stated she fell off her bed landing on her left arm and did hit her head.  Patient is taking coumadin.   Denies LOC, dizziness, or lightheadedness.

## 2019-12-20 NOTE — ED PROVIDER NOTES
History     Chief Complaint   Patient presents with     Trauma     Elbow Pain     Fall     This is a 74-year-old female who is currently on Coumadin due to chronic atrial fibrillation.  She was standing on her bed attempting to change a light bulb at home when she fell off her bed landing on her left elbow and arm and hitting her head against the wall.  She denies any LOC, no dizziness or lightheadedness.  Denies any syncope.  Denies any headache, visual changes, balance issues, nausea or vomiting.  No fever or chills.  No diarrhea or constipation.  She is here for further evaluation.            Allergies:  Allergies   Allergen Reactions     Ciprofloxacin      Other reaction(s): Other - Describe In Comment Field  Patient reports she has Long QT syndrome     Neomycin Itching     Swelling , redness     Sulfa Drugs Nausea and Vomiting     Abdominal pain     Unknown  [No Clinical Screening - See Comments]      Other reaction(s): Cardiac Arrest  Please verify with pharmacist prior to prescribing any medications due to her QT Syndrome     Liquid Adhesive Rash     Reaction also to EKG lead pads.      Tobramycin Rash       Problem List:    Patient Active Problem List    Diagnosis Date Noted     Dupuytren contracture 11/04/2019     Priority: Medium     Pulmonary hypertension (H) 11/08/2018     Priority: Medium     Hypercholesterolemia 02/13/2018     Priority: Medium     Major depressive disorder, recurrent episode (H) 02/13/2018     Priority: Medium     Overview:   controlled       Vaginitis, atrophic 02/13/2018     Priority: Medium     Anticoagulation monitoring, INR range 2-3 02/11/2018     Priority: Medium     Paroxysmal atrial fibrillation (H) 02/11/2018     Priority: Medium     Special screening for malignant neoplasms, colon 08/09/2017     Priority: Medium     Osteoporosis 07/25/2017     Priority: Medium     Impaired fasting glucose 07/14/2017     Priority: Medium     Vitamin D deficiency 07/14/2017     Priority:  Medium     Complete tear of right rotator cuff 06/15/2017     Priority: Medium     AC (acromioclavicular) joint arthritis 05/23/2017     Priority: Medium     Impingement syndrome of right shoulder 05/23/2017     Priority: Medium     Right rotator cuff tendinitis 05/23/2017     Priority: Medium     (HFpEF) heart failure with preserved ejection fraction (H) 01/07/2015     Priority: Medium     Pacemaker 01/07/2015     Priority: Medium     Overview:   Placed after first ablation in 2011       S/P ablation of atrial fibrillation 01/07/2015     Priority: Medium     Hypertension 04/16/2014     Priority: Medium     Cystocele 04/15/2014     Priority: Medium     ED (stress urinary incontinence, female) 04/15/2014     Priority: Medium     Abdominal pain 03/17/2014     Priority: Medium     Urge urinary incontinence 04/29/2013     Priority: Medium     Insomnia 01/07/2013     Priority: Medium     Long Q-T syndrome 11/13/2012     Priority: Medium     Overview:   PLEASE VERIFY WITH PHARMACIST PRIOR TO PRESCRIBING ANY MEDICATIONS DUE TO HER QT SYNDROME. Followed at [facility]       Edema of eyelid 03/03/2011     Priority: Medium     Other chronic allergic conjunctivitis 02/28/2011     Priority: Medium     Dysphagia 01/19/2011     Priority: Medium        Past Medical History:    Past Medical History:   Diagnosis Date     Atrial fibrillation (H)      Complete tear of right rotator cuff      Essential (primary) hypertension      Impingement syndrome of right shoulder      Insomnia      Long Q-T syndrome 2010     Major depressive disorder, single episode      Other shoulder lesions, right shoulder      Personal history of other infectious and parasitic diseases      Personal history of other medical treatment (CODE)      Primary osteoarthritis of shoulder      Pure hypercholesterolemia        Past Surgical History:    Past Surgical History:   Procedure Laterality Date     ARTHROSCOPY SHOULDER ROTATOR CUFF REPAIR      2004,Right shoulder      COLONOSCOPY  2007,Normal, follow up in 10 years     COLONOSCOPY  08/10/2017    08/10/2017,no follow up due to age being greater than 80 at next screening interval     IMPLANT PACEMAKER      ,Post ablation, due to junctional rythym     LAPAROSCOPIC TUBAL LIGATION      No Comments Provided     OTHER SURGICAL HISTORY      ,[id number],OTHER,Left knee     OTHER SURGICAL HISTORY      ,[id number],EP STUDY /ABLATION,Cardiac ablation     OTHER SURGICAL HISTORY      2015,[id number],CARDIAC ELECTROPHYSIOLOGY STUDY AND ABLATION     TONSILLECTOMY, ADENOIDECTOMY, COMBINED      As a child       Family History:    Family History   Problem Relation Age of Onset     Hypertension Mother         Hypertension     Hyperlipidemia Mother         Hyperlipidemia,elevated cholesterol     Other - See Comments Mother         Stroke,She  at age 86.  She had stroke as a complication of carotid endarterectomy surgery.     Arthritis Father         Arthritis     Cancer Father         Cancer,skin cancer     Other - See Comments Father         Stroke,He is 87, has a history of CVA/Alzheimer's     Breast Cancer Maternal Aunt 50        Cancer-breast     Other - See Comments Sister          of long QT syndrome.     Other - See Comments Son         Psychiatric illness,Bipolar,  due to suicide     Colon Cancer No family hx of         Cancer-colon     Anesthesia Reaction No family hx of         Anesthesia Malignant Hyperthermia       Social History:  Marital Status:   [2]  Social History     Tobacco Use     Smoking status: Former Smoker     Packs/day: 0.50     Years: 30.00     Pack years: 15.00     Types: Cigarettes     Start date: 1968     Last attempt to quit: 2002     Years since quittin.6     Smokeless tobacco: Never Used   Substance Use Topics     Alcohol use: Yes     Alcohol/week: 2.0 standard drinks     Types: 2 Glasses of wine per week     Comment: every day     Drug use: Unknown     Types: Other      "Comment: Drug use: No        Medications:    atorvastatin (LIPITOR) 40 MG tablet  calcium carbonate (OSCAL 500) 1250 (500 CA) MG TABS tablet  cyanocobalamin (RA VITAMIN B-12 TR) 1000 MCG TBCR  FLUoxetine (PROZAC) 20 MG capsule  furosemide (LASIX) 20 MG tablet  lisinopril (PRINIVIL/ZESTRIL) 5 MG tablet  Multiple Vitamins-Minerals (PRESERVISION AREDS 2 PO)  nadolol (CORGARD) 80 MG tablet  naproxen sodium 220 MG capsule  omeprazole (PRILOSEC) 20 MG DR capsule  oxybutynin ER (DITROPAN-XL) 10 MG 24 hr tablet  sildenafil (REVATIO) 20 MG tablet  VITAMIN D, CHOLECALCIFEROL, PO  warfarin ANTICOAGULANT (COUMADIN) 5 MG tablet          Review of Systems   Constitutional: Negative for chills, fatigue and fever.   HENT: Negative for congestion, facial swelling and sore throat.    Eyes: Negative for photophobia, pain and visual disturbance.   Respiratory: Negative for chest tightness and shortness of breath.    Cardiovascular: Negative for chest pain.   Gastrointestinal: Negative for abdominal pain, constipation, diarrhea, nausea and vomiting.   Genitourinary: Negative for hematuria.   Musculoskeletal: Negative for back pain and neck pain.   Skin: Negative for rash and wound.   Neurological: Negative for dizziness, syncope, weakness, light-headedness and headaches.   Hematological: Negative for adenopathy. Does not bruise/bleed easily.   Psychiatric/Behavioral: Negative for confusion.       Physical Exam   BP: (!) 151/80  Heart Rate: 98  Temp: 96.2  F (35.7  C)  Resp: 16  Height: 171.5 cm (5' 7.5\")  Weight: 77.1 kg (170 lb)  SpO2: 98 %      Physical Exam  Constitutional:       General: She is not in acute distress.     Appearance: She is well-developed. She is not ill-appearing, toxic-appearing or diaphoretic.   HENT:      Head: Normocephalic and atraumatic.      Right Ear: Tympanic membrane normal.      Left Ear: Tympanic membrane normal.      Nose: No congestion.      Mouth/Throat:      Pharynx: No oropharyngeal exudate.   Eyes: "      General: No scleral icterus.     Extraocular Movements: Extraocular movements intact.      Conjunctiva/sclera: Conjunctivae normal.      Pupils: Pupils are equal, round, and reactive to light.   Neck:      Musculoskeletal: Neck supple. No neck rigidity or muscular tenderness.   Cardiovascular:      Rate and Rhythm: Normal rate and regular rhythm.      Heart sounds: Normal heart sounds.   Pulmonary:      Effort: Pulmonary effort is normal. No respiratory distress.      Breath sounds: Normal breath sounds.      Comments: Lung sounds are clear SaO2 is 98% on room air.  Abdominal:      General: Bowel sounds are normal. There is no distension.      Palpations: Abdomen is soft. There is no mass.      Tenderness: There is no abdominal tenderness. There is no guarding or rebound.   Musculoskeletal:         General: Tenderness and signs of injury present. No swelling or deformity.      Comments: Left elbow some tenderness to palpation and pain with flexion greater than 90 degrees or extension.  Otherwise she has good cap refill and neurologically she is intact to the distal fingers.   Skin:     General: Skin is warm and dry.      Capillary Refill: Capillary refill takes less than 2 seconds.      Findings: No rash.   Neurological:      General: No focal deficit present.      Mental Status: She is alert.   Psychiatric:         Mood and Affect: Mood normal.         Behavior: Behavior normal.         ED Course     EKG shows an atrial paced rhythm with prolonged AV conduction.  Prolonged QT.  Heart rate is 70.  This is unchanged from previous EKG on 12/10/2018.    Results for orders placed or performed during the hospital encounter of 12/20/19 (from the past 24 hour(s))   CBC with platelets differential   Result Value Ref Range    WBC 5.1 4.0 - 11.0 10e9/L    RBC Count 3.92 3.8 - 5.2 10e12/L    Hemoglobin 10.7 (L) 11.7 - 15.7 g/dL    Hematocrit 34.7 (L) 35.0 - 47.0 %    MCV 89 78 - 100 fl    MCH 27.3 26.5 - 33.0 pg    MCHC  30.8 (L) 31.5 - 36.5 g/dL    RDW 16.1 (H) 10.0 - 15.0 %    Platelet Count 242 150 - 450 10e9/L    Diff Method Automated Method     % Neutrophils 67.3 %    % Lymphocytes 20.6 %    % Monocytes 8.6 %    % Eosinophils 2.5 %    % Basophils 0.8 %    % Immature Granulocytes 0.2 %    Absolute Neutrophil 3.4 1.6 - 8.3 10e9/L    Absolute Lymphocytes 1.1 0.8 - 5.3 10e9/L    Absolute Monocytes 0.4 0.0 - 1.3 10e9/L    Absolute Eosinophils 0.1 0.0 - 0.7 10e9/L    Absolute Basophils 0.0 0.0 - 0.2 10e9/L    Abs Immature Granulocytes 0.0 0 - 0.4 10e9/L   INR   Result Value Ref Range    INR 3.65 (H) 0 - 1.3   Comprehensive metabolic panel   Result Value Ref Range    Sodium 139 134 - 144 mmol/L    Potassium 4.2 3.5 - 5.1 mmol/L    Chloride 103 98 - 107 mmol/L    Carbon Dioxide 28 21 - 31 mmol/L    Anion Gap 8 3 - 14 mmol/L    Glucose 99 70 - 105 mg/dL    Urea Nitrogen 17 7 - 25 mg/dL    Creatinine 0.93 0.60 - 1.20 mg/dL    GFR Estimate 59 (L) >60 mL/min/[1.73_m2]    GFR Estimate If Black 71 >60 mL/min/[1.73_m2]    Calcium 9.2 8.6 - 10.3 mg/dL    Bilirubin Total 0.4 0.3 - 1.0 mg/dL    Albumin 4.4 3.5 - 5.7 g/dL    Protein Total 6.6 6.4 - 8.9 g/dL    Alkaline Phosphatase 134 (H) 34 - 104 U/L    ALT 29 7 - 52 U/L    AST 36 13 - 39 U/L   Troponin GH   Result Value Ref Range    Troponin 4.3 <34.0 pg/mL   CT Head w/o Contrast    Narrative    PROCEDURE: CT HEAD W/O CONTRAST     HISTORY: Head trauma, minor, pt on anticoagulation. Fall, hit her  head.    COMPARISON: None.    TECHNIQUE: Helical Images were obtained from the skull base to the  vertex. Axial, coronal and sagittal images were reviewed.     FINDINGS: There is mild, diffuse atrophy. No acute intracranial  hemorrhage, mass effect,  or midline shift.    The grey-white matter interface is preserved. Scattered  hypoattenuation within the white matter is most compatible with  microvascular ischemic change.     The calvarium is intact. The mastoid air cells are clear.  The  visualized  paranasal sinuses are clear.       Impression    IMPRESSION: No acute intracranial findings.      CHAYA LEE MD   CT Cervical Spine w/o Contrast    Narrative    PROCEDURE: CT CERVICAL SPINE W/O CONTRAST    HISTORY:  C-spine trauma, ligamentous injury suspected     TECHNIQUE: Helical CT images of the cervical spine were obtained  without contrast. Coronal and sagittal reformats were provided.    COMPARISON: None.    FINDINGS:     No acute fracture or subluxation is identified. There is normal  alignment and curvature. The vertebral bodies are maintained. Disc  spaces are narrowed with degenerative endplate changes and osteophytes  at multiple levels. There is also multilevel facet arthrosis.     The prevertebral soft tissues are unremarkable. The lung apices are  clear.      Impression    IMPRESSION:  No evidence of traumatic cervical spine injury.    CHAYA LEE MD   XR Elbow Left G/E 3 Views    Narrative    PROCEDURE:  XR ELBOW LT G/E 3 VW    HISTORY: trauma    COMPARISON:  None.    TECHNIQUE:  3 views of the right elbow were obtained.    FINDINGS:  There is a minimally depressed comminuted intra-articular  fracture of the radial head. There is an associated joint effusion. No  dislocation. No additional fractures.       Impression    IMPRESSION: Radial head fracture.      CHAYA LEE MD       Medications   sodium chloride 0.9% infusion (has no administration in time range)       Assessments & Plan (with Medical Decision Making)     I have reviewed the nursing notes.    I have reviewed the findings, diagnosis, plan and need for follow up with the patient.      New Prescriptions    HYDROCODONE-ACETAMINOPHEN (NORCO) 5-325 MG TABLET    Take 1 tablet by mouth every 4 hours as needed for moderate to severe pain or severe pain       Final diagnoses:   Closed head injury, initial encounter   Left radial head fracture   Elevated INR   Long term current use of anticoagulant therapy     Afebrile.  Vital signs  stable.  Patient with fall off a bed and head injury as well as left arm injury.  Currently on Coumadin for chronic atrial fibrillation.  Level 2 trauma called initially due to the mechanism of injury and being on Coumadin.  No focal neurological findings.  CT of her head shows no signs of ICH.  CT cervical is unremarkable.  CBC shows a normal white blood cells no left shift.  Her hemoglobin is 10.7.   EKG shows an atrial paced rhythm with prolonged AV conduction.  Prolonged QT.  Heart rate is 70.  This is unchanged from previous EKG on 12/10/2018.  Troponin is normal.  Her INR is elevated at 3.65.  Left elbow x-rays show a radial head fracture fragment.  She was fitted for sling at this time and given a Norco for pain relief orally.  I discussed close follow-up with orthopedics for further evaluation.  Referral to orthopedics.  Initially I discussed with the patient possible admission for observation given her elevated Coumadin and head injury.  I discussed this case with Dr. Lockhart who feels giving the patient 1 dose of vitamin k and holding her Coumadin tonight may be sufficient.  He feels as long as she has her  with her he can monitor her for any changes and return immediately or call 911 for further evaluation if needed.    Follow up with Dr. Starks in 5 days for further orthopedic evaluation.  Follow-up sooner if there is any other concerns problems or questions.  Rx for short course of Norco for pain relief.       12/20/2019   [facility]     Branden Stephenson PA-C  12/20/19 [id number]

## 2019-12-20 NOTE — ED AVS SNAPSHOT
[facility]  [address]  [place] [address]  Phone:  [phone]  Fax:  [phone]                                    Zoey Fan   MRN: [id number]    Department:  [facility]   Date of Visit:  12/20/2019           After Visit Summary Signature Page    I have received my discharge instructions, and my questions have been answered. I have discussed any challenges I see with this plan with the nurse or doctor.    ..........................................................................................................................................  Patient/Patient Representative Signature      ..........................................................................................................................................  Patient Representative Print Name and Relationship to Patient    ..................................................               ................................................  Date                                   Time    ..........................................................................................................................................  Reviewed by Signature/Title    ...................................................              ..............................................  Date                                               Time          22EPIC Rev 08/18

## 2019-12-23 ENCOUNTER — ANTICOAGULATION THERAPY VISIT (OUTPATIENT)
Dept: ANTICOAGULATION | Facility: OTHER | Age: 74
End: 2019-12-23
Attending: INTERNAL MEDICINE
Payer: MEDICARE

## 2019-12-23 DIAGNOSIS — I48.0 PAROXYSMAL ATRIAL FIBRILLATION (H): ICD-10-CM

## 2019-12-23 DIAGNOSIS — Z79.01 ANTICOAGULATION MONITORING, INR RANGE 2-3: ICD-10-CM

## 2019-12-23 DIAGNOSIS — S42.402D CLOSED FRACTURE OF LEFT ELBOW WITH ROUTINE HEALING, SUBSEQUENT ENCOUNTER: Primary | ICD-10-CM

## 2019-12-23 LAB — INR POINT OF CARE: 1.7 (ref 0.86–1.14)

## 2019-12-23 PROCEDURE — 85610 PROTHROMBIN TIME: CPT | Mod: QW,ZL

## 2019-12-23 NOTE — PROGRESS NOTES
ANTICOAGULATION FOLLOW-UP CLINIC VISIT    Patient Name:  Zoey Fan  Date:  2019  Contact Type:  Face to Face    SUBJECTIVE:  Patient Findings     Positives:   Missed doses (Held one dose and given Vit K 1 mg), Change in medications (Vit K 1 mg on 19), Hospital admission        Clinical Outcomes     Negatives:   Major bleeding event, Thromboembolic event, Anticoagulation-related hospital admission, Anticoagulation-related ED visit, Anticoagulation-related fatality           OBJECTIVE    INR Protime   Date Value Ref Range Status   2019 1.7 (A) 0.86 - 1.14 Final       ASSESSMENT / PLAN  INR assessment SUB    Recheck INR In: 1 WEEK    INR Location Clinic      Anticoagulation Summary  As of 2019    INR goal:   2.0-3.0   TTR:   70.6 % (1 y)   INR used for dosin.7! (2019)   Warfarin maintenance plan:   5 mg (5 mg x 1) every Mon, Wed, Fri; 2.5 mg (5 mg x 0.5) all other days   Full warfarin instructions:   5 mg every Mon, Wed, Fri; 2.5 mg all other days   Weekly warfarin total:   25 mg   No change documented:   Maryuri Vines RN   Plan last modified:   Maryuri Vines RN (2019)   Next INR check:   2019   Priority:   INR   Target end date:   Indefinite    Indications    Anticoagulation monitoring  INR range 2-3 [Z79.01]  Paroxysmal atrial fibrillation (H) [I48.0]             Anticoagulation Episode Summary     INR check location:       Preferred lab:       Send INR reminders to:    INR    Comments:         Anticoagulation Care Providers     Provider Role Specialty Phone number    Vijay Mackay MD [facility] [phone]            See the Encounter Report to view Anticoagulation Flowsheet and Dosing Calendar (Go to Encounters tab in chart review, and find the Anticoagulation Therapy Visit)        Maryuri Vines RN

## 2019-12-27 ENCOUNTER — OFFICE VISIT (OUTPATIENT)
Dept: ORTHOPEDICS | Facility: OTHER | Age: 74
End: 2019-12-27
Attending: SPECIALIST
Payer: MEDICARE

## 2019-12-27 ENCOUNTER — HOSPITAL ENCOUNTER (OUTPATIENT)
Dept: GENERAL RADIOLOGY | Facility: OTHER | Age: 74
Discharge: HOME | End: 2019-12-27
Attending: SPECIALIST | Admitting: SPECIALIST
Payer: MEDICARE

## 2019-12-27 DIAGNOSIS — S42.402D CLOSED FRACTURE OF LEFT ELBOW WITH ROUTINE HEALING, SUBSEQUENT ENCOUNTER: ICD-10-CM

## 2019-12-27 DIAGNOSIS — Z00.00 ROUTINE GENERAL MEDICAL EXAMINATION AT A HEALTH CARE FACILITY: Primary | ICD-10-CM

## 2019-12-27 PROCEDURE — G0463 HOSPITAL OUTPT CLINIC VISIT: HCPCS | Mod: 25

## 2019-12-27 PROCEDURE — 73080 X-RAY EXAM OF ELBOW: CPT | Mod: LT

## 2019-12-27 PROCEDURE — G0463 HOSPITAL OUTPT CLINIC VISIT: HCPCS

## 2020-01-03 NOTE — PROGRESS NOTES
VITALS:   Height:   67.5  Weight:   175  Pulse rate:  98  Blood Pressure:  150 / 80    CHIEF COMPLAINT: Left Elbow Injury    PROBLEMS:   Patient has no noted problems.    PATIENT REPORTED MEDICATIONS:  WARFARIN SODIUM 5 MG ORAL TABLET (WARFARIN SODIUM) ; Route: ORAL  SILDENAFIL CITRATE 20 MG ORAL TABLET (SILDENAFIL CITRATE) ; Route: ORAL  OXYBUTYNIN CHLORIDE ER 10 MG ORAL TABLET EXTENDED RELEASE 24 HOUR (OXYBUTYNIN CHLORIDE) ; Route: ORAL  OMEPRAZOLE TABLET DELAYED RELEASE (OMEPRAZOLE TBEC)   NAPROXEN SODIUM 220 MG ORAL TABLET (NAPROXEN SODIUM) ; Route: ORAL  NADOLOL 80 MG ORAL TABLET (NADOLOL) ; Route: ORAL  MULTIVITAMIN ADULTS ORAL TABLET (MULTIPLE VITAMINS-MINERALS) ; Route: ORAL  LISINOPRIL 5 MG ORAL TABLET (LISINOPRIL) ; Route: ORAL  HYDROCODONE-ACETAMINOPHEN 5-325 MG ORAL TABLET (HYDROCODONE-ACETAMINOPHEN) ; Route: ORAL  FUROSEMIDE 20 MG ORAL TABLET (FUROSEMIDE) ; Route: ORAL  FLUOXETINE HCL 20 MG ORAL CAPSULE (FLUOXETINE HCL) ; Route: ORAL  VITAMIN B-12 1000 MCG ORAL TABLET (CYANOCOBALAMIN) ; Route: ORAL  VITAMIN D (CHOLECALCIFEROL) TABLET (CHOLECALCIFEROL TABS)   CALCIUM CARBONATE ANTACID 1250 MG/5ML ORAL SUSPENSION (CALCIUM CARBONATE ANTACID) ; Route: ORAL  ATORVASTATIN CALCIUM 40 MG ORAL TABLET (ATORVASTATIN CALCIUM) ; Route: ORAL    PATIENT REPORTED ALLERGIES:  CIPRO (SevereReaction: No reaction details noted)  NEOMYCIN (NEOMYCIN SULFATE) (SevereReaction: Itching)  SULFA (SevereReaction: Nausea, Vomiting)  * LIQUID ADHESIVE (SevereReaction: Rash)  * TOBRAMYCIN (SevereReaction: Rash)    RISK FACTORS:  Tobacco use:   former smoker    HISTORY OF PRESENT ILLNESS:    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  The patient was changing a light bulb when she launched from her bed injuring her left elbow.    She was seen in the emergency room where x-rays were obtained and she was referred for evaluation.         PMH:   Health history form dated 12/27/19 is reviewed  "and signed.  Past medical history, surgical history, social history, family history, medications, and review of systems is noted and scanned into the chart.     PAST MEDICAL HISTORY:    High Blood Pressure   Heart Trouble  Arthritis  Bleeding Tendency  Pacemaker, Long QT    PAST ORTHOPEDIC SURGICAL HISTORY:    Right Rotator Cuff x2  Torn Meniscus    PAST SURGICAL HISTORY:    No Past Surgical History    FAMILY HISTORY:    Father:  Wet Macular Degeneration ( age 94)  Mother:  Cardiac, Wet Macular Degeneration ( age 86)  Sister:   age 47 of Long QT    SOCIAL HISTORY:   Marital Status:    Occupation:  Retired RN  Alcohol Use:  Yes  Tobacco Use:  Former  Secondhand Smoke Exposure:  No  History of HIV:  No  History of Hepatitis:  No     REVIEW OF SYSTEMS:  Joint or Muscle pain: Yes  Stiffness:  Yes  Swelling:  Yes  Difficulty in walking: No  Cold extremities: No  Weakness of muscles: No  Rash or Itching: No  Bruising:  Yes  Numbness/Tingling: No    PHYSICAL EXAMINATION:    General:    Physical examination reveals a 74-year-old female.  Height: 5' 7 \",  Weight: 175 pounds, Pulse: 98,  BP: 150/80.  The patient is pleasant, alert, oriented x3, appropriate, in no acute distress.    The patient's gait and station are appropriate.  The patient is well groomed, well kempt.  Skin is healthy and intact with no erythema, warmth, rashes, or bruising.   Normal capillary refill.  Pulses are palpable.  Motor is five out of five in all muscle groups tested.   Sensory exam is intact.    Musculoskeletal:        Examination of both upper extremities reveals full and symmetric range of motion of shoulders.   Examination of the right elbow reveals full range of motion.   The left elbow reveals about a 20  flexion contracture.   Pronation and supination are full.   Flexion to 120 .  Collateral ligaments are stable, pulses are brisk and symmetric.       X-RAY:  AP, lateral, oblique views of the left wrist are " reviewed dated today as well as x-rays the date of the injury, which was 12/20/19.   These x-rays reveal a minimally displaced left radial head fracture.      ASSESSMENT:    Minimally displaced left radial head fracture    PLAN:   Minimally Displaced Left Radial Head Fracture:  Recommendations for nonoperative treatment.  We discussed the potential for mechanical symptoms such as catching.  If these develop we would have her come in for consideration of removal of loose body.  We will otherwise treat her with fracture care nonoperatively.    Dictated by Celso Vizcarra M.D.  D:  12/27/2019  T:   01/03/2020    Copy to:  Vijay Mackay MD     Typed and/or reviewed and corrected by signing  below, and sent to the Physician for final review and signature.     This report was created using voice recording software and computer-generated templates. Although every effort has been made to review for and eliminate errors, some errors may still occur.         Electronically signed by Mamta Chinchilla  on 01/03/2020 at 1:45 PM    ________________________________________________________________________

## 2020-01-09 ENCOUNTER — HOSPITAL ENCOUNTER (EMERGENCY)
Facility: OTHER | Age: 75
Discharge: HOME | End: 2020-01-09
Attending: EMERGENCY MEDICINE | Admitting: EMERGENCY MEDICINE
Payer: MEDICARE

## 2020-01-09 ENCOUNTER — APPOINTMENT (OUTPATIENT)
Dept: GENERAL RADIOLOGY | Facility: OTHER | Age: 75
End: 2020-01-09
Attending: EMERGENCY MEDICINE
Payer: MEDICARE

## 2020-01-09 VITALS
SYSTOLIC BLOOD PRESSURE: 138 MMHG | BODY MASS INDEX: 26.68 KG/M2 | RESPIRATION RATE: 12 BRPM | WEIGHT: 170 LBS | HEART RATE: 71 BPM | TEMPERATURE: 97.5 F | DIASTOLIC BLOOD PRESSURE: 70 MMHG | OXYGEN SATURATION: 98 % | HEIGHT: 67 IN

## 2020-01-09 DIAGNOSIS — J06.9 UPPER RESPIRATORY TRACT INFECTION, UNSPECIFIED TYPE: ICD-10-CM

## 2020-01-09 LAB
FLUAV+FLUBV RNA SPEC QL NAA+PROBE: NEGATIVE
FLUAV+FLUBV RNA SPEC QL NAA+PROBE: NEGATIVE
RSV RNA SPEC NAA+PROBE: NEGATIVE
SPECIMEN SOURCE: NORMAL
SPECIMEN SOURCE: NORMAL
STREP GROUP A PCR: NOT DETECTED

## 2020-01-09 PROCEDURE — 99284 EMERGENCY DEPT VISIT MOD MDM: CPT | Mod: 25 | Performed by: EMERGENCY MEDICINE

## 2020-01-09 PROCEDURE — 87651 STREP A DNA AMP PROBE: CPT | Performed by: EMERGENCY MEDICINE

## 2020-01-09 PROCEDURE — 87631 RESP VIRUS 3-5 TARGETS: CPT | Performed by: EMERGENCY MEDICINE

## 2020-01-09 PROCEDURE — 71046 X-RAY EXAM CHEST 2 VIEWS: CPT

## 2020-01-09 PROCEDURE — 99283 EMERGENCY DEPT VISIT LOW MDM: CPT | Mod: Z6 | Performed by: EMERGENCY MEDICINE

## 2020-01-09 PROCEDURE — 25000125 ZZHC RX 250: Performed by: EMERGENCY MEDICINE

## 2020-01-09 RX ORDER — TETRACAINE HYDROCHLORIDE 5 MG/ML
1-2 SOLUTION OPHTHALMIC ONCE
Status: COMPLETED | OUTPATIENT
Start: 2020-01-09 | End: 2020-01-09

## 2020-01-09 RX ADMIN — TETRACAINE HYDROCHLORIDE 1 DROP: 5 SOLUTION OPHTHALMIC at 10:23

## 2020-01-09 RX ADMIN — FLUORESCEIN SODIUM 1 STRIP: 1 STRIP OPHTHALMIC at 10:24

## 2020-01-09 ASSESSMENT — MIFFLIN-ST. JEOR: SCORE: 1303.74

## 2020-01-09 NOTE — ED PROVIDER NOTES
[facility]  Emergency Department Visit Note    Chief Complaint   Patient presents with     Cough     Eye Problem       History of Present Illness     HPI:  Zoey Fan is a 74 year old female presenting with cough, runny nose, sore throat an eye irritation. These symptoms started 3 days ago and have been progressively worsening. The patient has not been exposed to sick contacts with similar symptoms. She is able to tolerate oral intake including plenty of fluids. No fevers, chills, chest pain, abdominal pain, nausea, vomiting, shortness of breath, rash. She has tried decongestant of choice without significant improvement in symptoms. No known history of immunosuppression.    Medications:  Prior to Admission medications    Medication Sig Last Dose Taking? Auth Provider   atorvastatin (LIPITOR) 40 MG tablet TAKE ONE-HALF TABLET BY  MOUTH AT BEDTIME 1/8/2020 at 2200 Yes Vijay Mackay MD   calcium carbonate (OSCAL 500) 1250 (500 CA) MG TABS tablet Take 500 mg by mouth daily with food 1/9/2020 at 0800 Yes Reported, Patient   cyanocobalamin (RA VITAMIN B-12 TR) 1000 MCG TBCR Take 1,000 mcg by mouth daily 1/9/2020 at 0800 Yes Reported, Patient   FLUoxetine (PROZAC) 20 MG capsule TAKE 1 CAPSULE BY MOUTH  EVERY MORNING 1/9/2020 at 0800 Yes Vijay Mackay MD   furosemide (LASIX) 20 MG tablet Take 1 tablet (20 mg) by mouth 2 times daily 1/9/2020 at 0800 Yes Vijay Mackay MD   HYDROcodone-acetaminophen (NORCO) 5-325 MG tablet Take 1 tablet by mouth every 4 hours as needed for moderate to severe pain or severe pain Past Month at Unknown time Yes Branden Stephenson PA-C   lisinopril (PRINIVIL/ZESTRIL) 5 MG tablet Take 1 tablet (5 mg) by mouth daily 1/8/2020 at 2200 Yes Vijay Mackay MD   nadolol (CORGARD) 80 MG tablet TAKE 1 AND 1/2 TABLETS BY  MOUTH TWICE DAILY 1/9/2020 at 0800 Yes Vijay Mackay MD   naproxen sodium 220 MG capsule Take 1 tablet by mouth 2 times daily  (with meals)  1/9/2020 at 0800 Yes Reported, Patient   omeprazole (PRILOSEC) 20 MG DR capsule TAKE 1 CAPSULE BY MOUTH  ONCE DAILY BEFORE A MEAL 1/9/2020 at 0800 Yes Vijay Mackay MD   oxybutynin ER (DITROPAN-XL) 10 MG 24 hr tablet Take 1 tablet (10 mg) by mouth daily 1/9/2020 at 0800 Yes Vijay Mackay MD   sildenafil (REVATIO) 20 MG tablet 3 times daily  1/9/2020 at 0800 Yes Reported, Patient   UNABLE TO FIND MEDICATION NAME: AREDS ( for eye health) 1/9/2020 at 0800 Yes Reported, Patient   VITAMIN D, CHOLECALCIFEROL, PO Take 1,000 Units by mouth daily 1/9/2020 at 0800 Yes Reported, Patient   warfarin ANTICOAGULANT (COUMADIN) 5 MG tablet TAKE 5 MG BY MOUTH DAILY  THREE DAYS PER WEEK AND 2.5 MG DAILY FOUR DAYS PER WEEK OR AS DIRECTED BY [facility]  CL[facility]. 1/8/2020 at 1800 Yes Vijay Mackay MD       Allergies:  Allergies   Allergen Reactions     Ciprofloxacin      Other reaction(s): Other - Describe In Comment Field  Patient reports she has Long QT syndrome     Neomycin Itching     Swelling , redness     Sulfa Drugs Nausea and Vomiting     Abdominal pain     Unknown  [No Clinical Screening - See Comments]      Other reaction(s): Cardiac Arrest  Please verify with pharmacist prior to prescribing any medications due to her QT Syndrome     Liquid Adhesive Rash     Reaction also to EKG lead pads.      Tobramycin Rash       Problem List:  Patient Active Problem List   Diagnosis     Anticoagulation monitoring, INR range 2-3     Paroxysmal atrial fibrillation (H)     (HFpEF) heart failure with preserved ejection fraction (H)     Abdominal pain     AC (acromioclavicular) joint arthritis     Complete tear of right rotator cuff     Other chronic allergic conjunctivitis     Cystocele     Dysphagia     Edema of eyelid     Hypercholesterolemia     Hypertension     Impaired fasting glucose     Impingement syndrome of right shoulder     Insomnia     Long Q-T syndrome     Major depressive disorder, recurrent  episode (H)     Osteoporosis     Pacemaker     Right rotator cuff tendinitis     S/P ablation of atrial fibrillation     Special screening for malignant neoplasms, colon     ED (stress urinary incontinence, female)     Urge urinary incontinence     Vaginitis, atrophic     Vitamin D deficiency     Pulmonary hypertension (H)     Dupuytren contracture       Past Medical History:  Past Medical History:   Diagnosis Date     Atrial fibrillation (H)     No Comments Provided     Complete tear of right rotator cuff     6/15/2017     Essential (primary) hypertension     No Comments Provided     Impingement syndrome of right shoulder     5/23/2017     Insomnia     No Comments Provided     Long Q-T syndrome 2010     Major depressive disorder, single episode     being managed     Other shoulder lesions, right shoulder     5/23/2017     Personal history of other infectious and parasitic diseases     As a child     Personal history of other medical treatment (CODE)     G2, P2     Primary osteoarthritis of shoulder     5/23/2017     Pure hypercholesterolemia     No Comments Provided       Past Surgical History:  Past Surgical History:   Procedure Laterality Date     ARTHROSCOPY SHOULDER ROTATOR CUFF REPAIR      2004,Right shoulder     COLONOSCOPY  12/14/2007 2007,Normal, follow up in 10 years     COLONOSCOPY  08/10/2017    08/10/2017,no follow up due to age being greater than 80 at next screening interval     IMPLANT PACEMAKER      2011,Post ablation, due to junctional rythym     LAPAROSCOPIC TUBAL LIGATION      No Comments Provided     OTHER SURGICAL HISTORY      2004,[id number],OTHER,Left knee     OTHER SURGICAL HISTORY      2011,[id number],EP STUDY /ABLATION,Cardiac ablation     OTHER SURGICAL HISTORY      2015,[id number],CARDIAC ELECTROPHYSIOLOGY STUDY AND ABLATION     TONSILLECTOMY, ADENOIDECTOMY, COMBINED      As a child       Social History:  Social History     Tobacco Use     Smoking status: Former Smoker     Packs/day: 0.50      "Years: 30.00     Pack years: 15.00     Types: Cigarettes     Start date: 1968     Last attempt to quit: 2002     Years since quittin.7     Smokeless tobacco: Never Used   Substance Use Topics     Alcohol use: Yes     Alcohol/week: 2.0 standard drinks     Types: 2 Glasses of wine per week     Comment: every day     Drug use: Unknown     Types: Other     Comment: Drug use: No       Review of Systems:  10 point review of systems obtained and pertinent positive and negative findings noted in HPI. Review of systems otherwise negative.      Physical Exam     Vital signs: /70   Pulse 71   Temp 97.5  F (36.4  C) (Tympanic)   Resp 12   Ht 1.702 m (5' 7\")   Wt 77.1 kg (170 lb)   SpO2 98%   Breastfeeding No   BMI 26.63 kg/m      Physical Exam:    General: awake and alert, comfortable  HEENT: PERRL, conjunctive injected, fluorescin stain with no corneal injury atraumatic, no scleral injection, no nasal discharge, neck supple  Chest: clear to auscultation bilaterally without wheezes or crackles, non labored respirations, symmetric chest rise  Cardiovascular: regular rate and rhythm, no murmurs or gallops  Abdomen: soft, nontender, no rebound or guarding, nondistended  Extremities: no deformities, edema, or tenderness  Skin: warm, dry, no rashes  Neuro: alert and oriented x 3, moving extremities x 4, ambulates without difficulty      Medical Decision Making & ED Course     Differential includes viral upper respiratory infection, influenza specifically, pneumonia, pertussis, sinusitis, post-nasal drip, streptococcal pharyngitis. Vitals are normal. Given the patient's age, clinical appearance, duration of symptoms, and co-morbidities, a chest radiograph was indicated. Given the exam a rapid strep test was indicated and negative. The constellation of symptoms and exam findings suggest that a viral upper respiratory infection is the most likely etiology of this patient's symptoms. Influenza cannot be " excluded without viral swab and this was negative as well. She was reassured regarding the lack of signs of serious infection. Over the counter therapy with decongestants and cough supression recommended. Recommend follow up with Primary Care Provider/Clinic as needed. Return to the ED with fever, trouble swallowing, difficulty breathing, or any other concerns. All questions were answered and the patient is comfortable with plan for discharge. The patient was discharged in stable condition.  .    Diagnosis & Disposition     Diagnosis:  1. Upper respiratory tract infection, unspecified type        Disposition:  Home    MD Junior Howell Theresa M, MD  01/09/20 3761

## 2020-01-09 NOTE — ED TRIAGE NOTES
"ED Nursing Triage Note (General)   ________________________________    Zoey Fan is a 74 year old Female that presents to triage private car  With history of  Cough, cold S/S with cough since Friday, also had diarrhea and vomiting over the weekend, now  having cough, reported by patient   Significant symptoms had onset 6 day(s) ago.  /70   Pulse 71   Temp 97.5  F (36.4  C) (Tympanic)   Resp 12   Ht 1.702 m (5' 7\")   Wt 77.1 kg (170 lb)   SpO2 98%   Breastfeeding No   BMI 26.63 kg/m  t  Patient appears alert , in mild distress., and cooperative and pleasant behavior.    GCS 15  Airway: intact  Breathing noted as cough.  Circulation Normal  Skin pale  Action taken:  Triage to critical care immediately      PRE HOSPITAL PRIOR LIVING SITUATION Spouse  "

## 2020-01-13 ENCOUNTER — ANTICOAGULATION THERAPY VISIT (OUTPATIENT)
Dept: ANTICOAGULATION | Facility: OTHER | Age: 75
End: 2020-01-13
Attending: INTERNAL MEDICINE
Payer: MEDICARE

## 2020-01-13 DIAGNOSIS — I48.0 PAROXYSMAL ATRIAL FIBRILLATION (H): ICD-10-CM

## 2020-01-13 DIAGNOSIS — Z79.01 ANTICOAGULATION MONITORING, INR RANGE 2-3: ICD-10-CM

## 2020-01-13 LAB — INR POINT OF CARE: 3.2 (ref 0.86–1.14)

## 2020-01-13 PROCEDURE — 85610 PROTHROMBIN TIME: CPT | Mod: QW,ZL

## 2020-01-13 NOTE — PROGRESS NOTES
ANTICOAGULATION FOLLOW-UP CLINIC VISIT    Patient Name:  Zoey Fan  Date:  1/13/2020  Contact Type:  Face to Face    SUBJECTIVE:  Patient Findings     Positives:   Change in medications (Ibuprofen prn occasional)        Clinical Outcomes     Negatives:   Major bleeding event, Thromboembolic event, Anticoagulation-related hospital admission, Anticoagulation-related ED visit, Anticoagulation-related fatality           OBJECTIVE    INR Protime   Date Value Ref Range Status   01/13/2020 3.2 (A) 0.86 - 1.14 Final       ASSESSMENT / PLAN  INR assessment SUPRA    Recheck INR In: 2 WEEKS    INR Location Clinic      Anticoagulation Summary  As of 1/13/2020    INR goal:   2.0-3.0   TTR:   72.8 % (1 y)   INR used for dosing:   3.2! (1/13/2020)   Warfarin maintenance plan:   5 mg (5 mg x 1) every Mon, Wed, Fri; 2.5 mg (5 mg x 0.5) all other days   Full warfarin instructions:   1/13: 2.5 mg; Otherwise 5 mg every Mon, Wed, Fri; 2.5 mg all other days   Weekly warfarin total:   25 mg   Plan last modified:   Maryuri Vines RN (12/16/2019)   Next INR check:   1/27/2020   Priority:   INR   Target end date:   Indefinite    Indications    Anticoagulation monitoring  INR range 2-3 [Z79.01]  Paroxysmal atrial fibrillation (H) [I48.0]             Anticoagulation Episode Summary     INR check location:       Preferred lab:       Send INR reminders to:    INR    Comments:         Anticoagulation Care Providers     Provider Role Specialty Phone number    Vijay Mackay MD Responsible Pediatrics [phone]            See the Encounter Report to view Anticoagulation Flowsheet and Dosing Calendar (Go to Encounters tab in chart review, and find the Anticoagulation Therapy Visit)        Maryuri Vines RN

## 2020-01-27 ENCOUNTER — ANTICOAGULATION THERAPY VISIT (OUTPATIENT)
Dept: ANTICOAGULATION | Facility: OTHER | Age: 75
End: 2020-01-27
Attending: INTERNAL MEDICINE
Payer: MEDICARE

## 2020-01-27 DIAGNOSIS — Z79.01 ANTICOAGULATION MONITORING, INR RANGE 2-3: ICD-10-CM

## 2020-01-27 DIAGNOSIS — I48.0 PAROXYSMAL ATRIAL FIBRILLATION (H): ICD-10-CM

## 2020-01-27 LAB — INR POINT OF CARE: 3 (ref 0.86–1.14)

## 2020-01-27 PROCEDURE — 36416 COLLJ CAPILLARY BLOOD SPEC: CPT | Mod: ZL

## 2020-01-27 NOTE — PROGRESS NOTES
ANTICOAGULATION FOLLOW-UP CLINIC VISIT    Patient Name:  Zoey Fan  Date:  1/27/2020  Contact Type:  Face to Face    SUBJECTIVE:         OBJECTIVE    INR Protime   Date Value Ref Range Status   01/27/2020 3.0 (A) 0.86 - 1.14 Final       ASSESSMENT / PLAN  No question data found.  Anticoagulation Summary  As of 1/27/2020    INR goal:   2.0-3.0   TTR:   69.0 % (1 y)   INR used for dosing:   3.0 (1/27/2020)   Warfarin maintenance plan:   5 mg (5 mg x 1) every Mon, Wed, Fri; 2.5 mg (5 mg x 0.5) all other days   Full warfarin instructions:   5 mg every Mon, Wed, Fri; 2.5 mg all other days   Weekly warfarin total:   25 mg   No change documented:   Neelima Camarillo RN   Plan last modified:   Maryuri Vines RN (12/16/2019)   Next INR check:   2/24/2020   Priority:   INR   Target end date:   Indefinite    Indications    Anticoagulation monitoring  INR range 2-3 [Z79.01]  Paroxysmal atrial fibrillation (H) [I48.0]             Anticoagulation Episode Summary     INR check location:       Preferred lab:       Send INR reminders to:    INR    Comments:         Anticoagulation Care Providers     Provider Role Specialty Phone number    Vijay Mackay MD [facility] Pediatrics [phone]            See the Encounter Report to view Anticoagulation Flowsheet and Dosing Calendar (Go to Encounters tab in chart review, and find the Anticoagulation Therapy Visit)        Neelima Camarillo RN

## 2020-02-24 ENCOUNTER — ANTICOAGULATION THERAPY VISIT (OUTPATIENT)
Dept: ANTICOAGULATION | Facility: OTHER | Age: 75
End: 2020-02-24
Attending: INTERNAL MEDICINE
Payer: MEDICARE

## 2020-02-24 DIAGNOSIS — I48.0 PAROXYSMAL ATRIAL FIBRILLATION (H): Primary | ICD-10-CM

## 2020-02-24 DIAGNOSIS — Z79.01 ANTICOAGULATION MONITORING, INR RANGE 2-3: ICD-10-CM

## 2020-02-24 LAB — INR POINT OF CARE: 3.4 (ref 0.86–1.14)

## 2020-02-24 PROCEDURE — 36416 COLLJ CAPILLARY BLOOD SPEC: CPT | Mod: ZL

## 2020-02-24 NOTE — PROGRESS NOTES
ANTICOAGULATION FOLLOW-UP CLINIC VISIT    Patient Name:  Zoey Fan  Date:  2/24/2020  Contact Type:  Face to Face    SUBJECTIVE:  Patient Findings     Positives:   Missed doses        Clinical Outcomes     Negatives:   Major bleeding event, Thromboembolic event, Anticoagulation-related hospital admission, Anticoagulation-related ED visit, Anticoagulation-related fatality           OBJECTIVE    INR Protime   Date Value Ref Range Status   02/24/2020 3.4 (A) 0.86 - 1.14 Final       ASSESSMENT / PLAN  INR assessment SUPRA    Recheck INR In: 1 WEEK    INR Location Clinic      Anticoagulation Summary  As of 2/24/2020    INR goal:   2.0-3.0   TTR:   61.3 % (1 y)   INR used for dosing:   3.4! (2/24/2020)   Warfarin maintenance plan:   5 mg (5 mg x 1) every Wed, Sat; 2.5 mg (5 mg x 0.5) all other days   Full warfarin instructions:   5 mg every Wed, Sat; 2.5 mg all other days   Weekly warfarin total:   22.5 mg   Plan last modified:   Maryuri Vines RN (2/24/2020)   Next INR check:   3/2/2020   Priority:   INR   Target end date:   Indefinite    Indications    Anticoagulation monitoring  INR range 2-3 [Z79.01]  Paroxysmal atrial fibrillation (H) [I48.0]             Anticoagulation Episode Summary     INR check location:       Preferred lab:       Send INR reminders to:    INR    Comments:         Anticoagulation Care Providers     Provider Role Specialty Phone number    Vijay Mackay MD Referring Pediatrics [phone]            See the Encounter Report to view Anticoagulation Flowsheet and Dosing Calendar (Go to Encounters tab in chart review, and find the Anticoagulation Therapy Visit)        Maryuri Vines RN

## 2020-03-02 ENCOUNTER — ANTICOAGULATION THERAPY VISIT (OUTPATIENT)
Dept: ANTICOAGULATION | Facility: OTHER | Age: 75
End: 2020-03-02
Attending: INTERNAL MEDICINE
Payer: MEDICARE

## 2020-03-02 DIAGNOSIS — I48.0 PAROXYSMAL ATRIAL FIBRILLATION (H): ICD-10-CM

## 2020-03-02 DIAGNOSIS — Z79.01 ANTICOAGULATION MONITORING, INR RANGE 2-3: ICD-10-CM

## 2020-03-02 LAB — INR POINT OF CARE: 3.5 (ref 0.86–1.14)

## 2020-03-02 PROCEDURE — 36416 COLLJ CAPILLARY BLOOD SPEC: CPT | Mod: ZL

## 2020-03-02 NOTE — PROGRESS NOTES
ANTICOAGULATION FOLLOW-UP CLINIC VISIT    Patient Name:  Zoey Fan  Date:  3/2/2020  Contact Type:  Face to Face    SUBJECTIVE:  Patient Findings     Positives:   Change in activity (patient states alittle more active since her daughter has been here)        Clinical Outcomes     Negatives:   Major bleeding event, Thromboembolic event, Anticoagulation-related hospital admission, Anticoagulation-related ED visit, Anticoagulation-related fatality           OBJECTIVE    INR Protime   Date Value Ref Range Status   03/02/2020 3.5 (A) 0.86 - 1.14 Final       ASSESSMENT / PLAN  INR assessment SUPRA    Recheck INR In: 1 WEEK    INR Location Clinic      Anticoagulation Summary  As of 3/2/2020    INR goal:   2.0-3.0   TTR:   59.4 % (1 y)   INR used for dosing:   3.5! (3/2/2020)   Warfarin maintenance plan:   5 mg (5 mg x 1) every Wed, Sat; 2.5 mg (5 mg x 0.5) all other days   Full warfarin instructions:   3/2: Hold; Otherwise 5 mg every Wed, Sat; 2.5 mg all other days   Weekly warfarin total:   22.5 mg   Plan last modified:   Maryuri Vines RN (2/24/2020)   Next INR check:   3/9/2020   Priority:   INR   Target end date:   Indefinite    Indications    Anticoagulation monitoring  INR range 2-3 [Z79.01]  Paroxysmal atrial fibrillation (H) [I48.0]             Anticoagulation Episode Summary     INR check location:       Preferred lab:       Send INR reminders to:    INR    Comments:         Anticoagulation Care Providers     Provider Role Specialty Phone number    Vijay Mackay MD Referring Pediatrics [phone]            See the Encounter Report to view Anticoagulation Flowsheet and Dosing Calendar (Go to Encounters tab in chart review, and find the Anticoagulation Therapy Visit)        Saige Rachel RN

## 2020-03-09 ENCOUNTER — ANTICOAGULATION THERAPY VISIT (OUTPATIENT)
Dept: ANTICOAGULATION | Facility: OTHER | Age: 75
End: 2020-03-09
Attending: INTERNAL MEDICINE
Payer: MEDICARE

## 2020-03-09 ENCOUNTER — OFFICE VISIT (OUTPATIENT)
Dept: PULMONOLOGY | Facility: OTHER | Age: 75
End: 2020-03-09
Attending: INTERNAL MEDICINE
Payer: MEDICARE

## 2020-03-09 VITALS
DIASTOLIC BLOOD PRESSURE: 70 MMHG | SYSTOLIC BLOOD PRESSURE: 122 MMHG | BODY MASS INDEX: 27.47 KG/M2 | HEIGHT: 67 IN | WEIGHT: 175 LBS | OXYGEN SATURATION: 98 % | RESPIRATION RATE: 16 BRPM | HEART RATE: 70 BPM

## 2020-03-09 DIAGNOSIS — G47.36 SLEEP-RELATED HYPOVENTILATION DUE TO MEDICAL CONDITION: ICD-10-CM

## 2020-03-09 DIAGNOSIS — I48.0 PAROXYSMAL ATRIAL FIBRILLATION (H): ICD-10-CM

## 2020-03-09 DIAGNOSIS — I27.20 PULMONARY HYPERTENSION (H): ICD-10-CM

## 2020-03-09 DIAGNOSIS — G47.33 OSA (OBSTRUCTIVE SLEEP APNEA): Primary | ICD-10-CM

## 2020-03-09 DIAGNOSIS — Z79.01 ANTICOAGULATION MONITORING, INR RANGE 2-3: ICD-10-CM

## 2020-03-09 LAB — INR POINT OF CARE: 2.4 (ref 0.86–1.14)

## 2020-03-09 PROCEDURE — 85610 PROTHROMBIN TIME: CPT | Mod: QW,ZL

## 2020-03-09 PROCEDURE — G0463 HOSPITAL OUTPT CLINIC VISIT: HCPCS

## 2020-03-09 ASSESSMENT — PAIN SCALES - GENERAL: PAINLEVEL: NO PAIN (0)

## 2020-03-09 ASSESSMENT — MIFFLIN-ST. JEOR: SCORE: 1321.42

## 2020-03-09 NOTE — PROGRESS NOTES
Sleep Medicine Progress Note  Zoey Fan  March 9, 2020  [id number]    Chief Complaint: Concern for obstructive sleep apnea    History of Present Illness: Zoey Fan is a 75 year old female presenting for above complaint.  She has a history of pulmonary hypertension and has been working with Dr. Joe del angel in [place].  She is on sildenafil for her pulmonary hypertension.  She is a non-smoker.  She does snore occasionally.  There was concern for nocturnal hypoxia and a sleep study was performed.  Her sleep study shows old obstructive sleep apnea to be present with a low oxygen saturation baseline.  Her apnea hypopnea index was 5.3/h with a respiratory disturbance index of 7.1/h.  Many of the events were during REM sleep.  50.6 minutes of time was spent with oxygen saturations at or below 88%.  She scores 4 on the [place] Sleepiness Scale.  She uses the bathroom 1 time at night.  She does snore but she is not socially disruptive for her.  Past medical history is significant for atrial fibrillation, hypertension, depression history of hypercholesterolemia.    Her echocardiogram in 2018 showed a normal left ventricular ejection fraction of 67%    Past Medical History:  Past Medical History:   Diagnosis Date     Atrial fibrillation (H)     No Comments Provided     Complete tear of right rotator cuff     6/15/2017     Essential (primary) hypertension     No Comments Provided     Impingement syndrome of right shoulder     5/23/2017     Insomnia     No Comments Provided     Long Q-T syndrome 2010     Major depressive disorder, single episode     being managed     Other shoulder lesions, right shoulder     5/23/2017     Personal history of other infectious and parasitic diseases     As a child     Personal history of other medical treatment (CODE)     G2, P2     Primary osteoarthritis of shoulder     5/23/2017     Pure hypercholesterolemia     No Comments Provided       Medications:  Current Outpatient  "Medications   Medication     atorvastatin (LIPITOR) 40 MG tablet     calcium carbonate (OSCAL 500) 1250 (500 CA) MG TABS tablet     cyanocobalamin (RA VITAMIN B-12 TR) 1000 MCG TBCR     FLUoxetine (PROZAC) 20 MG capsule     furosemide (LASIX) 20 MG tablet     HYDROcodone-acetaminophen (NORCO) 5-325 MG tablet     lisinopril (PRINIVIL/ZESTRIL) 5 MG tablet     nadolol (CORGARD) 80 MG tablet     naproxen sodium 220 MG capsule     omeprazole (PRILOSEC) 20 MG DR capsule     oxybutynin ER (DITROPAN-XL) 10 MG 24 hr tablet     sildenafil (REVATIO) 20 MG tablet     UNABLE TO FIND     VITAMIN D, CHOLECALCIFEROL, PO     warfarin ANTICOAGULANT (COUMADIN) 5 MG tablet     No current facility-administered medications for this visit.        Physical Exam:  /70 (BP Location: Right arm, Patient Position: Sitting, Cuff Size: Adult Regular)   Pulse 70   Resp 16   Ht 5' 7\" (1.702 m)   Wt 175 lb (79.4 kg)   SpO2 98%   Breastfeeding No   BMI 27.41 kg/m    Neck circumference 14-3/4 inches, pharynx is without erythema, dentition is intact, posterior crowding is noted, malampatti class III. Lungs are clear no wheeze, rhonchi, crackles, or focal dullness. Cardiovascular exam S1-S2, regular rhythm and rate, no murmur, rub, or gallop.  Lower extremities are without edema.     Assessment and Plan: Mild obstructive sleep apnea with nocturnal hypoxia.  Her nocturnal hypoxia is likely related to her pulmonary hypertension.  We discussed the goal of resolving nocturnal hypoxia to reduce the risk of pulmonary hypertension and improve this.  Options were discussed.  She would be a candidate for supplemental oxygen.  She also would be a candidate for positive pressure therapy with either BiPAP or CPAP as she does have mild sleep apnea with hypoxia.  Pros and cons of an oxygen concentrator versus positive pressure therapy were discussed.  Positive pressure therapy in the long run is cheaper and much more convenient and portable.  Plan will " be for a repeat polysomnogram titration study.  To expedite this we discussed ordering it in [facility] which does not have a long waiting list.  DME options were discussed with her either in [place] or [place].     CC: Sleep center [facility]

## 2020-03-09 NOTE — PROGRESS NOTES
ANTICOAGULATION FOLLOW-UP CLINIC VISIT    Patient Name:  Zoey Fan  Date:  3/9/2020  Contact Type:  Face to Face    SUBJECTIVE:  Patient Findings         Clinical Outcomes     Negatives:   Major bleeding event, Thromboembolic event, Anticoagulation-related hospital admission, Anticoagulation-related ED visit, Anticoagulation-related fatality           OBJECTIVE    INR Protime   Date Value Ref Range Status   2020 2.4 (A) 0.86 - 1.14 Final       ASSESSMENT / PLAN  No question data found.  Anticoagulation Summary  As of 3/9/2020    INR goal:   2.0-3.0   TTR:   58.5 % (1 y)   INR used for dosin.4 (3/9/2020)   Warfarin maintenance plan:   5 mg (5 mg x 1) every Wed, Sat; 2.5 mg (5 mg x 0.5) all other days   Full warfarin instructions:   5 mg every Wed, Sat; 2.5 mg all other days   Weekly warfarin total:   22.5 mg   No change documented:   Neelima Camarillo RN   Plan last modified:   Maryuri Vines RN (2020)   Next INR check:   3/16/2020   Priority:   INR   Target end date:   Indefinite    Indications    Anticoagulation monitoring  INR range 2-3 [Z79.01]  Paroxysmal atrial fibrillation (H) [I48.0]             Anticoagulation Episode Summary     INR check location:       Preferred lab:       Send INR reminders to:    INR    Comments:         Anticoagulation Care Providers     Provider Role Specialty Phone number    Vijay Mackay MD Referring Pediatrics [phone]            See the Encounter Report to view Anticoagulation Flowsheet and Dosing Calendar (Go to Encounters tab in chart review, and find the Anticoagulation Therapy Visit)      Neelima Camarillo RN

## 2020-03-09 NOTE — NURSING NOTE
"Patient presents to the clinic today for sleep study results.  Rama Coffey LPN 3/9/2020   2:37 PM    Chief Complaint   Patient presents with     RECHECK     Sleep Study       Initial /70 (BP Location: Right arm, Patient Position: Sitting, Cuff Size: Adult Regular)   Pulse 70   Resp 16   Ht 5' 7\" (1.702 m)   Wt 175 lb (79.4 kg)   SpO2 98%   Breastfeeding No   BMI 27.41 kg/m   Estimated body mass index is 27.41 kg/m  as calculated from the following:    Height as of this encounter: 5' 7\" (1.702 m).    Weight as of this encounter: 175 lb (79.4 kg).  Medication Reconciliation: complete  Rama Coffey LPN    "

## 2020-03-16 ENCOUNTER — ANTICOAGULATION THERAPY VISIT (OUTPATIENT)
Dept: ANTICOAGULATION | Facility: OTHER | Age: 75
End: 2020-03-16
Attending: INTERNAL MEDICINE
Payer: MEDICARE

## 2020-03-16 DIAGNOSIS — Z79.01 ANTICOAGULATION MONITORING, INR RANGE 2-3: ICD-10-CM

## 2020-03-16 DIAGNOSIS — I48.0 PAROXYSMAL ATRIAL FIBRILLATION (H): ICD-10-CM

## 2020-03-16 LAB — INR POINT OF CARE: 3.7 (ref 0.86–1.14)

## 2020-03-16 PROCEDURE — 36416 COLLJ CAPILLARY BLOOD SPEC: CPT | Mod: ZL

## 2020-03-16 NOTE — PROGRESS NOTES
ANTICOAGULATION FOLLOW-UP CLINIC VISIT    Patient Name:  Zoey Fan  Date:  3/16/2020  Contact Type:  Face to Face    SUBJECTIVE:  Patient Findings         Clinical Outcomes     Negatives:   Major bleeding event, Thromboembolic event, Anticoagulation-related hospital admission, Anticoagulation-related ED visit, Anticoagulation-related fatality           OBJECTIVE    INR Protime   Date Value Ref Range Status   03/16/2020 3.7 (A) 0.86 - 1.14 Final       ASSESSMENT / PLAN  INR assessment SUPRA    Recheck INR In: 2 WEEKS    INR Location Clinic      Anticoagulation Summary  As of 3/16/2020    INR goal:   2.0-3.0   TTR:   57.5 % (1 y)   INR used for dosing:   3.7! (3/16/2020)   Warfarin maintenance plan:   5 mg (5 mg x 1) every Sat; 2.5 mg (5 mg x 0.5) all other days   Full warfarin instructions:   5 mg every Sat; 2.5 mg all other days   Weekly warfarin total:   20 mg   Plan last modified:   Maryuri Vines RN (3/16/2020)   Next INR check:   3/30/2020   Priority:   INR   Target end date:   Indefinite    Indications    Anticoagulation monitoring  INR range 2-3 [Z79.01]  Paroxysmal atrial fibrillation (H) [I48.0]             Anticoagulation Episode Summary     INR check location:       Preferred lab:       Send INR reminders to:    INR    Comments:         Anticoagulation Care Providers     Provider Role Specialty Phone number    Vijay Mackay MD Referring Pediatrics [phone]            See the Encounter Report to view Anticoagulation Flowsheet and Dosing Calendar (Go to Encounters tab in chart review, and find the Anticoagulation Therapy Visit)        Maryuri Vines RN

## 2020-03-30 ENCOUNTER — ANTICOAGULATION THERAPY VISIT (OUTPATIENT)
Dept: ANTICOAGULATION | Facility: OTHER | Age: 75
End: 2020-03-30
Attending: INTERNAL MEDICINE
Payer: MEDICARE

## 2020-03-30 DIAGNOSIS — Z79.01 ANTICOAGULATION MONITORING, INR RANGE 2-3: ICD-10-CM

## 2020-03-30 DIAGNOSIS — I48.0 PAROXYSMAL ATRIAL FIBRILLATION (H): ICD-10-CM

## 2020-03-30 LAB — INR POINT OF CARE: 2.8 (ref 0.86–1.14)

## 2020-03-30 PROCEDURE — 36416 COLLJ CAPILLARY BLOOD SPEC: CPT | Mod: ZL

## 2020-03-30 NOTE — PROGRESS NOTES
ANTICOAGULATION FOLLOW-UP CLINIC VISIT    Patient Name:  Zoey Fan  Date:  3/30/2020  Contact Type:  Face to Face    SUBJECTIVE:  Patient Findings         Clinical Outcomes     Negatives:   Major bleeding event, Thromboembolic event, Anticoagulation-related hospital admission, Anticoagulation-related ED visit, Anticoagulation-related fatality           OBJECTIVE    INR Protime   Date Value Ref Range Status   2020 2.8 (A) 0.86 - 1.14 Final       ASSESSMENT / PLAN  INR assessment THER    Recheck INR In: 4 WEEKS    INR Location Clinic      Anticoagulation Summary  As of 3/30/2020    INR goal:   2.0-3.0   TTR:   54.5 % (1 y)   INR used for dosin.8 (3/30/2020)   Warfarin maintenance plan:   5 mg (5 mg x 1) every Sat; 2.5 mg (5 mg x 0.5) all other days   Full warfarin instructions:   5 mg every Sat; 2.5 mg all other days   Weekly warfarin total:   20 mg   Plan last modified:   Maryuri Vines RN (3/16/2020)   Next INR check:   2020   Priority:   INR   Target end date:   Indefinite    Indications    Anticoagulation monitoring  INR range 2-3 [Z79.01]  Paroxysmal atrial fibrillation (H) [I48.0]             Anticoagulation Episode Summary     INR check location:       Preferred lab:       Send INR reminders to:    INR    Comments:         Anticoagulation Care Providers     Provider Role Specialty Phone number    Vijay Mackay MD Referring Internal Medicine [phone]            See the Encounter Report to view Anticoagulation Flowsheet and Dosing Calendar (Go to Encounters tab in chart review, and find the Anticoagulation Therapy Visit)        Maryuri Vines RN

## 2020-05-08 ENCOUNTER — ANTICOAGULATION THERAPY VISIT (OUTPATIENT)
Dept: ANTICOAGULATION | Facility: OTHER | Age: 75
End: 2020-05-08
Attending: INTERNAL MEDICINE
Payer: MEDICARE

## 2020-05-08 DIAGNOSIS — Z79.01 ANTICOAGULATION MONITORING, INR RANGE 2-3: ICD-10-CM

## 2020-05-08 DIAGNOSIS — I48.0 PAROXYSMAL ATRIAL FIBRILLATION (H): ICD-10-CM

## 2020-05-08 LAB — INR POINT OF CARE: 2.4 (ref 0.86–1.14)

## 2020-05-08 PROCEDURE — 36416 COLLJ CAPILLARY BLOOD SPEC: CPT | Mod: ZL

## 2020-05-08 NOTE — PROGRESS NOTES
ANTICOAGULATION FOLLOW-UP CLINIC VISIT    Patient Name:  Zoey Fan  Date:  2020  Contact Type:  Face to Face    SUBJECTIVE:  Patient Findings         Clinical Outcomes     Negatives:   Major bleeding event, Thromboembolic event, Anticoagulation-related hospital admission, Anticoagulation-related ED visit, Anticoagulation-related fatality           OBJECTIVE    INR Protime   Date Value Ref Range Status   2020 2.4 (A) 0.86 - 1.14 Final       ASSESSMENT / PLAN  INR assessment THER    Recheck INR In: 4 WEEKS    INR Location Clinic      Anticoagulation Summary  As of 2020    INR goal:   2.0-3.0   TTR:   54.5 % (1 y)   INR used for dosin.4 (2020)   Warfarin maintenance plan:   5 mg (5 mg x 1) every Sat; 2.5 mg (5 mg x 0.5) all other days   Full warfarin instructions:   5 mg every Sat; 2.5 mg all other days   Weekly warfarin total:   20 mg   No change documented:   Janessa Olguin RN   Plan last modified:   Maryuri Vines RN (3/16/2020)   Next INR check:   2020   Priority:   INR   Target end date:   Indefinite    Indications    Anticoagulation monitoring  INR range 2-3 [Z79.01]  Paroxysmal atrial fibrillation (H) [I48.0]             Anticoagulation Episode Summary     INR check location:       Preferred lab:       Send INR reminders to:   [facility]    Comments:         Anticoagulation Care Providers     Provider Role Specialty Phone number    Vijay Mackay MD [facility] Internal Medicine [phone]            See the Encounter Report to view Anticoagulation Flowsheet and Dosing Calendar (Go to Encounters tab in chart review, and find the Anticoagulation Therapy Visit)        Janessa Olguin RN

## 2020-06-05 ENCOUNTER — ANTICOAGULATION THERAPY VISIT (OUTPATIENT)
Dept: ANTICOAGULATION | Facility: OTHER | Age: 75
End: 2020-06-05
Attending: INTERNAL MEDICINE
Payer: MEDICARE

## 2020-06-05 DIAGNOSIS — I48.0 PAROXYSMAL ATRIAL FIBRILLATION (H): ICD-10-CM

## 2020-06-05 DIAGNOSIS — Z79.01 ANTICOAGULATION MONITORING, INR RANGE 2-3: ICD-10-CM

## 2020-06-05 LAB — INR POINT OF CARE: 1.6 (ref 0.86–1.14)

## 2020-06-05 PROCEDURE — 36416 COLLJ CAPILLARY BLOOD SPEC: CPT | Mod: ZL

## 2020-06-05 NOTE — PROGRESS NOTES
ANTICOAGULATION FOLLOW-UP CLINIC VISIT    Patient Name:  Zoey Fan  Date:  2020  Contact Type:  Face to Face    SUBJECTIVE:  Patient Findings     Comments:   Patient denies any identifiable changes that caused the subtherapeutic INR.           Clinical Outcomes     Negatives:   Major bleeding event, Thromboembolic event, Anticoagulation-related hospital admission, Anticoagulation-related ED visit, Anticoagulation-related fatality    Comments:   Patient denies any identifiable changes that caused the subtherapeutic INR.              OBJECTIVE    Recent labs: (last 7 days)     20   INR 1.6*       ASSESSMENT / PLAN  INR assessment THER    Recheck INR In: 3 WEEKS    INR Location Clinic      Anticoagulation Summary  As of 2020    INR goal:   2.0-3.0   TTR:   50.6 % (1 y)   INR used for dosin.6! (2020)   Warfarin maintenance plan:   5 mg (5 mg x 1) every Mon, Fri; 2.5 mg (5 mg x 0.5) all other days   Full warfarin instructions:   5 mg every Mon, Fri; 2.5 mg all other days   Weekly warfarin total:   22.5 mg   Plan last modified:   Janessa Olguin RN (2020)   Next INR check:   2020   Priority:   INR   Target end date:   Indefinite    Indications    Anticoagulation monitoring  INR range 2-3 [Z79.01]  Paroxysmal atrial fibrillation (H) [I48.0]             Anticoagulation Episode Summary     INR check location:       Preferred lab:       Send INR reminders to:   [facility]    Comments:         Anticoagulation Care Providers     Provider Role Specialty Phone number    Vijay Mackay MD [facility] Internal Medicine [phone]            See the Encounter Report to view Anticoagulation Flowsheet and Dosing Calendar (Go to Encounters tab in chart review, and find the Anticoagulation Therapy Visit)        Janessa Olguin RN

## 2020-06-23 ENCOUNTER — TELEPHONE (OUTPATIENT)
Dept: PEDIATRICS | Facility: OTHER | Age: 75
End: 2020-06-23

## 2020-06-23 NOTE — TELEPHONE ENCOUNTER
Patient informed Dr. Mackay recommends she be seen. Patient was transferred to [facility].     Sheila Penn CMA on 6/23/2020 at 4:11 PM

## 2020-06-23 NOTE — TELEPHONE ENCOUNTER
Pt states that she is having a problem with her knee.  She would like to know if she can get an x-ray

## 2020-06-23 NOTE — TELEPHONE ENCOUNTER
Patient states she fell on a tile floor around June 11-12. She hurt her right knee. She states it feels hot and it throbs. Is wondering if she can have an xray done or if she needs to come in to be seen.    Sheila Penn CMA on 6/23/2020 at 3:36 PM

## 2020-06-26 ENCOUNTER — ANTICOAGULATION THERAPY VISIT (OUTPATIENT)
Dept: ANTICOAGULATION | Facility: OTHER | Age: 75
End: 2020-06-26
Attending: INTERNAL MEDICINE
Payer: MEDICARE

## 2020-06-26 DIAGNOSIS — Z79.01 ANTICOAGULATION MONITORING, INR RANGE 2-3: ICD-10-CM

## 2020-06-26 DIAGNOSIS — I48.0 PAROXYSMAL ATRIAL FIBRILLATION (H): ICD-10-CM

## 2020-06-26 LAB — INR POINT OF CARE: 2 (ref 0.86–1.14)

## 2020-06-26 PROCEDURE — 85610 PROTHROMBIN TIME: CPT | Mod: QW,ZL

## 2020-06-26 NOTE — PROGRESS NOTES
ANTICOAGULATION FOLLOW-UP CLINIC VISIT    Patient Name:  Zoey Fan  Date:  2020  Contact Type:  Face to Face    SUBJECTIVE:  Patient Findings         Clinical Outcomes     Negatives:   Major bleeding event, Thromboembolic event, Anticoagulation-related hospital admission, Anticoagulation-related ED visit, Anticoagulation-related fatality           OBJECTIVE    Recent labs: (last 7 days)     20   INR 2.0*       ASSESSMENT / PLAN  INR assessment THER    Recheck INR In: 4 WEEKS    INR Location Clinic      Anticoagulation Summary  As of 2020    INR goal:   2.0-3.0   TTR:   44.9 % (1 y)   INR used for dosin.0 (2020)   Warfarin maintenance plan:   5 mg (5 mg x 1) every Mon, Fri; 2.5 mg (5 mg x 0.5) all other days   Full warfarin instructions:   : 7.5 mg; Otherwise 5 mg every Mon, Fri; 2.5 mg all other days   Weekly warfarin total:   22.5 mg   Plan last modified:   Janessa Olguin RN (2020)   Next INR check:   2020   Priority:   INR   Target end date:   Indefinite    Indications    Anticoagulation monitoring  INR range 2-3 [Z79.01]  Paroxysmal atrial fibrillation (H) [I48.0]             Anticoagulation Episode Summary     INR check location:       Preferred lab:       Send INR reminders to:   [facility]    Comments:         Anticoagulation Care Providers     Provider Role Specialty Phone number    Vijay Mackay MD [facility] Internal Medicine [phone]            See the Encounter Report to view Anticoagulation Flowsheet and Dosing Calendar (Go to Encounters tab in chart review, and find the Anticoagulation Therapy Visit)        Maryuri Vines RN

## 2020-07-24 ENCOUNTER — ANTICOAGULATION THERAPY VISIT (OUTPATIENT)
Dept: ANTICOAGULATION | Facility: OTHER | Age: 75
End: 2020-07-24
Attending: INTERNAL MEDICINE
Payer: MEDICARE

## 2020-07-24 DIAGNOSIS — I48.0 PAROXYSMAL ATRIAL FIBRILLATION (H): ICD-10-CM

## 2020-07-24 DIAGNOSIS — Z79.01 ANTICOAGULATION MONITORING, INR RANGE 2-3: ICD-10-CM

## 2020-07-24 LAB — INR POINT OF CARE: 2.1 (ref 0.86–1.14)

## 2020-07-24 PROCEDURE — 85610 PROTHROMBIN TIME: CPT | Mod: QW,ZL

## 2020-07-24 NOTE — PROGRESS NOTES
ANTICOAGULATION FOLLOW-UP CLINIC VISIT    Patient Name:  Zoey Fan  Date:  2020  Contact Type:  Face to Face    SUBJECTIVE:  Patient Findings         Clinical Outcomes     Negatives:   Major bleeding event, Thromboembolic event, Anticoagulation-related hospital admission, Anticoagulation-related ED visit, Anticoagulation-related fatality           OBJECTIVE    Recent labs: (last 7 days)     20   INR 2.1*       ASSESSMENT / PLAN  INR assessment THER    Recheck INR In: 4 WEEKS    INR Location Clinic      Anticoagulation Summary  As of 2020    INR goal:   2.0-3.0   TTR:   48.0 % (1 y)   INR used for dosin.1 (2020)   Warfarin maintenance plan:   5 mg (5 mg x 1) every Mon, Fri; 2.5 mg (5 mg x 0.5) all other days   Full warfarin instructions:   5 mg every Mon, Fri; 2.5 mg all other days   Weekly warfarin total:   22.5 mg   No change documented:   Latonia Green RN   Plan last modified:   Janessa Olguin RN (2020)   Next INR check:   2020   Priority:   INR   Target end date:   Indefinite    Indications    Anticoagulation monitoring  INR range 2-3 [Z79.01]  Paroxysmal atrial fibrillation (H) [I48.0]             Anticoagulation Episode Summary     INR check location:       Preferred lab:       Send INR reminders to:   [facility]    Comments:         Anticoagulation Care Providers     Provider Role Specialty Phone number    Vijay Mackay MD [facility] Internal Medicine [phone]            See the Encounter Report to view Anticoagulation Flowsheet and Dosing Calendar (Go to Encounters tab in chart review, and find the Anticoagulation Therapy Visit)        Latonia Green RN

## 2020-08-21 ENCOUNTER — ANTICOAGULATION THERAPY VISIT (OUTPATIENT)
Dept: ANTICOAGULATION | Facility: OTHER | Age: 75
End: 2020-08-21
Payer: MEDICARE

## 2020-08-21 DIAGNOSIS — Z79.01 ANTICOAGULATION MONITORING, INR RANGE 2-3: ICD-10-CM

## 2020-08-21 DIAGNOSIS — I48.0 PAROXYSMAL ATRIAL FIBRILLATION (H): ICD-10-CM

## 2020-08-21 LAB — INR POINT OF CARE: 2.4 (ref 0.86–1.14)

## 2020-08-21 PROCEDURE — 85610 PROTHROMBIN TIME: CPT | Mod: QW,ZL

## 2020-08-21 NOTE — PROGRESS NOTES
ANTICOAGULATION FOLLOW-UP CLINIC VISIT    Patient Name:  Zoey Fan  Date:  2020  Contact Type:  Face to Face    SUBJECTIVE:  Patient Findings         Clinical Outcomes     Negatives:   Major bleeding event, Thromboembolic event, Anticoagulation-related hospital admission, Anticoagulation-related ED visit, Anticoagulation-related fatality           OBJECTIVE    Recent labs: (last 7 days)     20   INR 2.4*       ASSESSMENT / PLAN  INR assessment THER    Recheck INR In: 4 WEEKS    INR Location Clinic      Anticoagulation Summary  As of 2020    INR goal:   2.0-3.0   TTR:   52.8 % (1 y)   INR used for dosin.4 (2020)   Warfarin maintenance plan:   5 mg (5 mg x 1) every Mon, Fri; 2.5 mg (5 mg x 0.5) all other days   Full warfarin instructions:   5 mg every Mon, Fri; 2.5 mg all other days   Weekly warfarin total:   22.5 mg   Plan last modified:   Janessa Olguin RN (2020)   Next INR check:   2020   Priority:   INR   Target end date:   Indefinite    Indications    Anticoagulation monitoring  INR range 2-3 [Z79.01]  Paroxysmal atrial fibrillation (H) [I48.0]             Anticoagulation Episode Summary     INR check location:       Preferred lab:       Send INR reminders to:   [facility]    Comments:         Anticoagulation Care Providers     Provider Role Specialty Phone number    Vijay Mackay MD [facility] Internal Medicine [phone]            See the Encounter Report to view Anticoagulation Flowsheet and Dosing Calendar (Go to Encounters tab in chart review, and find the Anticoagulation Therapy Visit)        Maryuri Vines RN

## 2020-08-25 ENCOUNTER — TELEPHONE (OUTPATIENT)
Dept: PEDIATRICS | Facility: OTHER | Age: 75
End: 2020-08-25

## 2020-08-25 DIAGNOSIS — I48.0 PAROXYSMAL ATRIAL FIBRILLATION (H): ICD-10-CM

## 2020-08-25 RX ORDER — WARFARIN SODIUM 5 MG/1
TABLET ORAL
Qty: 90 TABLET | Refills: 1 | Status: SHIPPED | OUTPATIENT
Start: 2020-08-25 | End: 2020-09-18

## 2020-08-25 NOTE — TELEPHONE ENCOUNTER
Patient did not get her warfarin meds in mail yet. Wants to know if she can get a few doses to get thru till the ones in mail come in. Please all her.   Adeline Rodriguez on 8/25/2020 at 2:47 PM

## 2020-08-26 NOTE — TELEPHONE ENCOUNTER
Left message that prescription was sent in to [facility].  Cherri Schmitt LPN  8/26/2020 11:57 AM

## 2020-09-18 ENCOUNTER — ANTICOAGULATION THERAPY VISIT (OUTPATIENT)
Dept: ANTICOAGULATION | Facility: OTHER | Age: 75
End: 2020-09-18
Attending: INTERNAL MEDICINE
Payer: MEDICARE

## 2020-09-18 DIAGNOSIS — Z79.01 ANTICOAGULATION MONITORING, INR RANGE 2-3: ICD-10-CM

## 2020-09-18 DIAGNOSIS — I48.0 PAROXYSMAL ATRIAL FIBRILLATION (H): ICD-10-CM

## 2020-09-18 LAB — INR POINT OF CARE: 1.9 (ref 0.86–1.14)

## 2020-09-18 PROCEDURE — 99207 ZZC NO CHARGE NURSE ONLY: CPT

## 2020-09-18 PROCEDURE — 36416 COLLJ CAPILLARY BLOOD SPEC: CPT | Mod: ZL

## 2020-09-18 RX ORDER — WARFARIN SODIUM 5 MG/1
TABLET ORAL
Qty: 90 TABLET | Refills: 1 | COMMUNITY
Start: 2020-09-18 | End: 2020-11-09

## 2020-09-18 NOTE — PROGRESS NOTES
ANTICOAGULATION FOLLOW-UP CLINIC VISIT    Patient Name:  Zoey Fan  Date:  2020  Contact Type:  Face to Face    SUBJECTIVE:  Patient Findings         Clinical Outcomes     Negatives:   Major bleeding event, Thromboembolic event, Anticoagulation-related hospital admission, Anticoagulation-related ED visit, Anticoagulation-related fatality           OBJECTIVE    Recent labs: (last 7 days)     20   INR 1.9*       ASSESSMENT / PLAN  INR assessment SUB    Recheck INR In: 3 WEEKS    INR Location Clinic      Anticoagulation Summary  As of 2020    INR goal:   2.0-3.0   TTR:   51.3 % (1 y)   INR used for dosin.9! (2020)   Warfarin maintenance plan:   5 mg (5 mg x 1) every Mon, Wed, Fri; 2.5 mg (5 mg x 0.5) all other days   Full warfarin instructions:   5 mg every Mon, Wed, Fri; 2.5 mg all other days   Weekly warfarin total:   25 mg   Plan last modified:   Maryuri Vines RN (2020)   Next INR check:   10/9/2020   Priority:   INR   Target end date:   Indefinite    Indications    Anticoagulation monitoring  INR range 2-3 [Z79.01]  Paroxysmal atrial fibrillation (H) [I48.0]             Anticoagulation Episode Summary     INR check location:       Preferred lab:       Send INR reminders to:   [facility]    Comments:         Anticoagulation Care Providers     Provider Role Specialty Phone number    Vijay Mackay MD [facility] Internal Medicine [phone]            See the Encounter Report to view Anticoagulation Flowsheet and Dosing Calendar (Go to Encounters tab in chart review, and find the Anticoagulation Therapy Visit)        Maryuri Vines RN

## 2020-09-24 ENCOUNTER — HOSPITAL ENCOUNTER (OUTPATIENT)
Dept: MAMMOGRAPHY | Facility: OTHER | Age: 75
Discharge: HOME | End: 2020-09-24
Attending: INTERNAL MEDICINE | Admitting: INTERNAL MEDICINE
Payer: MEDICARE

## 2020-09-24 DIAGNOSIS — Z12.31 VISIT FOR SCREENING MAMMOGRAM: ICD-10-CM

## 2020-09-24 PROCEDURE — 77063 BREAST TOMOSYNTHESIS BI: CPT

## 2020-10-04 ENCOUNTER — ALLIED HEALTH/NURSE VISIT (OUTPATIENT)
Dept: FAMILY MEDICINE | Facility: OTHER | Age: 75
End: 2020-10-04
Payer: MEDICARE

## 2020-10-04 DIAGNOSIS — Z01.818 PRE-OP EXAM: Primary | ICD-10-CM

## 2020-10-04 PROCEDURE — C9803 HOPD COVID-19 SPEC COLLECT: HCPCS

## 2020-10-04 PROCEDURE — 99207 PR NO CHARGE NURSE ONLY: CPT

## 2020-10-04 PROCEDURE — U0003 INFECTIOUS AGENT DETECTION BY NUCLEIC ACID (DNA OR RNA); SEVERE ACUTE RESPIRATORY SYNDROME CORONAVIRUS 2 (SARS-COV-2) (CORONAVIRUS DISEASE [COVID-19]), AMPLIFIED PROBE TECHNIQUE, MAKING USE OF HIGH THROUGHPUT TECHNOLOGIES AS DESCRIBED BY CMS-2020-01-R: HCPCS | Mod: ZL

## 2020-10-05 ENCOUNTER — HOSPITAL ENCOUNTER (OUTPATIENT)
Dept: ULTRASOUND IMAGING | Facility: OTHER | Age: 75
End: 2020-10-05
Attending: INTERNAL MEDICINE
Payer: MEDICARE

## 2020-10-05 ENCOUNTER — HOSPITAL ENCOUNTER (OUTPATIENT)
Dept: MAMMOGRAPHY | Facility: OTHER | Age: 75
End: 2020-10-05
Attending: INTERNAL MEDICINE
Payer: MEDICARE

## 2020-10-05 DIAGNOSIS — R92.8 ABNORMAL FINDING ON BREAST IMAGING: ICD-10-CM

## 2020-10-05 LAB
SARS-COV-2 RNA SPEC QL NAA+PROBE: NOT DETECTED
SPECIMEN SOURCE: NORMAL

## 2020-10-05 PROCEDURE — G0279 TOMOSYNTHESIS, MAMMO: HCPCS

## 2020-10-05 PROCEDURE — 76642 ULTRASOUND BREAST LIMITED: CPT | Mod: RT

## 2020-10-08 ENCOUNTER — TELEPHONE (OUTPATIENT)
Dept: PEDIATRICS | Facility: OTHER | Age: 75
End: 2020-10-08

## 2020-10-08 NOTE — TELEPHONE ENCOUNTER
Patient education performed regarding what to expect with ultrasound breast biopsy.  Patient verbalizes understanding.  Biopsy appointment scheduled for 10/12 at 1230.  Patient reports  use of blood thinners-coumadin.  Patient will be off of coumadin this week for a pacemaker replacement.  She checked with the doctor doing the replacement and they said she could continue to hold coumadin until Monday for biopsy.  Coumadin clinic has coordinated an INR draw on 10/12 at 1200.  Patient reports no allergy to lidocaine or epinephrine.  Follow up (results) appointment scheduled for 10/15 at 0950 with Kenny.  Ioana Summers RN.

## 2020-10-12 ENCOUNTER — OFFICE VISIT (OUTPATIENT)
Dept: PEDIATRICS | Facility: OTHER | Age: 75
End: 2020-10-12
Attending: INTERNAL MEDICINE
Payer: MEDICARE

## 2020-10-12 ENCOUNTER — ANTICOAGULATION THERAPY VISIT (OUTPATIENT)
Dept: ANTICOAGULATION | Facility: OTHER | Age: 75
End: 2020-10-12
Attending: INTERNAL MEDICINE
Payer: MEDICARE

## 2020-10-12 ENCOUNTER — HOSPITAL ENCOUNTER (OUTPATIENT)
Dept: MAMMOGRAPHY | Facility: OTHER | Age: 75
End: 2020-10-12
Attending: INTERNAL MEDICINE
Payer: MEDICARE

## 2020-10-12 ENCOUNTER — HOSPITAL ENCOUNTER (OUTPATIENT)
Dept: ULTRASOUND IMAGING | Facility: OTHER | Age: 75
End: 2020-10-12
Attending: INTERNAL MEDICINE
Payer: MEDICARE

## 2020-10-12 VITALS
HEART RATE: 74 BPM | SYSTOLIC BLOOD PRESSURE: 118 MMHG | TEMPERATURE: 96.9 F | WEIGHT: 165.6 LBS | OXYGEN SATURATION: 96 % | RESPIRATION RATE: 16 BRPM | DIASTOLIC BLOOD PRESSURE: 62 MMHG | BODY MASS INDEX: 25.94 KG/M2

## 2020-10-12 DIAGNOSIS — Z95.0 S/P PLACEMENT OF CARDIAC PACEMAKER: ICD-10-CM

## 2020-10-12 DIAGNOSIS — N63.13 MASS OF LOWER OUTER QUADRANT OF RIGHT BREAST: ICD-10-CM

## 2020-10-12 DIAGNOSIS — L23.1 CONTACT DERMATITIS DUE TO ADHESIVES, UNSPECIFIED CONTACT DERMATITIS TYPE: Primary | ICD-10-CM

## 2020-10-12 DIAGNOSIS — Z79.01 ANTICOAGULATION MONITORING, INR RANGE 2-3: ICD-10-CM

## 2020-10-12 DIAGNOSIS — I48.0 PAROXYSMAL ATRIAL FIBRILLATION (H): ICD-10-CM

## 2020-10-12 LAB — INR POINT OF CARE: 1 (ref 0.86–1.14)

## 2020-10-12 PROCEDURE — 999N000065 MA POST PROCEDURE RIGHT

## 2020-10-12 PROCEDURE — 99213 OFFICE O/P EST LOW 20 MIN: CPT | Performed by: INTERNAL MEDICINE

## 2020-10-12 PROCEDURE — 85610 PROTHROMBIN TIME: CPT

## 2020-10-12 PROCEDURE — 88305 TISSUE EXAM BY PATHOLOGIST: CPT

## 2020-10-12 PROCEDURE — 272N000414 US BREAST BIOPSY CORE NEEDLE RIGHT

## 2020-10-12 PROCEDURE — G0463 HOSPITAL OUTPT CLINIC VISIT: HCPCS | Mod: 25

## 2020-10-12 PROCEDURE — G0463 HOSPITAL OUTPT CLINIC VISIT: HCPCS

## 2020-10-12 PROCEDURE — 250N000009 HC RX 250: Performed by: RADIOLOGY

## 2020-10-12 RX ORDER — LIDOCAINE HYDROCHLORIDE 10 MG/ML
20 INJECTION, SOLUTION EPIDURAL; INFILTRATION; INTRACAUDAL; PERINEURAL ONCE
Status: COMPLETED | OUTPATIENT
Start: 2020-10-12 | End: 2020-10-12

## 2020-10-12 RX ORDER — LIDOCAINE HYDROCHLORIDE AND EPINEPHRINE 10; 10 MG/ML; UG/ML
20 INJECTION, SOLUTION INFILTRATION; PERINEURAL ONCE
Status: COMPLETED | OUTPATIENT
Start: 2020-10-12 | End: 2020-10-12

## 2020-10-12 RX ADMIN — LIDOCAINE HYDROCHLORIDE 2 ML: 10 INJECTION, SOLUTION INFILTRATION; PERINEURAL at 12:52

## 2020-10-12 RX ADMIN — LIDOCAINE HYDROCHLORIDE,EPINEPHRINE BITARTRATE 3 ML: 10; .01 INJECTION, SOLUTION INFILTRATION; PERINEURAL at 12:57

## 2020-10-12 ASSESSMENT — PAIN SCALES - GENERAL: PAINLEVEL: NO PAIN (0)

## 2020-10-12 ASSESSMENT — PATIENT HEALTH QUESTIONNAIRE - PHQ9: SUM OF ALL RESPONSES TO PHQ QUESTIONS 1-9: 0

## 2020-10-12 NOTE — PROGRESS NOTES
Subjective  Zoey Fan is a 75 year old female who presents for change of dressing.  She recently went to [facility] and underwent a pacemaker generator change and atrial lead replacement with Dr. Medrano on 2020.  There were no postoperative complications.  The dressing was supposed to be removed this upcoming Friday the .  Unfortunately she has developed significant irritation of the skin underneath the dressing.  She has no pain under the center of the dressing.  She has not noticed any discharge.  There is no bruising.  She has no fever.    Problem List/PMH: reviewed in EMR, and made relevant updates today.  Medications: reviewed in EMR, and made relevant updates today.  Allergies: reviewed in EMR, and made relevant updates today.    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 Her  was an ED doc and worked in [place] before retiring. They lived on [address]. Patient's   suddenly of a likely arrhythmia in his mid 60s. Patient's son was bi    polar and ended up committing suicide after being convicted for murder. The patient has a daughter that works for [employer]. They recently traveled to the [facility] for a [employer] [employer] and have plans to go     on a cruise in [place] to find polar bears.        Remarried in May 2019.     I reviewed social history and made relevant updates today.    Family Hx:    Family History   Problem Relation Age of Onset     Hypertension Mother         Hypertension     Hyperlipidemia Mother         Hyperlipidemia,elevated cholesterol     Other - See Comments Mother         Stroke,She  at age 86.  She had stroke as a complication of carotid endarterectomy surgery.     Arthritis Father         Arthritis     Cancer Father         Cancer,skin cancer     Other - See Comments Father         Stroke,He is 87, has a history of CVA/Alzheimer's     Breast Cancer Maternal Aunt 50        Cancer-breast     Other - See Comments Sister          of long QT syndrome.     Other - See Comments Son         Psychiatric illness,Bipolar,  due to suicide     Colon Cancer No family hx of         Cancer-colon     Anesthesia Reaction No family hx of         Anesthesia Malignant Hyperthermia       Objective  Vitals: reviewed in EMR.  /62 (BP Location: Right arm, Patient Position: Sitting, Cuff Size: Adult Regular)   Pulse 74   Temp 96.9  F (36.1  C) (Tympanic)   Resp 16   Wt 75.1 kg (165 lb 9.6 oz)   LMP  (LMP Unknown)   SpO2 96%   Breastfeeding No   BMI 25.94 kg/m      Gen: Pleasant female, NAD.  HEENT: MMM  Neck: Supple  Pulm: Breathing easily  Neuro: Grossly intact  Skin: I personally removed the dressing today.  There is erythema around the edge and a square shaped consistent with irritation from the adhesive.  The center of the dressing is absent of erythema.  There is no induration.  No drainage or discharge.  Surgical incision site appears clean, dry and intact.  Psychiatric: Normal affect and insight. Does not appear anxious or depressed.      Assessment    ICD-10-CM    1. Contact dermatitis due to adhesives, unspecified contact dermatitis type  L23.1    2. S/P placement of cardiac pacemaker  Z95.0      I personally removed the dressing today.  There is irritation from the adhesive but the actual pacemaker surgical site looks to be healing well.  We replaced a different clear  occlusive dressing over the site to attempt to remain clean.  I encouraged her to do sponge baths if able or wrap with clear plastic wrap before taking a shower.  Since she will be able to see through the dressing at this point as long as everything appears unchanged to her and no new symptoms including pain or fever she should remove the dressing on her own on Friday and follow-up with electrophysiology as previously planned.    Plan   -- Expected clinical course discussed   --Dressing replaced   --Follow-up with EP    Justin, Vijay Mackay MD, FAAP, FACP  Internal Medicine & Pediatrics

## 2020-10-12 NOTE — PROGRESS NOTES
Patient here for ultrasound guided biopsy of right breast. INR 1.0 today.   Procedure reviewed with patient by writer and radiologist, questions answered.  Time out performed prior to biopsy.  Biopsy completed by radiologist, clip placed.  Pressure held to biopsy site for 10 minutes.  Medipore dressing  applied.   Post clip mammogram completed.  Discharge instructions reviewed with patient, patient verbalizes understanding of instructions.  Discharged to home in stable condition with no evidence of bleeding from biopsy site.   Ioana Summers RN.

## 2020-10-12 NOTE — DISCHARGE INSTRUCTIONS
"NEEDLE BIOPSY BREAST    Activity: Rest the remainder of the day. You may resume normal activity after the next day. Avoid any vigorous/strenuous physical activity for 24 hours.    Comfort: If you have discomfort or tenderness at the site you may take your usual or recommended pain medication. Do not take aspirin the day of the procedure or for 48 hours following the biopsy.    Diet: You may resume your usual diet.    Care of site: Leave ice pack in place for 4 hours, or until it is no longer cold. The ice pack is reusable and may be refrozen.  Keep your bra and the dressing on for 24 hours. Then you may remove the bandage and shower. If there are steri-strips you may remove them in 3 to 5 days.  You may have some discomfort and a small amount of bruising where the biopsy was performed. This is normal. For several days or even a couple of weeks, you may have tenderness or \"twinges\" and a tiny bump where the needle went into the skin. This can be bothersome, but is not abnormal. You can use warm moist washcloths, as this may help. Do Not Use A Heating Pad.    RETURN TO THE EMERGENCY ROOM FOR:   Shortness of breath   Rapid heart rate   If pain becomes worse    Call Your Doctor For:    A fever over 101 degrees   Increased redness, increased swelling, and/or persistent drainage/discomfort  around the site    Other: At the end of your breast biopsy, a tiny titanium clip will be inserted through the biopsy needle and placed at the biopsy site within your breast. The marker provides a landmark of the biopsy for further mammograms or surgical procedures. This marker is MRI compatible and poses no known health risks.    You will be receiving a letter in the mail from [facility] Mammography Department with your biopsy results.  In 6 months a mammogram will be needed to establish a new baseline and to recheck the area where the biopsy occurred. Our radiology department will call you to schedule an appointment.    For " "questions, problems or concerns, contact the Radiology Department at [phone].    NEEDLE BIOPSY BREAST    Activity: Rest the remainder of the day. You may resume normal activity after the next day. Avoid any vigorous/strenuous physical activity for 24 hours.    Comfort: If you have discomfort or tenderness at the site you may take your usual or recommended pain medication. Do not take aspirin the day of the procedure or for 48 hours following the biopsy.    Diet: You may resume your usual diet.    Care of site: Leave ice pack in place for 4 hours, or until it is no longer cold. The ice pack is reusable and may be refrozen.  Keep your bra and the dressing on for 24 hours. Then you may remove the bandage and shower. If there are steri-strips you may remove them in 3 to 5 days.  You may have some discomfort and a small amount of bruising where the biopsy was performed. This is normal. For several days or even a couple of weeks, you may have tenderness or \"twinges\" and a tiny bump where the needle went into the skin. This can be bothersome, but is not abnormal. You can use warm moist washcloths, as this may help. Do Not Use A Heating Pad.    RETURN TO THE EMERGENCY ROOM FOR:   Shortness of breath   Rapid heart rate   If pain becomes worse    Call Your Doctor For:    A fever over 101 degrees   Increased redness, increased swelling, and/or persistent drainage/discomfort  around the site    Other: At the end of your breast biopsy, a tiny titanium clip will be inserted through the biopsy needle and placed at the biopsy site within your breast. The marker provides a landmark of the biopsy for further mammograms or surgical procedures. This marker is MRI compatible and poses no known health risks.    You will be receiving a letter in the mail from [facility] Mammography Department with your biopsy results.  In 6 months a mammogram will be needed to establish a new baseline and to recheck the area where the biopsy occurred. " Our radiology department will call you to schedule an appointment.    For questions, problems or concerns, contact the Radiology Department at [phone].

## 2020-10-12 NOTE — NURSING NOTE
"Chief Complaint   Patient presents with     WOUND CARE     Dressing change after having pacemaker put in      Patient is here to have dressing changed after having pacemaker placed.     Initial /62 (BP Location: Right arm, Patient Position: Sitting, Cuff Size: Adult Regular)   Pulse 74   Temp 96.9  F (36.1  C) (Tympanic)   Resp 16   Wt 75.1 kg (165 lb 9.6 oz)   LMP  (LMP Unknown)   SpO2 96%   Breastfeeding No   BMI 25.94 kg/m   Estimated body mass index is 25.94 kg/m  as calculated from the following:    Height as of 3/9/20: 1.702 m (5' 7\").    Weight as of this encounter: 75.1 kg (165 lb 9.6 oz).  Medication Reconciliation: complete    Sheila Penn MA  "

## 2020-10-12 NOTE — PROGRESS NOTES
ANTICOAGULATION FOLLOW-UP CLINIC VISIT    Patient Name:  Zoey Fan  Date:  10/12/2020  Contact Type:  Face to Face    SUBJECTIVE:  Patient Findings     Positives:  Upcoming invasive procedure, Hospital admission    Comments:  Has a biopsy today and had placemaker replaced on 10/8/2020        Clinical Outcomes     Comments:  Has a biopsy today and had placemaker replaced on 10/8/2020           OBJECTIVE    Recent labs: (last 7 days)     10/12/20   INR 1.0       ASSESSMENT / PLAN  No question data found.  Anticoagulation Summary  As of 10/12/2020    INR goal:  2.0-3.0   TTR:  44.9 % (1 y)   INR used for dosin.0 (10/12/2020)   Warfarin maintenance plan:  5 mg (5 mg x 1) every Mon, Wed, Fri; 2.5 mg (5 mg x 0.5) all other days   Full warfarin instructions:  5 mg every Mon, Wed, Fri; 2.5 mg all other days   Weekly warfarin total:  25 mg   Plan last modified:  Maryuri Vines RN (2020)   Next INR check:  10/14/2020   Priority:  Maintenance   Target end date:  Indefinite    Indications    Anticoagulation monitoring  INR range 2-3 [Z79.01]  Paroxysmal atrial fibrillation (H) [I48.0]             Anticoagulation Episode Summary     INR check location:      Preferred lab:      Send INR reminders to:  [facility]    Comments:        Anticoagulation Care Providers     Provider Role Specialty Phone number    Vijay Mackay MD [facility] Internal Medicine [phone]            See the Encounter Report to view Anticoagulation Flowsheet and Dosing Calendar (Go to Encounters tab in chart review, and find the Anticoagulation Therapy Visit)    Discussed with the patient increased risk of clotting.    Mina Landers RN

## 2020-10-13 ENCOUNTER — TELEPHONE (OUTPATIENT)
Dept: SURGERY | Facility: OTHER | Age: 75
End: 2020-10-13

## 2020-10-15 ENCOUNTER — OFFICE VISIT (OUTPATIENT)
Dept: SURGERY | Facility: OTHER | Age: 75
End: 2020-10-15
Attending: SURGERY
Payer: COMMERCIAL

## 2020-10-15 VITALS
SYSTOLIC BLOOD PRESSURE: 164 MMHG | BODY MASS INDEX: 26.68 KG/M2 | WEIGHT: 170 LBS | TEMPERATURE: 96.7 F | RESPIRATION RATE: 16 BRPM | DIASTOLIC BLOOD PRESSURE: 64 MMHG | HEIGHT: 67 IN | HEART RATE: 76 BPM

## 2020-10-15 DIAGNOSIS — R92.8 ABNORMAL MAMMOGRAM OF RIGHT BREAST: Primary | ICD-10-CM

## 2020-10-15 PROCEDURE — 99203 OFFICE O/P NEW LOW 30 MIN: CPT | Performed by: SURGERY

## 2020-10-15 PROCEDURE — G0463 HOSPITAL OUTPT CLINIC VISIT: HCPCS

## 2020-10-15 ASSESSMENT — PAIN SCALES - GENERAL: PAINLEVEL: NO PAIN (0)

## 2020-10-15 ASSESSMENT — MIFFLIN-ST. JEOR: SCORE: 1298.74

## 2020-10-15 NOTE — PROGRESS NOTES
"Chief Complaint   Patient presents with     Consult     right breast mass       Initial BP (!) 148/78 (BP Location: Right arm, Patient Position: Sitting, Cuff Size: Adult Regular)   Pulse 76   Temp 96.7  F (35.9  C) (Tympanic)   Resp 16   Ht 1.702 m (5' 7\")   Wt 77.1 kg (170 lb)   LMP  (LMP Unknown)   BMI 26.63 kg/m   Estimated body mass index is 26.63 kg/m  as calculated from the following:    Height as of this encounter: 1.702 m (5' 7\").    Weight as of this encounter: 77.1 kg (170 lb).  Medication Reconciliation: complete    At what age did you start menopause? 55?  How many children do you have? 2  What age did your menstrual cycle start? 10  How old were you when your first child was born? 22  Did you breast feed? yes  Are you on or have you ever taken any hormone replacement or birth control? Yes, birth control  Do you have a family history of breast cancer? Yes, maternal aunt  Key Lora LPN..........10/15/2020  9:54 AM      "

## 2020-10-17 NOTE — PROGRESS NOTES
OFFICECONSULTATION NOTE  Patient Name: Zoey Fan  Address: [id number] [place] [address] [address] [place] [address]  Age:75 year old  Sex: female     Primary Care Physician: Vijay Mackay MD    I was requested to see this patient in consultation by Vijay Mackay MD for evaluation of right breast nodule. A copy of this note will be sent to Vijay Mackay MD.    HPI:   The patient is 75 year old female with new nodule noted in the right breast on recent mammogram. The patient hasn't noted any skin, nipple or breast changes. No previous breast cancer. No previous breast biopsy. No family history of breast cancer. The patient had biopsy performed that showed nodular dense fibrous stroma and atrophic lobules. Patient has done well since the biopsy. No issues with the site.    CONSULTATION ASSESSMENT AND PLAN/RECOMMENDATIONS: Fibrous stroma right breast  I discussed with the patient the pathophysiology of breast nodules and breast disease. We specifically discussed that most abnormalities seen on mammogram and US are not breast cancer. I explained that fibrous stroma is not a precancerous condition and that it doesn't turn into breast cancer. I recommended a 6 month right breast mammogram to follow up breast changes after the recent biopsy. The patient expressed understanding and denies further questions. The patient will call with questions or concerns.     REVIEW OF SYSTEMS  GENERAL: No fevers or chills. Denies fatigue, recent weight loss.  HEENT: No sinus drainage. No changes with vision or hearing. No difficulty swallowing.   LYMPHATICS:  No swollen nodes in axilla, neck or groin.  CARDIOVASCULAR: Denies chest pain, palpitations and dyspnea on exertion. Recent generator change left upper chest-healing ok.   PULMONARY: No shortness of breath or cough. No increase in sputum production.  GI:Denies melena, bright red blood in stools. No hematemesis. No constipation or diarrhea.  : No dysuria or  hematuria.  SKIN: No recent rashes or ulcers.   HEMATOLOGY:  Has some easy bruising/bleeding on coumadin.  ENDOCRINE:  No history of diabetes or thyroid problems.  NEUROLOGY:  No history of seizures or headaches. No motor or sensory changes.  BREAST:  Denies breast pain or changes.    Past Medical History:   Diagnosis Date     Atrial fibrillation (H)     No Comments Provided     Complete tear of right rotator cuff     6/15/2017     Essential (primary) hypertension     No Comments Provided     Impingement syndrome of right shoulder     5/23/2017     Insomnia     No Comments Provided     Long Q-T syndrome 2010     Major depressive disorder, single episode     being managed     Other shoulder lesions, right shoulder     5/23/2017     Personal history of other infectious and parasitic diseases     As a child     Personal history of other medical treatment (CODE)     G2, P2     Primary osteoarthritis of shoulder     5/23/2017     Pure hypercholesterolemia     No Comments Provided     Past Surgical History:   Procedure Laterality Date     ARTHROSCOPY SHOULDER ROTATOR CUFF REPAIR      2004,Right shoulder     COLONOSCOPY  12/14/2007 2007,Normal, follow up in 10 years     COLONOSCOPY  08/10/2017    08/10/2017,no follow up due to age being greater than 80 at next screening interval     IMPLANT PACEMAKER      2011,Post ablation, due to junctional rythym     LAPAROSCOPIC TUBAL LIGATION      No Comments Provided     OTHER SURGICAL HISTORY      2004,[id number],OTHER,Left knee     OTHER SURGICAL HISTORY      2011,[id number],EP STUDY /ABLATION,Cardiac ablation     OTHER SURGICAL HISTORY      2015,[id number],CARDIAC ELECTROPHYSIOLOGY STUDY AND ABLATION     TONSILLECTOMY, ADENOIDECTOMY, COMBINED      As a child     Current Outpatient Medications   Medication     atorvastatin (LIPITOR) 40 MG tablet     calcium carbonate (OSCAL 500) 1250 (500 CA) MG TABS tablet     cyanocobalamin (RA VITAMIN B-12 TR) 1000 MCG TBCR     FLUoxetine (PROZAC) 20 MG  capsule     furosemide (LASIX) 20 MG tablet     HYDROcodone-acetaminophen (NORCO) 5-325 MG tablet     lisinopril (PRINIVIL/ZESTRIL) 5 MG tablet     nadolol (CORGARD) 80 MG tablet     naproxen sodium 220 MG capsule     omeprazole (PRILOSEC) 20 MG DR capsule     oxybutynin ER (DITROPAN-XL) 10 MG 24 hr tablet     sildenafil (REVATIO) 20 MG tablet     UNABLE TO FIND     VITAMIN D, CHOLECALCIFEROL, PO     warfarin ANTICOAGULANT (COUMADIN) 5 MG tablet     No current facility-administered medications for this visit.      Allergies   Allergen Reactions     Ciprofloxacin      Other reaction(s): Other - Describe In Comment Field  Patient reports she has Long QT syndrome     Neomycin Itching     Swelling , redness     Sulfa Drugs Nausea and Vomiting     Abdominal pain     Unknown  [No Clinical Screening - See Comments]      Other reaction(s): Cardiac Arrest  Please verify with pharmacist prior to prescribing any medications due to her QT Syndrome     Liquid Adhesive Rash     Reaction also to EKG lead pads.      Tobramycin Rash     Family History   Problem Relation Age of Onset     Hypertension Mother         Hypertension     Hyperlipidemia Mother         Hyperlipidemia,elevated cholesterol     Other - See Comments Mother         Stroke,She  at age 86.  She had stroke as a complication of carotid endarterectomy surgery.     Arthritis Father         Arthritis     Cancer Father         Cancer,skin cancer     Other - See Comments Father         Stroke,He is 87, has a history of CVA/Alzheimer's     Breast Cancer Maternal Aunt 50        Cancer-breast     Other - See Comments Sister          of long QT syndrome.     Other - See Comments Son         Psychiatric illness,Bipolar,  due to suicide     Colon Cancer No family hx of         Cancer-colon     Anesthesia Reaction No family hx of         Anesthesia Malignant Hyperthermia     Social History     Socioeconomic History     Marital status:      Spouse name: None      Number of children: None     Years of education: None     Highest education level: None   Occupational History     None   Social Needs     Financial resource strain: None     Food insecurity     Worry: None     Inability: None     Transportation needs     Medical: None     Non-medical: None   Tobacco Use     Smoking status: Former Smoker     Packs/day: 0.50     Years: 30.00     Pack years: 15.00     Types: Cigarettes     Start date: 1968     Quit date: 2002     Years since quittin.4     Smokeless tobacco: Never Used   Substance and Sexual Activity     Alcohol use: Yes     Alcohol/week: 2.0 standard drinks     Types: 2 Glasses of wine per week     Comment: every day wine     Drug use: Never     Sexual activity: Yes     Partners: Male   Lifestyle     Physical activity     Days per week: None     Minutes per session: None     Stress: None   Relationships     Social connections     Talks on phone: None     Gets together: None     Attends Worship service: None     Active member of club or organization: None     Attends meetings of clubs or organizations: None     Relationship status: None     Intimate partner violence     Fear of current or ex partner: None     Emotionally abused: None     Physically abused: None     Forced sexual activity: None   Other Topics Concern     Parent/sibling w/ CABG, MI or angioplasty before 65F 55M? 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 Her  was an ED doc and worked in [employer] before retiring. They lived on [address]. Patient's   suddenly of a likely arrhythmia in his mid 60s. Patient's son was bi    polar and ended up committing suicide after being convicted for murder. The patient has a [place] that works for [employer]. They recently traveled to the [facility] for a [place] and have plans to go     on a cruise in [place] to  "find polar bears.        Remarried in May 2019.     The above history was reviewed today, 10/17/2020    PHYSICAL EXAM  Vitals: BP (!) 164/64 (BP Location: Right arm, Patient Position: Sitting, Cuff Size: Adult Regular)   Pulse 76   Temp 96.7  F (35.9  C) (Tympanic)   Resp 16   Ht 1.702 m (5' 7\")   Wt 77.1 kg (170 lb)   LMP  (LMP Unknown)   BMI 26.63 kg/m    GENERAL: Healthy appearing patient in no acute distress. Pleasant and cooperative with exam and interview.   HEENT: Head-normocephalic. Eyes-no scleral icterus. Nose-no nasal drainage. No lesions. Mouth-oral mucosa pink and moist, no lesions.  NECK: Supple. No thyroid nodules. Trachea midline.  LYMPHATICS:  No cervical, axillary or supraclavicular adenopathy.  CV: Regular rate and rhythm, no murmurs. No peripheral edema.  LUNGS:  No respiratory distress. Clear bilaterally to auscultation.  ABDOMEN: Non distended. Bowel sounds active. Soft, non-tender, no hepatosplenomegaly or hernias. No peritoneal signs.  SKIN: Pink, warm and dry. No jaundice. No rash.  NEURO:  Cranial nerves II-XII grossly intact. Alert and oriented.  PSYCH: Appropriate mood and affect.  BREAST: Breasts were examined in theseated and supine position. No mass noted bilaterally. No nipple changes or discharge bilaterally. Post biopsy changes noted right breast. Resolving ecchymosis with no sign of infection. Appropriate tenderness noted.    IMAGING/LAB  I personally reviewed patient's recent mammogram, US and biopsy images and report and pathology report.    "

## 2020-10-19 ENCOUNTER — ANTICOAGULATION THERAPY VISIT (OUTPATIENT)
Dept: ANTICOAGULATION | Facility: OTHER | Age: 75
End: 2020-10-19
Attending: INTERNAL MEDICINE
Payer: MEDICARE

## 2020-10-19 DIAGNOSIS — Z79.01 ANTICOAGULATION MONITORING, INR RANGE 2-3: ICD-10-CM

## 2020-10-19 DIAGNOSIS — I48.0 PAROXYSMAL ATRIAL FIBRILLATION (H): ICD-10-CM

## 2020-10-19 LAB — INR POINT OF CARE: 1.8 (ref 0.86–1.14)

## 2020-10-19 PROCEDURE — 36416 COLLJ CAPILLARY BLOOD SPEC: CPT | Mod: ZL

## 2020-10-19 NOTE — PROGRESS NOTES
ANTICOAGULATION FOLLOW-UP CLINIC VISIT    Patient Name:  Zoey Fan  Date:  10/19/2020  Contact Type:  Face to Face    SUBJECTIVE:  Patient Findings         Clinical Outcomes     Negatives:  Major bleeding event, Thromboembolic event, Anticoagulation-related hospital admission, Anticoagulation-related ED visit, Anticoagulation-related fatality           OBJECTIVE    Recent labs: (last 7 days)     10/19/20   INR 1.8*       ASSESSMENT / PLAN  INR assessment SUB    Recheck INR In: 2 WEEKS    INR Location Clinic      Anticoagulation Summary  As of 10/19/2020    INR goal:  2.0-3.0   TTR:  44.9 % (1 y)   INR used for dosin.8 (10/19/2020)   Warfarin maintenance plan:  5 mg (5 mg x 1) every Mon, Wed, Fri; 2.5 mg (5 mg x 0.5) all other days   Full warfarin instructions:  10/19: 7.5 mg; Otherwise 5 mg every Mon, Wed, Fri; 2.5 mg all other days   Weekly warfarin total:  25 mg   Plan last modified:  Maryuri Vines RN (2020)   Next INR check:  2020   Priority:  Maintenance   Target end date:  Indefinite    Indications    Anticoagulation monitoring  INR range 2-3 [Z79.01]  Paroxysmal atrial fibrillation (H) [I48.0]             Anticoagulation Episode Summary     INR check location:      Preferred lab:      Send INR reminders to:  [facility]    Comments:        Anticoagulation Care Providers     Provider Role Specialty Phone number    Vijay Mackay MD [facility] Internal Medicine [phone]            See the Encounter Report to view Anticoagulation Flowsheet and Dosing Calendar (Go to Encounters tab in chart review, and find the Anticoagulation Therapy Visit)        Maryuri Vines RN

## 2020-10-24 ENCOUNTER — APPOINTMENT (OUTPATIENT)
Dept: GENERAL RADIOLOGY | Facility: OTHER | Age: 75
End: 2020-10-24
Attending: PHYSICIAN ASSISTANT
Payer: MEDICARE

## 2020-10-24 ENCOUNTER — HOSPITAL ENCOUNTER (EMERGENCY)
Facility: OTHER | Age: 75
Discharge: HOME | End: 2020-10-24
Attending: PHYSICIAN ASSISTANT | Admitting: PHYSICIAN ASSISTANT
Payer: MEDICARE

## 2020-10-24 VITALS
DIASTOLIC BLOOD PRESSURE: 74 MMHG | OXYGEN SATURATION: 97 % | TEMPERATURE: 97 F | HEART RATE: 72 BPM | SYSTOLIC BLOOD PRESSURE: 155 MMHG | RESPIRATION RATE: 22 BRPM

## 2020-10-24 DIAGNOSIS — I50.32 CHRONIC HEART FAILURE WITH PRESERVED EJECTION FRACTION (H): ICD-10-CM

## 2020-10-24 DIAGNOSIS — R07.9 CHEST PAIN: ICD-10-CM

## 2020-10-24 DIAGNOSIS — B02.9 SHINGLES: ICD-10-CM

## 2020-10-24 DIAGNOSIS — E87.70 FLUID OVERLOAD: ICD-10-CM

## 2020-10-24 LAB
ALBUMIN SERPL-MCNC: 4 G/DL (ref 3.5–5.7)
ALP SERPL-CCNC: 109 U/L (ref 34–104)
ALT SERPL W P-5'-P-CCNC: 16 U/L (ref 7–52)
ANION GAP SERPL CALCULATED.3IONS-SCNC: 7 MMOL/L (ref 3–14)
AST SERPL W P-5'-P-CCNC: 24 U/L (ref 13–39)
BASOPHILS # BLD AUTO: 0.1 10E9/L (ref 0–0.2)
BASOPHILS NFR BLD AUTO: 0.9 %
BILIRUB SERPL-MCNC: 0.6 MG/DL (ref 0.3–1)
BUN SERPL-MCNC: 19 MG/DL (ref 7–25)
CALCIUM SERPL-MCNC: 9 MG/DL (ref 8.6–10.3)
CHLORIDE SERPL-SCNC: 103 MMOL/L (ref 98–107)
CO2 SERPL-SCNC: 29 MMOL/L (ref 21–31)
CREAT SERPL-MCNC: 1.06 MG/DL (ref 0.6–1.2)
DIFFERENTIAL METHOD BLD: ABNORMAL
EOSINOPHIL # BLD AUTO: 0.2 10E9/L (ref 0–0.7)
EOSINOPHIL NFR BLD AUTO: 3.4 %
ERYTHROCYTE [DISTWIDTH] IN BLOOD BY AUTOMATED COUNT: 19.1 % (ref 10–15)
GFR SERPL CREATININE-BSD FRML MDRD: 51 ML/MIN/{1.73_M2}
GLUCOSE SERPL-MCNC: 128 MG/DL (ref 70–105)
HCT VFR BLD AUTO: 34.5 % (ref 35–47)
HGB BLD-MCNC: 10.4 G/DL (ref 11.7–15.7)
IMM GRANULOCYTES # BLD: 0 10E9/L (ref 0–0.4)
IMM GRANULOCYTES NFR BLD: 0.2 %
INR PPP: 2.35 (ref 0.86–1.14)
LYMPHOCYTES # BLD AUTO: 1.2 10E9/L (ref 0.8–5.3)
LYMPHOCYTES NFR BLD AUTO: 18.9 %
MCH RBC QN AUTO: 25.2 PG (ref 26.5–33)
MCHC RBC AUTO-ENTMCNC: 30.1 G/DL (ref 31.5–36.5)
MCV RBC AUTO: 84 FL (ref 78–100)
MONOCYTES # BLD AUTO: 0.7 10E9/L (ref 0–1.3)
MONOCYTES NFR BLD AUTO: 10.2 %
NEUTROPHILS # BLD AUTO: 4.3 10E9/L (ref 1.6–8.3)
NEUTROPHILS NFR BLD AUTO: 66.4 %
NT-PROBNP SERPL-MCNC: 491 PG/ML (ref 0–100)
PLATELET # BLD AUTO: 270 10E9/L (ref 150–450)
POTASSIUM SERPL-SCNC: 4 MMOL/L (ref 3.5–5.1)
PROT SERPL-MCNC: 6.4 G/DL (ref 6.4–8.9)
RBC # BLD AUTO: 4.13 10E12/L (ref 3.8–5.2)
SODIUM SERPL-SCNC: 139 MMOL/L (ref 134–144)
TROPONIN I SERPL-MCNC: 5.3 PG/ML
WBC # BLD AUTO: 6.5 10E9/L (ref 4–11)

## 2020-10-24 PROCEDURE — 83880 ASSAY OF NATRIURETIC PEPTIDE: CPT | Performed by: PHYSICIAN ASSISTANT

## 2020-10-24 PROCEDURE — 71045 X-RAY EXAM CHEST 1 VIEW: CPT

## 2020-10-24 PROCEDURE — 80053 COMPREHEN METABOLIC PANEL: CPT | Performed by: PHYSICIAN ASSISTANT

## 2020-10-24 PROCEDURE — 99284 EMERGENCY DEPT VISIT MOD MDM: CPT | Performed by: PHYSICIAN ASSISTANT

## 2020-10-24 PROCEDURE — 36415 COLL VENOUS BLD VENIPUNCTURE: CPT | Performed by: PHYSICIAN ASSISTANT

## 2020-10-24 PROCEDURE — 85610 PROTHROMBIN TIME: CPT | Performed by: PHYSICIAN ASSISTANT

## 2020-10-24 PROCEDURE — 99285 EMERGENCY DEPT VISIT HI MDM: CPT | Mod: 25 | Performed by: PHYSICIAN ASSISTANT

## 2020-10-24 PROCEDURE — 84484 ASSAY OF TROPONIN QUANT: CPT | Performed by: PHYSICIAN ASSISTANT

## 2020-10-24 PROCEDURE — 93010 ELECTROCARDIOGRAM REPORT: CPT | Performed by: INTERNAL MEDICINE

## 2020-10-24 PROCEDURE — 93005 ELECTROCARDIOGRAM TRACING: CPT | Performed by: PHYSICIAN ASSISTANT

## 2020-10-24 PROCEDURE — 85025 COMPLETE CBC W/AUTO DIFF WBC: CPT | Performed by: PHYSICIAN ASSISTANT

## 2020-10-24 RX ORDER — ACYCLOVIR 800 MG/1
800 TABLET ORAL
Qty: 35 TABLET | Refills: 0 | Status: SHIPPED | OUTPATIENT
Start: 2020-10-24 | End: 2021-07-19

## 2020-10-24 RX ORDER — GABAPENTIN 300 MG/1
300 CAPSULE ORAL 2 TIMES DAILY
Qty: 14 CAPSULE | Refills: 0 | Status: SHIPPED | OUTPATIENT
Start: 2020-10-24 | End: 2021-07-19

## 2020-10-24 ASSESSMENT — ENCOUNTER SYMPTOMS
BACK PAIN: 0
CONFUSION: 0
WOUND: 0
CHEST TIGHTNESS: 0
BRUISES/BLEEDS EASILY: 0
FEVER: 0
ABDOMINAL PAIN: 0
SHORTNESS OF BREATH: 0
HEMATURIA: 0
ADENOPATHY: 0
CHILLS: 0

## 2020-10-24 NOTE — ED PROVIDER NOTES
History     Chief Complaint   Patient presents with     Chest Pain     HPI  Zoey Fan is a 75 year old female who presents to the ED for evaluation of right sided chest pain.  Patient describes her pain as intermittent and located on her right scapula.  She says it is sharp and stabbing.  She has a history of a pacemaker that was recently replaced earlier this month.  She denies fevers, cough, shortness of breath abdominal pain, nausea or vomiting or diaphoresis.    Allergies:  Allergies   Allergen Reactions     Ciprofloxacin      Other reaction(s): Other - Describe In Comment Field  Patient reports she has Long QT syndrome     Neomycin Itching     Swelling , redness     Sulfa Drugs Nausea and Vomiting     Abdominal pain     Unknown  [No Clinical Screening - See Comments]      Other reaction(s): Cardiac Arrest  Please verify with pharmacist prior to prescribing any medications due to her QT Syndrome     Liquid Adhesive Rash     Reaction also to EKG lead pads.      Tobramycin Rash       Problem List:    Patient Active Problem List    Diagnosis Date Noted     Dupuytren contracture 11/04/2019     Priority: Medium     Pulmonary hypertension (H) 11/08/2018     Priority: Medium     Hypercholesterolemia 02/13/2018     Priority: Medium     Major depressive disorder, recurrent episode (H) 02/13/2018     Priority: Medium     Overview:   controlled       Vaginitis, atrophic 02/13/2018     Priority: Medium     Anticoagulation monitoring, INR range 2-3 02/11/2018     Priority: Medium     Paroxysmal atrial fibrillation (H) 02/11/2018     Priority: Medium     Special screening for malignant neoplasms, colon 08/09/2017     Priority: Medium     Osteoporosis 07/25/2017     Priority: Medium     Impaired fasting glucose 07/14/2017     Priority: Medium     Vitamin D deficiency 07/14/2017     Priority: Medium     Complete tear of right rotator cuff 06/15/2017     Priority: Medium     AC (acromioclavicular) joint arthritis  05/23/2017     Priority: Medium     Impingement syndrome of right shoulder 05/23/2017     Priority: Medium     Right rotator cuff tendinitis 05/23/2017     Priority: Medium     (HFpEF) heart failure with preserved ejection fraction (H) 01/07/2015     Priority: Medium     Pacemaker 01/07/2015     Priority: Medium     Overview:   Placed after first ablation in 2011       S/P ablation of atrial fibrillation 01/07/2015     Priority: Medium     Hypertension 04/16/2014     Priority: Medium     Cystocele 04/15/2014     Priority: Medium     ED (stress urinary incontinence, female) 04/15/2014     Priority: Medium     Abdominal pain 03/17/2014     Priority: Medium     Urge urinary incontinence 04/29/2013     Priority: Medium     Insomnia 01/07/2013     Priority: Medium     Long Q-T syndrome 11/13/2012     Priority: Medium     Overview:   PLEASE VERIFY WITH PHARMACIST PRIOR TO PRESCRIBING ANY MEDICATIONS DUE TO HER QT SYNDROME. Followed at [facility]       Edema of eyelid 03/03/2011     Priority: Medium     Other chronic allergic conjunctivitis 02/28/2011     Priority: Medium     Dysphagia 01/19/2011     Priority: Medium        Past Medical History:    Past Medical History:   Diagnosis Date     Atrial fibrillation (H)      Complete tear of right rotator cuff      Essential (primary) hypertension      Impingement syndrome of right shoulder      Insomnia      Long Q-T syndrome 2010     Major depressive disorder, single episode      Other shoulder lesions, right shoulder      Personal history of other infectious and parasitic diseases      Personal history of other medical treatment (CODE)      Primary osteoarthritis of shoulder      Pure hypercholesterolemia        Past Surgical History:    Past Surgical History:   Procedure Laterality Date     ARTHROSCOPY SHOULDER ROTATOR CUFF REPAIR      2004,Right shoulder     COLONOSCOPY  12/14/2007 2007,Normal, follow up in 10 years     COLONOSCOPY  08/10/2017    08/10/2017,no follow up due to  age being greater than 80 at next screening interval     IMPLANT PACEMAKER      ,Post ablation, due to junctional rythym     LAPAROSCOPIC TUBAL LIGATION      No Comments Provided     OTHER SURGICAL HISTORY      2004,[id number],OTHER,Left knee     OTHER SURGICAL HISTORY      ,2065,EP STUDY /ABLATION,Cardiac ablation     OTHER SURGICAL HISTORY      2015,[id number],CARDIAC ELECTROPHYSIOLOGY STUDY AND ABLATION     TONSILLECTOMY, ADENOIDECTOMY, COMBINED      As a child       Family History:    Family History   Problem Relation Age of Onset     Hypertension Mother         Hypertension     Hyperlipidemia Mother         Hyperlipidemia,elevated cholesterol     Other - See Comments Mother         Stroke,She  at age 86.  She had stroke as a complication of carotid endarterectomy surgery.     Arthritis Father         Arthritis     Cancer Father         Cancer,skin cancer     Other - See Comments Father         Stroke,He is 87, has a history of CVA/Alzheimer's     Breast Cancer Maternal Aunt 50        Cancer-breast     Other - See Comments Sister          of long QT syndrome.     Other - See Comments Son         Psychiatric illness,Bipolar,  due to suicide     Colon Cancer No family hx of         Cancer-colon     Anesthesia Reaction No family hx of         Anesthesia Malignant Hyperthermia       Social History:  Marital Status:   [2]  Social History     Tobacco Use     Smoking status: Former Smoker     Packs/day: 0.50     Years: 30.00     Pack years: 15.00     Types: Cigarettes     Start date: 1968     Quit date: 2002     Years since quittin.4     Smokeless tobacco: Never Used   Substance Use Topics     Alcohol use: Yes     Alcohol/week: 2.0 standard drinks     Types: 2 Glasses of wine per week     Comment: every day wine     Drug use: Never        Medications:         acyclovir (ZOVIRAX) 800 MG tablet       atorvastatin (LIPITOR) 40 MG tablet       calcium carbonate (OSCAL 500) 1250 (500 CA)  MG TABS tablet       cyanocobalamin (RA VITAMIN B-12 TR) 1000 MCG TBCR       FLUoxetine (PROZAC) 20 MG capsule       furosemide (LASIX) 20 MG tablet       gabapentin (NEURONTIN) 300 MG capsule       HYDROcodone-acetaminophen (NORCO) 5-325 MG tablet       lisinopril (PRINIVIL/ZESTRIL) 5 MG tablet       nadolol (CORGARD) 80 MG tablet       naproxen sodium 220 MG capsule       omeprazole (PRILOSEC) 20 MG DR capsule       oxybutynin ER (DITROPAN-XL) 10 MG 24 hr tablet       sildenafil (REVATIO) 20 MG tablet       UNABLE TO FIND       VITAMIN D, CHOLECALCIFEROL, PO       warfarin ANTICOAGULANT (COUMADIN) 5 MG tablet          Review of Systems   Constitutional: Negative for chills and fever.   HENT: Negative for congestion.    Eyes: Negative for visual disturbance.   Respiratory: Negative for chest tightness and shortness of breath.    Cardiovascular: Positive for chest pain.   Gastrointestinal: Negative for abdominal pain.   Genitourinary: Negative for hematuria.   Musculoskeletal: Negative for back pain.   Skin: Positive for rash. Negative for wound.   Neurological: Negative for syncope.   Hematological: Negative for adenopathy. Does not bruise/bleed easily.   Psychiatric/Behavioral: Negative for confusion.       Physical Exam   BP: 136/87  Pulse: 75  Temp: 97  F (36.1  C)  Resp: 18  SpO2: 98 %      Physical Exam  Constitutional:       General: She is not in acute distress.     Appearance: She is well-developed. She is not diaphoretic.   HENT:      Head: Normocephalic and atraumatic.   Eyes:      General: No scleral icterus.     Conjunctiva/sclera: Conjunctivae normal.   Neck:      Musculoskeletal: Neck supple.   Cardiovascular:      Rate and Rhythm: Normal rate and regular rhythm.   Pulmonary:      Effort: Pulmonary effort is normal.      Breath sounds: Normal breath sounds.   Abdominal:      Palpations: Abdomen is soft.      Tenderness: There is no abdominal tenderness.   Musculoskeletal:         General: No  deformity.   Lymphadenopathy:      Cervical: No cervical adenopathy.   Skin:     General: Skin is warm and dry.      Findings: Rash present.      Comments: Vesiculopapular rash to right scapula area, does not cross midline    Neurological:      Mental Status: She is alert and oriented to person, place, and time. Mental status is at baseline.   Psychiatric:         Mood and Affect: Mood normal.         Behavior: Behavior normal.         ED Course        Procedures          EKG read at 1200. Atrial paced rhythm. No ST elevation or ischemic changes. Prolonged QT. Consistent with past EKGs.     Critical Care time:  none               Results for orders placed or performed during the hospital encounter of 10/24/20 (from the past 24 hour(s))   CBC with platelets differential   Result Value Ref Range    WBC 6.5 4.0 - 11.0 10e9/L    RBC Count 4.13 3.8 - 5.2 10e12/L    Hemoglobin 10.4 (L) 11.7 - 15.7 g/dL    Hematocrit 34.5 (L) 35.0 - 47.0 %    MCV 84 78 - 100 fl    MCH 25.2 (L) 26.5 - 33.0 pg    MCHC 30.1 (L) 31.5 - 36.5 g/dL    RDW 19.1 (H) 10.0 - 15.0 %    Platelet Count 270 150 - 450 10e9/L    Diff Method Automated Method     % Neutrophils 66.4 %    % Lymphocytes 18.9 %    % Monocytes 10.2 %    % Eosinophils 3.4 %    % Basophils 0.9 %    % Immature Granulocytes 0.2 %    Absolute Neutrophil 4.3 1.6 - 8.3 10e9/L    Absolute Lymphocytes 1.2 0.8 - 5.3 10e9/L    Absolute Monocytes 0.7 0.0 - 1.3 10e9/L    Absolute Eosinophils 0.2 0.0 - 0.7 10e9/L    Absolute Basophils 0.1 0.0 - 0.2 10e9/L    Abs Immature Granulocytes 0.0 0 - 0.4 10e9/L   Comprehensive metabolic panel   Result Value Ref Range    Sodium 139 134 - 144 mmol/L    Potassium 4.0 3.5 - 5.1 mmol/L    Chloride 103 98 - 107 mmol/L    Carbon Dioxide 29 21 - 31 mmol/L    Anion Gap 7 3 - 14 mmol/L    Glucose 128 (H) 70 - 105 mg/dL    Urea Nitrogen 19 7 - 25 mg/dL    Creatinine 1.06 0.60 - 1.20 mg/dL    GFR Estimate 51 (L) >60 mL/min/[1.73_m2]    GFR Estimate If Black 61 >60  mL/min/[1.73_m2]    Calcium 9.0 8.6 - 10.3 mg/dL    Bilirubin Total 0.6 0.3 - 1.0 mg/dL    Albumin 4.0 3.5 - 5.7 g/dL    Protein Total 6.4 6.4 - 8.9 g/dL    Alkaline Phosphatase 109 (H) 34 - 104 U/L    ALT 16 7 - 52 U/L    AST 24 13 - 39 U/L   Troponin GH   Result Value Ref Range    Troponin 5.3 <34.0 pg/mL   Nt probnp inpatient (BNP)   Result Value Ref Range    N-Terminal Pro BNP Inpatient 491 (H) 0 - 100 pg/mL   INR   Result Value Ref Range    INR 2.35 (H) 0.86 - 1.14   XR Chest Port 1 View    Narrative    XR CHEST PORT 1 VW    HISTORY: 75 yearsFemale right sided chest pain    TECHNIQUE: A single view of the chest was performed    COMPARISON: None    FINDINGS: There is mild-to-moderate cardiomegaly. A cardiac pacemaker  is again seen. Pulmonary vascularity is mildly prominent. Lungs are  clear.        Impression    IMPRESSION: Mild prominence of the pulmonary vascularity, question  mild early pulmonary edema. Lungs are otherwise clear.    BRETT SOLO MD       Medications - No data to display    Assessments & Plan (with Medical Decision Making)   Pt nontoxic in NAD. Heart, lung, bowel sounds normal, abd soft, nontender to palpation, nondistended. VSS, afebrile    She does have a vesiculopapular rash to the right scapular area, does not cross midline.    Patient's lab work significant for a negative troponin and a slightly elevated BNP of 491, therapeutic INR.    Chest x-ray read as Mild prominence of the pulmonary vascularity, question  mild early pulmonary edema. Lungs are otherwise clear.    Overall, the patient appears to be doing very well.  Her discomfort seems most likely related to the development of shingles to her right shoulder.  We did discuss that She may have a little extra fluid overload.  She has only been taking 1 dose of 20 mg Lasix per day.  We will increase that to twice per day over the next 4 days.  We will start her on acyclovir as well as gabapentin.  She does have a follow-up appoint  with her cardiologist this coming week and she should attend that.    Strict return precautions are given to the pt, they will return if symptoms are worsening or concerning. The pt understands and agrees with the plan and they are discharged.     Yohannes Anderson PA-C    I have reviewed the nursing notes.    I have reviewed the findings, diagnosis, plan and need for follow up with the patient.       Discharge Medication List as of 10/24/2020  1:58 PM      START taking these medications    Details   acyclovir (ZOVIRAX) 800 MG tablet Take 1 tablet (800 mg) by mouth 5 times daily for 7 days, Disp-35 tablet, R-0, E-Prescribe      gabapentin (NEURONTIN) 300 MG capsule Take 1 capsule (300 mg) by mouth 2 times daily for 7 days, Disp-14 capsule, R-0, E-Prescribe             Final diagnoses:   Shingles   Chest pain   Fluid overload       10/24/2020   [facility]     Yohannes Anderson PA  10/24/20 [id number]

## 2020-10-24 NOTE — ED AVS SNAPSHOT
[facility]  [address]  [place] [address]  Phone: [phone]  Fax: [phone]                                    Zoey Fan   MRN: [id number]    Department: [facility]   Date of Visit: 10/24/2020           After Visit Summary Signature Page    I have received my discharge instructions, and my questions have been answered. I have discussed any challenges I see with this plan with the nurse or doctor.    ..........................................................................................................................................  Patient/Patient Representative Signature      ..........................................................................................................................................  Patient Representative Print Name and Relationship to Patient    ..................................................               ................................................  Date                                   Time    ..........................................................................................................................................  Reviewed by Signature/Title    ...................................................              ..............................................  Date                                               Time          22EPIC Rev 08/18

## 2020-10-24 NOTE — ED TRIAGE NOTES
Patient presents to the ED with complaints of R) sided chest pain.  Patient states pain has been intermittent for the past week and is described as sharp and stabbing pain.  Patient has pacer for afib which was placed in 2011 and a hx of prolonged QT.  Patient states pacer was replaced this month at [facility].  Patient denies SOB, nausea, dizziness.

## 2020-10-24 NOTE — DISCHARGE INSTRUCTIONS
Get plenty of fluids and rest.  Take your medication as directed.  I also recommend that you take your Lasix twice per day, 20 mg twice per day over the next 3 to 4 days.  Keep and attend your already scheduled appointment with cardiology early this next week.  Return to the ED if there are worsening or concerning symptoms.

## 2020-10-24 NOTE — ED NOTES
Patient states shingle like rash to her back which she noticed a few days ago.  Patient states she has received the vaccine in the past.

## 2020-10-27 LAB — INTERPRETATION ECG - MUSE: NORMAL

## 2020-11-02 ENCOUNTER — ANTICOAGULATION THERAPY VISIT (OUTPATIENT)
Dept: ANTICOAGULATION | Facility: OTHER | Age: 75
End: 2020-11-02
Attending: INTERNAL MEDICINE
Payer: MEDICARE

## 2020-11-02 DIAGNOSIS — I48.0 PAROXYSMAL ATRIAL FIBRILLATION (H): ICD-10-CM

## 2020-11-02 DIAGNOSIS — Z79.01 ANTICOAGULATION MONITORING, INR RANGE 2-3: ICD-10-CM

## 2020-11-02 LAB — INR POINT OF CARE: 1.9 (ref 0.86–1.14)

## 2020-11-02 PROCEDURE — 85610 PROTHROMBIN TIME: CPT | Mod: ZL

## 2020-11-02 NOTE — PROGRESS NOTES
ANTICOAGULATION FOLLOW-UP CLINIC VISIT    Patient Name:  Zoey Fan  Date:  2020  Contact Type:  Face to Face    SUBJECTIVE:  Patient Findings     Positives:  Change in medications (completed Zovirax for shingles)        Clinical Outcomes     Negatives:  Major bleeding event, Thromboembolic event, Anticoagulation-related hospital admission, Anticoagulation-related ED visit, Anticoagulation-related fatality           OBJECTIVE    Recent labs: (last 7 days)     20   INR 1.9*       ASSESSMENT / PLAN  INR assessment SUB    Recheck INR In: 2 WEEKS    INR Location Clinic      Anticoagulation Summary  As of 2020    INR goal:  2.0-3.0   TTR:  47.7 % (1 y)   INR used for dosin.9 (2020)   Warfarin maintenance plan:  2.5 mg (5 mg x 0.5) every Sun, Tue, Thu; 5 mg (5 mg x 1) all other days   Full warfarin instructions:  2.5 mg every Sun, Tue, Thu; 5 mg all other days   Weekly warfarin total:  27.5 mg   Plan last modified:  Maryuri Vines RN (2020)   Next INR check:  2020   Priority:  Maintenance   Target end date:  Indefinite    Indications    Anticoagulation monitoring  INR range 2-3 [Z79.01]  Paroxysmal atrial fibrillation (H) [I48.0]             Anticoagulation Episode Summary     INR check location:      Preferred lab:      Send INR reminders to:  [facility]    Comments:        Anticoagulation Care Providers     Provider Role Specialty Phone number    Vijay Mackay MD [facility] Internal Medicine [phone]            See the Encounter Report to view Anticoagulation Flowsheet and Dosing Calendar (Go to Encounters tab in chart review, and find the Anticoagulation Therapy Visit)        Maryuri Vines RN

## 2020-11-09 DIAGNOSIS — I48.0 PAROXYSMAL ATRIAL FIBRILLATION (H): ICD-10-CM

## 2020-11-09 RX ORDER — WARFARIN SODIUM 5 MG/1
TABLET ORAL
Qty: 90 TABLET | Refills: 1 | Status: SHIPPED | OUTPATIENT
Start: 2020-11-09 | End: 2020-11-16

## 2020-11-09 NOTE — TELEPHONE ENCOUNTER
"Requested Prescriptions   Pending Prescriptions Disp Refills     warfarin ANTICOAGULANT (COUMADIN) 5 MG tablet 90 tablet 1     Sig: TAKE 5 MG X FOUR DAYS/WEEK AND 2.5 MG X THREE DAYS/WEEK.  OR AS [facility].       Vitamin K Antagonists Failed - 11/9/2020  2:19 PM        Failed - INR is within goal in the past 6 weeks     Confirm INR is within goal in the past 6 weeks.     Recent Labs   Lab Test 11/02/20   INR 1.9*                       Passed - Recent (12 mo) or future (30 days) visit within the authorizing provider's specialty     Patient has had an office visit with the authorizing provider or a provider within the authorizing providers department within the previous 12 mos or has a future within next 30 days. See \"Patient Info\" tab in inbasket, or \"Choose Columns\" in Meds & Orders section of the refill encounter.              Passed - Medication is active on med list        Passed - Patient is 18 years of age or older        Passed - Patient is not pregnant        Passed - No positive pregnancy on file in past 12 months           Prescription approved per [facility] Refill Protocol.    "

## 2020-11-16 ENCOUNTER — ANTICOAGULATION THERAPY VISIT (OUTPATIENT)
Dept: ANTICOAGULATION | Facility: OTHER | Age: 75
End: 2020-11-16
Attending: INTERNAL MEDICINE
Payer: MEDICARE

## 2020-11-16 DIAGNOSIS — I48.0 PAROXYSMAL ATRIAL FIBRILLATION (H): ICD-10-CM

## 2020-11-16 DIAGNOSIS — Z79.01 ANTICOAGULATION MONITORING, INR RANGE 2-3: ICD-10-CM

## 2020-11-16 LAB — INR POINT OF CARE: 3.3 (ref 0.86–1.14)

## 2020-11-16 PROCEDURE — 36416 COLLJ CAPILLARY BLOOD SPEC: CPT | Mod: ZL

## 2020-11-16 RX ORDER — WARFARIN SODIUM 5 MG/1
TABLET ORAL
Qty: 90 TABLET | Refills: 1 | COMMUNITY
Start: 2020-11-16 | End: 2021-07-12

## 2020-11-16 NOTE — PROGRESS NOTES
ANTICOAGULATION FOLLOW-UP CLINIC VISIT    Patient Name:  Zoey Fan  Date:  11/16/2020  Contact Type:  Face to Face    SUBJECTIVE:  Patient Findings     Positives:  Change in medications (taking Gabapentin daily)        Clinical Outcomes     Negatives:  Major bleeding event, Thromboembolic event, Anticoagulation-related hospital admission, Anticoagulation-related ED visit, Anticoagulation-related fatality           OBJECTIVE    Recent labs: (last 7 days)     11/16/20   INR 3.3*       ASSESSMENT / PLAN  INR assessment SUPRA    Recheck INR In: 2 WEEKS    INR Location Clinic      Anticoagulation Summary  As of 11/16/2020    INR goal:  2.0-3.0   TTR:  50.4 % (1 y)   INR used for dosing:  3.3 (11/16/2020)   Warfarin maintenance plan:  5 mg (5 mg x 1) every Mon, Wed, Fri; 2.5 mg (5 mg x 0.5) all other days   Full warfarin instructions:  5 mg every Mon, Wed, Fri; 2.5 mg all other days   Weekly warfarin total:  25 mg   Plan last modified:  Maryuri Vines RN (11/16/2020)   Next INR check:  11/30/2020   Priority:  Maintenance   Target end date:  Indefinite    Indications    Anticoagulation monitoring  INR range 2-3 [Z79.01]  Paroxysmal atrial fibrillation (H) [I48.0]             Anticoagulation Episode Summary     INR check location:      Preferred lab:      Send INR reminders to:  [facility]    Comments:        Anticoagulation Care Providers     Provider Role Specialty Phone number    Vijay Mackay MD [facility] Internal Medicine [phone]            See the Encounter Report to view Anticoagulation Flowsheet and Dosing Calendar (Go to Encounters tab in chart review, and find the Anticoagulation Therapy Visit)        Maryuri Vines RN

## 2020-11-19 ENCOUNTER — TELEPHONE (OUTPATIENT)
Dept: PEDIATRICS | Facility: OTHER | Age: 75
End: 2020-11-19

## 2020-11-19 DIAGNOSIS — I48.0 PAROXYSMAL ATRIAL FIBRILLATION (H): ICD-10-CM

## 2020-11-19 RX ORDER — NADOLOL 80 MG/1
TABLET ORAL
Qty: 270 TABLET | Refills: 3 | Status: SHIPPED | OUTPATIENT
Start: 2020-11-19

## 2020-11-19 NOTE — TELEPHONE ENCOUNTER
[facility] sent Rx request for the following:   nadolol (CORGARD) 80 MG tablet  Sig: TAKE 1 AND 1/2 TABLETS BY  MOUTH TWICE DAILY    Last Prescription Date:   11/04/2019  Last Fill Qty/Refills:         270, R-3    Last Office Visit:              10/12/2020   Future Office visit:           none  Routing refill request to provider for review/approval because:  Blood pressure out of range   BP Readings from Last 3 Encounters:   10/24/20 (!) 155/74   10/15/20 (!) 164/64   10/12/20 118/62         Unable to complete prescription refill per RN Medication Refill Policy.................... Aileen Duke RN ....................  11/19/2020   2:48 PM

## 2020-11-30 ENCOUNTER — ANTICOAGULATION THERAPY VISIT (OUTPATIENT)
Dept: ANTICOAGULATION | Facility: OTHER | Age: 75
End: 2020-11-30
Attending: INTERNAL MEDICINE
Payer: MEDICARE

## 2020-11-30 DIAGNOSIS — Z79.01 ANTICOAGULATION MONITORING, INR RANGE 2-3: ICD-10-CM

## 2020-11-30 DIAGNOSIS — I48.0 PAROXYSMAL ATRIAL FIBRILLATION (H): ICD-10-CM

## 2020-11-30 LAB — INR POINT OF CARE: 2.5 (ref 0.86–1.14)

## 2020-11-30 PROCEDURE — 85610 PROTHROMBIN TIME: CPT | Mod: ZL

## 2020-11-30 NOTE — PROGRESS NOTES
ANTICOAGULATION FOLLOW-UP CLINIC VISIT    Patient Name:  Zoey Fan  Date:  2020  Contact Type:  Face to Face    SUBJECTIVE:  Patient Findings         Clinical Outcomes     Negatives:  Major bleeding event, Thromboembolic event, Anticoagulation-related hospital admission, Anticoagulation-related ED visit, Anticoagulation-related fatality           OBJECTIVE    Recent labs: (last 7 days)     20   INR 2.5*       ASSESSMENT / PLAN  INR assessment THER    Recheck INR In: 3 WEEKS    INR Location Clinic      Anticoagulation Summary  As of 2020    INR goal:  2.0-3.0   TTR:  52.8 % (1 y)   INR used for dosin.5 (2020)   Warfarin maintenance plan:  5 mg (5 mg x 1) every Mon, Wed, Fri; 2.5 mg (5 mg x 0.5) all other days   Full warfarin instructions:  5 mg every Mon, Wed, Fri; 2.5 mg all other days   Weekly warfarin total:  25 mg   Plan last modified:  Maryuri Vines RN (2020)   Next INR check:  2020   Priority:  Maintenance   Target end date:  Indefinite    Indications    Anticoagulation monitoring  INR range 2-3 [Z79.01]  Paroxysmal atrial fibrillation (H) [I48.0]             Anticoagulation Episode Summary     INR check location:      Preferred lab:      Send INR reminders to:  [facility]    Comments:        Anticoagulation Care Providers     Provider Role Specialty Phone number    Vijay Mackay MD [facility] Internal Medicine [phone]            See the Encounter Report to view Anticoagulation Flowsheet and Dosing Calendar (Go to Encounters tab in chart review, and find the Anticoagulation Therapy Visit)        Maryuri Vines RN

## 2020-12-29 DIAGNOSIS — I10 ESSENTIAL HYPERTENSION: ICD-10-CM

## 2020-12-30 RX ORDER — LISINOPRIL 5 MG/1
TABLET ORAL
Qty: 90 TABLET | Refills: 3 | Status: SHIPPED | OUTPATIENT
Start: 2020-12-30 | End: 2021-10-04

## 2020-12-30 NOTE — TELEPHONE ENCOUNTER
Prescription refilled per RN Medication Refill Policy..................Neelima Camarillo RN 12/30/2020 8:26 AM

## 2021-01-05 ENCOUNTER — ANTICOAGULATION THERAPY VISIT (OUTPATIENT)
Dept: ANTICOAGULATION | Facility: OTHER | Age: 76
End: 2021-01-05
Attending: INTERNAL MEDICINE
Payer: MEDICARE

## 2021-01-05 DIAGNOSIS — I48.0 PAROXYSMAL ATRIAL FIBRILLATION (H): ICD-10-CM

## 2021-01-05 DIAGNOSIS — Z79.01 ANTICOAGULATION MONITORING, INR RANGE 2-3: ICD-10-CM

## 2021-01-05 LAB — INR POINT OF CARE: 3 (ref 0.86–1.14)

## 2021-01-05 PROCEDURE — 36416 COLLJ CAPILLARY BLOOD SPEC: CPT | Mod: ZL

## 2021-01-05 NOTE — PROGRESS NOTES
ANTICOAGULATION FOLLOW-UP CLINIC VISIT    Patient Name:  Zoey Fan  Date:  1/5/2021  Contact Type:  Face to Face    SUBJECTIVE:  Patient Findings         Clinical Outcomes     Negatives:  Major bleeding event, Thromboembolic event, Anticoagulation-related hospital admission, Anticoagulation-related ED visit, Anticoagulation-related fatality           OBJECTIVE    Recent labs: (last 7 days)     01/05/21   INR 3.0*       ASSESSMENT / PLAN  INR assessment THER    Recheck INR In: 4 WEEKS    INR Location Clinic      Anticoagulation Summary  As of 1/5/2021    INR goal:  2.0-3.0   TTR:  57.7 % (1 y)   INR used for dosing:  3.0 (1/5/2021)   Warfarin maintenance plan:  5 mg (5 mg x 1) every Mon, Wed, Fri; 2.5 mg (5 mg x 0.5) all other days   Full warfarin instructions:  5 mg every Mon, Wed, Fri; 2.5 mg all other days   Weekly warfarin total:  25 mg   No change documented:  Maryuri Vines RN   Plan last modified:  Maryuri Vines RN (11/16/2020)   Next INR check:  2/2/2021   Priority:  Maintenance   Target end date:  Indefinite    Indications    Anticoagulation monitoring  INR range 2-3 [Z79.01]  Paroxysmal atrial fibrillation (H) [I48.0]             Anticoagulation Episode Summary     INR check location:      Preferred lab:      Send INR reminders to:  [facility]    Comments:        Anticoagulation Care Providers     Provider Role Specialty Phone number    Vijay Mackay MD [facility] Internal Medicine [phone]            See the Encounter Report to view Anticoagulation Flowsheet and Dosing Calendar (Go to Encounters tab in chart review, and find the Anticoagulation Therapy Visit)        Maryuri Vines RN

## 2021-01-09 NOTE — TELEPHONE ENCOUNTER
Patient Information     Patient Name MRN Zoey Richard [id number] Female 1945      Telephone Encounter by Kathy Joshi at 8/3/2017  8:15 AM     Author:  Kathy Joshi Service:  (none) Author Type:  (none)     Filed:  8/3/2017  8:15 AM Encounter Date:  2017 Status:  Signed     :  Kathy Joshi            After last name and birthday was verified, patient was notified of information below.  Kathy Joshi LPN ................ 8/3/2017 8:15 AM           normal... Well appearing, awake, alert, oriented to person, place, time/situation and in no apparent distress.

## 2021-02-03 NOTE — PATIENT INSTRUCTIONS
Patient Information     Patient Name MRN Sex Zoey Ortega [id number] Female 1945      Patient Instructions by Janessa Olguin RN at 2017 10:45 AM     Author:  Janessa Olguin RN Service:  (none) Author Type:  NURS- Registered Nurse     Filed:  2017 10:46 AM Encounter Date:  2017 Status:  Signed     :  Janessa Olguin RN (NURS- Registered Nurse)            2017 Details    Sun Mon u Fri Sat        1               2      5 mg   See details      3      5 mg         4      5 mg           5      5 mg         6      5 mg         7               8               9               10               11                 12               13               14               15               16               17               18                 19               20               21               22               23               24               25                 26               27               28               29               30                  Date Details    This INR check       Date of next INR:  2017         How to take your warfarin dose     To take:  5 mg Take one of the 5 mg tablets.             Description          Take 5 mg daily x 4 days and recheck INR on 17. Janessa Olguin RN    2017  10:45 AM                                Anticoagulation Summary as of 2017     INR goal 2.0-3.0    Today's INR 1.0    Next INR check 2017          Call your Anticoagulation Clinic at Dept: [phone]   if:   1. Any medications are started, stopped, or there is a change in dose.  2. You experience any bleeding that is not easily stopped or if it is recurrent.  3. You notice an increase in bruising or any bruising that does not heal.  4. You are scheduled for surgery, colonoscopy, dental extraction or any other procedure where you may need to stop your Coumadin (warfarin).           [id number]

## 2021-02-16 ENCOUNTER — IMMUNIZATION (OUTPATIENT)
Dept: FAMILY MEDICINE | Facility: OTHER | Age: 76
End: 2021-02-16
Attending: INTERNAL MEDICINE
Payer: MEDICARE

## 2021-02-16 PROCEDURE — 91300 PR COVID VAC PFIZER DIL RECON 30 MCG/0.3 ML IM: CPT

## 2021-03-05 ENCOUNTER — ANTICOAGULATION THERAPY VISIT (OUTPATIENT)
Dept: ANTICOAGULATION | Facility: OTHER | Age: 76
End: 2021-03-05
Attending: INTERNAL MEDICINE
Payer: MEDICARE

## 2021-03-05 DIAGNOSIS — Z79.01 ANTICOAGULATION MONITORING, INR RANGE 2-3: ICD-10-CM

## 2021-03-05 DIAGNOSIS — I48.0 PAROXYSMAL ATRIAL FIBRILLATION (H): Primary | ICD-10-CM

## 2021-03-05 LAB — INR POINT OF CARE: 2.9 (ref 0.86–1.14)

## 2021-03-05 PROCEDURE — 36416 COLLJ CAPILLARY BLOOD SPEC: CPT | Mod: ZL

## 2021-03-05 NOTE — PROGRESS NOTES
ANTICOAGULATION FOLLOW-UP CLINIC VISIT    Patient Name:  Zoey Fan  Date:  3/5/2021  Contact Type:  Face to Face    SUBJECTIVE:  Patient Findings         Clinical Outcomes     Negatives:  Major bleeding event, Thromboembolic event, Anticoagulation-related hospital admission, Anticoagulation-related ED visit, Anticoagulation-related fatality           OBJECTIVE    Recent labs: (last 7 days)     21   INR 2.9*       ASSESSMENT / PLAN  INR assessment THER    Recheck INR In: 6 WEEKS    INR Location Clinic      Anticoagulation Summary  As of 3/5/2021    INR goal:  2.0-3.0   TTR:  72.7 % (1 y)   INR used for dosin.9 (3/5/2021)   Warfarin maintenance plan:  5 mg (5 mg x 1) every Mon, Wed, Fri; 2.5 mg (5 mg x 0.5) all other days   Full warfarin instructions:  5 mg every Mon, Wed, Fri; 2.5 mg all other days   Weekly warfarin total:  25 mg   Plan last modified:  Maryuri Vines RN (2020)   Next INR check:  2021   Priority:  Maintenance   Target end date:  Indefinite    Indications    Anticoagulation monitoring  INR range 2-3 [Z79.01]  Paroxysmal atrial fibrillation (H) [I48.0]             Anticoagulation Episode Summary     INR check location:      Preferred lab:      Send INR reminders to:  [facility]    Comments:        Anticoagulation Care Providers     Provider Role Specialty Phone number    Vijay Mackay MD Referring Internal Medicine [phone]            See the Encounter Report to view Anticoagulation Flowsheet and Dosing Calendar (Go to Encounters tab in chart review, and find the Anticoagulation Therapy Visit)        Maryuri Vines RN

## 2021-03-06 ENCOUNTER — HEALTH MAINTENANCE LETTER (OUTPATIENT)
Age: 76
End: 2021-03-06

## 2021-03-09 ENCOUNTER — IMMUNIZATION (OUTPATIENT)
Dept: FAMILY MEDICINE | Facility: OTHER | Age: 76
End: 2021-03-09
Attending: FAMILY MEDICINE
Payer: MEDICARE

## 2021-03-09 PROCEDURE — 91300 PR COVID VAC PFIZER DIL RECON 30 MCG/0.3 ML IM: CPT

## 2021-03-11 ENCOUNTER — TELEPHONE (OUTPATIENT)
Dept: PEDIATRICS | Facility: OTHER | Age: 76
End: 2021-03-11

## 2021-03-11 NOTE — TELEPHONE ENCOUNTER
Fax received from [facility] stating Nadolol is not covered under patient's insurance. Covered alternatives include: Propanolol 80 mg and/or Timolol 20 mg. Please send alternative if able.     Sheila Penn CMA on 3/11/2021 at 4:58 PM

## 2021-03-12 NOTE — TELEPHONE ENCOUNTER
See if Ms. Fan would like me to switch, I'd chose propranolol. Or she could get in touch with Dr. Tamayo to see what he'd recommend.    Signed, Vijay Mackay MD, FAAP, FACP  Internal Medicine & Pediatrics

## 2021-03-15 ENCOUNTER — TELEPHONE (OUTPATIENT)
Dept: PEDIATRICS | Facility: OTHER | Age: 76
End: 2021-03-15

## 2021-03-15 NOTE — TELEPHONE ENCOUNTER
Left message to call back- see encounter from 3/11/2021.    Sheila Penn CMA on 3/15/2021 at 12:45 PM

## 2021-03-15 NOTE — TELEPHONE ENCOUNTER
Patient informed of Dr. Mackay's response. She states she will call Dr. Tamayo's office as she has already tried Propranolol and Metoprolol.     Sheila Penn, CMA on 3/15/2021 at 1:52 PM

## 2021-03-15 NOTE — TELEPHONE ENCOUNTER
Needs med refill, medicare will no longer cover medication, please call.      Tammie Arguello on 3/15/2021 at 11:39 AM

## 2021-04-07 ENCOUNTER — HOSPITAL ENCOUNTER (OUTPATIENT)
Dept: MAMMOGRAPHY | Facility: OTHER | Age: 76
Discharge: HOME | End: 2021-04-07
Attending: SURGERY | Admitting: SURGERY
Payer: MEDICARE

## 2021-04-07 DIAGNOSIS — R92.8 ABNORMAL FINDING ON BREAST IMAGING: ICD-10-CM

## 2021-04-07 DIAGNOSIS — Z09 FOLLOW-UP EXAM, 3-6 MONTHS SINCE PREVIOUS EXAM: ICD-10-CM

## 2021-04-07 PROCEDURE — G0279 TOMOSYNTHESIS, MAMMO: HCPCS

## 2021-04-30 ENCOUNTER — ANTICOAGULATION THERAPY VISIT (OUTPATIENT)
Dept: ANTICOAGULATION | Facility: OTHER | Age: 76
End: 2021-04-30
Attending: INTERNAL MEDICINE
Payer: MEDICARE

## 2021-04-30 DIAGNOSIS — I48.0 PAROXYSMAL ATRIAL FIBRILLATION (H): ICD-10-CM

## 2021-04-30 DIAGNOSIS — Z79.01 ANTICOAGULATION MONITORING, INR RANGE 2-3: ICD-10-CM

## 2021-04-30 LAB — INR POINT OF CARE: 3.4 (ref 0.86–1.14)

## 2021-04-30 PROCEDURE — 36416 COLLJ CAPILLARY BLOOD SPEC: CPT | Mod: ZL

## 2021-04-30 NOTE — PROGRESS NOTES
ANTICOAGULATION FOLLOW-UP CLINIC VISIT    Patient Name:  Zoey Fan  Date:  4/30/2021  Contact Type:  Face to Face    SUBJECTIVE:  Patient Findings     Comments:  Patient denies any identifiable changes that caused the supratherapeutic INR. Recommended to have salads this week. Megan Berkowitz RN on 4/30/2021 at 11:26 AM            Clinical Outcomes     Negatives:  Major bleeding event, Thromboembolic event, Anticoagulation-related hospital admission, Anticoagulation-related ED visit, Anticoagulation-related fatality    Comments:  Patient denies any identifiable changes that caused the supratherapeutic INR. Recommended to have salads this week. Megan Berkowitz RN on 4/30/2021 at 11:26 AM               OBJECTIVE    Recent labs: (last 7 days)     04/30/21   INR 3.4*       ASSESSMENT / PLAN  INR assessment SUPRA    Recheck INR In: 1 WEEK    INR Location Clinic      Anticoagulation Summary  As of 4/30/2021    INR goal:  2.0-3.0   TTR:  64.5 % (1 y)   INR used for dosing:  3.4 (4/30/2021)   Warfarin maintenance plan:  5 mg (5 mg x 1) every Mon, Wed, Fri; 2.5 mg (5 mg x 0.5) all other days   Full warfarin instructions:  4/30: 2.5 mg; Otherwise 5 mg every Mon, Wed, Fri; 2.5 mg all other days   Weekly warfarin total:  25 mg   Plan last modified:  Maryuri Vines RN (11/16/2020)   Next INR check:  5/7/2021   Priority:  Maintenance   Target end date:  Indefinite    Indications    Anticoagulation monitoring  INR range 2-3 [Z79.01]  Paroxysmal atrial fibrillation (H) [I48.0]             Anticoagulation Episode Summary     INR check location:      Preferred lab:      Send INR reminders to:  [facility]    Comments:        Anticoagulation Care Providers     Provider Role Specialty Phone number    Vijay Mackay MD Referring Internal Medicine [phone]            See the Encounter Report to view Anticoagulation Flowsheet and Dosing Calendar (Go to Encounters tab in chart review, and find the  Anticoagulation Therapy Visit)        Megan Berkowitz RN

## 2021-05-07 ENCOUNTER — ANTICOAGULATION THERAPY VISIT (OUTPATIENT)
Dept: ANTICOAGULATION | Facility: OTHER | Age: 76
End: 2021-05-07
Attending: INTERNAL MEDICINE
Payer: MEDICARE

## 2021-05-07 DIAGNOSIS — I48.0 PAROXYSMAL ATRIAL FIBRILLATION (H): ICD-10-CM

## 2021-05-07 DIAGNOSIS — Z79.01 ANTICOAGULATION MONITORING, INR RANGE 2-3: ICD-10-CM

## 2021-05-07 LAB — INR POINT OF CARE: 3.6 (ref 0.86–1.14)

## 2021-05-07 PROCEDURE — 36416 COLLJ CAPILLARY BLOOD SPEC: CPT | Mod: ZL

## 2021-05-07 NOTE — PROGRESS NOTES
ANTICOAGULATION FOLLOW-UP CLINIC VISIT    Patient Name:  Zeoy Fan  Date:  5/7/2021  Contact Type:  Face to Face    SUBJECTIVE:  Patient Findings     Comments:  Patient denies any identifiable changes that caused the supratherapeutic INR.           Clinical Outcomes     Negatives:  Major bleeding event, Thromboembolic event, Anticoagulation-related hospital admission, Anticoagulation-related ED visit, Anticoagulation-related fatality    Comments:  Patient denies any identifiable changes that caused the supratherapeutic INR.              OBJECTIVE    Recent labs: (last 7 days)     05/07/21   INR 3.6*       ASSESSMENT / PLAN  INR assessment SUPRA    Recheck INR In: 2 WEEKS    INR Location Clinic      Anticoagulation Summary  As of 5/7/2021    INR goal:  2.0-3.0   TTR:  62.6 % (1 y)   INR used for dosing:  3.6 (5/7/2021)   Warfarin maintenance plan:  5 mg (5 mg x 1) every Mon, Fri; 2.5 mg (5 mg x 0.5) all other days   Full warfarin instructions:  5/7: 2.5 mg; Otherwise 5 mg every Mon, Fri; 2.5 mg all other days   Weekly warfarin total:  22.5 mg   Plan last modified:  Janessa Olguin, RN (5/7/2021)   Next INR check:  5/21/2021   Priority:  Maintenance   Target end date:  Indefinite    Indications    Anticoagulation monitoring  INR range 2-3 [Z79.01]  Paroxysmal atrial fibrillation (H) [I48.0]             Anticoagulation Episode Summary     INR check location:      Preferred lab:      Send INR reminders to:  [facility]    Comments:        Anticoagulation Care Providers     Provider Role Specialty Phone number    Vijay Mackay MD Referring Internal Medicine [phone]            See the Encounter Report to view Anticoagulation Flowsheet and Dosing Calendar (Go to Encounters tab in chart review, and find the Anticoagulation Therapy Visit)        Janessa Olguin, RN

## 2021-05-10 ENCOUNTER — TELEPHONE (OUTPATIENT)
Dept: PEDIATRICS | Facility: OTHER | Age: 76
End: 2021-05-10

## 2021-05-10 NOTE — LETTER
May 11, 2021      Zoey Fan  [address] [place] [address]      Your healthcare team cares about your health. To provide you with the best care,   we have reviewed your chart and based on our findings, we see that you are due to:     - DEPRESSION FOLLOW UP: Complete the attached questionnaires to ensure your mental health needs are being properly met.  Please fill them out and send back to us via [facility], and feel free to call us with any questions or concerns at [phone].    - HYPERTENSION FOLLOW UP: Blood pressure check with nurse only   -If blood pressure is greater or equal to 140/90, schedule a blood pressure office visit with   Dr. Mackay.    If you have already completed these items, please contact the clinic via phone or   HSTYLEt so your care team can review and update your records. Thank you for   choosing [facility] for your healthcare needs. For any questions,   concerns, or to schedule an appointment please contact the clinic.       Healthy Regards,      Your [facility] Care Team          Enclosure: [phone]

## 2021-05-11 NOTE — TELEPHONE ENCOUNTER
Patient Quality Outreach      Summary:    Patient has the following on her problem list/HM:     Depression / Dysthymia review    6 Month Remission: 4-8 month window range:   12 Month Remission: 10-14 month window range:        PHQ-9 SCORE 10/13/2017 11/4/2019 10/12/2020   PHQ-9 Total Score 0 0 0       If PHQ-9 recheck is 5 or more, route to provider for next steps.    Hypertension   Last three blood pressure readings:  BP Readings from Last 3 Encounters:   10/24/20 (!) 155/74   10/15/20 (!) 164/64   10/12/20 118/62     Blood pressure: Failed    HTN Guidelines:  ? 139/89     Patient is due/failing the following:   BP check and PHQ-9 Needed    Type of outreach:    Sent letter.    Questions for provider review:    None                                                                                                                                     Cherri Schmitt LPN  5/11/2021 11:03 AM

## 2021-05-21 ENCOUNTER — ANTICOAGULATION THERAPY VISIT (OUTPATIENT)
Dept: ANTICOAGULATION | Facility: OTHER | Age: 76
End: 2021-05-21
Attending: INTERNAL MEDICINE
Payer: MEDICARE

## 2021-05-21 DIAGNOSIS — Z79.01 ANTICOAGULATION MONITORING, INR RANGE 2-3: ICD-10-CM

## 2021-05-21 DIAGNOSIS — I48.0 PAROXYSMAL ATRIAL FIBRILLATION (H): ICD-10-CM

## 2021-05-21 LAB — INR POINT OF CARE: 3 (ref 0.86–1.14)

## 2021-05-21 PROCEDURE — 36416 COLLJ CAPILLARY BLOOD SPEC: CPT | Mod: ZL

## 2021-05-21 NOTE — PROGRESS NOTES
ANTICOAGULATION FOLLOW-UP CLINIC VISIT    Patient Name:  Zoey Fan  Date:  5/21/2021  Contact Type:  Face to Face    SUBJECTIVE:  Patient Findings         Clinical Outcomes     Negatives:  Major bleeding event, Thromboembolic event, Anticoagulation-related hospital admission, Anticoagulation-related ED visit, Anticoagulation-related fatality           OBJECTIVE    Recent labs: (last 7 days)     05/21/21   INR 3.0*       ASSESSMENT / PLAN  INR assessment THER    Recheck INR In: 4 WEEKS    INR Location Clinic      Anticoagulation Summary  As of 5/21/2021    INR goal:  2.0-3.0   TTR:  58.8 % (1 y)   INR used for dosing:  3.0 (5/21/2021)   Warfarin maintenance plan:  5 mg (5 mg x 1) every Mon, Wed, Fri; 2.5 mg (5 mg x 0.5) all other days   Full warfarin instructions:  5 mg every Mon, Wed, Fri; 2.5 mg all other days   Weekly warfarin total:  25 mg   Plan last modified:  Janessa Olguin RN (5/21/2021)   Next INR check:  6/18/2021   Priority:  Maintenance   Target end date:  Indefinite    Indications    Anticoagulation monitoring  INR range 2-3 [Z79.01]  Paroxysmal atrial fibrillation (H) [I48.0]             Anticoagulation Episode Summary     INR check location:      Preferred lab:      Send INR reminders to:  [facility]    Comments:        Anticoagulation Care Providers     Provider Role Specialty Phone number    Vijay Mackay MD Referring Internal Medicine [phone]            See the Encounter Report to view Anticoagulation Flowsheet and Dosing Calendar (Go to Encounters tab in chart review, and find the Anticoagulation Therapy Visit)        Janessa Olguin RN

## 2021-05-25 ENCOUNTER — OFFICE VISIT (OUTPATIENT)
Dept: PEDIATRICS | Facility: OTHER | Age: 76
End: 2021-05-25
Attending: INTERNAL MEDICINE
Payer: MEDICARE

## 2021-05-25 VITALS
TEMPERATURE: 98 F | WEIGHT: 166 LBS | SYSTOLIC BLOOD PRESSURE: 128 MMHG | OXYGEN SATURATION: 96 % | HEART RATE: 76 BPM | HEIGHT: 67 IN | DIASTOLIC BLOOD PRESSURE: 80 MMHG | BODY MASS INDEX: 26.06 KG/M2 | RESPIRATION RATE: 16 BRPM

## 2021-05-25 DIAGNOSIS — H25.9 SENILE CATARACT OF LEFT EYE, UNSPECIFIED AGE-RELATED CATARACT TYPE: ICD-10-CM

## 2021-05-25 DIAGNOSIS — Z98.890 S/P ABLATION OF ATRIAL FIBRILLATION: ICD-10-CM

## 2021-05-25 DIAGNOSIS — D50.9 MICROCYTIC ANEMIA: ICD-10-CM

## 2021-05-25 DIAGNOSIS — Z95.0 PACEMAKER: ICD-10-CM

## 2021-05-25 DIAGNOSIS — Z86.79 S/P ABLATION OF ATRIAL FIBRILLATION: ICD-10-CM

## 2021-05-25 DIAGNOSIS — I27.20 MILD PULMONARY HYPERTENSION (H): ICD-10-CM

## 2021-05-25 DIAGNOSIS — E78.00 HYPERCHOLESTEROLEMIA: ICD-10-CM

## 2021-05-25 DIAGNOSIS — I48.0 PAROXYSMAL ATRIAL FIBRILLATION (H): ICD-10-CM

## 2021-05-25 DIAGNOSIS — I45.81 LONG Q-T SYNDROME: Chronic | ICD-10-CM

## 2021-05-25 DIAGNOSIS — Z13.29 SCREENING FOR THYROID DISORDER: ICD-10-CM

## 2021-05-25 DIAGNOSIS — I51.89 GRADE II DIASTOLIC DYSFUNCTION: ICD-10-CM

## 2021-05-25 DIAGNOSIS — Z01.818 PREOP GENERAL PHYSICAL EXAM: Primary | ICD-10-CM

## 2021-05-25 DIAGNOSIS — Z23 NEED FOR VACCINATION: ICD-10-CM

## 2021-05-25 DIAGNOSIS — G47.33 OSA (OBSTRUCTIVE SLEEP APNEA): ICD-10-CM

## 2021-05-25 PROBLEM — Z12.11 SPECIAL SCREENING FOR MALIGNANT NEOPLASMS, COLON: Status: RESOLVED | Noted: 2017-08-09 | Resolved: 2021-05-25

## 2021-05-25 PROBLEM — M75.81 RIGHT ROTATOR CUFF TENDINITIS: Status: RESOLVED | Noted: 2017-05-23 | Resolved: 2021-05-25

## 2021-05-25 LAB
ANION GAP SERPL CALCULATED.3IONS-SCNC: 13 MMOL/L (ref 3–14)
BUN SERPL-MCNC: 15 MG/DL (ref 7–25)
CALCIUM SERPL-MCNC: 9.1 MG/DL (ref 8.6–10.3)
CHLORIDE SERPL-SCNC: 102 MMOL/L (ref 98–107)
CHOLEST SERPL-MCNC: 152 MG/DL
CO2 SERPL-SCNC: 25 MMOL/L (ref 21–31)
CREAT SERPL-MCNC: 0.86 MG/DL (ref 0.6–1.2)
ERYTHROCYTE [DISTWIDTH] IN BLOOD BY AUTOMATED COUNT: 19 % (ref 10–15)
FOLATE SERPL-MCNC: 10.4 NG/ML
GFR SERPL CREATININE-BSD FRML MDRD: 64 ML/MIN/{1.73_M2}
GLUCOSE SERPL-MCNC: 110 MG/DL (ref 70–105)
HCT VFR BLD AUTO: 31.7 % (ref 35–47)
HDLC SERPL-MCNC: 60 MG/DL (ref 23–92)
HGB BLD-MCNC: 9.5 G/DL (ref 11.7–15.7)
INTERPRETATION ECG - MUSE: NORMAL
IRON SATN MFR SERPL: 5 % (ref 20–55)
IRON SERPL-MCNC: 23 UG/DL (ref 50–212)
LDLC SERPL CALC-MCNC: 72 MG/DL
MCH RBC QN AUTO: 23.6 PG (ref 26.5–33)
MCHC RBC AUTO-ENTMCNC: 30 G/DL (ref 31.5–36.5)
MCV RBC AUTO: 79 FL (ref 78–100)
NONHDLC SERPL-MCNC: 92 MG/DL
PLATELET # BLD AUTO: 278 10E9/L (ref 150–450)
POTASSIUM SERPL-SCNC: 4 MMOL/L (ref 3.5–5.1)
RBC # BLD AUTO: 4.02 10E12/L (ref 3.8–5.2)
SODIUM SERPL-SCNC: 140 MMOL/L (ref 134–144)
TIBC SERPL-MCNC: 481.6 UG/DL (ref 245–400)
TRIGL SERPL-MCNC: 100 MG/DL
TSH SERPL DL<=0.05 MIU/L-ACNC: 1.73 IU/ML (ref 0.34–5.6)
UIBC (UNSATURATED): 458.6 MG/DL
VIT B12 SERPL-MCNC: >1500 PG/ML (ref 180–914)
WBC # BLD AUTO: 5.6 10E9/L (ref 4–11)

## 2021-05-25 PROCEDURE — 93010 ELECTROCARDIOGRAM REPORT: CPT | Performed by: INTERNAL MEDICINE

## 2021-05-25 PROCEDURE — 84443 ASSAY THYROID STIM HORMONE: CPT | Mod: ZL | Performed by: INTERNAL MEDICINE

## 2021-05-25 PROCEDURE — 99214 OFFICE O/P EST MOD 30 MIN: CPT | Performed by: INTERNAL MEDICINE

## 2021-05-25 PROCEDURE — 82607 VITAMIN B-12: CPT | Mod: ZL | Performed by: INTERNAL MEDICINE

## 2021-05-25 PROCEDURE — G0463 HOSPITAL OUTPT CLINIC VISIT: HCPCS

## 2021-05-25 PROCEDURE — 85027 COMPLETE CBC AUTOMATED: CPT | Mod: ZL | Performed by: INTERNAL MEDICINE

## 2021-05-25 PROCEDURE — 83550 IRON BINDING TEST: CPT | Mod: ZL | Performed by: INTERNAL MEDICINE

## 2021-05-25 PROCEDURE — 82746 ASSAY OF FOLIC ACID SERUM: CPT | Mod: ZL | Performed by: INTERNAL MEDICINE

## 2021-05-25 PROCEDURE — 83540 ASSAY OF IRON: CPT | Mod: ZL | Performed by: INTERNAL MEDICINE

## 2021-05-25 PROCEDURE — 93005 ELECTROCARDIOGRAM TRACING: CPT | Performed by: INTERNAL MEDICINE

## 2021-05-25 PROCEDURE — G0009 ADMIN PNEUMOCOCCAL VACCINE: HCPCS

## 2021-05-25 PROCEDURE — 36415 COLL VENOUS BLD VENIPUNCTURE: CPT | Mod: ZL | Performed by: INTERNAL MEDICINE

## 2021-05-25 PROCEDURE — G0463 HOSPITAL OUTPT CLINIC VISIT: HCPCS | Mod: 25

## 2021-05-25 PROCEDURE — 80048 BASIC METABOLIC PNL TOTAL CA: CPT | Mod: ZL | Performed by: INTERNAL MEDICINE

## 2021-05-25 PROCEDURE — 80061 LIPID PANEL: CPT | Mod: ZL | Performed by: INTERNAL MEDICINE

## 2021-05-25 RX ORDER — FERROUS SULFATE 325(65) MG
325 TABLET ORAL 2 TIMES DAILY
Qty: 180 TABLET | Refills: 3 | Status: SHIPPED | OUTPATIENT
Start: 2021-05-25 | End: 2022-07-18

## 2021-05-25 RX ORDER — ZOSTER VACCINE RECOMBINANT, ADJUVANTED 50 MCG/0.5
1 KIT INTRAMUSCULAR ONCE
Qty: 0.5 ML | Refills: 1 | Status: SHIPPED | OUTPATIENT
Start: 2021-05-25 | End: 2021-05-25

## 2021-05-25 ASSESSMENT — MIFFLIN-ST. JEOR: SCORE: 1275.6

## 2021-05-25 ASSESSMENT — PAIN SCALES - GENERAL: PAINLEVEL: NO PAIN (0)

## 2021-05-25 NOTE — Clinical Note
Please let me know if you would recommend anything other than holding a dose for mckinley.  It didn't seem like checking another INR before surgery would help that much.    Vijay

## 2021-05-25 NOTE — Clinical Note
HIM: Please send a copy of my H&P/note, last EKG scan and report.    Signed, Vijay Mackay MD, FAAP, FACP  Internal Medicine & Pediatrics

## 2021-05-25 NOTE — NURSING NOTE
Chief Complaint   Patient presents with     Pre-Op Exam     Preop 6/2 Dr. Hitchcock Left Cataract        Medication Reconciliation: completed   Dian Cason LPN  5/25/2021 2:18 PM

## 2021-05-25 NOTE — PATIENT INSTRUCTIONS
-- Skip tomorrow night's 5 mg warfarin dose   -- See if Dr. Hitchcock requires a COVID test     -- Schedule sleep study and follow-up with Dr. Williamson     -- No change to other prescriptions   -- Hold aspirin for 5-7 days before surgery, unless you have had a stent then continue aspirin.   -- Hold ibuprofen/Advil for 2-3 days before surgery   -- Hold naproxen/Aleve for 5-7 days before surgery   -- Acetaminophen (Tylenol) is okay   -- Hold vitamins and herbal remedies for 7 days before surgery        Preparing for Your Surgery  Getting started  A nurse will call you to review your health history and instructions. They will give you an arrival time based on your scheduled surgery time.  Please be ready to share the following:    Your doctor's clinic name and phone number    Your medical, surgical and anesthesia history    A list of allergies and sensitivities    A list of medicines, including herbal treatments and over-the-counter drugs    Whether the patient has a legal guardian (ask how to send us the papers in advance)  If you have a child who's having surgery, please ask for a copy of Preparing for Your Child's Surgery.    Preparing for surgery    Within 30 days of surgery: Have a pre-op exam (sometimes called an H&P, or History and Physical). This can be done at a clinic or pre-operative center.  ? If you're having a , you may not need this exam. Talk to your care team    At your pre-op exam, talk to your care team about all medicines you take. If you need to stop any medicines before surgery, ask when to start taking them again.  ? We do this for your safety. Many medicines can make you bleed too much during surgery. Some change how well surgery (anesthesia) drugs work.    Call your insurance company to let them know you're having surgery. (If you don't have insurance, call [phone].)    Call your clinic if there's any change in your health. This includes signs of a cold or flu (sore throat, runny  nose, cough, rash, fever). It also includes a scrape or scratch near the surgery site.    If you have questions on the day of surgery, call your hospital or surgery center.  Eating and drinking guidelines  For your safety: Unless your surgeon tells you otherwise, follow the guidelines below.    Eat and drink as usual until 8 hours before surgery. After that, no food or milk.    Drink clear liquids until 2 hours before surgery. These are liquids you can see through, like water, Gatorade and Propel Water. You may also have black coffee and tea (no cream or milk).    Nothing by mouth within 2 hours of surgery. This includes gum, candy and breath mints.    If you drink, stop drinking alcohol the night before surgery.    If your care team tells you to take medicine on the morning of surgery, it's okay to take it with a sip of water.  Preventing infection    Shower or bathe the night before and morning of your surgery. Follow the instructions your clinic gave you. (If no instructions, use regular soap.)    Don't shave or clip hair near your surgery site. We'll remove the hair if needed.    Don't smoke or vape the morning of surgery. You may chew nicotine gum up to 2 hours before surgery. A nicotine patch is okay.  ? Note: Some surgeries require you to completely quit smoking and nicotine. Check with your surgeon.    Your care team will make every effort to keep you safe from infection. We will:  ? Clean our hands often with soap and water (or an alcohol-based hand rub).  ? Clean the skin at your surgery site with a special soap that kills germs.  ? Give you a special gown to keep you warm. (Cold raises the risk of infection.)  ? Wear special hair covers, masks, gowns and gloves during surgery.  ? Give antibiotic medicine, if prescribed. Not all surgeries need antibiotics.  What to bring on the day of surgery    Photo ID and insurance card    Copy of your health care directive, if you have one    Glasses and hearing aides  (bring cases)  ? You can't wear contacts during surgery    Inhaler and eye drops, if you use them (tell us about these when you arrive)    CPAP machine or breathing device, if you use them    A few personal items, if spending the night    If you have . . .  ? A pacemaker or ICD (cardiac defibrillator): Bring the ID card.  ? An implanted stimulator: Bring the remote control.  ? A legal guardian: Bring a copy of the certified (court-stamped) guardianship papers.  Please remove any jewelry, including body piercings. Leave jewelry and other valuables at home.  If you're going home the day of surgery  Important: If you don't follow the rules below, we must cancel your surgery.     Arrange for someone to drive you home after surgery. You may not drive, take a taxi or take public transportation by yourself (unless you'll have local anesthesia only).    Arrange for a responsible adult to stay with you overnight. If you don't, we may keep you in the hospital overnight, and you may need to pay the costs yourself.  Questions?   If you have any questions for your care team, list them here: _________________________________________________________________________________________________________________________________________________________________________________________________________________________________________________________________________________________________________________________  For informational purposes only. Not to replace the advice of your health care provider. Copyright   2003, 2019 [facility]. All rights reserved. Clinically reviewed by Abby Bonilla MD. [employer] [id number] - REV 4/20.    Preparing for Your Surgery  Getting started  A nurse will call you to review your health history and instructions. They will give you an arrival time based on your scheduled surgery time.  Please be ready to share the following:    Your doctor's clinic name and phone number    Your medical, surgical and  anesthesia history    A list of allergies and sensitivities    A list of medicines, including herbal treatments and over-the-counter drugs    Whether the patient has a legal guardian (ask how to send us the papers in advance)  If you have a child who's having surgery, please ask for a copy of Preparing for Your Child's Surgery.    Preparing for surgery    Within 30 days of surgery: Have a pre-op exam (sometimes called an H&P, or History and Physical). This can be done at a clinic or pre-operative center.  ? If you're having a , you may not need this exam. Talk to your care team    At your pre-op exam, talk to your care team about all medicines you take. If you need to stop any medicines before surgery, ask when to start taking them again.  ? We do this for your safety. Many medicines can make you bleed too much during surgery. Some change how well surgery (anesthesia) drugs work.    Call your insurance company to let them know you're having surgery. (If you don't have insurance, call [phone].)    Call your clinic if there's any change in your health. This includes signs of a cold or flu (sore throat, runny nose, cough, rash, fever). It also includes a scrape or scratch near the surgery site.    If you have questions on the day of surgery, call your hospital or surgery center.  Eating and drinking guidelines  For your safety: Unless your surgeon tells you otherwise, follow the guidelines below.    Eat and drink as usual until 8 hours before surgery. After that, no food or milk.    Drink clear liquids until 2 hours before surgery. These are liquids you can see through, like water, Gatorade and Propel Water. You may also have black coffee and tea (no cream or milk).    Nothing by mouth within 2 hours of surgery. This includes gum, candy and breath mints.    If you drink, stop drinking alcohol the night before surgery.    If your care team tells you to take medicine on the morning of surgery, it's okay to  take it with a sip of water.  Preventing infection    Shower or bathe the night before and morning of your surgery. Follow the instructions your clinic gave you. (If no instructions, use regular soap.)    Don't shave or clip hair near your surgery site. We'll remove the hair if needed.    Don't smoke or vape the morning of surgery. You may chew nicotine gum up to 2 hours before surgery. A nicotine patch is okay.  ? Note: Some surgeries require you to completely quit smoking and nicotine. Check with your surgeon.    Your care team will make every effort to keep you safe from infection. We will:  ? Clean our hands often with soap and water (or an alcohol-based hand rub).  ? Clean the skin at your surgery site with a special soap that kills germs.  ? Give you a special gown to keep you warm. (Cold raises the risk of infection.)  ? Wear special hair covers, masks, gowns and gloves during surgery.  ? Give antibiotic medicine, if prescribed. Not all surgeries need antibiotics.  What to bring on the day of surgery    Photo ID and insurance card    Copy of your health care directive, if you have one    Glasses and hearing aides (bring cases)  ? You can't wear contacts during surgery    Inhaler and eye drops, if you use them (tell us about these when you arrive)    CPAP machine or breathing device, if you use them    A few personal items, if spending the night    If you have . . .  ? A pacemaker or ICD (cardiac defibrillator): Bring the ID card.  ? An implanted stimulator: Bring the remote control.  ? A legal guardian: Bring a copy of the certified (court-stamped) guardianship papers.  Please remove any jewelry, including body piercings. Leave jewelry and other valuables at home.  If you're going home the day of surgery  Important: If you don't follow the rules below, we must cancel your surgery.     Arrange for someone to drive you home after surgery. You may not drive, take a taxi or take public transportation by yourself  (unless you'll have local anesthesia only).    Arrange for a responsible adult to stay with you overnight. If you don't, we may keep you in the hospital overnight, and you may need to pay the costs yourself.  Questions?   If you have any questions for your care team, list them here: _________________________________________________________________________________________________________________________________________________________________________________________________________________________________________________________________________________________________________________________  For informational purposes only. Not to replace the advice of your health care provider. Copyright   2019 [facility]. All rights reserved. Clinically reviewed by Abby Bonilla MD. [employer] [id number] - REV .    Preparing for Your Surgery  Getting started  A nurse will call you to review your health history and instructions. They will give you an arrival time based on your scheduled surgery time.  Please be ready to share the following:    Your doctor's clinic name and phone number    Your medical, surgical and anesthesia history    A list of allergies and sensitivities    A list of medicines, including herbal treatments and over-the-counter drugs    Whether the patient has a legal guardian (ask how to send us the papers in advance)  If you have a child who's having surgery, please ask for a copy of Preparing for Your Child's Surgery.    Preparing for surgery    Within 30 days of surgery: Have a pre-op exam (sometimes called an H&P, or History and Physical). This can be done at a clinic or pre-operative center.  ? If you're having a , you may not need this exam. Talk to your care team    At your pre-op exam, talk to your care team about all medicines you take. If you need to stop any medicines before surgery, ask when to start taking them again.  ? We do this for your safety. Many medicines can make  you bleed too much during surgery. Some change how well surgery (anesthesia) drugs work.    Call your insurance company to let them know you're having surgery. (If you don't have insurance, call [phone].)    Call your clinic if there's any change in your health. This includes signs of a cold or flu (sore throat, runny nose, cough, rash, fever). It also includes a scrape or scratch near the surgery site.    If you have questions on the day of surgery, call your hospital or surgery center.  Eating and drinking guidelines  For your safety: Unless your surgeon tells you otherwise, follow the guidelines below.    Eat and drink as usual until 8 hours before surgery. After that, no food or milk.    Drink clear liquids until 2 hours before surgery. These are liquids you can see through, like water, Gatorade and Propel Water. You may also have black coffee and tea (no cream or milk).    Nothing by mouth within 2 hours of surgery. This includes gum, candy and breath mints.    If you drink, stop drinking alcohol the night before surgery.    If your care team tells you to take medicine on the morning of surgery, it's okay to take it with a sip of water.  Preventing infection    Shower or bathe the night before and morning of your surgery. Follow the instructions your clinic gave you. (If no instructions, use regular soap.)    Don't shave or clip hair near your surgery site. We'll remove the hair if needed.    Don't smoke or vape the morning of surgery. You may chew nicotine gum up to 2 hours before surgery. A nicotine patch is okay.  ? Note: Some surgeries require you to completely quit smoking and nicotine. Check with your surgeon.    Your care team will make every effort to keep you safe from infection. We will:  ? Clean our hands often with soap and water (or an alcohol-based hand rub).  ? Clean the skin at your surgery site with a special soap that kills germs.  ? Give you a special gown to keep you warm. (Cold raises  the risk of infection.)  ? Wear special hair covers, masks, gowns and gloves during surgery.  ? Give antibiotic medicine, if prescribed. Not all surgeries need antibiotics.  What to bring on the day of surgery    Photo ID and insurance card    Copy of your health care directive, if you have one    Glasses and hearing aides (bring cases)  ? You can't wear contacts during surgery    Inhaler and eye drops, if you use them (tell us about these when you arrive)    CPAP machine or breathing device, if you use them    A few personal items, if spending the night    If you have . . .  ? A pacemaker or ICD (cardiac defibrillator): Bring the ID card.  ? An implanted stimulator: Bring the remote control.  ? A legal guardian: Bring a copy of the certified (court-stamped) guardianship papers.  Please remove any jewelry, including body piercings. Leave jewelry and other valuables at home.  If you're going home the day of surgery  Important: If you don't follow the rules below, we must cancel your surgery.     Arrange for someone to drive you home after surgery. You may not drive, take a taxi or take public transportation by yourself (unless you'll have local anesthesia only).    Arrange for a responsible adult to stay with you overnight. If you don't, we may keep you in the hospital overnight, and you may need to pay the costs yourself.  Questions?   If you have any questions for your care team, list them here: _________________________________________________________________________________________________________________________________________________________________________________________________________________________________________________________________________________________________________________________  For informational purposes only. Not to replace the advice of your health care provider. Copyright   2003, 2019 [facility]. All rights reserved. Clinically reviewed by Abby Bonilla MD. [employer]  [id number] - REV 4/20.

## 2021-05-25 NOTE — NURSING NOTE
Chief Complaint   Patient presents with     Pre-Op Exam     Preop 6/2 Dr. Hitchcock Left Cataract        Medication Reconciliation: completed   Dian Cason LPN  5/25/2021 2:06 PM

## 2021-05-25 NOTE — PROGRESS NOTES
[facility] AND [facility]  [address]  [place] [address]  Phone: [phone]  Fax: [phone]  Primary Provider: Vijay Mackay  [id number]}  PREOPERATIVE EVALUATION:  Today's date: 5/25/2021    Zoey Fan is a 76 year old female who presents for a preoperative evaluation.    Surgical Information:  Surgery/Procedure: Left Cataract  Surgery Location: [facility]   Surgeon: Dr. Hitchcock  Surgery Date: 6/2/21  Time of Surgery: n/a  Where patient plans to recover: At home with family  Fax number for surgical facility: [phone]    Type of Anesthesia Anticipated: Local      PREOPERATIVE HISTORY & PHYSICAL  Date of Exam: 5/25/2021  Chief Complaint   Patient presents with     Pre-Op Exam     Preop 6/2 Dr. Naldo Mcnair Cataract        Nursing Notes:   Dian Cason LPN  5/25/2021  2:14 PM  Signed  Chief Complaint   Patient presents with     Pre-Op Exam     Preop 6/2 Dr. Naldo Mcnair Cataract        Medication Reconciliation: completed   Dian Cason LPN  5/25/2021 2:06 PM       HPI:  I was asked to see Ms. Zoey Fan by Dr. Hitchcock for preoperative management of afib, pacemaker, hypertension.    Zoey Fan is a 76 year old female with a history of atrial fibrillation on warfarin anticoagulation, grade 2 diastolic dysfunction with mild pulmonary hypertension, long QT syndrome status post pacemaker placement here for preoperative examination.   The most physically active she has been over the past 2 weeks was: Chores without chest pains.    Patient Active Problem List    Diagnosis Date Noted     Grade II diastolic dysfunction 05/25/2021     Priority: Medium     Dupuytren contracture 11/04/2019     Priority: Medium     Mild pulmonary hypertension (H) 11/08/2018     Priority: Medium     Hypercholesterolemia 02/13/2018     Priority: Medium     Major depressive disorder, recurrent episode (H) 02/13/2018     Priority: Medium     Overview:   controlled       Vaginitis,  atrophic 02/13/2018     Priority: Medium     Anticoagulation monitoring, INR range 2-3 02/11/2018     Priority: Medium     Paroxysmal atrial fibrillation (H) 02/11/2018     Priority: Medium     Osteoporosis 07/25/2017     Priority: Medium     Impaired fasting glucose 07/14/2017     Priority: Medium     Vitamin D deficiency 07/14/2017     Priority: Medium     Complete tear of right rotator cuff 06/15/2017     Priority: Medium     AC (acromioclavicular) joint arthritis 05/23/2017     Priority: Medium     Impingement syndrome of right shoulder 05/23/2017     Priority: Medium     (HFpEF) heart failure with preserved ejection fraction (H) 01/07/2015     Priority: Medium     Pacemaker 01/07/2015     Priority: Medium     Overview:   Placed after first ablation in 2011       S/P ablation of atrial fibrillation 01/07/2015     Priority: Medium     Hypertension 04/16/2014     Priority: Medium     Cystocele 04/15/2014     Priority: Medium     ED (stress urinary incontinence, female) 04/15/2014     Priority: Medium     Abdominal pain 03/17/2014     Priority: Medium     Urge urinary incontinence 04/29/2013     Priority: Medium     Insomnia 01/07/2013     Priority: Medium     Long Q-T syndrome 11/13/2012     Priority: Medium     Overview:   PLEASE VERIFY WITH PHARMACIST PRIOR TO PRESCRIBING ANY MEDICATIONS DUE TO HER QT SYNDROME. Followed at [facility]       Edema of eyelid 03/03/2011     Priority: Medium     Other chronic allergic conjunctivitis 02/28/2011     Priority: Medium     Dysphagia 01/19/2011     Priority: Medium     Past Medical History:   Diagnosis Date     Atrial fibrillation (H)     No Comments Provided     Complete tear of right rotator cuff     6/15/2017     Essential (primary) hypertension     No Comments Provided     Impingement syndrome of right shoulder     5/23/2017     Insomnia     No Comments Provided     Long Q-T syndrome 2010     Major depressive disorder, single episode     being managed     Other shoulder  lesions, right shoulder     5/23/2017     Personal history of other infectious and parasitic diseases     As a child     Personal history of other medical treatment (CODE)     G2, P2     Primary osteoarthritis of shoulder     5/23/2017     Pure hypercholesterolemia     No Comments Provided     Past Surgical History:   Procedure Laterality Date     ARTHROSCOPY SHOULDER ROTATOR CUFF REPAIR      2004,Right shoulder     COLONOSCOPY  12/14/2007 2007,Normal, follow up in 10 years     COLONOSCOPY  08/10/2017    08/10/2017,no follow up due to age being greater than 80 at next screening interval     IMPLANT PACEMAKER      2011,Post ablation, due to junctional rythym     LAPAROSCOPIC TUBAL LIGATION      No Comments Provided     OTHER SURGICAL HISTORY      2004,101648,OTHER,Left knee     OTHER SURGICAL HISTORY      2011,[id number],EP STUDY /ABLATION,Cardiac ablation     OTHER SURGICAL HISTORY      2015,[id number],CARDIAC ELECTROPHYSIOLOGY STUDY AND ABLATION     TONSILLECTOMY, ADENOIDECTOMY, COMBINED      As a child     Current Outpatient Medications   Medication Sig Dispense Refill     atorvastatin (LIPITOR) 40 MG tablet TAKE ONE-HALF TABLET BY  MOUTH AT BEDTIME 45 tablet 3     calcium carbonate (OSCAL 500) 1250 (500 CA) MG TABS tablet Take 500 mg by mouth daily with food       cyanocobalamin (RA VITAMIN B-12 TR) 1000 MCG TBCR Take 1,000 mcg by mouth daily       ferrous sulfate (FEROSUL) 325 (65 Fe) MG tablet Take 1 tablet (325 mg) by mouth 2 times daily 180 tablet 3     FLUoxetine (PROZAC) 20 MG capsule TAKE 1 CAPSULE BY MOUTH  EVERY MORNING 90 capsule 3     furosemide (LASIX) 20 MG tablet Take 1 tablet (20 mg) by mouth 2 times daily 180 tablet 3     HYDROcodone-acetaminophen (NORCO) 5-325 MG tablet Take 1 tablet by mouth every 4 hours as needed for moderate to severe pain or severe pain 20 tablet 0     lisinopril (ZESTRIL) 5 MG tablet TAKE 1 TABLET BY MOUTH  DAILY 90 tablet 3     nadolol (CORGARD) 80 MG tablet TAKE 1 AND 1/2  TABLETS BY  MOUTH TWICE DAILY 270 tablet 3     naproxen sodium 220 MG capsule Take 1 tablet by mouth 2 times daily (with meals)        omeprazole (PRILOSEC) 20 MG DR capsule TAKE 1 CAPSULE BY MOUTH  ONCE A DAY BEFORE A MEAL 90 capsule 3     oxybutynin ER (DITROPAN-XL) 10 MG 24 hr tablet TAKE 1 TABLET BY MOUTH  DAILY 90 tablet 3     sildenafil (REVATIO) 20 MG tablet 3 times daily        UNABLE TO FIND MEDICATION NAME: AREDS ( for eye health)       VITAMIN D, CHOLECALCIFEROL, PO Take 1,000 Units by mouth daily       warfarin ANTICOAGULANT (COUMADIN) 5 MG tablet TAKE 5 MG X THREE DAYS/WEEK AND 2.5 MG X FOUR DAYS/WEEK.  OR AS DIRECTED BY [facility]. 90 tablet 1     zoster vaccine recombinant adjuvanted (SHINGRIX) injection Inject 0.5 mLs into the muscle once for 1 dose 0.5 mL 1     acyclovir (ZOVIRAX) 800 MG tablet Take 1 tablet (800 mg) by mouth 5 times daily for 7 days 35 tablet 0     gabapentin (NEURONTIN) 300 MG capsule Take 1 capsule (300 mg) by mouth 2 times daily for 7 days 14 capsule 0     Allergies   Allergen Reactions     Ciprofloxacin      Other reaction(s): Other - Describe In Comment Field  Patient reports she has Long QT syndrome     Neomycin Itching     Swelling , redness     Neosporin [Neomycin-Polymyx-Gramicid]      Sulfa Drugs Nausea and Vomiting     Abdominal pain     Unknown  [No Clinical Screening - See Comments]      Other reaction(s): Cardiac Arrest  Please verify with pharmacist prior to prescribing any medications due to her QT Syndrome     Liquid Adhesive Rash     Reaction also to EKG lead pads.      Tobramycin Rash     Family History   Problem Relation Age of Onset     Hypertension Mother         Hypertension     Hyperlipidemia Mother         Hyperlipidemia,elevated cholesterol     Other - See Comments Mother         Stroke,She  at age 86.  She had stroke as a complication of carotid endarterectomy surgery.     Arthritis Father         Arthritis     Cancer Father         Cancer,skin  cancer     Other - See Comments Father         Stroke,He is 87, has a history of CVA/Alzheimer's     Breast Cancer Maternal Aunt 50        Cancer-breast     Other - See Comments Sister          of long QT syndrome.     Other - See Comments Son         Psychiatric illness,Bipolar,  due to suicide     Colon Cancer No family hx of         Cancer-colon     Anesthesia Reaction No family hx of         Anesthesia Malignant Hyperthermia     Social History     Socioeconomic History     Marital status:      Spouse name: None     Number of children: None     Years of education: None     Highest education level: None   Occupational History     None   Social Needs     Financial resource strain: None     Food insecurity     Worry: None     Inability: None     Transportation needs     Medical: None     Non-medical: None   Tobacco Use     Smoking status: Former Smoker     Packs/day: 0.50     Years: 30.00     Pack years: 15.00     Types: Cigarettes     Start date: 1968     Quit date: 2002     Years since quittin.0     Smokeless tobacco: Never Used   Substance and Sexual Activity     Alcohol use: Yes     Alcohol/week: 2.0 standard drinks     Types: 2 Glasses of wine per week     Comment: every day wine     Drug use: Never     Sexual activity: Yes     Partners: Male   Lifestyle     Physical activity     Days per week: None     Minutes per session: None     Stress: None   Relationships     Social connections     Talks on phone: None     Gets together: None     Attends Jehovah's witness service: None     Active member of club or organization: None     Attends meetings of clubs or organizations: None     Relationship status: None     Intimate partner violence     Fear of current or ex partner: None     Emotionally abused: None     Physically abused: None     Forced sexual activity: None   Other Topics Concern     Parent/sibling w/ CABG, MI or angioplasty before 65F 55M? Not Asked   Social History Narrative    Patient  "is a retired RN. She moved here from [place], [place] in the . Her  was an ED doc and worked in [place] before retiring. They lived on [address]. Patient's   suddenly of a likely arrhythmia in his mid 60s. Patient's son was bi    polar and ended up committing suicide after being convicted for murder. The patient has a [place] that works for [employer]. They recently traveled to the [facility] for a ooma and have plans to go     on a cruise in [place] to find polar bears.        Remarried in May 2019.       REVIEW OF SYSTEMS:  Review of Systems:  Skin: Negative  Eyes: See HPI  Ears/Nose/Throat: Negative  Respiratory: Negative  Cardiovascular: Negative  Gastrointestinal: Negative  Genitourinary: Negative  Musculoskeletal: Negative  Neurologic: Negative  Psychiatric: Negative  Hematologic/Lymphatic/Immunologic: Negative  Endocrine: Negative      EXAM:   Vitals: reviewed in EMR.  /80 (BP Location: Right arm, Patient Position: Sitting, Cuff Size: Adult Large)   Pulse 76   Temp 98  F (36.7  C) (Tympanic)   Resp 16   Ht 1.702 m (5' 7\")   Wt 75.3 kg (166 lb)   LMP  (LMP Unknown)   SpO2 96%   Breastfeeding No   BMI 26.00 kg/m      Gen: Pleasant female, NAD.  HEENT: MMM, no OP erythema.   Neck: Supple, no JVD, no bruits.  CV: RRR no m/r/g.   Pulm: CTAB no w/r/r  Neuro: Grossly intact  Msk: No lower extremity edema.  Skin: No concerning lesions.  Psychiatric: Normal affect and insight. Does not appear anxious or depressed.      DIAGNOSTICS:   ECG  Study personally reviewed today  Atrial paced rhythm, prolonged QTc 554 ms    Results for orders placed or performed in visit on 21 (from the past 48 hour(s))   EKG 12-lead, tracing only   Result Value Ref Range    Interpretation ECG Click View Image link to view waveform and result    Thyrotropin GH   Result Value Ref Range    Thyrotropin 1.73 0.34 - 5.60 IU/mL   Lipid Profile   Result " Value Ref Range    Cholesterol 152 <200 mg/dL    Triglycerides 100 <150 mg/dL    HDL Cholesterol 60 23 - 92 mg/dL    LDL Cholesterol Calculated 72 <100 mg/dL    Non HDL Cholesterol 92 <130 mg/dL   CBC with platelets   Result Value Ref Range    WBC 5.6 4.0 - 11.0 10e9/L    RBC Count 4.02 3.8 - 5.2 10e12/L    Hemoglobin 9.5 (L) 11.7 - 15.7 g/dL    Hematocrit 31.7 (L) 35.0 - 47.0 %    MCV 79 78 - 100 fl    MCH 23.6 (L) 26.5 - 33.0 pg    MCHC 30.0 (L) 31.5 - 36.5 g/dL    RDW 19.0 (H) 10.0 - 15.0 %    Platelet Count 278 150 - 450 10e9/L   Basic metabolic panel   Result Value Ref Range    Sodium 140 134 - 144 mmol/L    Potassium 4.0 3.5 - 5.1 mmol/L    Chloride 102 98 - 107 mmol/L    Carbon Dioxide 25 21 - 31 mmol/L    Anion Gap 13 3 - 14 mmol/L    Glucose 110 (H) 70 - 105 mg/dL    Urea Nitrogen 15 7 - 25 mg/dL    Creatinine 0.86 0.60 - 1.20 mg/dL    GFR Estimate 64 >60 mL/min/[1.73_m2]    GFR Estimate If Black 78 >60 mL/min/[1.73_m2]    Calcium 9.1 8.6 - 10.3 mg/dL   Folate   Result Value Ref Range    Folate 10.4 >5.21 ng/mL   Vitamin B12   Result Value Ref Range    Vitamin B12 >1,500 (H) 180 - 914 pg/mL   Iron Binding Panel GH   Result Value Ref Range    Iron 23 (L) 50 - 212 ug/dL    UIBC (Unsaturated) 458.60 mg/dL    Iron Binding Capacity 481.60 (H) 245.00 - 400.00 ug/dL    Iron Saturation 5 (L) 20 - 55 %             IMPRESSION:     ICD-10-CM    1. Preop general physical exam  Z01.818 EKG 12-lead, tracing only     Basic metabolic panel     CBC with platelets     CBC with platelets     Basic metabolic panel   2. Senile cataract of left eye, unspecified age-related cataract type  H25.9    3. TAMMY (obstructive sleep apnea)  G47.33 SLEEP EVALUATION & MANAGEMENT REFERRAL - ADULT -[facility] [phone] (Age 13 and up)   4. Grade II diastolic dysfunction  I51.9    5. Mild pulmonary hypertension (H)  I27.20    6. Pacemaker  Z95.0    7. Paroxysmal atrial fibrillation (H)  I48.0    8. Need  for vaccination  Z23 zoster vaccine recombinant adjuvanted (SHINGRIX) injection   9. Hypercholesterolemia  E78.00 Lipid Profile     Lipid Profile   10. Screening for thyroid disorder  Z13.29 Thyrotropin GH     Thyrotropin GH   11. Long Q-T syndrome  I45.81    12. S/P ablation of atrial fibrillation  Z98.890     Z86.79    13. Microcytic anemia  D50.9 Iron Binding Panel GH     Vitamin B12     Folate     Folate     Vitamin B12     Iron Binding Panel GH     Iron Binding Panel GH     CBC with platelets     ferrous sulfate (FEROSUL) 325 (65 Fe) MG tablet       For above listed surgery and anesthesia:   Patient is at increased risk for perioperative complications based on atrial fibrillation on warfarin anticoagulation, grade 2 diastolic dysfunction with mild pulmonary hypertension, long QT syndrome status post pacemaker placement, age.    We discussed the use of bridging anticoagulation perioperatively and decided against its use.  I recommend holding a single dose of her warfarin which should allow her INR to drift down over the course of the next week.    RECOMMENDATIONS:   APPROVAL GIVEN to proceed with proposed procedure, without further diagnostic evaluation    Patient is on chronic pain medications: No  Patient is on antiplatelet/anticoagulation:yes  Other medications that need adjustment perioperatively: No  Patient Instructions      -- Skip tomorrow night's 5 mg warfarin dose   -- See if Dr. Hitchcock requires a COVID test     -- Schedule sleep study and follow-up with Dr. Williamson     -- No change to other prescriptions   -- Hold aspirin for 5-7 days before surgery, unless you have had a stent then continue aspirin.   -- Hold ibuprofen/Advil for 2-3 days before surgery   -- Hold naproxen/Aleve for 5-7 days before surgery   -- Acetaminophen (Tylenol) is okay   -- Hold vitamins and herbal remedies for 7 days before surgery            Signed, Vijay Mackay MD, FAAP, FACP  Internal Medicine & Pediatrics

## 2021-06-01 ENCOUNTER — DOCUMENTATION ONLY (OUTPATIENT)
Dept: PEDIATRICS | Facility: OTHER | Age: 76
End: 2021-06-01

## 2021-06-02 ENCOUNTER — TELEPHONE (OUTPATIENT)
Dept: PEDIATRICS | Facility: OTHER | Age: 76
End: 2021-06-02

## 2021-06-02 ASSESSMENT — PATIENT HEALTH QUESTIONNAIRE - PHQ9: SUM OF ALL RESPONSES TO PHQ QUESTIONS 1-9: 1

## 2021-06-02 NOTE — TELEPHONE ENCOUNTER
PHQ9 questionnaire received from patient. Entered into screening.  Cherri Schmitt LPN  6/2/2021 2:56 PM

## 2021-06-10 ENCOUNTER — TELEPHONE (OUTPATIENT)
Dept: PEDIATRICS | Facility: OTHER | Age: 76
End: 2021-06-10

## 2021-06-10 NOTE — TELEPHONE ENCOUNTER
"Faith from [facility] called [facility] stating she received an order for a sleep study put in by Dr. Mackay. The reason for the study was given as simply \"Dr. Williamson wants repeat study\". The type of study was not given. She is wondering what the reason is for the repeat study and what type of study Dr. Mackay would like.  Please advise.    Diya Shen on 6/10/2021 at 2:49 PM    "

## 2021-06-11 NOTE — TELEPHONE ENCOUNTER
I spoke with Adriana from [facility] since Faith is gone this afternoon. I gave her the phone number to reach Dr. Williamson assistant to hopefully get the information they need.  Shoaib Diaz LPN on 6/11/2021 at 2:53 PM

## 2021-06-18 ENCOUNTER — ANTICOAGULATION THERAPY VISIT (OUTPATIENT)
Dept: ANTICOAGULATION | Facility: OTHER | Age: 76
End: 2021-06-18
Attending: INTERNAL MEDICINE
Payer: MEDICARE

## 2021-06-18 DIAGNOSIS — I48.0 PAROXYSMAL ATRIAL FIBRILLATION (H): ICD-10-CM

## 2021-06-18 DIAGNOSIS — Z79.01 ANTICOAGULATION MONITORING, INR RANGE 2-3: ICD-10-CM

## 2021-06-18 LAB — INR POINT OF CARE: 4.1 (ref 0.86–1.14)

## 2021-06-18 PROCEDURE — 36416 COLLJ CAPILLARY BLOOD SPEC: CPT | Mod: ZL

## 2021-06-18 NOTE — PROGRESS NOTES
ANTICOAGULATION FOLLOW-UP CLINIC VISIT2    Patient Name:  Zoey Roy  Date:  2021  Contact Type:  Face to Face    SUBJECTIVE:  Patient Findings     Comments:  Patient denies any identifiable changes that caused the supratherapeutic INR. Unable to do 2nd cataract procedure INR to high          Clinical Outcomes     Negatives:  Major bleeding event, Thromboembolic event, Anticoagulation-related hospital admission, Anticoagulation-related ED visit, Anticoagulation-related fatality    Comments:  Patient denies any identifiable changes that caused the supratherapeutic INR. Unable to do 2nd cataract procedure INR to high             OBJECTIVE    Recent labs: (last 7 days)     21   INR 4.1*       ASSESSMENT / PLAN  INR assessment SUPRA    Recheck INR In: 1 WEEK    INR Location Clinic      Anticoagulation Summary  As of 2021    INR goal:  2.0-3.0   TTR:  58.5 % (1 y)   INR used for dosin.1 (2021)   Warfarin maintenance plan:  5 mg (5 mg x 1) every Mon, Fri; 2.5 mg (5 mg x 0.5) all other days   Full warfarin instructions:  : Hold; Otherwise 5 mg every Mon, Fri; 2.5 mg all other days   Weekly warfarin total:  22.5 mg   Plan last modified:  Janessa Olguin RN (2021)   Next INR check:  2021   Priority:  Maintenance   Target end date:  Indefinite    Indications    Anticoagulation monitoring  INR range 2-3 [Z79.01]  Paroxysmal atrial fibrillation (H) [I48.0]             Anticoagulation Episode Summary     INR check location:      Preferred lab:      Send INR reminders to:  [facility]    Comments:        Anticoagulation Care Providers     Provider Role Specialty Phone number    Vijay Mackay MD Referring Internal Medicine [phone]            See the Encounter Report to view Anticoagulation Flowsheet and Dosing Calendar (Go to Encounters tab in chart review, and find the Anticoagulation Therapy Visit)        Janessa Olguin, RN

## 2021-06-21 ENCOUNTER — TELEPHONE (OUTPATIENT)
Dept: PEDIATRICS | Facility: OTHER | Age: 76
End: 2021-06-21

## 2021-06-21 NOTE — TELEPHONE ENCOUNTER
"Due to an insurance conflict Zoey needs to \"Start over\" with the Sleep study process.  She is scheduled for a split night sleep study but the order form does not specify.  In order for insurance to cover this sleep study she needs a verbal order or new order form stating \"Split night sleep study\"  Please call Faith and give a verbal order to continue this process.        Melissa Gonsalves on 6/21/2021 at 3:39 PM        Faith .[phone]  "

## 2021-06-25 ENCOUNTER — ANTICOAGULATION THERAPY VISIT (OUTPATIENT)
Dept: ANTICOAGULATION | Facility: OTHER | Age: 76
End: 2021-06-25
Attending: INTERNAL MEDICINE
Payer: MEDICARE

## 2021-06-25 DIAGNOSIS — I48.0 PAROXYSMAL ATRIAL FIBRILLATION (H): ICD-10-CM

## 2021-06-25 DIAGNOSIS — Z79.01 ANTICOAGULATION MONITORING, INR RANGE 2-3: ICD-10-CM

## 2021-06-25 LAB — INR POINT OF CARE: 1.9 (ref 0.86–1.14)

## 2021-06-25 PROCEDURE — 36416 COLLJ CAPILLARY BLOOD SPEC: CPT | Mod: ZL

## 2021-06-25 NOTE — TELEPHONE ENCOUNTER
Faith notified  Dian Cason LPN........................6/25/2021  1:29 PM   Scheduled for study   8/5/21 @ 8:30  Dian Cason LPN........................6/25/2021  1:28 PM

## 2021-06-25 NOTE — PROGRESS NOTES
ANTICOAGULATION FOLLOW-UP CLINIC VISIT    Patient Name:  Zoey Fan  Date:  2021  Contact Type:  Face to Face    SUBJECTIVE:  Patient Findings         Clinical Outcomes     Negatives:  Major bleeding event, Thromboembolic event, Anticoagulation-related hospital admission, Anticoagulation-related ED visit, Anticoagulation-related fatality           OBJECTIVE    Recent labs: (last 7 days)     21   INR 1.9*       ASSESSMENT / PLAN  INR assessment SUB    Recheck INR In: 1 WEEK    INR Location Clinic      Anticoagulation Summary  As of 2021    INR goal:  2.0-3.0   TTR:  59.4 % (1 y)   INR used for dosin.9 (2021)   Warfarin maintenance plan:  5 mg (5 mg x 1) every Mon, Fri; 2.5 mg (5 mg x 0.5) all other days   Full warfarin instructions:  5 mg every Mon, Fri; 2.5 mg all other days   Weekly warfarin total:  22.5 mg   No change documented:  Megan Brekowitz RN   Plan last modified:  Janessa Olguin RN (2021)   Next INR check:  2021   Priority:  Maintenance   Target end date:  Indefinite    Indications    Anticoagulation monitoring  INR range 2-3 [Z79.01]  Paroxysmal atrial fibrillation (H) [I48.0]             Anticoagulation Episode Summary     INR check location:      Preferred lab:      Send INR reminders to:  [facility]    Comments:        Anticoagulation Care Providers     Provider Role Specialty Phone number    Vijay Mackay MD Referring Internal Medicine [phone]            See the Encounter Report to view Anticoagulation Flowsheet and Dosing Calendar (Go to Encounters tab in chart review, and find the Anticoagulation Therapy Visit)        Megan Berkowitz RN

## 2021-07-02 ENCOUNTER — ANTICOAGULATION THERAPY VISIT (OUTPATIENT)
Dept: ANTICOAGULATION | Facility: OTHER | Age: 76
End: 2021-07-02
Attending: INTERNAL MEDICINE
Payer: MEDICARE

## 2021-07-02 DIAGNOSIS — I48.0 PAROXYSMAL ATRIAL FIBRILLATION (H): ICD-10-CM

## 2021-07-02 DIAGNOSIS — Z79.01 ANTICOAGULATION MONITORING, INR RANGE 2-3: ICD-10-CM

## 2021-07-02 LAB — INR POINT OF CARE: 2.1 (ref 0.86–1.14)

## 2021-07-02 PROCEDURE — 36416 COLLJ CAPILLARY BLOOD SPEC: CPT | Mod: ZL

## 2021-07-02 NOTE — PROGRESS NOTES
ANTICOAGULATION FOLLOW-UP CLINIC VISIT    Patient Name:  Zoey Fan  Date:  2021  Contact Type:  Face to Face    SUBJECTIVE:  Patient Findings         Clinical Outcomes     Negatives:  Major bleeding event, Thromboembolic event, Anticoagulation-related hospital admission, Anticoagulation-related ED visit, Anticoagulation-related fatality           OBJECTIVE    Recent labs: (last 7 days)     21   INR 2.1*       ASSESSMENT / PLAN  INR assessment THER    Recheck INR In: 2 WEEKS    INR Location Clinic      Anticoagulation Summary  As of 2021    INR goal:  2.0-3.0   TTR:  58.7 % (1 y)   INR used for dosin.1 (2021)   Warfarin maintenance plan:  5 mg (5 mg x 1) every Mon, Fri; 2.5 mg (5 mg x 0.5) all other days   Full warfarin instructions:  5 mg every Mon, Fri; 2.5 mg all other days   Weekly warfarin total:  22.5 mg   No change documented:  Janessa Olguin RN   Plan last modified:  Janessa Olguin RN (2021)   Next INR check:  2021   Priority:  Maintenance   Target end date:  Indefinite    Indications    Anticoagulation monitoring  INR range 2-3 [Z79.01]  Paroxysmal atrial fibrillation (H) [I48.0]             Anticoagulation Episode Summary     INR check location:      Preferred lab:      Send INR reminders to:  [facility]    Comments:        Anticoagulation Care Providers     Provider Role Specialty Phone number    Vijay Mackay MD Referring Internal Medicine [phone]            See the Encounter Report to view Anticoagulation Flowsheet and Dosing Calendar (Go to Encounters tab in chart review, and find the Anticoagulation Therapy Visit)        Janessa Olguin RN

## 2021-07-12 DIAGNOSIS — I48.0 PAROXYSMAL ATRIAL FIBRILLATION (H): ICD-10-CM

## 2021-07-12 RX ORDER — WARFARIN SODIUM 5 MG/1
TABLET ORAL
Qty: 90 TABLET | Refills: 1 | Status: SHIPPED | OUTPATIENT
Start: 2021-07-12 | End: 2021-08-17

## 2021-07-12 NOTE — TELEPHONE ENCOUNTER
"Requested Prescriptions   Pending Prescriptions Disp Refills     warfarin ANTICOAGULANT (COUMADIN) 5 MG tablet 90 tablet 1     Sig: TAKE 5 MG X 2 DAYS/WEEK AND 2.5 MG X 5 DAYS/WEEK.  OR [facility].       Vitamin K Antagonists Failed - 7/12/2021  3:09 PM        Failed - INR is within goal in the past 6 weeks     Confirm INR is within goal in the past 6 weeks.     Recent Labs   Lab Test 07/02/21  0000   INR 2.1*                       Passed - Recent (12 mo) or future (30 days) visit within the authorizing provider's specialty     Patient has had an office visit with the authorizing provider or a provider within the authorizing providers department within the previous 12 mos or has a future within next 30 days. See \"Patient Info\" tab in inbasket, or \"Choose Columns\" in Meds & Orders section of the refill encounter.              Passed - Medication is active on med list        Passed - Patient is 18 years of age or older        Passed - Patient is not pregnant        Passed - No positive pregnancy on file in past 12 months           Prescription refilled per RN MedicationRefill Policy.................... Janessa Olguni RN ....................  7/12/2021   3:13 PM      "

## 2021-07-12 NOTE — TELEPHONE ENCOUNTER
Received fax from pharmacy for refill on warfarin.  Adeline Harrell LPN.........................7/12/2021  2:55 PM

## 2021-07-19 ENCOUNTER — OFFICE VISIT (OUTPATIENT)
Dept: PEDIATRICS | Facility: OTHER | Age: 76
End: 2021-07-19
Attending: INTERNAL MEDICINE
Payer: MEDICARE

## 2021-07-19 ENCOUNTER — ANTICOAGULATION THERAPY VISIT (OUTPATIENT)
Dept: ANTICOAGULATION | Facility: OTHER | Age: 76
End: 2021-07-19
Attending: INTERNAL MEDICINE
Payer: COMMERCIAL

## 2021-07-19 VITALS
SYSTOLIC BLOOD PRESSURE: 130 MMHG | BODY MASS INDEX: 25.83 KG/M2 | RESPIRATION RATE: 16 BRPM | OXYGEN SATURATION: 99 % | TEMPERATURE: 97.7 F | HEIGHT: 67 IN | HEART RATE: 76 BPM | WEIGHT: 164.6 LBS | DIASTOLIC BLOOD PRESSURE: 60 MMHG

## 2021-07-19 DIAGNOSIS — Z79.01 ANTICOAGULATION MONITORING, INR RANGE 2-3: ICD-10-CM

## 2021-07-19 DIAGNOSIS — H25.9 SENILE CATARACT OF RIGHT EYE, UNSPECIFIED AGE-RELATED CATARACT TYPE: ICD-10-CM

## 2021-07-19 DIAGNOSIS — I27.20 MILD PULMONARY HYPERTENSION (H): Chronic | ICD-10-CM

## 2021-07-19 DIAGNOSIS — Z79.01 ANTICOAGULATION MONITORING, INR RANGE 2-3: Primary | ICD-10-CM

## 2021-07-19 DIAGNOSIS — I48.0 PAROXYSMAL ATRIAL FIBRILLATION (H): ICD-10-CM

## 2021-07-19 DIAGNOSIS — Z98.890 S/P ABLATION OF ATRIAL FIBRILLATION: Chronic | ICD-10-CM

## 2021-07-19 DIAGNOSIS — I51.89 GRADE II DIASTOLIC DYSFUNCTION: Chronic | ICD-10-CM

## 2021-07-19 DIAGNOSIS — D50.9 IRON DEFICIENCY ANEMIA, UNSPECIFIED IRON DEFICIENCY ANEMIA TYPE: ICD-10-CM

## 2021-07-19 DIAGNOSIS — I45.81 LONG Q-T SYNDROME: Chronic | ICD-10-CM

## 2021-07-19 DIAGNOSIS — Z01.818 PREOPERATIVE EXAMINATION: Primary | ICD-10-CM

## 2021-07-19 DIAGNOSIS — Z86.79 S/P ABLATION OF ATRIAL FIBRILLATION: Chronic | ICD-10-CM

## 2021-07-19 LAB — INR POINT OF CARE: 1.9 (ref 0.9–1.1)

## 2021-07-19 PROCEDURE — G0463 HOSPITAL OUTPT CLINIC VISIT: HCPCS

## 2021-07-19 PROCEDURE — 36416 COLLJ CAPILLARY BLOOD SPEC: CPT | Mod: ZL

## 2021-07-19 PROCEDURE — 85610 PROTHROMBIN TIME: CPT | Mod: ZL,QW

## 2021-07-19 PROCEDURE — 99214 OFFICE O/P EST MOD 30 MIN: CPT | Performed by: INTERNAL MEDICINE

## 2021-07-19 ASSESSMENT — MIFFLIN-ST. JEOR: SCORE: 1261.31

## 2021-07-19 ASSESSMENT — PAIN SCALES - GENERAL: PAINLEVEL: NO PAIN (0)

## 2021-07-19 NOTE — NURSING NOTE
Pt here for a pre-op.  Right eye cataract.  Dr. Hitchcock 7/28/2021.  Amy Man CMA ([facility])......................7/19/2021  2:21 PM       Medication Reconciliation: complete    Amy Man CMA  7/19/2021 2:22 PM     FOOD SECURITY SCREENING QUESTIONS  Hunger Vital Signs:  Within the past 12 months we worried whether our food would run out before we got money to buy more. Never  Within the past 12 months the food we bought just didn't last and we didn't have money to get more. Never  Amy Man CMA 7/19/2021 2:22 PM

## 2021-07-19 NOTE — PATIENT INSTRUCTIONS
-- No change to prescriptions   -- Recheck INR on Monday     -- Hold aspirin for 5-7 days before surgery, unless you have had a stent then continue aspirin.   -- Hold ibuprofen/Advil for 2-3 days before surgery   -- Hold naproxen/Aleve for 5-7 days before surgery   -- Acetaminophen (Tylenol) is okay   -- Hold vitamins and herbal remedies for 7 days before surgery

## 2021-07-19 NOTE — PROGRESS NOTES
PREOPERATIVE HISTORY & PHYSICAL  Date of Exam: 7/19/2021  Chief Complaint   Patient presents with     Pre-Op Exam       Nursing Notes:   Amy Man CMA  7/19/2021  2:39 PM  Signed  Pt here for a pre-op.  Right eye cataract.  Dr. Hitchcock 7/28/2021.  Amy Man [facility] ([facility])......................7/19/2021  2:21 PM       Medication Reconciliation: complete    Amy Man CMA  7/19/2021 2:22 PM     FOOD SECURITY SCREENING QUESTIONS  Hunger Vital Signs:  Within the past 12 months we worried whether our food would run out before we got money to buy more. Never  Within the past 12 months the food we bought just didn't last and we didn't have money to get more. Never  Amy Man [facility] 7/19/2021 2:22 PM        HPI:  I was asked to see Ms. Zoey Fan by Dr. Hitchcock for preoperative management of atrial fib.    Zoey Fan is a 76 year old female with a history of on QT syndrome, atrial fibrillation status post ablation and pacemaker placement,HFpEF here for preoperative examination.  The most physically active she has been over the past 2 weeks was: chores without chest pain.    Patient Active Problem List    Diagnosis Date Noted     Grade II diastolic dysfunction 05/25/2021     Priority: Medium     Dupuytren contracture 11/04/2019     Priority: Medium     Mild pulmonary hypertension (H) 11/08/2018     Priority: Medium     Hypercholesterolemia 02/13/2018     Priority: Medium     Major depressive disorder, recurrent episode (H) 02/13/2018     Priority: Medium     Overview:   controlled       Vaginitis, atrophic 02/13/2018     Priority: Medium     Anticoagulation monitoring, INR range 2-3 02/11/2018     Priority: Medium     Paroxysmal atrial fibrillation (H) 02/11/2018     Priority: Medium     Osteoporosis 07/25/2017     Priority: Medium     Impaired fasting glucose 07/14/2017     Priority: Medium     Vitamin D deficiency 07/14/2017     Priority: Medium     Complete tear of right rotator cuff  06/15/2017     Priority: Medium     AC (acromioclavicular) joint arthritis 05/23/2017     Priority: Medium     Impingement syndrome of right shoulder 05/23/2017     Priority: Medium     (HFpEF) heart failure with preserved ejection fraction (H) 01/07/2015     Priority: Medium     Pacemaker 01/07/2015     Priority: Medium     Overview:   Placed after first ablation in 2011       S/P ablation of atrial fibrillation 01/07/2015     Priority: Medium     Hypertension 04/16/2014     Priority: Medium     Cystocele 04/15/2014     Priority: Medium     ED (stress urinary incontinence, female) 04/15/2014     Priority: Medium     Abdominal pain 03/17/2014     Priority: Medium     Urge urinary incontinence 04/29/2013     Priority: Medium     Insomnia 01/07/2013     Priority: Medium     Long Q-T syndrome 11/13/2012     Priority: Medium     Overview:   PLEASE VERIFY WITH PHARMACIST PRIOR TO PRESCRIBING ANY MEDICATIONS DUE TO HER QT SYNDROME. Followed at [facility]       Edema of eyelid 03/03/2011     Priority: Medium     Other chronic allergic conjunctivitis 02/28/2011     Priority: Medium     Dysphagia 01/19/2011     Priority: Medium     Past Medical History:   Diagnosis Date     Atrial fibrillation (H)     No Comments Provided     Complete tear of right rotator cuff     6/15/2017     Essential (primary) hypertension     No Comments Provided     Impingement syndrome of right shoulder     5/23/2017     Insomnia     No Comments Provided     Long Q-T syndrome 2010     Major depressive disorder, single episode     being managed     Other shoulder lesions, right shoulder     5/23/2017     Personal history of other infectious and parasitic diseases     As a child     Personal history of other medical treatment (CODE)     G2, P2     Primary osteoarthritis of shoulder     5/23/2017     Pure hypercholesterolemia     No Comments Provided     Past Surgical History:   Procedure Laterality Date     ARTHROSCOPY SHOULDER ROTATOR CUFF REPAIR       2004,Right shoulder     COLONOSCOPY  12/14/2007 2007,Normal, follow up in 10 years     COLONOSCOPY  08/10/2017    08/10/2017,no follow up due to age being greater than 80 at next screening interval     IMPLANT PACEMAKER      2011,Post ablation, due to junctional rythym     LAPAROSCOPIC TUBAL LIGATION      No Comments Provided     OTHER SURGICAL HISTORY      2004,[id number],OTHER,Left knee     OTHER SURGICAL HISTORY      2011,[id number],EP STUDY /ABLATION,Cardiac ablation     OTHER SURGICAL HISTORY      2015,[id number],CARDIAC ELECTROPHYSIOLOGY STUDY AND ABLATION     TONSILLECTOMY, ADENOIDECTOMY, COMBINED      As a child     Current Outpatient Medications   Medication Sig Dispense Refill     atorvastatin (LIPITOR) 40 MG tablet TAKE ONE-HALF TABLET BY  MOUTH AT BEDTIME 45 tablet 3     calcium carbonate (OSCAL 500) 1250 (500 CA) MG TABS tablet Take 500 mg by mouth daily with food       cyanocobalamin (RA VITAMIN B-12 TR) 1000 MCG TBCR Take 1,000 mcg by mouth daily       ferrous sulfate (FEROSUL) 325 (65 Fe) MG tablet Take 1 tablet (325 mg) by mouth 2 times daily 180 tablet 3     FLUoxetine (PROZAC) 20 MG capsule TAKE 1 CAPSULE BY MOUTH  EVERY MORNING 90 capsule 3     furosemide (LASIX) 20 MG tablet Take 1 tablet (20 mg) by mouth 2 times daily 180 tablet 3     lisinopril (ZESTRIL) 5 MG tablet TAKE 1 TABLET BY MOUTH  DAILY 90 tablet 3     nadolol (CORGARD) 80 MG tablet TAKE 1 AND 1/2 TABLETS BY  MOUTH TWICE DAILY 270 tablet 3     naproxen sodium 220 MG capsule Take 1 tablet by mouth 2 times daily (with meals)        omeprazole (PRILOSEC) 20 MG DR capsule TAKE 1 CAPSULE BY MOUTH  ONCE A DAY BEFORE A MEAL 90 capsule 3     oxybutynin ER (DITROPAN-XL) 10 MG 24 hr tablet TAKE 1 TABLET BY MOUTH  DAILY 90 tablet 3     sildenafil (REVATIO) 20 MG tablet 3 times daily        UNABLE TO FIND MEDICATION NAME: AREDS ( for eye health)       VITAMIN D, CHOLECALCIFEROL, PO Take 1,000 Units by mouth daily       warfarin ANTICOAGULANT (COUMADIN) 5  MG tablet TAKE 5 MG X 2 DAYS/WEEK AND 2.5 MG X 5 DAYS/WEEK.  OR AS DIRECTED BY [facility]. 90 tablet 1     Allergies   Allergen Reactions     Ciprofloxacin      Other reaction(s): Other - Describe In Comment Field  Patient reports she has Long QT syndrome     Neomycin Itching     Swelling , redness     Neosporin [Neomycin-Polymyx-Gramicid]      Sulfa Drugs Nausea and Vomiting     Abdominal pain     Unknown  [No Clinical Screening - See Comments]      Other reaction(s): Cardiac Arrest  Please verify with pharmacist prior to prescribing any medications due to her QT Syndrome     Liquid Adhesive Rash     Reaction also to EKG lead pads.      Tobramycin Rash     Family History   Problem Relation Age of Onset     Arthritis Father         Arthritis     Cancer Father         Cancer,skin cancer     Other - See Comments Father         Stroke,He is 87, has a history of CVA/Alzheimer's     Hypertension Mother         Hypertension     Hyperlipidemia Mother         Hyperlipidemia,elevated cholesterol     Other - See Comments Mother         Stroke,She  at age 86.  She had stroke as a complication of carotid endarterectomy surgery.     Other - See Comments Sister          of long QT syndrome.     Other - See Comments Son         Psychiatric illness,Bipolar,  due to suicide     Breast Cancer Maternal Aunt 50        Cancer-breast     Colon Cancer No family hx of         Cancer-colon     Anesthesia Reaction No family hx of         Anesthesia Malignant Hyperthermia     Bleeding Disorder No family hx of      Social History     Socioeconomic History     Marital status:      Spouse name: None     Number of children: None     Years of education: None     Highest education level: None   Occupational History     None   Tobacco Use     Smoking status: Former Smoker     Packs/day: 0.50     Years: 30.00     Pack years: 15.00     Types: Cigarettes     Start date: 1968     Quit date: 2002     Years since quitting:  19.2     Smokeless tobacco: Never Used   Vaping Use     Vaping Use: Never used   Substance and Sexual Activity     Alcohol use: Yes     Alcohol/week: 2.0 standard drinks     Types: 2 Glasses of wine per week     Comment: every day wine     Drug use: Never     Sexual activity: Yes     Partners: Male   Other Topics Concern     Parent/sibling w/ CABG, MI or angioplasty before 65F 55M? Not Asked   Social History Narrative    Patient is a retired RN. She moved here from [place], [place] in the . Her  was an ED doc and worked in [employer] before retiring. They lived on [address]. Patient's   suddenly of a likely arrhythmia in his mid 60s. Patient's son was bi    polar and ended up committing suicide after being convicted for murder. The patient has a [place] that works for [employer]. They recently traveled to the [facility] for a [place] and have plans to go     on a cruise in [place] to find polar bears.        Remarried in May 2019.     Social Determinants of Health     Financial Resource Strain:      Difficulty of Paying Living Expenses:    Food Insecurity:      Worried About Running Out of Food in the Last Year:      Ran Out of Food in the Last Year:    Transportation Needs:      Lack of Transportation (Medical):      Lack of Transportation (Non-Medical):    Physical Activity:      Days of Exercise per Week:      Minutes of Exercise per Session:    Stress:      Feeling of Stress :    Social Connections:      Frequency of Communication with Friends and Family:      Frequency of Social Gatherings with Friends and Family:      Attends Protestant Services:      Active Member of Clubs or Organizations:      Attends Club or Organization Meetings:      Marital Status:    Intimate Partner Violence:      Fear of Current or Ex-Partner:      Emotionally Abused:      Physically Abused:      Sexually Abused:        REVIEW OF SYSTEMS:  Review of Systems:  Skin:  "Negative  Eyes: See HPI  Ears/Nose/Throat: Negative  Respiratory: Negative  Cardiovascular: Negative  Gastrointestinal: Negative  Genitourinary: Negative  Musculoskeletal: Negative  Neurologic: Negative  Psychiatric: Negative  Hematologic/Lymphatic/Immunologic: Negative  Endocrine: Negative      EXAM:   Vitals: reviewed in EMR.  /60 (BP Location: Right arm, Patient Position: Sitting, Cuff Size: Adult Regular)   Pulse 76   Temp 97.7  F (36.5  C) (Tympanic)   Resp 16   Ht 1.689 m (5' 6.5\")   Wt 74.7 kg (164 lb 9.6 oz)   LMP  (LMP Unknown)   SpO2 99%   BMI 26.17 kg/m      Gen: Pleasant female, NAD.  HEENT: MMM, no OP erythema.   Neck: Supple, no JVD, no bruits.  CV: RRR no m/r/g.   Pulm: CTAB no w/r/r  Neuro: Grossly intact  Msk: No lower extremity edema.  Skin: No concerning lesions.  Psychiatric: Normal affect and insight. Does not appear anxious or depressed.      DIAGNOSTICS:   Labs:  Lab Results   Component Value Date    WBC 5.6 05/25/2021    HGB 9.5 (L) 05/25/2021    HCT 31.7 (L) 05/25/2021     05/25/2021    CHOL 152 05/25/2021    TRIG 100 05/25/2021    HDL 60 05/25/2021    ALT 16 10/24/2020    AST 24 10/24/2020     05/25/2021    BUN 15 05/25/2021    CO2 25 05/25/2021    INR 1.9 (A) 07/19/2021       Glucose   Date Value Ref Range Status   05/25/2021 110 (H) 70 - 105 mg/dL Final   10/24/2020 128 (H) 70 - 105 mg/dL Final     Hemoglobin A1C   Date Value Ref Range Status   11/09/2018 5.2 4.0 - 6.0 % Final     Creatinine   Date Value Ref Range Status   05/25/2021 0.86 0.60 - 1.20 mg/dL Final   10/24/2020 1.06 0.60 - 1.20 mg/dL Final     LDL Cholesterol Calculated   Date Value Ref Range Status   05/25/2021 72 <100 mg/dL Final     Comment:     Desirable:       <100 mg/dl     Thyrotropin   Date Value Ref Range Status   05/25/2021 1.73 0.34 - 5.60 IU/mL Final   11/09/2018 4.00 0.34 - 5.60 IU/mL Final                   IMPRESSION:     ICD-10-CM    1. Preoperative examination  Z01.818    2. Senile " cataract of right eye, unspecified age-related cataract type  H25.9    3. Grade II diastolic dysfunction  I51.9    4. S/P ablation of atrial fibrillation  Z98.890     Z86.79    5. Iron deficiency anemia, unspecified iron deficiency anemia type  D50.9    6. Mild pulmonary hypertension (H)  I27.20    7. Anticoagulation monitoring, INR range 2-3  Z79.01    8. Long Q-T syndrome  I45.81        For above listed surgery and anesthesia:   Patient is at increased risk for perioperative complications based on long QT syndrome, atrial fibrillation status post ablation status post pacemaker placement, chronic anticoagulation, mild pulmonary hypertension, heart failure with preserved ejection fraction, age.    RECOMMENDATIONS:   APPROVAL GIVEN to proceed with proposed procedure, without further diagnostic evaluation    Patient is on chronic pain medications: No  Patient is on antiplatelet/anticoagulation: Yes  Other medications that need adjustment perioperatively: No  Patient Instructions    -- No change to prescriptions   -- Recheck INR on Monday     -- Hold aspirin for 5-7 days before surgery, unless you have had a stent then continue aspirin.   -- Hold ibuprofen/Advil for 2-3 days before surgery   -- Hold naproxen/Aleve for 5-7 days before surgery   -- Acetaminophen (Tylenol) is okay   -- Hold vitamins and herbal remedies for 7 days before surgery        Signed, Vijay Mackay MD, FAAP, FACP  Internal Medicine & Pediatrics    CC Dr. Hitchcock, [facility]

## 2021-07-19 NOTE — PROGRESS NOTES
ANTICOAGULATION FOLLOW-UP CLINIC VISIT    Patient Name:  Zoey Fan  Date:  2021  Contact Type:  Face to Face    SUBJECTIVE:  Patient Findings     Positives:  Upcoming invasive procedure (Cataract removal on 21)        Clinical Outcomes     Negatives:  Major bleeding event, Thromboembolic event, Anticoagulation-related hospital admission, Anticoagulation-related ED visit, Anticoagulation-related fatality           OBJECTIVE    Recent labs: (last 7 days)     21  0000   INR 1.9*       ASSESSMENT / PLAN  INR assessment SUB    Recheck INR In: 10 DAYS    INR Location Clinic      Anticoagulation Summary  As of 2021    INR goal:  2.0-3.0   TTR:  56.3 % (1 y)   INR used for dosin.9 (2021)   Warfarin maintenance plan:  5 mg (5 mg x 1) every Mon, Fri; 2.5 mg (5 mg x 0.5) all other days   Full warfarin instructions:  5 mg every Mon, Fri; 2.5 mg all other days   Weekly warfarin total:  22.5 mg   Plan last modified:  Janessa Olguin RN (2021)   Next INR check:  2021   Priority:  Maintenance   Target end date:  Indefinite    Indications    Anticoagulation monitoring  INR range 2-3 [Z79.01]  Paroxysmal atrial fibrillation (H) [I48.0]             Anticoagulation Episode Summary     INR check location:      Preferred lab:      Send INR reminders to:  [facility]    Comments:        Anticoagulation Care Providers     Provider Role Specialty Phone number    Vijay Mackay MD Referring Internal Medicine [phone]            See the Encounter Report to view Anticoagulation Flowsheet and Dosing Calendar (Go to Encounters tab in chart review, and find the Anticoagulation Therapy Visit)        Ev Montalvo RN

## 2021-07-26 ENCOUNTER — ANTICOAGULATION THERAPY VISIT (OUTPATIENT)
Dept: ANTICOAGULATION | Facility: OTHER | Age: 76
End: 2021-07-26
Attending: INTERNAL MEDICINE
Payer: MEDICARE

## 2021-07-26 DIAGNOSIS — Z79.01 ANTICOAGULATION MONITORING, INR RANGE 2-3: Primary | ICD-10-CM

## 2021-07-26 DIAGNOSIS — I48.0 PAROXYSMAL ATRIAL FIBRILLATION (H): ICD-10-CM

## 2021-07-26 LAB — INR POINT OF CARE: 2.2 (ref 0.9–1.1)

## 2021-07-26 PROCEDURE — 36416 COLLJ CAPILLARY BLOOD SPEC: CPT | Mod: ZL

## 2021-07-26 NOTE — PROGRESS NOTES
"ANTICOAGULATION FOLLOW-UP CLINIC VISIT    Patient Name:  Zoey Fan  Date:  2021  Contact Type:  Face to Face    SUBJECTIVE:  Patient Findings     Positives:  Upcoming invasive procedure (Having cataract surgery on 21)    Comments:  \"They just want me low therapeutic level to do the procedure\".         Clinical Outcomes     Negatives:  Major bleeding event, Thromboembolic event, Anticoagulation-related hospital admission, Anticoagulation-related ED visit, Anticoagulation-related fatality    Comments:  \"They just want me low therapeutic level to do the procedure\".            OBJECTIVE    Recent labs: (last 7 days)     21  1221   INR 2.2*       ASSESSMENT / PLAN  INR assessment THER    Recheck INR In: 2 WEEKS    INR Location Clinic      Anticoagulation Summary  As of 2021    INR goal:  2.0-3.0   TTR:  55.7 % (1 y)   INR used for dosin.2 (2021)   Warfarin maintenance plan:  5 mg (5 mg x 1) every Mon, Fri; 2.5 mg (5 mg x 0.5) all other days   Full warfarin instructions:  5 mg every Mon, Fri; 2.5 mg all other days   Weekly warfarin total:  22.5 mg   No change documented:  Megan Berkowitz RN   Plan last modified:  Janessa Olguin RN (2021)   Next INR check:  2021   Priority:  Maintenance   Target end date:  Indefinite    Indications    Anticoagulation monitoring  INR range 2-3 [Z79.01]  Paroxysmal atrial fibrillation (H) [I48.0]             Anticoagulation Episode Summary     INR check location:      Preferred lab:      Send INR reminders to:  [facility]    Comments:        Anticoagulation Care Providers     Provider Role Specialty Phone number    Vijay Mackay MD Referring Internal Medicine [phone]            See the Encounter Report to view Anticoagulation Flowsheet and Dosing Calendar (Go to Encounters tab in chart review, and find the Anticoagulation Therapy Visit)        Megan Berkowitz RN                 "

## 2021-08-01 ENCOUNTER — ALLIED HEALTH/NURSE VISIT (OUTPATIENT)
Dept: FAMILY MEDICINE | Facility: OTHER | Age: 76
End: 2021-08-01
Attending: INTERNAL MEDICINE
Payer: MEDICARE

## 2021-08-01 DIAGNOSIS — Z20.822 COVID-19 RULED OUT: Primary | ICD-10-CM

## 2021-08-01 LAB — SARS-COV-2 RNA RESP QL NAA+PROBE: NEGATIVE

## 2021-08-01 PROCEDURE — C9803 HOPD COVID-19 SPEC COLLECT: HCPCS

## 2021-08-01 PROCEDURE — U0005 INFEC AGEN DETEC AMPLI PROBE: HCPCS | Mod: ZL

## 2021-08-05 ENCOUNTER — THERAPY VISIT (OUTPATIENT)
Dept: SLEEP MEDICINE | Facility: OTHER | Age: 76
End: 2021-08-05
Attending: INTERNAL MEDICINE
Payer: MEDICARE

## 2021-08-05 DIAGNOSIS — R06.81 APNEA: Primary | ICD-10-CM

## 2021-08-05 PROCEDURE — 95810 POLYSOM 6/> YRS 4/> PARAM: CPT | Mod: TC

## 2021-08-09 ENCOUNTER — ANTICOAGULATION THERAPY VISIT (OUTPATIENT)
Dept: ANTICOAGULATION | Facility: OTHER | Age: 76
End: 2021-08-09
Attending: INTERNAL MEDICINE
Payer: MEDICARE

## 2021-08-09 DIAGNOSIS — I48.0 PAROXYSMAL ATRIAL FIBRILLATION (H): ICD-10-CM

## 2021-08-09 DIAGNOSIS — Z79.01 ANTICOAGULATION MONITORING, INR RANGE 2-3: Primary | ICD-10-CM

## 2021-08-09 LAB — INR POINT OF CARE: 1.8 (ref 0.9–1.1)

## 2021-08-09 PROCEDURE — 36416 COLLJ CAPILLARY BLOOD SPEC: CPT | Mod: ZL

## 2021-08-09 NOTE — PROGRESS NOTES
ANTICOAGULATION FOLLOW-UP CLINIC VISIT    Patient Name:  Zoey Fan  Date:  2021  Contact Type:  Face to Face    SUBJECTIVE:  Patient Findings     Positives:  Missed doses (held dose on 21 for eye surgery. )        Clinical Outcomes     Negatives:  Major bleeding event, Thromboembolic event, Anticoagulation-related hospital admission, Anticoagulation-related ED visit, Anticoagulation-related fatality           OBJECTIVE    Recent labs: (last 7 days)     21  1203   INR 1.8*       ASSESSMENT / PLAN  INR assessment SUB    Recheck INR In: 1 WEEK    INR Location Clinic      Anticoagulation Summary  As of 2021    INR goal:  2.0-3.0   TTR:  53.8 % (1 y)   INR used for dosin.8 (2021)   Warfarin maintenance plan:  5 mg (5 mg x 1) every Mon, Fri; 2.5 mg (5 mg x 0.5) all other days   Full warfarin instructions:  : 7.5 mg; Otherwise 5 mg every Mon, Fri; 2.5 mg all other days   Weekly warfarin total:  22.5 mg   Plan last modified:  Janessa Olguin RN (2021)   Next INR check:  2021   Priority:  Maintenance   Target end date:  Indefinite    Indications    Anticoagulation monitoring  INR range 2-3 [Z79.01]  Paroxysmal atrial fibrillation (H) [I48.0]             Anticoagulation Episode Summary     INR check location:      Preferred lab:      Send INR reminders to:  [facility]    Comments:        Anticoagulation Care Providers     Provider Role Specialty Phone number    Vijay Mackay MD Referring Internal Medicine [phone]            See the Encounter Report to view Anticoagulation Flowsheet and Dosing Calendar (Go to Encounters tab in chart review, and find the Anticoagulation Therapy Visit)        Megan Berkowitz RN

## 2021-08-17 ENCOUNTER — ANTICOAGULATION THERAPY VISIT (OUTPATIENT)
Dept: ANTICOAGULATION | Facility: OTHER | Age: 76
End: 2021-08-17
Attending: INTERNAL MEDICINE
Payer: MEDICARE

## 2021-08-17 ENCOUNTER — DOCUMENTATION ONLY (OUTPATIENT)
Dept: OTHER | Facility: CLINIC | Age: 76
End: 2021-08-17

## 2021-08-17 DIAGNOSIS — Z79.01 ANTICOAGULATION MONITORING, INR RANGE 2-3: Primary | ICD-10-CM

## 2021-08-17 DIAGNOSIS — I48.0 PAROXYSMAL ATRIAL FIBRILLATION (H): ICD-10-CM

## 2021-08-17 LAB — INR POINT OF CARE: 1.9 (ref 0.9–1.1)

## 2021-08-17 PROCEDURE — 36416 COLLJ CAPILLARY BLOOD SPEC: CPT | Mod: ZL

## 2021-08-17 RX ORDER — WARFARIN SODIUM 5 MG/1
TABLET ORAL
Qty: 90 TABLET | Refills: 1
Start: 2021-08-17 | End: 2021-10-04

## 2021-08-17 NOTE — PROGRESS NOTES
"ANTICOAGULATION FOLLOW-UP CLINIC VISIT    Patient Name:  Zoey Fan  Date:  2021  Contact Type:  Face to Face    SUBJECTIVE:  Patient Findings     Positives:  Change in medications (Started on Prednisone eye drops after Cataract surgery around the beginning of . I still have 3 weeks to go. )    Comments:  Also started on Iron. \"I don't want my INR on the low side. Let's increase it\".         Clinical Outcomes     Negatives:  Major bleeding event, Thromboembolic event, Anticoagulation-related hospital admission, Anticoagulation-related ED visit, Anticoagulation-related fatality    Comments:  Also started on Iron. \"I don't want my INR on the low side. Let's increase it\".            OBJECTIVE    Recent labs: (last 7 days)     21  1207   INR 1.9*       ASSESSMENT / PLAN  INR assessment SUB    Recheck INR In: 1 WEEK    INR Location Clinic      Anticoagulation Summary  As of 2021    INR goal:  2.0-3.0   TTR:  51.6 % (1 y)   INR used for dosin.9 (2021)   Warfarin maintenance plan:  5 mg (5 mg x 1) every Mon, Wed, Fri; 2.5 mg (5 mg x 0.5) all other days   Full warfarin instructions:  5 mg every Mon, Wed, Fri; 2.5 mg all other days   Weekly warfarin total:  25 mg   Plan last modified:  Megan Berkowitz RN (2021)   Next INR check:  2021   Priority:  Maintenance   Target end date:  Indefinite    Indications    Anticoagulation monitoring  INR range 2-3 [Z79.01]  Paroxysmal atrial fibrillation (H) [I48.0]             Anticoagulation Episode Summary     INR check location:      Preferred lab:      Send INR reminders to:  [facility]    Comments:        Anticoagulation Care Providers     Provider Role Specialty Phone number    Vijay Mackay MD Referring Internal Medicine [phone]            See the Encounter Report to view Anticoagulation Flowsheet and Dosing Calendar (Go to Encounters tab in chart review, and find the Anticoagulation Therapy Visit)      Megan PÉREZ" GENEVA Berkowitz

## 2021-08-24 ENCOUNTER — ANTICOAGULATION THERAPY VISIT (OUTPATIENT)
Dept: ANTICOAGULATION | Facility: OTHER | Age: 76
End: 2021-08-24
Attending: INTERNAL MEDICINE
Payer: MEDICARE

## 2021-08-24 DIAGNOSIS — I48.0 PAROXYSMAL ATRIAL FIBRILLATION (H): ICD-10-CM

## 2021-08-24 DIAGNOSIS — Z79.01 ANTICOAGULATION MONITORING, INR RANGE 2-3: Primary | ICD-10-CM

## 2021-08-24 LAB — INR POINT OF CARE: 2.2 (ref 0.9–1.1)

## 2021-08-24 PROCEDURE — 36416 COLLJ CAPILLARY BLOOD SPEC: CPT | Mod: ZL

## 2021-08-24 NOTE — PROGRESS NOTES
"ANTICOAGULATION FOLLOW-UP CLINIC VISIT    Patient Name:  Zoey Fan  Date:  2021  Contact Type:  Face to Face    SUBJECTIVE:  Patient Findings     Comments:  Both of my feet were swollen this weekend. Eating junk. I was really busy. I had a lot of company. I did double my dose of Lasix like my Specialist  told me to do and the swelling went away\".  Today no visible swelling         Clinical Outcomes     Negatives:  Major bleeding event, Thromboembolic event, Anticoagulation-related hospital admission, Anticoagulation-related ED visit, Anticoagulation-related fatality    Comments:  Both of my feet were swollen this weekend. Eating junk. I was really busy. I had a lot of company. I did double my dose of Lasix like my Specialist  told me to do and the swelling went away\".  Today no visible swelling            OBJECTIVE    Recent labs: (last 7 days)     21  1205   INR 2.2*       ASSESSMENT / PLAN  INR assessment THER    Recheck INR In: 2 WEEKS    INR Location Clinic      Anticoagulation Summary  As of 2021    INR goal:  2.0-3.0   TTR:  51.0 % (1 y)   INR used for dosin.2 (2021)   Warfarin maintenance plan:  5 mg (5 mg x 1) every Mon, Wed, Fri; 2.5 mg (5 mg x 0.5) all other days   Full warfarin instructions:  5 mg every Mon, Wed, Fri; 2.5 mg all other days   Weekly warfarin total:  25 mg   No change documented:  Megan Berkowitz RN   Plan last modified:  Megan Berkowitz RN (2021)   Next INR check:  2021   Priority:  Maintenance   Target end date:  Indefinite    Indications    Anticoagulation monitoring  INR range 2-3 [Z79.01]  Paroxysmal atrial fibrillation (H) [I48.0]             Anticoagulation Episode Summary     INR check location:      Preferred lab:      Send INR reminders to:  [facility]    Comments:        Anticoagulation Care Providers     Provider Role Specialty Phone number    Vijay Mackay MD Referring Internal Medicine [phone]            See the " Encounter Report to view Anticoagulation Flowsheet and Dosing Calendar (Go to Encounters tab in chart review, and find the Anticoagulation Therapy Visit)        Megan Berkowitz RN

## 2021-09-04 ENCOUNTER — HOSPITAL ENCOUNTER (EMERGENCY)
Facility: OTHER | Age: 76
Discharge: HOME | End: 2021-09-04
Attending: STUDENT IN AN ORGANIZED HEALTH CARE EDUCATION/TRAINING PROGRAM | Admitting: STUDENT IN AN ORGANIZED HEALTH CARE EDUCATION/TRAINING PROGRAM
Payer: MEDICARE

## 2021-09-04 VITALS
HEART RATE: 78 BPM | TEMPERATURE: 99.1 F | OXYGEN SATURATION: 98 % | BODY MASS INDEX: 25.76 KG/M2 | WEIGHT: 162 LBS | DIASTOLIC BLOOD PRESSURE: 81 MMHG | SYSTOLIC BLOOD PRESSURE: 162 MMHG | RESPIRATION RATE: 20 BRPM

## 2021-09-04 DIAGNOSIS — K08.89 PAIN, DENTAL: ICD-10-CM

## 2021-09-04 PROCEDURE — 99282 EMERGENCY DEPT VISIT SF MDM: CPT | Performed by: STUDENT IN AN ORGANIZED HEALTH CARE EDUCATION/TRAINING PROGRAM

## 2021-09-04 PROCEDURE — 99283 EMERGENCY DEPT VISIT LOW MDM: CPT | Performed by: STUDENT IN AN ORGANIZED HEALTH CARE EDUCATION/TRAINING PROGRAM

## 2021-09-04 RX ORDER — OXYCODONE HYDROCHLORIDE 5 MG/1
5 TABLET ORAL EVERY 8 HOURS PRN
Qty: 6 TABLET | Refills: 0 | Status: SHIPPED | OUTPATIENT
Start: 2021-09-04 | End: 2021-09-13

## 2021-09-04 RX ORDER — PENICILLIN V POTASSIUM 500 MG/1
500 TABLET, FILM COATED ORAL 4 TIMES DAILY
Qty: 28 TABLET | Refills: 0 | Status: SHIPPED | OUTPATIENT
Start: 2021-09-04 | End: 2021-09-11

## 2021-09-04 NOTE — DISCHARGE INSTRUCTIONS
You may have evidence of an early dental infection, however at this time I suspect most of your symptoms are expected postoperatively.    Stop taking Aleve or any other NSAIDs including ibuprofen as this may contribute to bleeding especially since you are also on Coumadin.    Start taking Tylenol 1000 mg 3 times daily, as well as oxycodone 5 mg every 8 hours for breakthrough pain not controlled with Tylenol, as needed for pain.  Also continue icing the affected area 4 times daily as needed.    Start taking penicillin 500 mg 4 times daily for total 7-day course for suspected early dental infection.    Follow-up with your dentist as soon as possible for definitive management.    Come back to the emergency department immediately or call 911 if severe worsening pain despite above measures, fever, worsening bleeding, or any other acute concerns or otherwise feeling sicker.

## 2021-09-04 NOTE — ED PROVIDER NOTES
History     Chief Complaint   Patient presents with     Dental Pain     HPI  Zoey Fan is a 76 year old woman with history of hypertension, hypercholesterolemia, HFpEF, mild pulmonary hypertension, depression, long QT syndrome, paroxysmal A. fib on Coumadin who presents for evaluation of dental pain as well as some bleeding from a dental site after recent tooth extraction.  Patient had right posterior lower molar extracted 2 days ago at the dentist, comes in today with ongoing issues with pain.  She has been taking Aleve twice daily with partial relief.  She also notes that the site seems to be oozing some blood, not sure how much she generally swallows it.  She also has some bruising to her face which she was told to expect a little bit of redness, states she has been icing the area.  No fevers, nausea or vomiting, difficulty breathing or difficulty opening her mouth.     Allergies:  Allergies   Allergen Reactions     Ciprofloxacin      Other reaction(s): Other - Describe In Comment Field  Patient reports she has Long QT syndrome     Neomycin Itching     Swelling , redness     Neosporin [Neomycin-Polymyx-Gramicid]      Sulfa Drugs Nausea and Vomiting     Abdominal pain     Unknown  [No Clinical Screening - See Comments]      Other reaction(s): Cardiac Arrest  Please verify with pharmacist prior to prescribing any medications due to her QT Syndrome     Liquid Adhesive Rash     Reaction also to EKG lead pads.      Tobramycin Rash       Problem List:    Patient Active Problem List    Diagnosis Date Noted     Grade II diastolic dysfunction 05/25/2021     Priority: Medium     Dupuytren contracture 11/04/2019     Priority: Medium     Mild pulmonary hypertension (H) 11/08/2018     Priority: Medium     Hypercholesterolemia 02/13/2018     Priority: Medium     Major depressive disorder, recurrent episode (H) 02/13/2018     Priority: Medium     Overview:   controlled       Vaginitis, atrophic 02/13/2018     Priority:  Medium     Anticoagulation monitoring, INR range 2-3 02/11/2018     Priority: Medium     Paroxysmal atrial fibrillation (H) 02/11/2018     Priority: Medium     Osteoporosis 07/25/2017     Priority: Medium     Impaired fasting glucose 07/14/2017     Priority: Medium     Vitamin D deficiency 07/14/2017     Priority: Medium     Complete tear of right rotator cuff 06/15/2017     Priority: Medium     AC (acromioclavicular) joint arthritis 05/23/2017     Priority: Medium     Impingement syndrome of right shoulder 05/23/2017     Priority: Medium     (HFpEF) heart failure with preserved ejection fraction (H) 01/07/2015     Priority: Medium     Pacemaker 01/07/2015     Priority: Medium     Overview:   Placed after first ablation in 2011       S/P ablation of atrial fibrillation 01/07/2015     Priority: Medium     Hypertension 04/16/2014     Priority: Medium     Cystocele 04/15/2014     Priority: Medium     ED (stress urinary incontinence, female) 04/15/2014     Priority: Medium     Abdominal pain 03/17/2014     Priority: Medium     Urge urinary incontinence 04/29/2013     Priority: Medium     Insomnia 01/07/2013     Priority: Medium     Long Q-T syndrome 11/13/2012     Priority: Medium     Overview:   PLEASE VERIFY WITH PHARMACIST PRIOR TO PRESCRIBING ANY MEDICATIONS DUE TO HER QT SYNDROME. Followed at [facility]       Edema of eyelid 03/03/2011     Priority: Medium     Other chronic allergic conjunctivitis 02/28/2011     Priority: Medium     Dysphagia 01/19/2011     Priority: Medium        Past Medical History:    Past Medical History:   Diagnosis Date     Atrial fibrillation (H)      Complete tear of right rotator cuff      Essential (primary) hypertension      Impingement syndrome of right shoulder      Insomnia      Long Q-T syndrome 2010     Major depressive disorder, single episode      Other shoulder lesions, right shoulder      Personal history of other infectious and parasitic diseases      Personal history of other  medical treatment (CODE)      Primary osteoarthritis of shoulder      Pure hypercholesterolemia        Past Surgical History:    Past Surgical History:   Procedure Laterality Date     ARTHROSCOPY SHOULDER ROTATOR CUFF REPAIR      ,Right shoulder     COLONOSCOPY  2007,Normal, follow up in 10 years     COLONOSCOPY  08/10/2017    08/10/2017,no follow up due to age being greater than 80 at next screening interval     IMPLANT PACEMAKER      ,Post ablation, due to junctional rythym     LAPAROSCOPIC TUBAL LIGATION      No Comments Provided     OTHER SURGICAL HISTORY      ,[id number],OTHER,Left knee     OTHER SURGICAL HISTORY      ,[id number],EP STUDY /ABLATION,Cardiac ablation     OTHER SURGICAL HISTORY      ,[id number],CARDIAC ELECTROPHYSIOLOGY STUDY AND ABLATION     TONSILLECTOMY, ADENOIDECTOMY, COMBINED      As a child       Family History:    Family History   Problem Relation Age of Onset     Arthritis Father         Arthritis     Cancer Father         Cancer,skin cancer     Other - See Comments Father         Stroke,He is 87, has a history of CVA/Alzheimer's     Hypertension Mother         Hypertension     Hyperlipidemia Mother         Hyperlipidemia,elevated cholesterol     Other - See Comments Mother         Stroke,She  at age 86.  She had stroke as a complication of carotid endarterectomy surgery.     Other - See Comments Sister          of long QT syndrome.     Other - See Comments Son         Psychiatric illness,Bipolar,  due to suicide     Breast Cancer Maternal Aunt 50        Cancer-breast     Colon Cancer No family hx of         Cancer-colon     Anesthesia Reaction No family hx of         Anesthesia Malignant Hyperthermia     Bleeding Disorder No family hx of        Social History:  Marital Status:   [2]  Social History     Tobacco Use     Smoking status: Former Smoker     Packs/day: 0.50     Years: 30.00     Pack years: 15.00     Types: Cigarettes     Start date:  1968     Quit date: 2002     Years since quittin.3     Smokeless tobacco: Never Used   Vaping Use     Vaping Use: Never used   Substance Use Topics     Alcohol use: Yes     Alcohol/week: 2.0 standard drinks     Types: 2 Glasses of wine per week     Comment: every day wine     Drug use: Never        Medications:    atorvastatin (LIPITOR) 40 MG tablet  calcium carbonate (OSCAL 500) 1250 (500 CA) MG TABS tablet  cyanocobalamin (RA VITAMIN B-12 TR) 1000 MCG TBCR  ferrous sulfate (FEROSUL) 325 (65 Fe) MG tablet  FLUoxetine (PROZAC) 20 MG capsule  furosemide (LASIX) 20 MG tablet  lisinopril (ZESTRIL) 5 MG tablet  nadolol (CORGARD) 80 MG tablet  naproxen sodium 220 MG capsule  omeprazole (PRILOSEC) 20 MG DR capsule  oxybutynin ER (DITROPAN-XL) 10 MG 24 hr tablet  sildenafil (REVATIO) 20 MG tablet  UNABLE TO FIND  VITAMIN D, CHOLECALCIFEROL, PO  warfarin ANTICOAGULANT (COUMADIN) 5 MG tablet          Review of Systems  Please see HPI above for pertinent positives and negatives.  All other systems reviewed and found to be negative.    Physical Exam   BP: (!) 162/81  Pulse: 78  Temp: 99.1  F (37.3  C)  Resp: 20  Weight: 73.5 kg (162 lb)  SpO2: 98 %      Physical Exam  Gen: Lying in bed, no acute distress, alert, nontoxic  HEENT: Ecchymosis to the right lateral mandible/lower cheek, with mild swelling and mild erythema (patient had recently had an ice pack on her face).  Oropharyngeal exam notable for right lower posterior molar absent status post extraction, clot formation, no purulent drainage, mild irritation of the adjacent gingiva, very scant oozing bleeding  CV: Regular rate and rhythm, appears warm well perfused  Pulm: Normal respiratory effort, no audible wheezing or stridor  Skin: No rash or other lesions other than ecchymosis to the right lateral mandible and lower cheek as noted in HEENT above as well as mild adjacent erythema status post ice pack  MSK: No gross deformities or swelling  Neuro: Alert and  oriented x4, no focal deficits    ED Course     ED Course as of Sep 04 1610   Sat Sep 04, 2021   1602 Patient evaluated, had a right posterior lower molar extracted 2 days ago, has had some swelling and bruising on her face, pain (has been taking Aleve in the setting of also being on Coumadin), and some bleeding from the site.  Exam unremarkable with appropriate postoperative changes, no apparent abscess, possible early dental infection therefore will treat with short course of penicillin, also will stop Aleve as this can contribute to bleeding and change to Tylenol with oxycodone for breakthrough pain.  Patient otherwise appropriate for discharge, bleeding is minimal and expect this to improve with stopping Aleve, instructed to continue Coumadin as she is for now, low suspicion for supratherapeutic INR as there is no other evidence of easy bleeding/bruising, close outpatient dental follow-up, careful return precautions provided        Procedures              Critical Care time:  none               No results found for this or any previous visit (from the past 24 hour(s)).    Medications - No data to display    Assessments & Plan (with Medical Decision Making)   76 year old woman with history of hypertension, hypercholesterolemia, HFpEF, mild pulmonary hypertension, depression, long QT syndrome, paroxysmal A. fib on Coumadin who presents for evaluation of dental pain as well as some bleeding from a dental site after recent tooth extraction. Vitally stable, afebrile.  Exam consistent with possible mild dental infection with adjacent gingival irritation but otherwise expected exam status post extraction, mild erythema surrounding ecchymosis in the right lateral mandible and lower cheek consistent with ice packing.  Will need to continue Coumadin, no other concerns for acquired bleeding diathesis to warrant further investigation.  Patient struck to stop Aleve which may be contributing to impaired platelet  function,  strict to take Tylenol 1 prescribers short course of oxycodone for severe pain.  We will also start on course of penicillin for possible early dental infection.  At this time no evidence of abscess or fluctuance on exam.  Patient appropriate for discharge with close outpatient dental follow-up, careful return precautions provided.    From ED discharge instructions:  You may have evidence of an early dental infection, however at this time I suspect most of your symptoms are expected postoperatively.    Stop taking Aleve or any other NSAIDs including ibuprofen as this may contribute to bleeding especially since you are also on Coumadin.    Start taking Tylenol 1000 mg 3 times daily, as well as oxycodone 5 mg every 8 hours for breakthrough pain not controlled with Tylenol, as needed for pain.  Also continue icing the affected area 4 times daily as needed.    Start taking penicillin 500 mg 4 times daily for total 7-day course for suspected early dental infection.    Follow-up with your dentist as soon as possible for definitive management.    Come back to the emergency department immediately or call 911 if severe worsening pain despite above measures, fever, worsening bleeding, or any other acute concerns or otherwise feeling sicker.        I have reviewed the nursing notes.    I have reviewed the findings, diagnosis, plan and need for follow up with the patient.       New Prescriptions    No medications on file       Final diagnoses:   Pain, dental       9/4/2021   [facility] AND \[facility] [place] [facility]\"" Omid Nieves MD  09/05/21 [id number]

## 2021-09-04 NOTE — ED TRIAGE NOTES
"ED Nursing Triage Note (General)   ________________________________    Zoey Fan is a 76 year old Female that presents to triage private car. Patient states she had a right lower back tooth extracted this past Thursday and is c/o 5 out of 10 pain in that area. Patient states she took Aleve this AM with no relief. She also states she is on Warfarin and is having \"some bleeding or oozing\" from that tooth area since surgery. She is uncertain how much she is bleeding because she is swallowing the blood.   BP (!) 162/81   Pulse 78   Temp 99.1  F (37.3  C) (Tympanic)   Resp 20   Wt 73.5 kg (162 lb)   LMP  (LMP Unknown)   SpO2 98%   BMI 25.76 kg/m  t  Patient appears alert , in no acute distress., and cooperative and calm behavior.    GCS Total = 15  Airway: intact  Breathing noted as Normal.  Circulation Normal  Skin normal  Action taken:  Triage to critical care immediately      PRE HOSPITAL PRIOR LIVING SITUATION Spouse      "

## 2021-09-07 ENCOUNTER — ANTICOAGULATION THERAPY VISIT (OUTPATIENT)
Dept: ANTICOAGULATION | Facility: OTHER | Age: 76
End: 2021-09-07
Attending: INTERNAL MEDICINE
Payer: MEDICARE

## 2021-09-07 DIAGNOSIS — Z79.01 ANTICOAGULATION MONITORING, INR RANGE 2-3: Primary | ICD-10-CM

## 2021-09-07 DIAGNOSIS — I48.0 PAROXYSMAL ATRIAL FIBRILLATION (H): ICD-10-CM

## 2021-09-07 LAB — INR POINT OF CARE: 2.5 (ref 0.9–1.1)

## 2021-09-07 PROCEDURE — 36416 COLLJ CAPILLARY BLOOD SPEC: CPT | Mod: ZL

## 2021-09-07 NOTE — PROGRESS NOTES
ANTICOAGULATION FOLLOW-UP CLINIC VISIT    Patient Name:  Zoey Fan  Date:  2021  Contact Type:  Face to Face    SUBJECTIVE:  Patient Findings     Positives:  Emergency department visit (ED on 21 for dental pain), Change in medications (21 started on Oxycodone and PCN for tooth pain)    Comments:  Has been taking tylenol since Saturday.         Clinical Outcomes     Comments:  Has been taking tylenol since Saturday.            OBJECTIVE    Recent labs: (last 7 days)     21  1208   INR 2.5*       ASSESSMENT / PLAN  INR assessment THER    Recheck INR In: 1 WEEK    INR Location Clinic      Anticoagulation Summary  As of 2021    INR goal:  2.0-3.0   TTR:  51.0 % (1 y)   INR used for dosin.5 (2021)   Warfarin maintenance plan:  5 mg (5 mg x 1) every Mon, Wed, Fri; 2.5 mg (5 mg x 0.5) all other days   Full warfarin instructions:  5 mg every Mon, Wed, Fri; 2.5 mg all other days   Weekly warfarin total:  25 mg   No change documented:  Megan Berkowitz RN   Plan last modified:  Megan Berkowitz RN (2021)   Next INR check:  2021   Priority:  Maintenance   Target end date:  Indefinite    Indications    Anticoagulation monitoring  INR range 2-3 [Z79.01]  Paroxysmal atrial fibrillation (H) [I48.0]             Anticoagulation Episode Summary     INR check location:      Preferred lab:      Send INR reminders to:  [facility]    Comments:        Anticoagulation Care Providers     Provider Role Specialty Phone number    Vijay Mackay MD Referring Internal Medicine [phone]            See the Encounter Report to view Anticoagulation Flowsheet and Dosing Calendar (Go to Encounters tab in chart review, and find the Anticoagulation Therapy Visit)      Megan Berkowitz RN

## 2021-09-13 ENCOUNTER — HOSPITAL ENCOUNTER (EMERGENCY)
Facility: OTHER | Age: 76
Discharge: HOME | End: 2021-09-13
Attending: PHYSICIAN ASSISTANT | Admitting: PHYSICIAN ASSISTANT
Payer: MEDICARE

## 2021-09-13 VITALS
SYSTOLIC BLOOD PRESSURE: 166 MMHG | WEIGHT: 160 LBS | RESPIRATION RATE: 16 BRPM | OXYGEN SATURATION: 98 % | HEART RATE: 72 BPM | TEMPERATURE: 97.1 F | HEIGHT: 67 IN | BODY MASS INDEX: 25.11 KG/M2 | DIASTOLIC BLOOD PRESSURE: 97 MMHG

## 2021-09-13 DIAGNOSIS — K13.79 ORAL BLEEDING: ICD-10-CM

## 2021-09-13 DIAGNOSIS — I10 ESSENTIAL HYPERTENSION: ICD-10-CM

## 2021-09-13 LAB
BASOPHILS # BLD AUTO: 0.1 10E3/UL (ref 0–0.2)
BASOPHILS NFR BLD AUTO: 1 %
EOSINOPHIL # BLD AUTO: 0.1 10E3/UL (ref 0–0.7)
EOSINOPHIL NFR BLD AUTO: 2 %
ERYTHROCYTE [DISTWIDTH] IN BLOOD BY AUTOMATED COUNT: ABNORMAL %
HCT VFR BLD AUTO: 34.7 % (ref 35–47)
HGB BLD-MCNC: 10.6 G/DL (ref 11.7–15.7)
IMM GRANULOCYTES # BLD: 0 10E3/UL
IMM GRANULOCYTES NFR BLD: 0 %
INR PPP: 2.02 (ref 0.85–1.15)
LYMPHOCYTES # BLD AUTO: 1.2 10E3/UL (ref 0.8–5.3)
LYMPHOCYTES NFR BLD AUTO: 19 %
MCH RBC QN AUTO: 26.5 PG (ref 26.5–33)
MCHC RBC AUTO-ENTMCNC: 30.5 G/DL (ref 31.5–36.5)
MCV RBC AUTO: 87 FL (ref 78–100)
MONOCYTES # BLD AUTO: 0.7 10E3/UL (ref 0–1.3)
MONOCYTES NFR BLD AUTO: 10 %
NEUTROPHILS # BLD AUTO: 4.4 10E3/UL (ref 1.6–8.3)
NEUTROPHILS NFR BLD AUTO: 68 %
NRBC # BLD AUTO: 0 10E3/UL
NRBC BLD AUTO-RTO: 0 /100
PLATELET # BLD AUTO: 242 10E3/UL (ref 150–450)
RBC # BLD AUTO: 4 10E6/UL (ref 3.8–5.2)
WBC # BLD AUTO: 6.5 10E3/UL (ref 4–11)

## 2021-09-13 PROCEDURE — 99283 EMERGENCY DEPT VISIT LOW MDM: CPT | Performed by: PHYSICIAN ASSISTANT

## 2021-09-13 PROCEDURE — 85610 PROTHROMBIN TIME: CPT | Performed by: PHYSICIAN ASSISTANT

## 2021-09-13 PROCEDURE — 36415 COLL VENOUS BLD VENIPUNCTURE: CPT | Performed by: PHYSICIAN ASSISTANT

## 2021-09-13 PROCEDURE — 250N000013 HC RX MED GY IP 250 OP 250 PS 637: Mod: GY | Performed by: PHYSICIAN ASSISTANT

## 2021-09-13 PROCEDURE — 85025 COMPLETE CBC W/AUTO DIFF WBC: CPT | Performed by: PHYSICIAN ASSISTANT

## 2021-09-13 RX ORDER — ACETAMINOPHEN 500 MG
1000 TABLET ORAL ONCE
Status: COMPLETED | OUTPATIENT
Start: 2021-09-13 | End: 2021-09-13

## 2021-09-13 RX ORDER — LISINOPRIL 5 MG/1
5 TABLET ORAL ONCE
Status: COMPLETED | OUTPATIENT
Start: 2021-09-13 | End: 2021-09-13

## 2021-09-13 RX ORDER — LISINOPRIL 10 MG/1
10 TABLET ORAL DAILY
Qty: 30 TABLET | Refills: 0 | Status: SHIPPED | OUTPATIENT
Start: 2021-09-13 | End: 2021-10-26

## 2021-09-13 RX ADMIN — LISINOPRIL 5 MG: 5 TABLET ORAL at 21:11

## 2021-09-13 RX ADMIN — ACETAMINOPHEN 1000 MG: 500 TABLET, FILM COATED ORAL at 19:58

## 2021-09-13 ASSESSMENT — ENCOUNTER SYMPTOMS
STRIDOR: 0
ABDOMINAL PAIN: 0
SORE THROAT: 0
FLANK PAIN: 0
AGITATION: 0
NAUSEA: 0
BACK PAIN: 0
SEIZURES: 0
VOMITING: 0
FEVER: 0
TREMORS: 0
EYE PAIN: 0
FACIAL SWELLING: 0
WHEEZING: 0
NECK PAIN: 0

## 2021-09-13 ASSESSMENT — MIFFLIN-ST. JEOR: SCORE: 1248.39

## 2021-09-14 NOTE — ED PROVIDER NOTES
History     Chief Complaint   Patient presents with     Coagulation Disorder     oral bleeding     HPI  Zoey Fan is a 76 year old female who this is a 76-year-old female who had a right lower molar extracted 2 weeks ago by Dr. Hutson.  The patient is on warfarin therapy due to proximal atrial fibrillation.  Today she had a dental appointment and her dentist reported that everything seemed to be healing nicely then later this afternoon the patient had some spontaneous bleeding from the extraction site.  This is aware tooth #17 used to reside.  She reports her INR gets checked every 2 weeks and she just had this checked on 9/7/2021 at that time was 2.5.  Prior to this it was 2.2 on 8/24/2021.  Her current regimen is 5 mg Monday Wednesday Friday and 2.5 mg every day other than that.  Denies any lightheadedness or dizziness.  No nausea or vomiting.  No new mouth trauma.    Allergies:  Allergies   Allergen Reactions     Ciprofloxacin      Other reaction(s): Other - Describe In Comment Field  Patient reports she has Long QT syndrome     Neomycin Itching     Swelling , redness     Neosporin [Neomycin-Polymyx-Gramicid]      Sulfa Drugs Nausea and Vomiting     Abdominal pain     Unknown  [No Clinical Screening - See Comments]      Other reaction(s): Cardiac Arrest  Please verify with pharmacist prior to prescribing any medications due to her QT Syndrome     Liquid Adhesive Rash     Reaction also to EKG lead pads.      Tobramycin Rash       Problem List: No  Patient Active Problem List    Diagnosis Date Noted     Grade II diastolic dysfunction 05/25/2021     Priority: Medium     Dupuytren contracture 11/04/2019     Priority: Medium     Mild pulmonary hypertension (H) 11/08/2018     Priority: Medium     Hypercholesterolemia 02/13/2018     Priority: Medium     Major depressive disorder, recurrent episode (H) 02/13/2018     Priority: Medium     Overview:   controlled       Vaginitis, atrophic 02/13/2018     Priority:  Medium     Anticoagulation monitoring, INR range 2-3 02/11/2018     Priority: Medium     Paroxysmal atrial fibrillation (H) 02/11/2018     Priority: Medium     Osteoporosis 07/25/2017     Priority: Medium     Impaired fasting glucose 07/14/2017     Priority: Medium     Vitamin D deficiency 07/14/2017     Priority: Medium     Complete tear of right rotator cuff 06/15/2017     Priority: Medium     AC (acromioclavicular) joint arthritis 05/23/2017     Priority: Medium     Impingement syndrome of right shoulder 05/23/2017     Priority: Medium     (HFpEF) heart failure with preserved ejection fraction (H) 01/07/2015     Priority: Medium     Pacemaker 01/07/2015     Priority: Medium     Overview:   Placed after first ablation in 2011       S/P ablation of atrial fibrillation 01/07/2015     Priority: Medium     Hypertension 04/16/2014     Priority: Medium     Cystocele 04/15/2014     Priority: Medium     ED (stress urinary incontinence, female) 04/15/2014     Priority: Medium     Abdominal pain 03/17/2014     Priority: Medium     Urge urinary incontinence 04/29/2013     Priority: Medium     Insomnia 01/07/2013     Priority: Medium     Long Q-T syndrome 11/13/2012     Priority: Medium     Overview:   PLEASE VERIFY WITH PHARMACIST PRIOR TO PRESCRIBING ANY MEDICATIONS DUE TO HER QT SYNDROME. Followed at [email or web address]]       Edema of eyelid 03/03/2011     Priority: Medium     Other chronic allergic conjunctivitis 02/28/2011     Priority: Medium     Dysphagia 01/19/2011     Priority: Medium        Past Medical History:    Past Medical History:   Diagnosis Date     Atrial fibrillation (H)      Complete tear of right rotator cuff      Essential (primary) hypertension      Impingement syndrome of right shoulder      Insomnia      Long Q-T syndrome 2010     Major depressive disorder, single episode      Other shoulder lesions, right shoulder      Personal history of other infectious and parasitic diseases      Personal history of other  medical treatment (CODE)      Primary osteoarthritis of shoulder      Pure hypercholesterolemia        Past Surgical History:    Past Surgical History:   Procedure Laterality Date     ARTHROSCOPY SHOULDER ROTATOR CUFF REPAIR      ,Right shoulder     COLONOSCOPY  2007,Normal, follow up in 10 years     COLONOSCOPY  08/10/2017    08/10/2017,no follow up due to age being greater than 80 at next screening interval     IMPLANT PACEMAKER      ,Post ablation, due to junctional rythym     LAPAROSCOPIC TUBAL LIGATION      No Comments Provided     OTHER SURGICAL HISTORY      ,049698,OTHER,Left knee     OTHER SURGICAL HISTORY      ,[id number],EP STUDY /ABLATION,Cardiac ablation     OTHER SURGICAL HISTORY      ,[id number],CARDIAC ELECTROPHYSIOLOGY STUDY AND ABLATION     TONSILLECTOMY, ADENOIDECTOMY, COMBINED      As a child       Family History:    Family History   Problem Relation Age of Onset     Arthritis Father         Arthritis     Cancer Father         Cancer,skin cancer     Other - See Comments Father         Stroke,He is 87, has a history of CVA/Alzheimer's     Hypertension Mother         Hypertension     Hyperlipidemia Mother         Hyperlipidemia,elevated cholesterol     Other - See Comments Mother         Stroke,She  at age 86.  She had stroke as a complication of carotid endarterectomy surgery.     Other - See Comments Sister          of long QT syndrome.     Other - See Comments Son         Psychiatric illness,Bipolar,  due to suicide     Breast Cancer Maternal Aunt 50        Cancer-breast     Colon Cancer No family hx of         Cancer-colon     Anesthesia Reaction No family hx of         Anesthesia Malignant Hyperthermia     Bleeding Disorder No family hx of        Social History:  Marital Status:   [2]  Social History     Tobacco Use     Smoking status: Former Smoker     Packs/day: 0.50     Years: 30.00     Pack years: 15.00     Types: Cigarettes     Start date:  "1968     Quit date: 2002     Years since quittin.3     Smokeless tobacco: Never Used   Vaping Use     Vaping Use: Never used   Substance Use Topics     Alcohol use: Yes     Alcohol/week: 2.0 standard drinks     Types: 2 Glasses of wine per week     Comment: every day wine     Drug use: Never        Medications:    atorvastatin (LIPITOR) 40 MG tablet  calcium carbonate (OSCAL 500) 1250 (500 CA) MG TABS tablet  cyanocobalamin (RA VITAMIN B-12 TR) 1000 MCG TBCR  ferrous sulfate (FEROSUL) 325 (65 Fe) MG tablet  FLUoxetine (PROZAC) 20 MG capsule  furosemide (LASIX) 20 MG tablet  lisinopril (ZESTRIL) 5 MG tablet  nadolol (CORGARD) 80 MG tablet  omeprazole (PRILOSEC) 20 MG DR capsule  oxybutynin ER (DITROPAN-XL) 10 MG 24 hr tablet  sildenafil (REVATIO) 20 MG tablet  VITAMIN D, CHOLECALCIFEROL, PO  warfarin ANTICOAGULANT (COUMADIN) 5 MG tablet  UNABLE TO FIND          Review of Systems   Constitutional: Negative for fever.   HENT: Negative for facial swelling and sore throat.         Oral bleeding from the lower posterior molar area   Eyes: Negative for pain.   Respiratory: Negative for wheezing and stridor.    Cardiovascular: Negative for chest pain.   Gastrointestinal: Negative for abdominal pain, nausea and vomiting.   Genitourinary: Negative for flank pain.   Musculoskeletal: Negative for back pain and neck pain.   Skin: Negative for pallor.   Neurological: Negative for tremors and seizures.   Psychiatric/Behavioral: Negative for agitation.   All other systems reviewed and are negative.      Physical Exam   BP: (!) 192/73  Pulse: 72  Temp: 97.1  F (36.2  C)  Resp: 16  Height: 170.2 cm (5' 7\")  Weight: 72.6 kg (160 lb)  SpO2: 98 %    Vitals:    21 19121 19121   BP:  (!) 192/73 (!) 166/97   Pulse: 72  72   Resp: 16     Temp: 97.1  F (36.2  C)     TempSrc: Tympanic     SpO2: 98%     Weight: 72.6 kg (160 lb)     Height: 1.702 m (5' 7\")         Physical Exam  Vitals and nursing note " reviewed.   Constitutional:       General: She is not in acute distress.     Appearance: Normal appearance. She is not ill-appearing or toxic-appearing.   HENT:      Head: Normocephalic. No raccoon eyes, right periorbital erythema or left periorbital erythema.      Right Ear: No drainage or tenderness.      Left Ear: No drainage or tenderness.      Nose: Nose normal.      Mouth/Throat:        Comments: Minimal bleeding from the extraction site of tooth 17.  This was repackaged with cotton bite blocks.  Eyes:      General: Lids are normal. Gaze aligned appropriately. No scleral icterus.     Extraocular Movements: Extraocular movements intact.   Neck:      Trachea: No tracheal deviation.   Cardiovascular:      Rate and Rhythm: Normal rate.   Pulmonary:      Effort: Pulmonary effort is normal. No respiratory distress.      Breath sounds: No stridor. No wheezing.   Abdominal:      Tenderness: There is no abdominal tenderness.   Musculoskeletal:         General: No deformity or signs of injury. Normal range of motion.      Cervical back: Normal range of motion. No signs of trauma.   Skin:     General: Skin is warm and dry.      Coloration: Skin is not jaundiced or pale.   Neurological:      General: No focal deficit present.      Mental Status: She is alert and oriented to person, place, and time.      GCS: GCS eye subscore is 4. GCS verbal subscore is 5. GCS motor subscore is 6.      Motor: No tremor or seizure activity.   Psychiatric:         Attention and Perception: Attention normal.         Mood and Affect: Mood normal.         ED Course     Results for orders placed or performed during the hospital encounter of 09/13/21 (from the past 24 hour(s))   INR   Result Value Ref Range    INR 2.02 (H) 0.85 - 1.15   CBC with platelets differential    Narrative    The following orders were created for panel order CBC with platelets differential.  Procedure                               Abnormality         Status                      ---------                               -----------         ------                     CBC with platelets and d...[[id number]]  Abnormal            Final result                 Please view results for these tests on the individual orders.   CBC with platelets and differential   Result Value Ref Range    WBC Count 6.5 4.0 - 11.0 10e3/uL    RBC Count 4.00 3.80 - 5.20 10e6/uL    Hemoglobin 10.6 (L) 11.7 - 15.7 g/dL    Hematocrit 34.7 (L) 35.0 - 47.0 %    MCV 87 78 - 100 fL    MCH 26.5 26.5 - 33.0 pg    MCHC 30.5 (L) 31.5 - 36.5 g/dL    RDW      Platelet Count 242 150 - 450 10e3/uL    % Neutrophils 68 %    % Lymphocytes 19 %    % Monocytes 10 %    % Eosinophils 2 %    % Basophils 1 %    % Immature Granulocytes 0 %    NRBCs per 100 WBC 0 <1 /100    Absolute Neutrophils 4.4 1.6 - 8.3 10e3/uL    Absolute Lymphocytes 1.2 0.8 - 5.3 10e3/uL    Absolute Monocytes 0.7 0.0 - 1.3 10e3/uL    Absolute Eosinophils 0.1 0.0 - 0.7 10e3/uL    Absolute Basophils 0.1 0.0 - 0.2 10e3/uL    Absolute Immature Granulocytes 0.0 <=0.0 10e3/uL    Absolute NRBCs 0.0 10e3/uL       Medications   lisinopril (ZESTRIL) tablet 5 mg (has no administration in time range)   acetaminophen (TYLENOL) tablet 1,000 mg (1,000 mg Oral Given 9/13/21 1958)       Assessments & Plan (with Medical Decision Making)     I have reviewed the nursing notes.    I have reviewed the findings, diagnosis, plan and need for follow up with the patient.      New Prescriptions    LISINOPRIL (ZESTRIL) 10 MG TABLET    Take 1 tablet (10 mg) by mouth daily       Final diagnoses:   Oral bleeding   Essential hypertension     Afebrile.  Vital signs stable but elevated blood pressure noted at 192/73.  The patient had a tooth extracted 2 weeks ago.  Today she saw her dentist and there was no bleeding at the site but then when she returned home she suddenly had bleeding coming out of the tooth socket #17.  She is on warfarin for proximal atrial fibrillation.  She packed this with gauze  and she is here for further evaluation.  Her INR today is 2.02.  Her blood pressure continues to be elevated she was given an additional lisinopril.  She was also given Tylenol for tooth pain.  The bleeding stopped at this time.  I discussed increasing her lisinopril to 10 mg daily and continued to monitor her blood pressure.  She will need to be in contact with her primary care provider for additional management.  Follow-up if there is any concerns for further evaluation as needed.    9/13/2021   [facility]     Branden Stephenson PA-C  09/13/21 2025

## 2021-09-14 NOTE — ED TRIAGE NOTES
ED Nursing Triage Note (General)   ________________________________    Zoey Fan is a 76 year old Female that presents to triage private car  With history of  Bleeding from the site of an extracted tooth. She had the tooth removed a couple of weeks ago and it started to bleed today at 1630, called the dentist, he put gauze pads on it, and he advised her to come in here to be seen to stop th bleeding. She is on warfarin.  reported by patient   Significant symptoms had onset 1630    Patient appears alert  and oriented, in no acute distress., and cooperative, pleasant and calm behavior.    GCS Total = 15  Airway: intact  Breathing noted as Normal  Circulation Normal  Skin:  Normal        PRE HOSPITAL PRIOR LIVING SITUATION home   Peng Advancement Flap Text: The defect edges were debeveled with a #15 scalpel blade.  Given the location of the defect, shape of the defect and the proximity to free margins a Peng advancement flap was deemed most appropriate.  Using a sterile surgical marker, an appropriate advancement flap was drawn incorporating the defect and placing the expected incisions within the relaxed skin tension lines where possible. The area thus outlined was incised deep to adipose tissue with a #15 scalpel blade.  The skin margins were undermined to an appropriate distance in all directions utilizing iris scissors.

## 2021-09-15 ENCOUNTER — OFFICE VISIT (OUTPATIENT)
Dept: INTERNAL MEDICINE | Facility: OTHER | Age: 76
End: 2021-09-15
Attending: NURSE PRACTITIONER
Payer: COMMERCIAL

## 2021-09-15 VITALS
RESPIRATION RATE: 16 BRPM | HEART RATE: 74 BPM | WEIGHT: 159.8 LBS | OXYGEN SATURATION: 97 % | TEMPERATURE: 97.2 F | DIASTOLIC BLOOD PRESSURE: 74 MMHG | BODY MASS INDEX: 25.03 KG/M2 | SYSTOLIC BLOOD PRESSURE: 124 MMHG

## 2021-09-15 DIAGNOSIS — K08.409 HISTORY OF TOOTH EXTRACTION, UNSPECIFIED EDENTULISM CLASS: ICD-10-CM

## 2021-09-15 DIAGNOSIS — K13.79 BLEEDING IN MOUTH: ICD-10-CM

## 2021-09-15 DIAGNOSIS — Z79.01 ANTICOAGULATION MONITORING, INR RANGE 2-3: Primary | ICD-10-CM

## 2021-09-15 PROCEDURE — G0463 HOSPITAL OUTPT CLINIC VISIT: HCPCS

## 2021-09-15 PROCEDURE — 99215 OFFICE O/P EST HI 40 MIN: CPT | Performed by: NURSE PRACTITIONER

## 2021-09-15 ASSESSMENT — PAIN SCALES - GENERAL: PAINLEVEL: MILD PAIN (2)

## 2021-09-15 NOTE — NURSING NOTE
"Chief Complaint   Patient presents with     RECHECK     ER Follow up - tooth extraction 9/2/21; tooth has been bleeding since     Patient presents for ER follow up for a tooth, bottom left molar, that was extracted 9/2/21.  Tooth has not stopped bleeding; first ER was 9/4/21, second ER was 9/13/21.  Tooth still bleeding today, patient inquiring about getting INR checked.    Initial /74 (BP Location: Right arm, Patient Position: Sitting, Cuff Size: Adult Regular)   Pulse 74   Temp 97.2  F (36.2  C) (Tympanic)   Resp 16   Wt 72.5 kg (159 lb 12.8 oz)   LMP  (LMP Unknown)   SpO2 97%   Breastfeeding No   BMI 25.03 kg/m   Estimated body mass index is 25.03 kg/m  as calculated from the following:    Height as of 9/13/21: 1.702 m (5' 7\").    Weight as of this encounter: 72.5 kg (159 lb 12.8 oz).  Medication Reconciliation: complete  FOOD SECURITY SCREENING QUESTIONS  Hunger Vital Signs:  Within the past 12 months we worried whether our food would run out before we got money to buy more. Never  Within the past 12 months the food we bought just didn't last and we didn't have money to get more. Never    Advance care plan reviewed      Kemi Bey LPN    "

## 2021-09-15 NOTE — PROGRESS NOTES
"Nursing Notes:   Kemi Bey LPN  9/15/2021 12:03 PM  Signed  Chief Complaint   Patient presents with     RECHECK     ER Follow up - tooth extraction 9/2/21; tooth has been bleeding since     Patient presents for ER follow up for a tooth, bottom left molar, that was extracted 9/2/21.  Tooth has not stopped bleeding; first ER was 9/4/21, second ER was 9/13/21.  Tooth still bleeding today, patient inquiring about getting INR checked.    Initial /74 (BP Location: Right arm, Patient Position: Sitting, Cuff Size: Adult Regular)   Pulse 74   Temp 97.2  F (36.2  C) (Tympanic)   Resp 16   Wt 72.5 kg (159 lb 12.8 oz)   LMP  (LMP Unknown)   SpO2 97%   Breastfeeding No   BMI 25.03 kg/m   Estimated body mass index is 25.03 kg/m  as calculated from the following:    Height as of 9/13/21: 1.702 m (5' 7\").    Weight as of this encounter: 72.5 kg (159 lb 12.8 oz).  Medication Reconciliation: complete  FOOD SECURITY SCREENING QUESTIONS  Hunger Vital Signs:  Within the past 12 months we worried whether our food would run out before we got money to buy more. Never  Within the past 12 months the food we bought just didn't last and we didn't have money to get more. Never    Advance care plan reviewed      Kemi Bey LPN         Nursing note reviewed with patient.  Accuracy and completeness verified.     Ms. Fan is a 76 year old female who presents to the clinic today for an ED follow up visit.      Emergency Department:  [facility] ED    Date of ED Visit:   9/13/21  Reason(s) for ED Visit:  Bleeding in mouth from tooth extraction  Summary of ED Visit:   Per ED Note:  Per notes: 76-year-old female who had a right lower molar extracted 2 weeks ago by Dr. Hutson.  The patient is on warfarin therapy due to proximal atrial fibrillation.  Today she had a dental appointment and her dentist reported that everything seemed to be healing nicely then later this afternoon the patient had some spontaneous bleeding from " the extraction site of where tooth #17 used to reside.  She reports her INR gets checked every 2 weeks and she just had this checked on 9/7/2021 and at that time was 2.5.  Prior to this, it was 2.2 on 8/24/2021.  Her current regimen is 5 mg Monday Wednesday Friday and 2.5 mg every day other than that.  Denies any lightheadedness or dizziness.  No nausea or vomiting.  No new mouth trauma.     Diagnostic Tests/Treatments reviewed.  Follow up needed:  Needs to be seen by Dentist who did initial removal.  Update since discharge:   She was instructed to hold warfarin for two nights and had increased lisinopril due to higher BP's by ED provider. She has concerns about clotting in her body since holding warfarin.       Patient reports she is changing her gauze in her mouth greater than 6 times per day.  She is using salt water rinses and ice water.  She is not eating or drinking much due to the excessive bleeding.  She is not drinking any carbonated products or using peroxide in her mouth.    Initial surgery was on September 2.  September 4 patient reports pain and bleeding.  September 8 return to the dentist who looked at it.  She went to the dentist again on September 13 and was told by dentist that everything was looking great, at 7 PM that night, the bleeding and pain started again which prompted patient to go to the ER.    The patient has a past medical history which is reviewed and listed as below:     Past Medical History:   Diagnosis Date     Atrial fibrillation (H)     No Comments Provided     Complete tear of right rotator cuff     6/15/2017     Essential (primary) hypertension     No Comments Provided     Impingement syndrome of right shoulder     5/23/2017     Insomnia     No Comments Provided     Long Q-T syndrome 2010     Major depressive disorder, single episode     being managed     Other shoulder lesions, right shoulder     5/23/2017     Personal history of other infectious and parasitic diseases     As a  "child     Personal history of other medical treatment (CODE)     G2, P2     Primary osteoarthritis of shoulder     5/23/2017     Pure hypercholesterolemia     No Comments Provided       SUBJECTIVE:                                                      REVIEW OF SYSTEMS:   Review of Systems   HENT: Positive for dental problem (Bleeding and large clots from the right lower extraction site, just will not stop).    Psychiatric/Behavioral: The patient is nervous/anxious.    All other systems reviewed and are negative.       OBJECTIVE:                                                      /74 (BP Location: Right arm, Patient Position: Sitting, Cuff Size: Adult Regular)   Pulse 74   Temp 97.2  F (36.2  C) (Tympanic)   Resp 16   Wt 72.5 kg (159 lb 12.8 oz)   LMP  (LMP Unknown)   SpO2 97%   Breastfeeding No   BMI 25.03 kg/m      Estimated body mass index is 25.03 kg/m  as calculated from the following:    Height as of 9/13/21: 1.702 m (5' 7\").    Weight as of this encounter: 72.5 kg (159 lb 12.8 oz).     PHYSICAL EXAM:  Physical Exam  Vitals and nursing note reviewed.   Constitutional:       General: She is in acute distress.   HENT:      Head: Normocephalic and atraumatic.      Mouth/Throat:        Comments: Patient presents with large cotton piece in her mouth.  When I removed it to inspect the extraction site, a very large clot approximately 2 inches in length and about 1/2 inch in width was noted on the cotton.  Patient then reports she needed something to spit in as the blood was just pooling in her mouth.  She had filled approximately one quarter of a 4 ounce cup during the time of my appointment.  Active bleeding from that tooth extraction site.  Cardiovascular:      Rate and Rhythm: Normal rate and regular rhythm.   Pulmonary:      Effort: Pulmonary effort is normal.      Breath sounds: Normal breath sounds.   Musculoskeletal:         General: Normal range of motion.   Skin:     General: Skin is warm and " dry.   Neurological:      Mental Status: She is alert and oriented to person, place, and time. Mental status is at baseline.   Psychiatric:         Mood and Affect: Mood normal.         Behavior: Behavior normal.         Thought Content: Thought content normal.         Judgment: Judgment normal.         ASSESSMENT/PLAN:                                                      Bleeding in mouth  History of tooth extraction, unspecified edentulism class  Instructed patient she is to go to the dentist office and have the original provider manage this as we do not have the equipment here at the clinic and he knows exactly what procedure was done.    Anticoagulation monitoring, INR range 2-3  Patient's last INR in the ER was 2.0.  Continue to hold the Coumadin until Friday and then recheck INR and hemoglobin.  Patient encouraged to follow-up with PCP and an appointment will be set up for her today.  We did discuss the possibility for strokes to occur during the time she is not taking her Coumadin however this risk is most likely very low due to the fact she has had a prior ablation and pacemaker placement.    I explained my diagnostic considerations and recommendations to the patient, who voiced understanding and agreement with the treatment plan. All questions were answered. We discussed potential side effects of any prescribed or recommended therapies, as well as expectations for response to treatments.     Return in about 2 days (around 9/17/2021) for Recheck with PCP.       LENI Flores, AGNP-C  Internal Medicine  [facility]    09/15/2021 4:07 PM    Total time spent with this patient was 30 minutes which included chart review, visualization and interpretation of labs/images, time spent with the patient and documentation.

## 2021-09-17 ENCOUNTER — OFFICE VISIT (OUTPATIENT)
Dept: PEDIATRICS | Facility: OTHER | Age: 76
End: 2021-09-17
Attending: INTERNAL MEDICINE
Payer: MEDICARE

## 2021-09-17 VITALS
HEART RATE: 87 BPM | OXYGEN SATURATION: 96 % | RESPIRATION RATE: 16 BRPM | TEMPERATURE: 97.8 F | HEIGHT: 68 IN | SYSTOLIC BLOOD PRESSURE: 128 MMHG | WEIGHT: 158.5 LBS | DIASTOLIC BLOOD PRESSURE: 78 MMHG | BODY MASS INDEX: 24.02 KG/M2

## 2021-09-17 DIAGNOSIS — Z23 NEED FOR VACCINATION: ICD-10-CM

## 2021-09-17 DIAGNOSIS — Z98.890 S/P ABLATION OF ATRIAL FIBRILLATION: Chronic | ICD-10-CM

## 2021-09-17 DIAGNOSIS — I48.0 PAROXYSMAL ATRIAL FIBRILLATION (H): ICD-10-CM

## 2021-09-17 DIAGNOSIS — Z79.01 WARFARIN ANTICOAGULATION: ICD-10-CM

## 2021-09-17 DIAGNOSIS — Z86.79 S/P ABLATION OF ATRIAL FIBRILLATION: Chronic | ICD-10-CM

## 2021-09-17 DIAGNOSIS — K91.840 SURGICAL WOUND HEMORRHAGE AFTER DENTAL PROCEDURE: Primary | ICD-10-CM

## 2021-09-17 DIAGNOSIS — Z95.0 PACEMAKER: Chronic | ICD-10-CM

## 2021-09-17 DIAGNOSIS — Z98.818 SURGICAL WOUND HEMORRHAGE AFTER DENTAL PROCEDURE: Primary | ICD-10-CM

## 2021-09-17 PROCEDURE — G0463 HOSPITAL OUTPT CLINIC VISIT: HCPCS | Mod: 25

## 2021-09-17 PROCEDURE — 99213 OFFICE O/P EST LOW 20 MIN: CPT | Performed by: INTERNAL MEDICINE

## 2021-09-17 PROCEDURE — 90662 IIV NO PRSV INCREASED AG IM: CPT

## 2021-09-17 PROCEDURE — G0463 HOSPITAL OUTPT CLINIC VISIT: HCPCS

## 2021-09-17 PROCEDURE — G0008 ADMIN INFLUENZA VIRUS VAC: HCPCS

## 2021-09-17 ASSESSMENT — MIFFLIN-ST. JEOR: SCORE: 1249.51

## 2021-09-17 ASSESSMENT — PAIN SCALES - GENERAL: PAINLEVEL: MODERATE PAIN (4)

## 2021-09-17 NOTE — PROGRESS NOTES
"Subjective   Zoey Fan is a 76 year old female who presents for ER follow-up.  She had dental surgery on September 2, 2021 where a tooth was removed on the right side of her mandible.  Unfortunately on the fourth she had developed significant bleeding and presented to ER.  She was recommended to continue her warfarin and discharged in stable condition.  Bleeding persisted and she was seen again on September 13, 2021.  She has been off of her warfarin since September 12, 2021.  She has followed up with her dentist.  She will be seeing an oral surgeon today.  Over the course of the last 2 days she has not had any bleeding.  She is very frustrated by this clinical course.    Objective   Vitals: /78   Pulse 87   Temp 97.8  F (36.6  C) (Tympanic)   Resp 16   Ht 1.715 m (5' 7.5\")   Wt 71.9 kg (158 lb 8 oz)   LMP  (LMP Unknown)   SpO2 96%   Breastfeeding No   BMI 24.46 kg/m      HEENT: Just behind the last tooth on the right mandible there is an area that appears to be the surgical site without surrounding erythema.  No bleeding is seen.    Review and Analysis of Data   I personally reviewed the following:  External notes: No  Results: Yes Lab work reviewed  Use of an independent historian: No  Independent review of a test performed by another physician: No  Discussion of management with another physician: No  Moderate risk of morbidity from additional diagnostic testing and/or treatment.    Assessment & Plan       ICD-10-CM    1. Surgical wound hemorrhage after dental procedure  K91.840    2. Warfarin anticoagulation  Z79.01    3. Paroxysmal atrial fibrillation (H)  I48.0    4. S/P ablation of atrial fibrillation  Z98.890     Z86.79    5. Pacemaker  Z95.0    6. Need for vaccination  Z23 INFLUENZA, QUAD, HIGH DOSE, PF, 65YR + (FLUZONE HD)     Bleeding does appear to have subsided.  I agree with holding the warfarin until the healing process has started to reduce the bleeding.  I believe she will be at " a lower risk for stroke related to atrial fibrillation based on history of ablation, pacemaker placement with atrial paced rhythm.  However we did discuss the possibility for stroke during the time off anticoagulation.  Agree with consultation with oral surgeon.  If no major bleeding over the course of the next 48 hours recommend resume warfarin on Sunday, September 19, 2021.    Signed, Vijay Mackay MD, FAAP, FACP  Internal Medicine & Pediatrics

## 2021-09-17 NOTE — NURSING NOTE
Patient presents to the clinic for a F/U ER visit for tooth extraction issues.  Cristiana Green LPN

## 2021-09-19 PROBLEM — K13.79 BLEEDING IN MOUTH: Status: ACTIVE | Noted: 2021-09-19

## 2021-09-19 PROBLEM — K08.409 HISTORY OF TOOTH EXTRACTION: Status: ACTIVE | Noted: 2021-09-19

## 2021-09-19 ASSESSMENT — ENCOUNTER SYMPTOMS: NERVOUS/ANXIOUS: 1

## 2021-09-28 ENCOUNTER — ANTICOAGULATION THERAPY VISIT (OUTPATIENT)
Dept: ANTICOAGULATION | Facility: OTHER | Age: 76
End: 2021-09-28
Attending: INTERNAL MEDICINE
Payer: MEDICARE

## 2021-09-28 DIAGNOSIS — I48.0 PAROXYSMAL ATRIAL FIBRILLATION (H): ICD-10-CM

## 2021-09-28 DIAGNOSIS — Z79.01 ANTICOAGULATION MONITORING, INR RANGE 2-3: Primary | ICD-10-CM

## 2021-09-28 LAB — INR POINT OF CARE: 1.5 (ref 0.9–1.1)

## 2021-09-28 PROCEDURE — 36416 COLLJ CAPILLARY BLOOD SPEC: CPT | Mod: ZL

## 2021-09-28 NOTE — PROGRESS NOTES
ANTICOAGULATION FOLLOW-UP CLINIC VISIT    Patient Name:  Zoey Fan  Date:  2021  Contact Type:  Face to Face    SUBJECTIVE:  Patient Findings     Positives:  Missed doses, Change in medications    Comments:  Increase of Lisinopril dose, was told to hold Warfarin after ER visit on 21 for 6 days to help with healing after recent tooth extraction.         Clinical Outcomes     Negatives:  Major bleeding event, Thromboembolic event, Anticoagulation-related hospital admission, Anticoagulation-related ED visit, Anticoagulation-related fatality    Comments:  Increase of Lisinopril dose, was told to hold Warfarin after ER visit on 21 for 6 days to help with healing after recent tooth extraction.            OBJECTIVE    Recent labs: (last 7 days)     21  1457   INR 1.5*       ASSESSMENT / PLAN  INR assessment SUB    Recheck INR In: 1 WEEK    INR Location Clinic      Anticoagulation Summary  As of 2021    INR goal:  2.0-3.0   TTR:  51.4 % (1 y)   INR used for dosin.5 (2021)   Warfarin maintenance plan:  5 mg (5 mg x 1) every Mon, Wed, Fri; 2.5 mg (5 mg x 0.5) all other days   Full warfarin instructions:  : 5 mg; Otherwise 5 mg every Mon, Wed, Fri; 2.5 mg all other days   Weekly warfarin total:  25 mg   Plan last modified:  Megan Berkowitz RN (2021)   Next INR check:  10/5/2021   Priority:  Maintenance   Target end date:  Indefinite    Indications    Anticoagulation monitoring  INR range 2-3 [Z79.01]  Paroxysmal atrial fibrillation (H) [I48.0]             Anticoagulation Episode Summary     INR check location:      Preferred lab:      Send INR reminders to:  [facility]    Comments:        Anticoagulation Care Providers     Provider Role Specialty Phone number    Vijay Mackay MD Referring Internal Medicine [phone]            See the Encounter Report to view Anticoagulation Flowsheet and Dosing Calendar (Go to Encounters tab in chart review, and find  the Anticoagulation Therapy Visit)    Mina Landers RN

## 2021-10-01 ENCOUNTER — TELEPHONE (OUTPATIENT)
Dept: PEDIATRICS | Facility: OTHER | Age: 76
End: 2021-10-01

## 2021-10-01 NOTE — TELEPHONE ENCOUNTER
[facility] requesting refill order for Coumadin.  Form completed and awaiting MD signature at this time.      Key Adams LPN 10/1/2021 1:04 PM

## 2021-10-02 DIAGNOSIS — E78.00 HYPERCHOLESTEROLEMIA: ICD-10-CM

## 2021-10-02 DIAGNOSIS — F39 MOOD DISORDER (H): ICD-10-CM

## 2021-10-02 DIAGNOSIS — K21.9 GASTROESOPHAGEAL REFLUX DISEASE, UNSPECIFIED WHETHER ESOPHAGITIS PRESENT: ICD-10-CM

## 2021-10-02 DIAGNOSIS — I10 ESSENTIAL HYPERTENSION: ICD-10-CM

## 2021-10-02 DIAGNOSIS — N39.41 URGE URINARY INCONTINENCE: ICD-10-CM

## 2021-10-04 DIAGNOSIS — I48.0 PAROXYSMAL ATRIAL FIBRILLATION (H): ICD-10-CM

## 2021-10-04 RX ORDER — ATORVASTATIN CALCIUM 40 MG/1
TABLET, FILM COATED ORAL
Qty: 45 TABLET | Refills: 3 | Status: SHIPPED | OUTPATIENT
Start: 2021-10-04 | End: 2024-08-15

## 2021-10-04 RX ORDER — LISINOPRIL 5 MG/1
TABLET ORAL
Qty: 90 TABLET | Refills: 3 | Status: SHIPPED | OUTPATIENT
Start: 2021-10-04 | End: 2021-10-26

## 2021-10-04 RX ORDER — WARFARIN SODIUM 5 MG/1
TABLET ORAL
Qty: 90 TABLET | Refills: 1 | Status: SHIPPED | OUTPATIENT
Start: 2021-10-04 | End: 2022-02-03

## 2021-10-04 RX ORDER — OXYBUTYNIN CHLORIDE 10 MG/1
TABLET, EXTENDED RELEASE ORAL
Qty: 90 TABLET | Refills: 3 | Status: SHIPPED | OUTPATIENT
Start: 2021-10-04 | End: 2022-08-23

## 2021-10-04 NOTE — TELEPHONE ENCOUNTER
"Requested Prescriptions   Pending Prescriptions Disp Refills     warfarin ANTICOAGULANT (COUMADIN) 5 MG tablet 90 tablet 1     Sig: TAKE 5 MG X 3 DAYS/WEEK AND 2.5 MG X 4 DAYS/WEEK.  OR [facility].       Vitamin K Antagonists Failed - 10/4/2021  8:37 AM        Failed - INR is within goal in the past 6 weeks     Confirm INR is within goal in the past 6 weeks.     Recent Labs   Lab Test 09/28/21  1457   INR 1.5*                       Passed - Recent (12 mo) or future (30 days) visit within the authorizing provider's specialty     Patient has had an office visit with the authorizing provider or a provider within the authorizing providers department within the previous 12 mos or has a future within next 30 days. See \"Patient Info\" tab in inbasket, or \"Choose Columns\" in Meds & Orders section of the refill encounter.              Passed - Medication is active on med list        Passed - Patient is 18 years of age or older        Passed - Patient is not pregnant        Passed - No positive pregnancy on file in past 12 months           LOV-09/17/2021    Prescription refilled per RN Medication RefillPolicy.................... Megan Berkowitz RN ....................  10/4/2021   8:39 AM        "

## 2021-10-05 ENCOUNTER — ANTICOAGULATION THERAPY VISIT (OUTPATIENT)
Dept: ANTICOAGULATION | Facility: OTHER | Age: 76
End: 2021-10-05
Attending: INTERNAL MEDICINE
Payer: MEDICARE

## 2021-10-05 ENCOUNTER — HOSPITAL ENCOUNTER (OUTPATIENT)
Dept: MAMMOGRAPHY | Facility: OTHER | Age: 76
End: 2021-10-05
Attending: INTERNAL MEDICINE
Payer: MEDICARE

## 2021-10-05 DIAGNOSIS — Z12.31 VISIT FOR SCREENING MAMMOGRAM: ICD-10-CM

## 2021-10-05 DIAGNOSIS — Z79.01 ANTICOAGULATION MONITORING, INR RANGE 2-3: Primary | ICD-10-CM

## 2021-10-05 DIAGNOSIS — I48.0 PAROXYSMAL ATRIAL FIBRILLATION (H): ICD-10-CM

## 2021-10-05 LAB — INR POINT OF CARE: 2.4 (ref 0.9–1.1)

## 2021-10-05 PROCEDURE — 36416 COLLJ CAPILLARY BLOOD SPEC: CPT | Mod: ZL

## 2021-10-05 PROCEDURE — 77063 BREAST TOMOSYNTHESIS BI: CPT

## 2021-10-05 NOTE — PROGRESS NOTES
ANTICOAGULATION FOLLOW-UP CLINIC VISIT    Patient Name:  Zoey Fan  Date:  10/5/2021  Contact Type:  Face to Face    SUBJECTIVE:  Patient Findings     Comments:  Tooth extraction site still sensitive.         Clinical Outcomes     Negatives:  Major bleeding event, Thromboembolic event, Anticoagulation-related hospital admission, Anticoagulation-related ED visit, Anticoagulation-related fatality    Comments:  Tooth extraction site still sensitive.            OBJECTIVE    Recent labs: (last 7 days)     10/05/21  1201   INR 2.4*       ASSESSMENT / PLAN  INR assessment THER    Recheck INR In: 1 WEEK    INR Location Clinic      Anticoagulation Summary  As of 10/5/2021    INR goal:  2.0-3.0   TTR:  52.2 % (1 y)   INR used for dosin.4 (10/5/2021)   Warfarin maintenance plan:  5 mg (5 mg x 1) every Mon, Wed, Fri; 2.5 mg (5 mg x 0.5) all other days   Full warfarin instructions:  5 mg every Mon, Wed, Fri; 2.5 mg all other days   Weekly warfarin total:  25 mg   No change documented:  Megan Berkowitz RN   Plan last modified:  Megan Berkowitz RN (2021)   Next INR check:  10/12/2021   Priority:  Maintenance   Target end date:  Indefinite    Indications    Anticoagulation monitoring  INR range 2-3 [Z79.01]  Paroxysmal atrial fibrillation (H) [I48.0]             Anticoagulation Episode Summary     INR check location:      Preferred lab:      Send INR reminders to:  [facility]    Comments:        Anticoagulation Care Providers     Provider Role Specialty Phone number    Vijay Mackay MD Referring Internal Medicine [phone]            See the Encounter Report to view Anticoagulation Flowsheet and Dosing Calendar (Go to Encounters tab in chart review, and find the Anticoagulation Therapy Visit)        Megan Berkowitz RN

## 2021-10-07 ENCOUNTER — IMMUNIZATION (OUTPATIENT)
Dept: FAMILY MEDICINE | Facility: OTHER | Age: 76
End: 2021-10-07
Attending: FAMILY MEDICINE
Payer: MEDICARE

## 2021-10-07 PROCEDURE — 91300 PR COVID VAC PFIZER DIL RECON 30 MCG/0.3 ML IM: CPT

## 2021-10-12 ENCOUNTER — ANTICOAGULATION THERAPY VISIT (OUTPATIENT)
Dept: ANTICOAGULATION | Facility: OTHER | Age: 76
End: 2021-10-12
Attending: INTERNAL MEDICINE
Payer: MEDICARE

## 2021-10-12 DIAGNOSIS — I48.0 PAROXYSMAL ATRIAL FIBRILLATION (H): ICD-10-CM

## 2021-10-12 DIAGNOSIS — Z79.01 ANTICOAGULATION MONITORING, INR RANGE 2-3: Primary | ICD-10-CM

## 2021-10-12 LAB — INR POINT OF CARE: 2.4 (ref 0.9–1.1)

## 2021-10-12 PROCEDURE — 36416 COLLJ CAPILLARY BLOOD SPEC: CPT | Mod: ZL

## 2021-10-12 NOTE — PROGRESS NOTES
ANTICOAGULATION FOLLOW-UP CLINIC VISIT    Patient Name:  Zoey Fan  Date:  10/12/2021  Contact Type:  Face to Face    SUBJECTIVE:  Patient Findings         Clinical Outcomes     Negatives:  Major bleeding event, Thromboembolic event, Anticoagulation-related hospital admission, Anticoagulation-related ED visit, Anticoagulation-related fatality           OBJECTIVE    Recent labs: (last 7 days)     10/12/21  1202   INR 2.4*       ASSESSMENT / PLAN  INR assessment THER    Recheck INR In: 2 WEEKS    INR Location Clinic      Anticoagulation Summary  As of 10/12/2021    INR goal:  2.0-3.0   TTR:  54.1 % (1 y)   INR used for dosin.4 (10/12/2021)   Warfarin maintenance plan:  5 mg (5 mg x 1) every Mon, Wed, Fri; 2.5 mg (5 mg x 0.5) all other days   Full warfarin instructions:  5 mg every Mon, Wed, Fri; 2.5 mg all other days   Weekly warfarin total:  25 mg   No change documented:  Megan Berkowitz RN   Plan last modified:  Megan Berkowitz RN (2021)   Next INR check:  10/26/2021   Priority:  Maintenance   Target end date:  Indefinite    Indications    Anticoagulation monitoring  INR range 2-3 [Z79.01]  Paroxysmal atrial fibrillation (H) [I48.0]             Anticoagulation Episode Summary     INR check location:      Preferred lab:      Send INR reminders to:  [facility]    Comments:        Anticoagulation Care Providers     Provider Role Specialty Phone number    Vijay Mackay MD Referring Internal Medicine [phone]            See the Encounter Report to view Anticoagulation Flowsheet and Dosing Calendar (Go to Encounters tab in chart review, and find the Anticoagulation Therapy Visit)        Megan Berkowitz RN

## 2021-10-19 ENCOUNTER — TRANSFERRED RECORDS (OUTPATIENT)
Dept: HEALTH INFORMATION MANAGEMENT | Facility: OTHER | Age: 76
End: 2021-10-19

## 2021-10-21 ENCOUNTER — TELEPHONE (OUTPATIENT)
Dept: PEDIATRICS | Facility: OTHER | Age: 76
End: 2021-10-21

## 2021-10-21 ENCOUNTER — MEDICAL CORRESPONDENCE (OUTPATIENT)
Dept: HEALTH INFORMATION MANAGEMENT | Facility: OTHER | Age: 76
End: 2021-10-21

## 2021-10-21 NOTE — TELEPHONE ENCOUNTER
Patient needs to talk to [facility] about rearranging her meds. Also her long qt is elevated.     Amy Rachel on 10/21/2021 at 10:17 AM

## 2021-10-22 ENCOUNTER — TELEPHONE (OUTPATIENT)
Dept: PEDIATRICS | Facility: OTHER | Age: 76
End: 2021-10-22

## 2021-10-22 NOTE — TELEPHONE ENCOUNTER
Patient notified. Appointment set for next week.  Adeline Harrell LPN.........................10/22/2021  10:51 AM

## 2021-10-22 NOTE — TELEPHONE ENCOUNTER
Received paper from Ms. Quezadasam.     -- Obtain notes from Dr. Walker's visit   -- Schedule OV next week, we can get EKG at that time and send to Dr. PÉREZ    -- Stop Prozac   -- Will decide on new option at that time.    Signed, Vijay Mackay MD, FAAP, FACP  Internal Medicine & Pediatrics

## 2021-10-22 NOTE — TELEPHONE ENCOUNTER
[facility]-pt very frustrated that she is getting no where having her records sent to us. Her clinic refuses to send them according to the pt. She says she has talked with them many times. Any suggestions? Please call. Thank you.  Michell Owen

## 2021-10-22 NOTE — TELEPHONE ENCOUNTER
Spoke with patient. Told her to come in and sign a release to get her records from the other facility sent here.  Adeline Harrell LPN.........................10/22/2021  12:04 PM

## 2021-10-26 ENCOUNTER — ANTICOAGULATION THERAPY VISIT (OUTPATIENT)
Dept: ANTICOAGULATION | Facility: OTHER | Age: 76
End: 2021-10-26
Attending: INTERNAL MEDICINE
Payer: MEDICARE

## 2021-10-26 ENCOUNTER — ALLIED HEALTH/NURSE VISIT (OUTPATIENT)
Dept: FAMILY MEDICINE | Facility: OTHER | Age: 76
End: 2021-10-26
Attending: INTERNAL MEDICINE
Payer: MEDICARE

## 2021-10-26 ENCOUNTER — OFFICE VISIT (OUTPATIENT)
Dept: PEDIATRICS | Facility: OTHER | Age: 76
End: 2021-10-26
Attending: INTERNAL MEDICINE
Payer: COMMERCIAL

## 2021-10-26 ENCOUNTER — TELEPHONE (OUTPATIENT)
Dept: PEDIATRICS | Facility: OTHER | Age: 76
End: 2021-10-26

## 2021-10-26 VITALS
HEART RATE: 94 BPM | TEMPERATURE: 97.5 F | SYSTOLIC BLOOD PRESSURE: 146 MMHG | DIASTOLIC BLOOD PRESSURE: 58 MMHG | RESPIRATION RATE: 16 BRPM | WEIGHT: 162 LBS | OXYGEN SATURATION: 96 % | BODY MASS INDEX: 25 KG/M2

## 2021-10-26 DIAGNOSIS — I48.0 PAROXYSMAL ATRIAL FIBRILLATION (H): ICD-10-CM

## 2021-10-26 DIAGNOSIS — Z79.01 ANTICOAGULATION MONITORING, INR RANGE 2-3: Primary | ICD-10-CM

## 2021-10-26 DIAGNOSIS — Z95.0 PACEMAKER: Chronic | ICD-10-CM

## 2021-10-26 DIAGNOSIS — I45.81 LONG Q-T SYNDROME: Primary | Chronic | ICD-10-CM

## 2021-10-26 DIAGNOSIS — I50.32 CHRONIC HEART FAILURE WITH PRESERVED EJECTION FRACTION (H): ICD-10-CM

## 2021-10-26 DIAGNOSIS — Z79.899 ENCOUNTER FOR LONG-TERM (CURRENT) DRUG USE: Primary | ICD-10-CM

## 2021-10-26 DIAGNOSIS — I45.81 LONG Q-T SYNDROME: ICD-10-CM

## 2021-10-26 LAB
ATRIAL RATE - MUSE: 76 BPM
DIASTOLIC BLOOD PRESSURE - MUSE: NORMAL MMHG
INR POINT OF CARE: 2.4 (ref 0.9–1.1)
INTERPRETATION ECG - MUSE: NORMAL
P AXIS - MUSE: 101 DEGREES
PR INTERVAL - MUSE: 190 MS
QRS DURATION - MUSE: 80 MS
QT - MUSE: 498 MS
QTC - MUSE: 560 MS
R AXIS - MUSE: 51 DEGREES
SYSTOLIC BLOOD PRESSURE - MUSE: NORMAL MMHG
T AXIS - MUSE: -18 DEGREES
VENTRICULAR RATE- MUSE: 76 BPM

## 2021-10-26 PROCEDURE — 99214 OFFICE O/P EST MOD 30 MIN: CPT | Performed by: INTERNAL MEDICINE

## 2021-10-26 PROCEDURE — 93005 ELECTROCARDIOGRAM TRACING: CPT

## 2021-10-26 PROCEDURE — G0463 HOSPITAL OUTPT CLINIC VISIT: HCPCS | Mod: 25

## 2021-10-26 PROCEDURE — 36416 COLLJ CAPILLARY BLOOD SPEC: CPT | Mod: ZL

## 2021-10-26 PROCEDURE — 93010 ELECTROCARDIOGRAM REPORT: CPT | Performed by: INTERNAL MEDICINE

## 2021-10-26 PROCEDURE — G0463 HOSPITAL OUTPT CLINIC VISIT: HCPCS

## 2021-10-26 RX ORDER — LISINOPRIL 10 MG/1
10 TABLET ORAL DAILY
Qty: 30 TABLET | Refills: 3 | Status: SHIPPED | OUTPATIENT
Start: 2021-10-26 | End: 2021-12-17

## 2021-10-26 RX ORDER — FUROSEMIDE 20 MG
20 TABLET ORAL 2 TIMES DAILY
Qty: 180 TABLET | Refills: 3 | Status: SHIPPED | OUTPATIENT
Start: 2021-10-26 | End: 2022-12-21

## 2021-10-26 RX ORDER — FUROSEMIDE 20 MG
20 TABLET ORAL 2 TIMES DAILY
Qty: 180 TABLET | Refills: 3 | Status: SHIPPED | OUTPATIENT
Start: 2021-10-26 | End: 2021-10-26

## 2021-10-26 ASSESSMENT — PAIN SCALES - GENERAL: PAINLEVEL: NO PAIN (0)

## 2021-10-26 NOTE — PROGRESS NOTES
Subjective   Zoey Fan is a 76 year old female who presents for follow-up after ECG at outside facility demonstrated QTc prolongation. Patient has known history of long QT but most recent ECG showed increased elongation from previous studies. Provider at that appointment recommended discontinuing fluoxetine due to its potential of causing QT prolongation. Patient reported to be on fluoxetine for depression since 1997 and was very satisfied with this medication with good control over her depressive symptoms. Has not tried any other selective serotonin reuptake inhibitor's or other antidepressant medications. Per the recommendation, she discontinued fluoxetine a week ago and since has had increased emotions and feelings of sadness, where she reported an episode of extensive crying yesterday. Denies thoughts of self harm or harm to others.     Additionally, she was seen in the ED in September due to oral bleeding following molar extraction. She was found to be hypertensive at that time and her dose of lisinopril was doubled to 10mg Qday, along with discontinuation of NSAIDS (notably Aleve). She has been recording her blood pressures at home since then, with the lowest reading being 70/42. She did not report any symptoms including shortness of breath, dizziness or lightheadedness at the time of this reading. She is slightly hypertensive at presentation today, with a reading of 146/58. Unclear as to whether her BP cuff at home is giving her accurate readings. No chest pain, shortness of breath, headaches, vision changes, or palpitations reported.      Objective   Vitals: BP (!) 146/58   Pulse 94   Temp 97.5  F (36.4  C) (Tympanic)   Resp 16   Wt 162 lb (73.5 kg)   LMP  (LMP Unknown)   SpO2 96%   BMI 25.00 kg/m      General: well appearing; pleasant; no apparent distress  CV: Regular rate and rhythm, no murmur, rub or gallop  Pulm: Clear to auscultation bilaterally, no wheezing, rales or rhonchi  Neuro:  Grossly intact  Musculoskeletal: No lower extremity edema; grossly moves all extremities   Skin: No rash appreciated   Psychiatry: Excellent affect and insight.    Results for orders placed or performed in visit on 10/26/21   INR POCT     Status: Abnormal   Result Value Ref Range    INR Protime 2.4 (A) 0.9 - 1.1   Results for orders placed or performed in visit on 10/26/21   EKG 12-lead, tracing only     Status: None (Preliminary result)   Result Value Ref Range    Systolic Blood Pressure  mmHg    Diastolic Blood Pressure  mmHg    Ventricular Rate 76 BPM    Atrial Rate 76 BPM    AK Interval 190 ms    QRS Duration 80 ms     ms    QTc 560 ms    P Axis 101 degrees    R AXIS 51 degrees    T Axis -18 degrees    Interpretation ECG       Atrial-paced rhythm  Anterior infarct , age undetermined  Prolonged QT  Abnormal ECG  When compared with ECG of 25-MAY-2021 15:05,  Anterior infarct is now Present           Assessment & Plan   1. Long Q-T syndrome  2. Pacemaker  3. Chronic heart failure with preserved ejection fraction (H)  Most recent ECG demonstrated QTc of 560ms. Unknown measurement at outside facility that initially instigated most recent medication changes. ECGs dating back to 2017 show consistent elevations in the 500s. Unclear what is most recent precipitating factor of increase. Reviewed patient's medication list and agreed that fluoxetine appears to be only medication that patient is currently tanking with well documented QT prolongation properties. Agreed with outside provider to discontinue medication.    - lisinopril (ZESTRIL) 10 MG tablet; Take 1 tablet (10 mg) by mouth daily  Dispense: 30 tablet; Refill: 3  - furosemide (LASIX) 20 MG tablet; Take 1 tablet (20 mg) by mouth 2 times daily  Dispense: 180 tablet; Refill: 3    4. History of Depression  Patient has suffered from depressive episodes since the mid 1990s. Began taking fluoxetine in 1997 with satisfactory control of her symptoms. Discussed  different antidepressant medications with patient today including risks associated with QT prolongation. After discussing with patient about the benefits of therapy and counseling, patient is willing to undergo trial of discontinuation of all antidepressant medications. Will follow-up in 6 weeks after body has time to adjust to discontinuation of medications and trial of therapy.   - Establish care with therapist  - Follow-up in 6 weeks if depressive feelings are not adequately controlled with therapy alone to discuss other medication or treatment options    Patient Instructions      -- Stop Prozac   -- See a therapist   -- If you still feel sad in 6 weeks, consider another antidepressant    [place] counselors/therapists   Telephone Hours Kids? Address   [facility]  (Many counselors) [phone] M-Th 8-5  F 8-12 Yes [address]   [email or web address]   [facility]  (Many counselors) [phone]  Yes [address] [place]   [email or web address]   [facility]  (Many counselors) [phone]  Yes [address]  [email or web address]   [facility]  Yannick Levy [phone]  Yes [facility]  [address]  [email or web address]   [facility]  Donald Garcia [phone]  Yes [address]   [address]  [email or web address]   Esperanza Carlson [phone]   [address]  [email or web address]   [facility]  Yohannes Foote [phone]   [address]  [email or web address]://[email or web address].[email or web address] Facilitysy[facility] General Health Centerogical.com/   Nkechi Diaz [phone]   [address]   Faith Campbell [phone]   [address] [place] [address]   Emily Schmitt [phone]  Yes [address]   Elisa Vega [phone]   [address] [place] [place]    Yovani Bonds [phone]   [address] [place] [address]   kSye Amezquita [phone]    [address]   [email or web address]   Colleen Kolb [phone]   [address] [place] [address] [address]  ([facility])  [email or web address]   [facility]  Alonzo João [phone]   [address] [facility]  Michele Rinne Cindy Thomas [phone]      [facility] [place] [phone]  Yes RASHAD Baron  [address]  [email or web address]://[email or web address]   [facility] [place] [phone]  Yes RASHAD Klein  [address]  [email or web address]://[email or web address]         Return if symptoms worsen or fail to improve.    Kojo Zamorano, Medical Student - Year 3    Attestation Statement:  I was present with the medical student who participated in the service and in the documentation of this note. I have verified the history and personally performed the physical exam and medical decision making, and have verified the content of the note which accurately reflects my assessment of the patient and the plan of care.    Signed, Vijay Mackay MD, FAAP, FACP  Internal Medicine & Pediatrics  10/26/2021 3:22 PM

## 2021-10-26 NOTE — NURSING NOTE
Chief Complaint   Patient presents with     RECHECK     F/U after specialty appt for heart     Medication Reconciliation: complete    FOOD SECURITY SCREENING QUESTIONS  Hunger Vital Signs:  Within the past 12 months we worried whether our food would run out before we got money to buy more. Never  Within the past 12 months the food we bought just didn't last and we didn't have money to get more. Never    Carla Larkin CMA ([facility])

## 2021-10-26 NOTE — PROGRESS NOTES
"ANTICOAGULATION FOLLOW-UP CLINIC VISIT    Patient Name:  Zoey Fan  Date:  10/26/2021  Contact Type:  Face to Face    SUBJECTIVE:  Patient Findings     Positives:  Change in medications (Stopped Prozac about a week ago due to EKG results. \"Hope to start something different today at OV\". )        Clinical Outcomes     Negatives:  Major bleeding event, Thromboembolic event, Anticoagulation-related hospital admission, Anticoagulation-related ED visit, Anticoagulation-related fatality           OBJECTIVE    Recent labs: (last 7 days)     10/26/21  1141   INR 2.4*       ASSESSMENT / PLAN  INR assessment THER    Recheck INR In: 2 WEEKS    INR Location Clinic      Anticoagulation Summary  As of 10/26/2021    INR goal:  2.0-3.0   TTR:  56.6 % (1 y)   INR used for dosin.4 (10/26/2021)   Warfarin maintenance plan:  5 mg (5 mg x 1) every Mon, Wed, Fri; 2.5 mg (5 mg x 0.5) all other days   Full warfarin instructions:  5 mg every Mon, Wed, Fri; 2.5 mg all other days   Weekly warfarin total:  25 mg   No change documented:  Megan Berkowitz RN   Plan last modified:  Megan Berkowitz RN (2021)   Next INR check:  2021   Priority:  Maintenance   Target end date:  Indefinite    Indications    Anticoagulation monitoring  INR range 2-3 [Z79.01]  Paroxysmal atrial fibrillation (H) [I48.0]             Anticoagulation Episode Summary     INR check location:      Preferred lab:      Send INR reminders to:  [facility]    Comments:        Anticoagulation Care Providers     Provider Role Specialty Phone number    Vijay Mackay MD Referring Internal Medicine [phone]            See the Encounter Report to view Anticoagulation Flowsheet and Dosing Calendar (Go to Encounters tab in chart review, and find the Anticoagulation Therapy Visit)        Megan Berkowitz RN                 "

## 2021-10-26 NOTE — PATIENT INSTRUCTIONS
-- Stop Prozac   -- See a therapist   -- If you still feel sad in 6 weeks, consider another antidepressant    [place] counselors/therapists   Telephone Hours Kids? Address   [facility]  (Many counselors) [phone] M-Th 8-5  F 8-12 Yes [address] [place] [address]   [email or web address]   [facility]  (Many counselors) [phone]  Yes [address]   [email or web address]://[email or web address]   [facility]  (Many counselors) [phone]  Yes [address]  [email or web address]://[email or web address]   [facility]  Yannick Levy [phone]  Yes [facility]  [address] [place] [address], [address]  [email or web address]   [facility]  Donald Garcia [phone]  Yes [address] [place] [address]   [address]  [email or web address]   Esperanza Carlson [phone]   [address]  [email or web address]   [facility]  Yohannes Foote [phone]   [address] [facility] [address]  [email or web address]://[email or web address] Centerogical.[email or web address]/   Nkechi Kassyvikyshawna [phone]   [address]   Faith Campbell [phone]   [address] [place] [address]   Emily Schmitt [phone]  Yes [address]   Elisa Vega [phone]   [address] [facility]   Yovani Bonds [phone]   [address] [place] [address]   Skye Amezquita [phone]   [address] [place] [address]   http://[email or web address].[facility]co[facility].[email or web address]   Colleen Kolb [phone]   [address] [place] [address] [address]  ([facility])  [email or web address]   [facility]  Alonzo Moyer [phone]   [address] [facility]  Michele Rinne Cindy Thomas [phone]      [facility] [place] [phone]  Yes RASHAD Baron  [address]  [email or web address]://[email or web address]   [facility] [place] [phone]  Yes RASHAD Klein  [address] [place] [address]  [email or web address]://[email or web address]

## 2021-10-26 NOTE — TELEPHONE ENCOUNTER
The nurse who performed this is the one who would send this to the ordering provider.  She brought us a copy as a courtesy because she had the appointment with you today.  Is there any reason we would need to change this workflow?  Carla Larkin CMA ([facility])    [FreeTextEntry1] : Imbalance, tinnitus improving with dexamethasone taper.\par Continue dexamethasone will taper over two weeks.\par If symptoms persist after steroid taper, will consider CT head.\par Contact information provided if patient has any further questions/concerns.\par Education provided regarding plan of care.\par \par Patient verbalizes agreement and understanding with plan.

## 2021-10-26 NOTE — PROGRESS NOTES
Pt presented to nurse injection room to get an EKG, orders in Epic. EKG ordered by  due to Long Qt. EKG performed and transmitted, then imported to MUSE.    Aileen Duke RN ....................  10/26/2021   11:22 AM

## 2021-10-26 NOTE — TELEPHONE ENCOUNTER
[facility] Please call. Thank you.  Michell Owen

## 2021-10-26 NOTE — TELEPHONE ENCOUNTER
Dr. Walker wanted to get a copy of the EKG.    Signed, Vijay Mackay MD, FAAP, FACP  Internal Medicine & Pediatrics

## 2021-10-27 NOTE — TELEPHONE ENCOUNTER
"Pt reported via phone that she has been in touch with \"someone from the records department with [facility], stoney Guallpa\" regarding EKG performed in clinic on 10/26/2021. Pt is requesting the EKG tracing be faxed to her Cardiologist \"in the [facility]\". Pt then reported \"[facility] records department said they would fax a copy of my EKG based on my verbal ok to do so\"    Pt advised to call back with any concerns.    Aileen Duke RN  ....................  10/27/2021   10:18 AM      "

## 2021-11-09 ENCOUNTER — ANTICOAGULATION THERAPY VISIT (OUTPATIENT)
Dept: ANTICOAGULATION | Facility: OTHER | Age: 76
End: 2021-11-09
Attending: INTERNAL MEDICINE
Payer: MEDICARE

## 2021-11-09 DIAGNOSIS — I48.0 PAROXYSMAL ATRIAL FIBRILLATION (H): ICD-10-CM

## 2021-11-09 DIAGNOSIS — Z79.01 ANTICOAGULATION MONITORING, INR RANGE 2-3: Primary | ICD-10-CM

## 2021-11-09 LAB — INR POINT OF CARE: 2.9 (ref 0.9–1.1)

## 2021-11-09 PROCEDURE — 36416 COLLJ CAPILLARY BLOOD SPEC: CPT | Mod: ZL

## 2021-11-09 NOTE — PROGRESS NOTES
ANTICOAGULATION FOLLOW-UP CLINIC VISIT    Patient Name:  Zoey Fan  Date:  2021  Contact Type:  Face to Face    SUBJECTIVE:  Patient Findings         Clinical Outcomes     Negatives:  Major bleeding event, Thromboembolic event, Anticoagulation-related hospital admission, Anticoagulation-related ED visit, Anticoagulation-related fatality           OBJECTIVE    Recent labs: (last 7 days)     21  1204   INR 2.9*       ASSESSMENT / PLAN  INR assessment THER    Recheck INR In: 3 WEEKS    INR Location Clinic      Anticoagulation Summary  As of 2021    INR goal:  2.0-3.0   TTR:  57.4 % (1 y)   INR used for dosin.9 (2021)   Warfarin maintenance plan:  5 mg (5 mg x 1) every Mon, Wed, Fri; 2.5 mg (5 mg x 0.5) all other days   Full warfarin instructions:  5 mg every Mon, Wed, Fri; 2.5 mg all other days   Weekly warfarin total:  25 mg   No change documented:  Megan Berkowitz RN   Plan last modified:  Megan Berkowitz RN (2021)   Next INR check:  2021   Priority:  Maintenance   Target end date:  Indefinite    Indications    Anticoagulation monitoring  INR range 2-3 [Z79.01]  Paroxysmal atrial fibrillation (H) [I48.0]             Anticoagulation Episode Summary     INR check location:      Preferred lab:      Send INR reminders to:  [facility]    Comments:        Anticoagulation Care Providers     Provider Role Specialty Phone number    Vijay Mackay MD Referring Internal Medicine [phone]            See the Encounter Report to view Anticoagulation Flowsheet and Dosing Calendar (Go to Encounters tab in chart review, and find the Anticoagulation Therapy Visit)        Megan Berkowitz RN

## 2021-11-24 ENCOUNTER — OFFICE VISIT (OUTPATIENT)
Dept: FAMILY MEDICINE | Facility: OTHER | Age: 76
End: 2021-11-24
Attending: FAMILY MEDICINE
Payer: MEDICARE

## 2021-11-24 ENCOUNTER — HOSPITAL ENCOUNTER (OUTPATIENT)
Dept: GENERAL RADIOLOGY | Facility: OTHER | Age: 76
End: 2021-11-24
Attending: FAMILY MEDICINE
Payer: MEDICARE

## 2021-11-24 VITALS
WEIGHT: 160 LBS | OXYGEN SATURATION: 100 % | TEMPERATURE: 97.3 F | SYSTOLIC BLOOD PRESSURE: 120 MMHG | DIASTOLIC BLOOD PRESSURE: 50 MMHG | HEART RATE: 80 BPM | BODY MASS INDEX: 24.69 KG/M2 | RESPIRATION RATE: 16 BRPM

## 2021-11-24 DIAGNOSIS — M17.12 PRIMARY OSTEOARTHRITIS OF LEFT KNEE: Primary | ICD-10-CM

## 2021-11-24 PROCEDURE — 73560 X-RAY EXAM OF KNEE 1 OR 2: CPT | Mod: RT

## 2021-11-24 PROCEDURE — G0463 HOSPITAL OUTPT CLINIC VISIT: HCPCS | Mod: 25

## 2021-11-24 PROCEDURE — 99214 OFFICE O/P EST MOD 30 MIN: CPT | Performed by: FAMILY MEDICINE

## 2021-11-24 PROCEDURE — G0463 HOSPITAL OUTPT CLINIC VISIT: HCPCS

## 2021-11-24 ASSESSMENT — PAIN SCALES - GENERAL: PAINLEVEL: MILD PAIN (3)
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
## 2021-11-24 NOTE — PROGRESS NOTES
HPI:  76-year-old female coming in for evaluation of left knee pain. She reports that her knee pain started 52 years ago during a downhill skiing accident. More recently, she slipped coming out of the hot tub. She has pain in the peripatellar region. Her pain is variable depending on her activities. She characterizes her pain as sharp and achy. She has pain with lifting, squatting, sleeping, and going up stairs. She has been using ice. She has had two previous arthroscopic meniscectomies, the last being approximately 15 years ago. She has also received previous cortisone injections. The last being 5 years ago. She denies radicular symptoms.      EXAM:  /50 (BP Location: Right arm, Patient Position: Sitting, Cuff Size: Adult Regular)   Pulse 80   Temp 97.3  F (36.3  C) (Tympanic)   Resp 16   Wt 72.6 kg (160 lb)   LMP  (LMP Unknown)   SpO2 100%   BMI 24.69 kg/m    MUSCULOSKELETAL EXAM:  LEFT KNEE  Inspection:  -No gross deformity  -No bruising or soft tissue swelling  -Scars:  None    Tenderness to palpation of the:  -Quadriceps musculature:  Negative  -Quadriceps tendon:  Negative  -Patella:  Negative  -Medial patellar facet: Positive  -Lateral patellar facet: Positive  -Inferior pole of the patella:  Negative  -Patellar tendon:  Negative  -Tibial tuberosity:  Negative  -Medial joint line: Mild pain  -Medial collateral ligament:  Negative  -Medial hamstring tendons:  Negative  -Medial femoral condyle:  Negative  -Medial tibial plateau:  Negative  -Pes anserine bursa:  Negative  -Lateral joint line:  Negative  -Distal IT band:  Negative  -Gerdy's tubercle:  Negative  -Lateral collateral ligament:  Negative  -Lateral hamstring tendons:  Negative  -Lateral femoral condyle:  Negative  -Lateral tibial plateau:  Negative  -Posterior lateral corner:  Negative  -Popliteal fossa:  Negative    Range of Motion:  -Passive flexion:  125 with pain at endrange  -Passive extension:  0    Strength:  -Extension:   5/5  -Flexion:  5/5    Special Tests:  -Effusion: Small  -Medial patellar glide: Positive  -Lateral patellar glide: Positive  -Patellar apprehension:  Negative  -Medial Aroldo's:  Negative  -Lateral Aroldo's:  Negative  -Valgus stress:  Negative  -Varus stress:  Negative  -Lachman test:  Negative  -Anterior drawer:  Negative  -Posterior drawer:  Negative    Other:  -Intact sensation to light touch distally.  -No signs of cyanosis. Normal skin temperature of the lower extremity.  -Foot/ankle:  No gross deformity. Full range of motion.  -Right knee:  No gross deformity. No palpable tenderness. Normal strength and ROM.      IMAGIN2016: 3 view left knee x-ray  -Mild-moderate lateral tibiofemoral compartment joint space narrowing with moderate osteophytosis.  Severe degenerative changes in the patellofemoral joint with severe osteophytosis.    2021: 4 view left knee x-ray  -Moderate-severe lateral tibiofemoral compartment joint space narrowing, slightly worse than x-rays from 5 years ago.  Osteophytosis still present.  Continuing to see severe degenerative changes with osteophytosis in the patellofemoral joint.  No fracture, dislocation, or bony lesion.      ASSESSMENT/PLAN:  Diagnoses and all orders for this visit:  Primary osteoarthritis of left knee  -     XR Knee Standing 2v  Bilateral & 2v Left  -     Physical Therapy Referral; Future    76-year-old female with primary osteoarthritis of the left knee. Her symptoms seem to be most prevalent in the patellofemoral compartment. X-rays from 2016 and 2021 were both personally reviewed in the office with the findings as demonstrated above by my interpretation. We discussed treatment options for this condition to include aerobic activity, physical therapy, maintaining appropriate body weight, topical medications, oral medications, injection therapies, and surgical referral. Patient is interested in moving forward with a CSI. She is planning on  receiving her shingles vaccine on 12/1. We discussed the theoretical risk of immunosuppression with corticosteroids and that she should consider waiting 2 weeks after her immunization before undergoing an injection to ensure that she receives maximum benefit from the immunization. She is in agreement with this plan  -Discussed the importance of aerobic activity, patient will continue to do walking as able  -Referral placed to physical therapy  -Discussed the importance of maintaining appropriate body weight  -Okay to use topical lidocaine-based products or Voltaren gel  -Ice and elevation for acute flares  -Follow-up 2+ weeks after immunization for landmark guided CSI into the left intra-articular knee      Kostas Cervantes MD  11/24/2021  10:36 AM    Total time spent with this patient was 37 minutes which included chart review, visualization and interpretation of images, time spent with the patient, and documentation.

## 2021-11-30 ENCOUNTER — ANTICOAGULATION THERAPY VISIT (OUTPATIENT)
Dept: ANTICOAGULATION | Facility: OTHER | Age: 76
End: 2021-11-30
Attending: INTERNAL MEDICINE
Payer: MEDICARE

## 2021-11-30 DIAGNOSIS — I48.0 PAROXYSMAL ATRIAL FIBRILLATION (H): ICD-10-CM

## 2021-11-30 DIAGNOSIS — Z79.01 ANTICOAGULATION MONITORING, INR RANGE 2-3: Primary | ICD-10-CM

## 2021-11-30 LAB — INR POINT OF CARE: 2.9 (ref 0.9–1.1)

## 2021-11-30 PROCEDURE — 36416 COLLJ CAPILLARY BLOOD SPEC: CPT | Mod: ZL

## 2021-11-30 NOTE — PROGRESS NOTES
ANTICOAGULATION FOLLOW-UP CLINIC VISIT    Patient Name:  Zoey Fan  Date:  2021  Contact Type:  Face to Face    SUBJECTIVE:  Patient Findings     Comments:  Will be starting topical lidocaine-based products or Voltaren gel        Clinical Outcomes     Negatives:  Major bleeding event, Thromboembolic event, Anticoagulation-related hospital admission, Anticoagulation-related ED visit, Anticoagulation-related fatality    Comments:  Will be starting topical lidocaine-based products or Voltaren gel           OBJECTIVE    Recent labs: (last 7 days)     21  1202   INR 2.9*       ASSESSMENT / PLAN  INR assessment THER    Recheck INR In: 2 WEEKS    INR Location Clinic      Anticoagulation Summary  As of 2021    INR goal:  2.0-3.0   TTR:  59.6 % (1 y)   INR used for dosin.9 (2021)   Warfarin maintenance plan:  5 mg (5 mg x 1) every Mon, Wed, Fri; 2.5 mg (5 mg x 0.5) all other days   Full warfarin instructions:  5 mg every Mon, Wed, Fri; 2.5 mg all other days   Weekly warfarin total:  25 mg   No change documented:  Megan Berkowitz RN   Plan last modified:  Megan Berkowitz RN (2021)   Next INR check:  2021   Priority:  Maintenance   Target end date:  Indefinite    Indications    Anticoagulation monitoring  INR range 2-3 [Z79.01]  Paroxysmal atrial fibrillation (H) [I48.0]             Anticoagulation Episode Summary     INR check location:      Preferred lab:      Send INR reminders to:  [facility]    Comments:        Anticoagulation Care Providers     Provider Role Specialty Phone number    Vijay Mackay MD Referring Internal Medicine [phone]            See the Encounter Report to view Anticoagulation Flowsheet and Dosing Calendar (Go to Encounters tab in chart review, and find the Anticoagulation Therapy Visit)        Megan Berkowitz RN

## 2021-12-15 ENCOUNTER — ANTICOAGULATION THERAPY VISIT (OUTPATIENT)
Dept: ANTICOAGULATION | Facility: OTHER | Age: 76
End: 2021-12-15
Attending: INTERNAL MEDICINE
Payer: MEDICARE

## 2021-12-15 DIAGNOSIS — Z79.01 ANTICOAGULATION MONITORING, INR RANGE 2-3: Primary | ICD-10-CM

## 2021-12-15 DIAGNOSIS — I48.0 PAROXYSMAL ATRIAL FIBRILLATION (H): ICD-10-CM

## 2021-12-15 LAB — INR POINT OF CARE: 2.7 (ref 0.9–1.1)

## 2021-12-15 PROCEDURE — 36416 COLLJ CAPILLARY BLOOD SPEC: CPT | Mod: ZL

## 2021-12-15 NOTE — PROGRESS NOTES
ANTICOAGULATION FOLLOW-UP CLINIC VISIT    Patient Name:  Zoey Fan  Date:  12/15/2021  Contact Type:  Face to Face    SUBJECTIVE:  Patient Findings         Clinical Outcomes     Negatives:  Major bleeding event, Thromboembolic event, Anticoagulation-related hospital admission, Anticoagulation-related ED visit, Anticoagulation-related fatality           OBJECTIVE    Recent labs: (last 7 days)     12/15/21  1305   INR 2.7*       ASSESSMENT / PLAN  INR assessment THER    Recheck INR In: 4 WEEKS    INR Location Clinic      Anticoagulation Summary  As of 12/15/2021    INR goal:  2.0-3.0   TTR:  59.6 % (1 y)   INR used for dosin.7 (12/15/2021)   Warfarin maintenance plan:  5 mg (5 mg x 1) every Mon, Wed, Fri; 2.5 mg (5 mg x 0.5) all other days   Full warfarin instructions:  5 mg every Mon, Wed, Fri; 2.5 mg all other days   Weekly warfarin total:  25 mg   No change documented:  Janessa Olguin RN   Plan last modified:  Megan Berkowitz RN (2021)   Next INR check:  2022   Priority:  Maintenance   Target end date:  Indefinite    Indications    Anticoagulation monitoring  INR range 2-3 [Z79.01]  Paroxysmal atrial fibrillation (H) [I48.0]             Anticoagulation Episode Summary     INR check location:      Preferred lab:      Send INR reminders to:  [facility]    Comments:        Anticoagulation Care Providers     Provider Role Specialty Phone number    Vijay Mackay MD [facility] Internal Medicine [phone]            See the Encounter Report to view Anticoagulation Flowsheet and Dosing Calendar (Go to Encounters tab in chart review, and find the Anticoagulation Therapy Visit)        Janessa Olguin, RN

## 2021-12-16 DIAGNOSIS — I50.32 CHRONIC HEART FAILURE WITH PRESERVED EJECTION FRACTION (H): ICD-10-CM

## 2021-12-17 RX ORDER — LISINOPRIL 10 MG/1
TABLET ORAL
Qty: 90 TABLET | Refills: 3 | Status: SHIPPED | OUTPATIENT
Start: 2021-12-17 | End: 2022-11-08

## 2021-12-17 NOTE — TELEPHONE ENCOUNTER
[facility] mail service- [place] [place] [facility] sent Rx request for the following:      Requested Prescriptions   Pending Prescriptions Disp Refills     lisinopril (ZESTRIL) 10 MG tablet [Pharmacy Med Name: Lisinopril 10 MG Oral Tablet] 90 tablet 3     Sig: TAKE 1 TABLET BY MOUTH  DAILY       ACE Inhibitors (Including Combos) Protocol Passed - 12/16/2021  9:18 PM          Last Prescription Date:   10/26/2021  Last Fill Qty/Refills:         30, R-3    Last Office Visit:              10/26/2021 Mackay   Future Office visit:           None noted  Routing refill request to provider for review/approval Anahi Briseno RN ....................  12/17/2021   1:44 PM

## 2021-12-21 ENCOUNTER — TELEPHONE (OUTPATIENT)
Dept: PEDIATRICS | Facility: OTHER | Age: 76
End: 2021-12-21
Payer: COMMERCIAL

## 2021-12-21 DIAGNOSIS — F39 MOOD DISORDER (H): Primary | ICD-10-CM

## 2021-12-21 DIAGNOSIS — I45.81 LONG Q-T SYNDROME: Chronic | ICD-10-CM

## 2021-12-21 ASSESSMENT — PATIENT HEALTH QUESTIONNAIRE - PHQ9: SUM OF ALL RESPONSES TO PHQ QUESTIONS 1-9: 8

## 2021-12-21 NOTE — TELEPHONE ENCOUNTER
She stopped the prozac because it interferes with long Q-T Syndrome. She has started to cry a lot and thinks she needs to be on something again. PHQ-9 score is 8.  Please send medication to Al.  Norma J. Gosselin, LPN .......  12/21/2021  9:17 AM

## 2021-12-21 NOTE — TELEPHONE ENCOUNTER
After verifying patient's name and date of birth, patient was given the below information.  Norma J. Gosselin, LPN....12/21/2021 11:38 AM

## 2021-12-21 NOTE — TELEPHONE ENCOUNTER
ICD-10-CM    1. Mood disorder (H)  F39 Adult Mental Health Referral   2. Long Q-T syndrome  I45.81 Adult Mental Health Referral       -- Encourage her to establish with a therapist   -- Consult to Dr. Johnson    SignedVijay MD, FAAP, FACP  Internal Medicine & Pediatrics

## 2021-12-29 ENCOUNTER — HOSPITAL ENCOUNTER (OUTPATIENT)
Dept: PHYSICAL THERAPY | Facility: OTHER | Age: 76
Setting detail: THERAPIES SERIES
End: 2021-12-29
Attending: FAMILY MEDICINE
Payer: MEDICARE

## 2021-12-29 DIAGNOSIS — M17.12 PRIMARY OSTEOARTHRITIS OF LEFT KNEE: ICD-10-CM

## 2021-12-29 PROCEDURE — 97110 THERAPEUTIC EXERCISES: CPT | Mod: GP

## 2021-12-29 PROCEDURE — 97161 PT EVAL LOW COMPLEX 20 MIN: CPT | Mod: GP

## 2021-12-29 NOTE — PROGRESS NOTES
[facility]    OUTPATIENT PHYSICAL THERAPY ORTHOPEDIC EVALUATION  PLAN OF TREATMENT FOR OUTPATIENT REHABILITATION  (COMPLETE FOR INITIAL CLAIMS ONLY)  Patient's Last Name, First Name, M.I.  YOB: 1945  Zoey Fan    Provider s Name:  [facility]   Medical Record No.  [id number]   Start of Care Date:  12/29/21   Onset Date:   (Date of referral )   Type:     _X__PT   ___OT   ___SLP Medical Diagnosis:  Primary osteoarthritis of left knee M17.12      PT Diagnosis:  Impaired mobility left LE   Visits from SOC:  1      _________________________________________________________________________________  Plan of Treatment/Functional Goals:  joint mobilization,manual therapy,ROM,strengthening,stretching     Cryotherapy,Hot packs,Ultrasound     Goals  Goal Identifier: Pain  Goal Description: Patient will report she is able to walk for up to 20' per episode 3-4/days /week without limit due to left knee pain greater than 2/10.  She will report a decrease  in episodes of sleep disturbance due to knee pain to 2-3X week in 10-12 weeks  Target Date: 03/28/22    Goal Identifier: Mobility  Goal Description: Patient will demonstrate AROM of the right knee to 135 deg flexion with ext improved to 0- to +5 deg allowing improved gait quality on community level surfaces and up and down stairs on a daily basis in 8 weeks  Target Date: 03/28/22    Goal Identifier: Strength  Goal Description: Patient will demo MMT grade 5/5 strength of the left LE hip, knee and ankle muscle group restoring stability to the left LE and supporting improved gait for up to 20' per episode 3-4X per week in 10-12 weeks  Target Date: 03/28/22                                                           Therapy Frequency:  2 times/Week  Predicted Duration of Therapy Intervention:  12 weeks    Arben Lopez, PT                  I CERTIFY THE NEED FOR THESE SERVICES FURNISHED UNDER        THIS PLAN OF TREATMENT AND WHILE UNDER MY CARE     (Physician co-signature of this document indicates review and certification of the therapy plan).                       Certification Date From:  12/29/21   Certification Date To:  03/28/22    Referring Provider:  Dr. Kostas Shine    Initial Assessment        See Epic Evaluation Start of Care Date: 12/29/21
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
## 2022-01-03 ENCOUNTER — HOSPITAL ENCOUNTER (OUTPATIENT)
Dept: PHYSICAL THERAPY | Facility: OTHER | Age: 77
Setting detail: THERAPIES SERIES
End: 2022-01-03
Attending: FAMILY MEDICINE
Payer: MEDICARE

## 2022-01-03 PROCEDURE — 97140 MANUAL THERAPY 1/> REGIONS: CPT | Mod: GP

## 2022-01-03 PROCEDURE — 97110 THERAPEUTIC EXERCISES: CPT | Mod: GP

## 2022-01-07 ENCOUNTER — HOSPITAL ENCOUNTER (OUTPATIENT)
Dept: PHYSICAL THERAPY | Facility: OTHER | Age: 77
Setting detail: THERAPIES SERIES
End: 2022-01-07
Attending: FAMILY MEDICINE
Payer: MEDICARE

## 2022-01-07 PROCEDURE — 97110 THERAPEUTIC EXERCISES: CPT | Mod: GP

## 2022-01-07 PROCEDURE — 97140 MANUAL THERAPY 1/> REGIONS: CPT | Mod: GP

## 2022-01-10 ENCOUNTER — HOSPITAL ENCOUNTER (OUTPATIENT)
Dept: PHYSICAL THERAPY | Facility: OTHER | Age: 77
Setting detail: THERAPIES SERIES
End: 2022-01-10
Attending: FAMILY MEDICINE
Payer: MEDICARE

## 2022-01-10 PROCEDURE — 97140 MANUAL THERAPY 1/> REGIONS: CPT | Mod: GP,CQ

## 2022-01-10 PROCEDURE — 97110 THERAPEUTIC EXERCISES: CPT | Mod: GP,CQ

## 2022-01-12 ENCOUNTER — ANTICOAGULATION THERAPY VISIT (OUTPATIENT)
Dept: ANTICOAGULATION | Facility: OTHER | Age: 77
End: 2022-01-12
Attending: INTERNAL MEDICINE
Payer: MEDICARE

## 2022-01-12 ENCOUNTER — HOSPITAL ENCOUNTER (OUTPATIENT)
Dept: PHYSICAL THERAPY | Facility: OTHER | Age: 77
Setting detail: THERAPIES SERIES
End: 2022-01-12
Attending: FAMILY MEDICINE
Payer: MEDICARE

## 2022-01-12 DIAGNOSIS — I48.0 PAROXYSMAL ATRIAL FIBRILLATION (H): ICD-10-CM

## 2022-01-12 DIAGNOSIS — Z79.01 ANTICOAGULATION MONITORING, INR RANGE 2-3: Primary | ICD-10-CM

## 2022-01-12 LAB — INR POINT OF CARE: 3.1 (ref 0.9–1.1)

## 2022-01-12 PROCEDURE — 85610 PROTHROMBIN TIME: CPT | Mod: ZL,QW

## 2022-01-12 PROCEDURE — 97110 THERAPEUTIC EXERCISES: CPT | Mod: GP,CQ

## 2022-01-12 PROCEDURE — 97140 MANUAL THERAPY 1/> REGIONS: CPT | Mod: GP,CQ

## 2022-01-12 NOTE — PROGRESS NOTES
ANTICOAGULATION FOLLOW-UP CLINIC VISIT    Patient Name:  Zoey Fan  Date:  1/12/2022  Contact Type:  Face to Face    SUBJECTIVE:  Patient Findings     Positives:  Change in medications (stopped aleve and prozac will be switching antidepressants)        Clinical Outcomes     Negatives:  Major bleeding event, Thromboembolic event, Anticoagulation-related hospital admission, Anticoagulation-related ED visit, Anticoagulation-related fatality           OBJECTIVE    Recent labs: (last 7 days)     01/12/22  1405   INR 3.1*       ASSESSMENT / PLAN  INR assessment SUPRA    Recheck INR In: 2 WEEKS    INR Location Clinic      Anticoagulation Summary  As of 1/12/2022    INR goal:  2.0-3.0   TTR:  57.7 % (1 y)   INR used for dosing:  3.1 (1/12/2022)   Warfarin maintenance plan:  5 mg (5 mg x 1) every Mon, Wed, Fri; 2.5 mg (5 mg x 0.5) all other days   Full warfarin instructions:  5 mg every Mon, Wed, Fri; 2.5 mg all other days   Weekly warfarin total:  25 mg   No change documented:  Janessa Olguin RN   Plan last modified:  Megan Berkowitz RN (8/17/2021)   Next INR check:  1/26/2022   Priority:  Maintenance   Target end date:  Indefinite    Indications    Anticoagulation monitoring  INR range 2-3 [Z79.01]  Paroxysmal atrial fibrillation (H) [I48.0]             Anticoagulation Episode Summary     INR check location:      Preferred lab:      Send INR reminders to:  [facility]    Comments:        Anticoagulation Care Providers     Provider Role Specialty Phone number    Vijay Mackay MD [facility] Internal Medicine [phone]            See the Encounter Report to view Anticoagulation Flowsheet and Dosing Calendar (Go to Encounters tab in chart review, and find the Anticoagulation Therapy Visit)        Janessa Olguin, RN

## 2022-01-17 ENCOUNTER — HOSPITAL ENCOUNTER (OUTPATIENT)
Dept: PHYSICAL THERAPY | Facility: OTHER | Age: 77
Setting detail: THERAPIES SERIES
End: 2022-01-17
Attending: FAMILY MEDICINE
Payer: MEDICARE

## 2022-01-17 PROCEDURE — 97110 THERAPEUTIC EXERCISES: CPT | Mod: GP

## 2022-01-17 PROCEDURE — 97140 MANUAL THERAPY 1/> REGIONS: CPT | Mod: GP

## 2022-01-17 NOTE — PROGRESS NOTES
[facility] Rehabilitation Service    Outpatient Physical Therapy Discharge Note  Patient: Zoey Fan  : 1945    End Dates of Reporting Period:  2022    Referring Provider: Dr. Kostas Shine    Therapy Diagnosis: Left knee pain due to primary OA     Client Self Report: She is feeling good. Reports her left knee is 60% better. She is able to walk on her treadmill 15-20' per day.  She continues to have pain when walking up and down stairs. She does feel much stronger.    Objective Measurements:  Objective Measure: Mobility  Details: +4 through 133 deg flex  Objective Measure: Strength  Details: Quads 4+/5, HS 5+/5, Hip abd 4+/5  Objective Measure: gait  Details: NML        Goals:  Goal Identifier Pain   Goal Description Patient will report she is able to walk for up to 20' per episode 3-4/days /week without limit due to left knee pain greater than 2/10.  She will report a decrease  in episodes of sleep disturbance due to knee pain to 2-3X week in 10-12 weeks. She is no longer waking with pain.    Target Date 22   Date Met  22   Progress (detail required for progress note): She is now able to walk on the treadmill for up to 15-20' w/o residual knee pain.      Goal Identifier Mobility   Goal Description Patient will demonstrate AROM of the right knee to 135 deg flexion with ext improved to 0- to +5 deg allowing improved gait quality on community level surfaces and up and down stairs on a daily basis in 8 weeks   Target Date 22   Date Met  22   Progress (detail required for progress note): ROM now +5 to 133 deg. allowing easier access into low shelves and the ability to bend at the knees to  household items     Goal Identifier Strength   Goal Description Patient will demo MMT grade 5/5 strength of the left LE hip, knee and ankle muscle group restoring stability to the left LE and supporting  improved gait for up to 20' per episode 3-4X per week in 10-12 weeks   Target Date 03/28/22   Date Met      Progress (detail required for progress note): Quads 4+/5, HS 5+/5, Hip abd 4+/5, hip ext 4/5 allowing for imnproved knee stability when walking up and down stairs.                  Plan:  Discharge from therapy.    Discharge:    Reason for Discharge: Patient has met all goals.See above    Equipment Issued: None    Discharge Plan: Patient to continue home program.

## 2022-01-26 ENCOUNTER — ANTICOAGULATION THERAPY VISIT (OUTPATIENT)
Dept: ANTICOAGULATION | Facility: OTHER | Age: 77
End: 2022-01-26
Attending: INTERNAL MEDICINE
Payer: MEDICARE

## 2022-01-26 DIAGNOSIS — I48.0 PAROXYSMAL ATRIAL FIBRILLATION (H): ICD-10-CM

## 2022-01-26 DIAGNOSIS — Z79.01 ANTICOAGULATION MONITORING, INR RANGE 2-3: Primary | ICD-10-CM

## 2022-01-26 LAB — INR POINT OF CARE: 4 (ref 0.9–1.1)

## 2022-01-26 PROCEDURE — 36416 COLLJ CAPILLARY BLOOD SPEC: CPT | Mod: ZL

## 2022-01-26 NOTE — PROGRESS NOTES
ANTICOAGULATION FOLLOW-UP CLINIC VISIT    Patient Name:  Zoey Fan  Date:  2022  Contact Type:  Face to Face    SUBJECTIVE:  Patient Findings         Clinical Outcomes     Negatives:  Major bleeding event, Thromboembolic event, Anticoagulation-related hospital admission, Anticoagulation-related ED visit, Anticoagulation-related fatality           OBJECTIVE    Recent labs: (last 7 days)     22  1126   INR 4.0*       ASSESSMENT / PLAN  INR assessment SUPRA    Recheck INR In: 1 WEEK    INR Location Clinic      Anticoagulation Summary  As of 2022    INR goal:  2.0-3.0   TTR:  53.9 % (1 y)   INR used for dosin.0 (2022)   Warfarin maintenance plan:  5 mg (5 mg x 1) every Mon, Wed; 2.5 mg (5 mg x 0.5) all other days   Full warfarin instructions:  : Hold; Otherwise 5 mg every Mon, Wed; 2.5 mg all other days   Weekly warfarin total:  22.5 mg   Plan last modified:  Bibiana Melo RN (2022)   Next INR check:  2022   Priority:  Maintenance   Target end date:  Indefinite    Indications    Anticoagulation monitoring  INR range 2-3 [Z79.01]  Paroxysmal atrial fibrillation (H) [I48.0]             Anticoagulation Episode Summary     INR check location:      Preferred lab:      Send INR reminders to:  [facility]    Comments:        Anticoagulation Care Providers     Provider Role Specialty Phone number    Vijay Mackay MD [facility] Internal Medicine [phone]            See the Encounter Report to view Anticoagulation Flowsheet and Dosing Calendar (Go to Encounters tab in chart review, and find the Anticoagulation Therapy Visit)        Bibiana Melo RN

## 2022-01-28 ENCOUNTER — ALLIED HEALTH/NURSE VISIT (OUTPATIENT)
Dept: FAMILY MEDICINE | Facility: OTHER | Age: 77
End: 2022-01-28
Attending: INTERNAL MEDICINE
Payer: MEDICARE

## 2022-01-28 DIAGNOSIS — I45.81 LONG QT SYNDROME: Primary | ICD-10-CM

## 2022-01-28 PROCEDURE — 93010 ELECTROCARDIOGRAM REPORT: CPT | Performed by: INTERNAL MEDICINE

## 2022-01-28 PROCEDURE — 93005 ELECTROCARDIOGRAM TRACING: CPT

## 2022-01-28 NOTE — PROGRESS NOTES
Pt presented to nurse injection room to get an EKG. EKG ordered by [facility]- due to long QT syndrome. EKG performed and tracing faxed to ordering provider. EKG transmitted and imported to Muse, then tracing and written order sent for scanning into Epic.     Aileen Duke RN ....................  1/28/2022   2:10 PM

## 2022-01-30 LAB
ATRIAL RATE - MUSE: 89 BPM
DIASTOLIC BLOOD PRESSURE - MUSE: NORMAL MMHG
INTERPRETATION ECG - MUSE: NORMAL
P AXIS - MUSE: 96 DEGREES
PR INTERVAL - MUSE: 176 MS
QRS DURATION - MUSE: 78 MS
QT - MUSE: 414 MS
QTC - MUSE: 503 MS
R AXIS - MUSE: 86 DEGREES
SYSTOLIC BLOOD PRESSURE - MUSE: NORMAL MMHG
T AXIS - MUSE: -18 DEGREES
VENTRICULAR RATE- MUSE: 89 BPM

## 2022-02-01 ENCOUNTER — TELEPHONE (OUTPATIENT)
Dept: PEDIATRICS | Facility: OTHER | Age: 77
End: 2022-02-01
Payer: COMMERCIAL

## 2022-02-01 NOTE — TELEPHONE ENCOUNTER
Reason for call: Request for results.    Name of test or procedure: EKG    Date of test or procedure: 01/28/2022    Location of test or procedure: [facility]    Preferred method for responding to this message: Telephone Call    Phone number patient can be reached at: Cell number on file:    Telephone Information:   Mobile [phone]       If we can't reach you directly, may we leave a detailed response at the number you provided?Yes     Would like to discuss EKG and a medication

## 2022-02-01 NOTE — TELEPHONE ENCOUNTER
This was ordered by Dr. Mcelroy, results should come through him. No major changes to QT interval.    Signed, Vijay Mackay MD, FAAP, FACP  Internal Medicine & Pediatrics

## 2022-02-02 ENCOUNTER — ANTICOAGULATION THERAPY VISIT (OUTPATIENT)
Dept: ANTICOAGULATION | Facility: OTHER | Age: 77
End: 2022-02-02
Attending: INTERNAL MEDICINE
Payer: MEDICARE

## 2022-02-02 DIAGNOSIS — I48.0 PAROXYSMAL ATRIAL FIBRILLATION (H): ICD-10-CM

## 2022-02-02 DIAGNOSIS — Z79.01 ANTICOAGULATION MONITORING, INR RANGE 2-3: Primary | ICD-10-CM

## 2022-02-02 LAB — INR POINT OF CARE: 2 (ref 0.9–1.1)

## 2022-02-02 PROCEDURE — 85610 PROTHROMBIN TIME: CPT | Mod: ZL,QW

## 2022-02-02 PROCEDURE — 36416 COLLJ CAPILLARY BLOOD SPEC: CPT | Mod: ZL

## 2022-02-02 NOTE — TELEPHONE ENCOUNTER
Message was taken care of by [facility], [facility]. Patient scheduled for cardioversion in [place].  Cherri Schmitt LPN  2/2/2022 11:23 AM

## 2022-02-02 NOTE — PROGRESS NOTES
ANTICOAGULATION FOLLOW-UP CLINIC VISIT    Patient Name:  Zoey Fan  Date:  2022  Contact Type:  Face to Face    SUBJECTIVE:  Patient Findings     Positives:  Upcoming invasive procedure (having cardioversion next week)        Clinical Outcomes     Negatives:  Major bleeding event, Thromboembolic event, Anticoagulation-related hospital admission, Anticoagulation-related ED visit, Anticoagulation-related fatality           OBJECTIVE    Recent labs: (last 7 days)     22  1128   INR 2.0*       ASSESSMENT / PLAN  INR assessment THER    Recheck INR In: 2 WEEKS    INR Location Clinic      Anticoagulation Summary  As of 2022    INR goal:  2.0-3.0   TTR:  53.0 % (1 y)   INR used for dosin.0 (2022)   Warfarin maintenance plan:  5 mg (5 mg x 1) every Mon, Wed, Fri; 2.5 mg (5 mg x 0.5) all other days   Full warfarin instructions:  5 mg every Mon, Wed, Fri; 2.5 mg all other days   Weekly warfarin total:  25 mg   Plan last modified:  Janessa Olguin RN (2022)   Next INR check:  2022   Priority:  Maintenance   Target end date:  Indefinite    Indications    Anticoagulation monitoring  INR range 2-3 [Z79.01]  Paroxysmal atrial fibrillation (H) [I48.0]             Anticoagulation Episode Summary     INR check location:      Preferred lab:      Send INR reminders to:  [facility]    Comments:        Anticoagulation Care Providers     Provider Role Specialty Phone number    Vijay Mackay MD Referring Internal Medicine [phone]            See the Encounter Report to view Anticoagulation Flowsheet and Dosing Calendar (Go to Encounters tab in chart review, and find the Anticoagulation Therapy Visit)        Janessa Olguin RN

## 2022-02-03 RX ORDER — WARFARIN SODIUM 5 MG/1
TABLET ORAL
Qty: 65 TABLET | Refills: 3 | Status: SHIPPED | OUTPATIENT
Start: 2022-02-03 | End: 2022-07-18

## 2022-02-03 NOTE — TELEPHONE ENCOUNTER
"Requested Prescriptions   Pending Prescriptions Disp Refills     warfarin ANTICOAGULANT (COUMADIN) 5 MG tablet [Pharmacy Med Name: Warfarin Sodium 5 MG Oral Tablet] 65 tablet 3     Sig: TAKE 1 TABLET BY MOUTH 3  DAYS A WEEK AND 1/2 TAB 4  DAYS A WEEK OR AS DIRECTED  BY [facility]       Vitamin K Antagonists Failed - 2/2/2022  9:29 PM        Failed - INR is within goal in the past 6 weeks     Confirm INR is within goal in the past 6 weeks.     Recent Labs   Lab Test 02/02/22  1128   INR 2.0*                       Passed - Recent (12 mo) or future (30 days) visit within the authorizing provider's specialty     Patient has had an office visit with the authorizing provider or a provider within the authorizing providers department within the previous 12 mos or has a future within next 30 days. See \"Patient Info\" tab in inbasket, or \"Choose Columns\" in Meds & Orders section of the refill encounter.              Passed - Medication is active on med list        Passed - Patient is 18 years of age or older        Passed - Patient is not pregnant        Passed - No positive pregnancy on file in past 12 months           Prescription refilled per RN MedicationRefill Policy.................... Janessa Olguin RN ....................  2/3/2022   7:45 AM      "

## 2022-02-16 ENCOUNTER — ANTICOAGULATION THERAPY VISIT (OUTPATIENT)
Dept: ANTICOAGULATION | Facility: OTHER | Age: 77
End: 2022-02-16
Attending: INTERNAL MEDICINE
Payer: MEDICARE

## 2022-02-16 DIAGNOSIS — Z79.01 ANTICOAGULATION MONITORING, INR RANGE 2-3: Primary | ICD-10-CM

## 2022-02-16 DIAGNOSIS — I48.0 PAROXYSMAL ATRIAL FIBRILLATION (H): ICD-10-CM

## 2022-02-16 LAB — INR POINT OF CARE: 2.9 (ref 0.9–1.1)

## 2022-02-16 PROCEDURE — 36416 COLLJ CAPILLARY BLOOD SPEC: CPT | Mod: ZL

## 2022-02-16 PROCEDURE — 85610 PROTHROMBIN TIME: CPT | Mod: ZL,QW

## 2022-02-16 NOTE — PROGRESS NOTES
ANTICOAGULATION FOLLOW-UP CLINIC VISIT    Patient Name:  Zoey Fan  Date:  2022  Contact Type:  Face to Face    SUBJECTIVE:  Patient Findings     Positives:  Change in medications (venlafaxine)        Clinical Outcomes     Negatives:  Major bleeding event, Thromboembolic event, Anticoagulation-related hospital admission, Anticoagulation-related ED visit, Anticoagulation-related fatality           OBJECTIVE    Recent labs: (last 7 days)     22  1103   INR 2.9*       ASSESSMENT / PLAN  INR assessment THER    Recheck INR In: 3 WEEKS    INR Location Clinic      Anticoagulation Summary  As of 2022    INR goal:  2.0-3.0   TTR:  53.0 % (1 y)   INR used for dosin.9 (2022)   Warfarin maintenance plan:  5 mg (5 mg x 1) every Mon, Wed, Fri; 2.5 mg (5 mg x 0.5) all other days   Full warfarin instructions:  5 mg every Mon, Wed, Fri; 2.5 mg all other days   Weekly warfarin total:  25 mg   No change documented:  Rachel Orellana RN   Plan last modified:  Janessa Olguin RN (2022)   Next INR check:  3/9/2022   Priority:  Maintenance   Target end date:  Indefinite    Indications    Anticoagulation monitoring  INR range 2-3 [Z79.01]  Paroxysmal atrial fibrillation (H) [I48.0]             Anticoagulation Episode Summary     INR check location:      Preferred lab:      Send INR reminders to:  [facility]    Comments:        Anticoagulation Care Providers     Provider Role Specialty Phone number    Vijay Mackay MD Referring Internal Medicine [phone]            See the Encounter Report to view Anticoagulation Flowsheet and Dosing Calendar (Go to Encounters tab in chart review, and find the Anticoagulation Therapy Visit)        Rachel Orellana RN

## 2022-02-22 ENCOUNTER — ALLIED HEALTH/NURSE VISIT (OUTPATIENT)
Dept: FAMILY MEDICINE | Facility: OTHER | Age: 77
End: 2022-02-22
Attending: INTERNAL MEDICINE
Payer: MEDICARE

## 2022-02-22 DIAGNOSIS — Z79.899 ENCOUNTER FOR LONG-TERM (CURRENT) DRUG USE: Primary | ICD-10-CM

## 2022-02-22 PROCEDURE — 93005 ELECTROCARDIOGRAM TRACING: CPT

## 2022-02-22 PROCEDURE — 93010 ELECTROCARDIOGRAM REPORT: CPT | Performed by: INTERNAL MEDICINE

## 2022-02-22 NOTE — PROGRESS NOTES
Pt presented to nurse injection room to get an EKG. EKG ordered by both [facility]. EKG performed and tracing faxed to ordering provider. EKG transmitted and imported to Muse, then tracing and written order sent for scanning into Epic.     Aileen Duke RN ....................  2/22/2022   8:57 AM

## 2022-02-23 LAB
ATRIAL RATE - MUSE: 77 BPM
DIASTOLIC BLOOD PRESSURE - MUSE: NORMAL MMHG
INTERPRETATION ECG - MUSE: NORMAL
P AXIS - MUSE: 94 DEGREES
PR INTERVAL - MUSE: 216 MS
QRS DURATION - MUSE: 80 MS
QT - MUSE: 474 MS
QTC - MUSE: 536 MS
R AXIS - MUSE: 83 DEGREES
SYSTOLIC BLOOD PRESSURE - MUSE: NORMAL MMHG
T AXIS - MUSE: 6 DEGREES
VENTRICULAR RATE- MUSE: 77 BPM

## 2022-03-08 ENCOUNTER — ALLIED HEALTH/NURSE VISIT (OUTPATIENT)
Dept: FAMILY MEDICINE | Facility: OTHER | Age: 77
End: 2022-03-08
Attending: INTERNAL MEDICINE
Payer: MEDICARE

## 2022-03-08 DIAGNOSIS — I48.0 PAROXYSMAL ATRIAL FIBRILLATION (H): Primary | ICD-10-CM

## 2022-03-08 PROCEDURE — 93005 ELECTROCARDIOGRAM TRACING: CPT

## 2022-03-08 NOTE — PROGRESS NOTES
Pt presented to nurse injection room to get an EKG. EKG ordered by DR. GIRON due to A FIB AND MEDICAITON USE. EKG performed and tracing faxed to ordering provider. EKG transmitted and imported to Muse, then tracing and written order sent for scanning into Epic.     Meme Barton RN ....................  3/8/2022   1:36 PM

## 2022-03-09 ENCOUNTER — ANTICOAGULATION THERAPY VISIT (OUTPATIENT)
Dept: ANTICOAGULATION | Facility: OTHER | Age: 77
End: 2022-03-09
Attending: INTERNAL MEDICINE
Payer: MEDICARE

## 2022-03-09 DIAGNOSIS — I48.0 PAROXYSMAL ATRIAL FIBRILLATION (H): ICD-10-CM

## 2022-03-09 DIAGNOSIS — Z79.01 ANTICOAGULATION MONITORING, INR RANGE 2-3: Primary | ICD-10-CM

## 2022-03-09 LAB — INR POINT OF CARE: 2.4 (ref 0.9–1.1)

## 2022-03-09 PROCEDURE — 85610 PROTHROMBIN TIME: CPT | Mod: ZL,QW

## 2022-03-09 PROCEDURE — 36416 COLLJ CAPILLARY BLOOD SPEC: CPT | Mod: ZL

## 2022-03-09 NOTE — PROGRESS NOTES
ANTICOAGULATION FOLLOW-UP CLINIC VISIT    Patient Name:  Zoey Fan  Date:  3/9/2022  Contact Type:  Face to Face    SUBJECTIVE:  Patient Findings         Clinical Outcomes     Negatives:  Major bleeding event, Thromboembolic event, Anticoagulation-related hospital admission, Anticoagulation-related ED visit, Anticoagulation-related fatality           OBJECTIVE    Recent labs: (last 7 days)     22  1121   INR 2.4*       ASSESSMENT / PLAN  INR assessment THER    Recheck INR In: 4 WEEKS    INR Location Clinic      Anticoagulation Summary  As of 3/9/2022    INR goal:  2.0-3.0   TTR:  53.0 % (1 y)   INR used for dosin.4 (3/9/2022)   Warfarin maintenance plan:  5 mg (5 mg x 1) every Mon, Wed, Fri; 2.5 mg (5 mg x 0.5) all other days   Full warfarin instructions:  5 mg every Mon, Wed, Fri; 2.5 mg all other days   Weekly warfarin total:  25 mg   No change documented:  Janessa Olguin RN   Plan last modified:  Janessa Olguin RN (2022)   Next INR check:  2022   Priority:  Maintenance   Target end date:  Indefinite    Indications    Anticoagulation monitoring  INR range 2-3 [Z79.01]  Paroxysmal atrial fibrillation (H) [I48.0]             Anticoagulation Episode Summary     INR check location:      Preferred lab:      Send INR reminders to:  [facility]    Comments:        Anticoagulation Care Providers     Provider Role Specialty Phone number    Vijay Mackay MD Referring Internal Medicine [phone]            See the Encounter Report to view Anticoagulation Flowsheet and Dosing Calendar (Go to Encounters tab in chart review, and find the Anticoagulation Therapy Visit)        Janessa Olguin RN

## 2022-03-22 ENCOUNTER — TELEPHONE (OUTPATIENT)
Dept: PEDIATRICS | Facility: OTHER | Age: 77
End: 2022-03-22
Payer: COMMERCIAL

## 2022-04-06 ENCOUNTER — ANTICOAGULATION THERAPY VISIT (OUTPATIENT)
Dept: ANTICOAGULATION | Facility: OTHER | Age: 77
End: 2022-04-06
Attending: INTERNAL MEDICINE
Payer: MEDICARE

## 2022-04-06 ENCOUNTER — LAB (OUTPATIENT)
Dept: LAB | Facility: OTHER | Age: 77
End: 2022-04-06
Attending: INTERNAL MEDICINE
Payer: MEDICARE

## 2022-04-06 DIAGNOSIS — I48.91 ATRIAL FIBRILLATION (H): ICD-10-CM

## 2022-04-06 DIAGNOSIS — Z79.01 ANTICOAGULATION MONITORING, INR RANGE 2-3: Primary | ICD-10-CM

## 2022-04-06 DIAGNOSIS — I48.0 PAROXYSMAL ATRIAL FIBRILLATION (H): ICD-10-CM

## 2022-04-06 LAB
ALBUMIN SERPL-MCNC: 4.1 G/DL (ref 3.5–5.7)
ALP SERPL-CCNC: 168 U/L (ref 34–104)
ALT SERPL W P-5'-P-CCNC: 26 U/L (ref 7–52)
ANION GAP SERPL CALCULATED.3IONS-SCNC: 6 MMOL/L (ref 3–14)
AST SERPL W P-5'-P-CCNC: 31 U/L (ref 13–39)
BILIRUB DIRECT SERPL-MCNC: 0.1 MG/DL (ref 0–0.2)
BILIRUB SERPL-MCNC: 0.6 MG/DL (ref 0.3–1)
BUN SERPL-MCNC: 14 MG/DL (ref 7–25)
CALCIUM SERPL-MCNC: 9.5 MG/DL (ref 8.6–10.3)
CHLORIDE BLD-SCNC: 103 MMOL/L (ref 98–107)
CO2 SERPL-SCNC: 30 MMOL/L (ref 21–31)
CREAT SERPL-MCNC: 0.95 MG/DL (ref 0.6–1.2)
ERYTHROCYTE [DISTWIDTH] IN BLOOD BY AUTOMATED COUNT: 14.9 % (ref 10–15)
GFR SERPL CREATININE-BSD FRML MDRD: 61 ML/MIN/1.73M2
GLUCOSE BLD-MCNC: 108 MG/DL (ref 70–105)
HCT VFR BLD AUTO: 37.2 % (ref 35–47)
HGB BLD-MCNC: 11.6 G/DL (ref 11.7–15.7)
INR POINT OF CARE: 1.9 (ref 0.9–1.1)
MCH RBC QN AUTO: 29.3 PG (ref 26.5–33)
MCHC RBC AUTO-ENTMCNC: 31.2 G/DL (ref 31.5–36.5)
MCV RBC AUTO: 94 FL (ref 78–100)
PLATELET # BLD AUTO: 197 10E3/UL (ref 150–450)
POTASSIUM BLD-SCNC: 3.7 MMOL/L (ref 3.5–5.1)
PROT SERPL-MCNC: 6.5 G/DL (ref 6.4–8.9)
RBC # BLD AUTO: 3.96 10E6/UL (ref 3.8–5.2)
SODIUM SERPL-SCNC: 139 MMOL/L (ref 134–144)
WBC # BLD AUTO: 5.6 10E3/UL (ref 4–11)

## 2022-04-06 PROCEDURE — 85018 HEMOGLOBIN: CPT | Mod: ZL

## 2022-04-06 PROCEDURE — 36415 COLL VENOUS BLD VENIPUNCTURE: CPT | Mod: ZL

## 2022-04-06 PROCEDURE — 85610 PROTHROMBIN TIME: CPT | Mod: ZL,QW

## 2022-04-06 PROCEDURE — 82248 BILIRUBIN DIRECT: CPT | Mod: ZL

## 2022-04-06 PROCEDURE — 80053 COMPREHEN METABOLIC PANEL: CPT | Mod: ZL

## 2022-04-06 NOTE — PROGRESS NOTES
ANTICOAGULATION FOLLOW-UP CLINIC VISIT    Patient Name:  Zoey Fan  Date:  2022  Contact Type:  Face to Face    SUBJECTIVE:  Patient Findings     Comments:  Plans to be switching to Eliquis will be doing labs today and then finalize with her cardiologist.         Clinical Outcomes     Negatives:  Major bleeding event, Thromboembolic event, Anticoagulation-related hospital admission, Anticoagulation-related ED visit, Anticoagulation-related fatality    Comments:  Plans to be switching to Eliquis will be doing labs today and then finalize with her cardiologist.            OBJECTIVE    Recent labs: (last 7 days)     22  1458   INR 1.9*       ASSESSMENT / PLAN  INR assessment SUB    Recheck INR In: 2 WEEKS    INR Location Clinic      Anticoagulation Summary  As of 2022    INR goal:  2.0-3.0   TTR:  57.1 % (1 y)   INR used for dosin.9 (2022)   Warfarin maintenance plan:  5 mg (5 mg x 1) every Mon, Wed, Fri; 2.5 mg (5 mg x 0.5) all other days   Full warfarin instructions:  5 mg every Mon, Wed, Fri; 2.5 mg all other days   Weekly warfarin total:  25 mg   No change documented:  Janessa Olguin RN   Plan last modified:  Janessa Olguin RN (2022)   Next INR check:  2022   Priority:  Maintenance   Target end date:  Indefinite    Indications    Anticoagulation monitoring  INR range 2-3 [Z79.01]  Paroxysmal atrial fibrillation (H) [I48.0]             Anticoagulation Episode Summary     INR check location:      Preferred lab:      Send INR reminders to:  [facility]    Comments:        Anticoagulation Care Providers     Provider Role Specialty Phone number    Vijay Mackay MD Referring Internal Medicine [phone]            See the Encounter Report to view Anticoagulation Flowsheet and Dosing Calendar (Go to Encounters tab in chart review, and find the Anticoagulation Therapy Visit)        Janessa Olguin RN

## 2022-04-07 ENCOUNTER — ALLIED HEALTH/NURSE VISIT (OUTPATIENT)
Dept: FAMILY MEDICINE | Facility: OTHER | Age: 77
End: 2022-04-07
Attending: INTERNAL MEDICINE
Payer: MEDICARE

## 2022-04-07 DIAGNOSIS — Z95.0 PACEMAKER: Primary | Chronic | ICD-10-CM

## 2022-04-07 LAB
ATRIAL RATE - MUSE: 76 BPM
DIASTOLIC BLOOD PRESSURE - MUSE: NORMAL MMHG
INTERPRETATION ECG - MUSE: NORMAL
P AXIS - MUSE: 100 DEGREES
PR INTERVAL - MUSE: 198 MS
QRS DURATION - MUSE: 86 MS
QT - MUSE: 494 MS
QTC - MUSE: 555 MS
R AXIS - MUSE: 62 DEGREES
SYSTOLIC BLOOD PRESSURE - MUSE: NORMAL MMHG
T AXIS - MUSE: 12 DEGREES
VENTRICULAR RATE- MUSE: 76 BPM

## 2022-04-07 PROCEDURE — 93010 ELECTROCARDIOGRAM REPORT: CPT | Performed by: INTERNAL MEDICINE

## 2022-04-07 PROCEDURE — 93005 ELECTROCARDIOGRAM TRACING: CPT

## 2022-04-07 NOTE — PROGRESS NOTES
Pt presented to nurse injection room to get an EKG. EKG ordered by [facility] due to PACEMAKER AND ABNORMAL EKG. EKG performed and tracing faxed to ordering provider. EKG transmitted and imported to Muse, then tracing and written order sent for scanning into Epic.     Meme Barton RN ....................  4/7/2022   1:30 PM

## 2022-04-08 LAB
ATRIAL RATE - MUSE: 76 BPM
DIASTOLIC BLOOD PRESSURE - MUSE: NORMAL MMHG
INTERPRETATION ECG - MUSE: NORMAL
P AXIS - MUSE: 94 DEGREES
PR INTERVAL - MUSE: 204 MS
QRS DURATION - MUSE: 82 MS
QT - MUSE: 488 MS
QTC - MUSE: 549 MS
R AXIS - MUSE: 53 DEGREES
SYSTOLIC BLOOD PRESSURE - MUSE: NORMAL MMHG
T AXIS - MUSE: 8 DEGREES
VENTRICULAR RATE- MUSE: 76 BPM

## 2022-04-20 ENCOUNTER — ANTICOAGULATION THERAPY VISIT (OUTPATIENT)
Dept: ANTICOAGULATION | Facility: OTHER | Age: 77
End: 2022-04-20
Attending: INTERNAL MEDICINE
Payer: MEDICARE

## 2022-04-20 ENCOUNTER — ALLIED HEALTH/NURSE VISIT (OUTPATIENT)
Dept: FAMILY MEDICINE | Facility: OTHER | Age: 77
End: 2022-04-20
Attending: FAMILY MEDICINE
Payer: MEDICARE

## 2022-04-20 DIAGNOSIS — Z23 HIGH PRIORITY FOR 2019-NCOV VACCINE: Primary | ICD-10-CM

## 2022-04-20 DIAGNOSIS — Z79.01 ANTICOAGULATION MONITORING, INR RANGE 2-3: Primary | ICD-10-CM

## 2022-04-20 DIAGNOSIS — I48.0 PAROXYSMAL ATRIAL FIBRILLATION (H): ICD-10-CM

## 2022-04-20 LAB — INR POINT OF CARE: 3.5 (ref 0.9–1.1)

## 2022-04-20 PROCEDURE — 91305 COVID-19,PF,PFIZER (12+ YRS): CPT

## 2022-04-20 PROCEDURE — 85610 PROTHROMBIN TIME: CPT | Mod: ZL,QW

## 2022-04-29 ENCOUNTER — ANTICOAGULATION THERAPY VISIT (OUTPATIENT)
Dept: ANTICOAGULATION | Facility: OTHER | Age: 77
End: 2022-04-29
Attending: INTERNAL MEDICINE
Payer: MEDICARE

## 2022-04-29 DIAGNOSIS — Z79.01 ANTICOAGULATION MONITORING, INR RANGE 2-3: Primary | ICD-10-CM

## 2022-04-29 DIAGNOSIS — I48.0 PAROXYSMAL ATRIAL FIBRILLATION (H): ICD-10-CM

## 2022-04-29 LAB — INR POINT OF CARE: 2.5 (ref 0.9–1.1)

## 2022-04-29 PROCEDURE — 85610 PROTHROMBIN TIME: CPT | Mod: ZL,QW

## 2022-04-29 NOTE — PROGRESS NOTES
ANTICOAGULATION FOLLOW-UP CLINIC VISIT    Patient Name:  Zoey Fan  Date:  2022  Contact Type:  Face to Face    SUBJECTIVE:  Patient Findings         Clinical Outcomes     Negatives:  Major bleeding event, Thromboembolic event, Anticoagulation-related hospital admission, Anticoagulation-related ED visit, Anticoagulation-related fatality           OBJECTIVE    Recent labs: (last 7 days)     22  1320   INR 2.5*       ASSESSMENT / PLAN  INR assessment THER    Recheck INR In: 2 WEEKS    INR Location Clinic      Anticoagulation Summary  As of 2022    INR goal:  2.0-3.0   TTR:  60.8 % (1 y)   INR used for dosin.5 (2022)   Warfarin maintenance plan:  5 mg (5 mg x 1) every Mon, Fri; 2.5 mg (5 mg x 0.5) all other days   Full warfarin instructions:  5 mg every Mon, Fri; 2.5 mg all other days   Weekly warfarin total:  22.5 mg   Plan last modified:  Bibiana Melo RN (2022)   Next INR check:  2022   Priority:  Maintenance   Target end date:  Indefinite    Indications    Anticoagulation monitoring  INR range 2-3 [Z79.01]  Paroxysmal atrial fibrillation (H) [I48.0]             Anticoagulation Episode Summary     INR check location:      Preferred lab:      Send INR reminders to:  [facility]    Comments:        Anticoagulation Care Providers     Provider Role Specialty Phone number    Vijay Mackay MD Referring Internal Medicine [phone]            See the Encounter Report to view Anticoagulation Flowsheet and Dosing Calendar (Go to Encounters tab in chart review, and find the Anticoagulation Therapy Visit)        Neli Guillaume RN

## 2022-05-12 ENCOUNTER — ANTICOAGULATION THERAPY VISIT (OUTPATIENT)
Dept: ANTICOAGULATION | Facility: OTHER | Age: 77
End: 2022-05-12
Attending: INTERNAL MEDICINE
Payer: MEDICARE

## 2022-05-12 DIAGNOSIS — Z79.01 ANTICOAGULATION MONITORING, INR RANGE 2-3: Primary | ICD-10-CM

## 2022-05-12 DIAGNOSIS — I48.0 PAROXYSMAL ATRIAL FIBRILLATION (H): ICD-10-CM

## 2022-05-12 LAB — INR POINT OF CARE: 3 (ref 0.9–1.1)

## 2022-05-12 PROCEDURE — 85610 PROTHROMBIN TIME: CPT | Mod: ZL,QW

## 2022-05-12 PROCEDURE — 36416 COLLJ CAPILLARY BLOOD SPEC: CPT | Mod: ZL

## 2022-05-12 NOTE — PROGRESS NOTES
ANTICOAGULATION MANAGEMENT     Zoey Fan 77 year old female is on warfarin with therapeutic INR result. (Goal INR 2.0-3.0)    Recent labs: (last 7 days)     05/12/22  1313   INR 3.0*       ASSESSMENT       Source(s): Chart Review and in person       Warfarin doses taken: Warfarin taken as instructed    Diet: No new diet changes identified    New illness, injury, or hospitalization: No    Medication/supplement changes: patient is to switch to Eliquis when INR is below 2.0 she will start holding warfarin    Signs or symptoms of bleeding or clotting: No    Previous INR: Therapeutic last 2(+) visits    Additional findings: None       PLAN     Recommended plan for no diet, medication or health factor changes affecting INR     Dosing Instructions: start holding warfarin to get INR to less than 2.0 to start Eliquis with next INR in 5 days       Summary  As of 5/12/2022    Full warfarin instructions:  5/13: 2.5 mg; 5/14: Hold; 5/15: Hold; 5/16: Hold; Otherwise 5 mg every Mon, Fri; 2.5 mg all other days   Next INR check:  5/17/2022             in person    Patient elected to schedule next visit 5/17/22    Education provided: Written instructions provided    Plan made per [facility] anticoagulation protocol    Janessa Olguin RN  Anticoagulation Clinic  5/12/2022    _______________________________________________________________________     Anticoagulation Episode Summary     Current INR goal:  2.0-3.0   TTR:  64.4 % (1 y)   Target end date:  Indefinite   Send INR reminders to:  [facility]    Indications    Anticoagulation monitoring  INR range 2-3 [Z79.01]  Paroxysmal atrial fibrillation (H) [I48.0]           Comments:           Anticoagulation Care Providers     Provider Role Specialty Phone number    Vijay Mackay MD Referring Internal Medicine [phone]

## 2022-05-17 ENCOUNTER — ANTICOAGULATION THERAPY VISIT (OUTPATIENT)
Dept: ANTICOAGULATION | Facility: OTHER | Age: 77
End: 2022-05-17
Attending: INTERNAL MEDICINE
Payer: MEDICARE

## 2022-05-17 DIAGNOSIS — Z79.01 ANTICOAGULATION MONITORING, INR RANGE 2-3: Primary | ICD-10-CM

## 2022-05-17 DIAGNOSIS — I48.0 PAROXYSMAL ATRIAL FIBRILLATION (H): ICD-10-CM

## 2022-05-17 LAB — INR POINT OF CARE: 1.3 (ref 0.9–1.1)

## 2022-05-17 PROCEDURE — 85610 PROTHROMBIN TIME: CPT | Mod: ZL,QW

## 2022-05-17 NOTE — PROGRESS NOTES
Patient switching to Eliquis (apixaban). Okay to stop warfarin and start Eliquis. Patient INR below 2. Neli Guillaume RN on 5/17/2022 at 2:08 PM

## 2022-07-08 ENCOUNTER — TELEPHONE (OUTPATIENT)
Dept: PEDIATRICS | Facility: OTHER | Age: 77
End: 2022-07-08

## 2022-07-08 DIAGNOSIS — I45.81 LONG Q-T SYNDROME: Primary | ICD-10-CM

## 2022-07-08 NOTE — TELEPHONE ENCOUNTER
ICD-10-CM    1. Long Q-T syndrome  I45.81 EKG 12-lead, tracing only       -- Make sure a copy of her EKG tracing gets faxed to Dr. Walker at [facility].    Signed, Vijay Mackay MD, FAAP, FACP  Internal Medicine & Pediatrics

## 2022-07-08 NOTE — TELEPHONE ENCOUNTER
Needs order for EKG, not in orders yet, please order them, please call, thank you!        Tammie Arguello on 7/8/2022 at 1:01 PM

## 2022-07-12 ENCOUNTER — TELEPHONE (OUTPATIENT)
Dept: PEDIATRICS | Facility: OTHER | Age: 77
End: 2022-07-12

## 2022-07-12 DIAGNOSIS — Z91.89 AT RISK FOR SIDE EFFECT OF MEDICATION: Primary | ICD-10-CM

## 2022-07-12 NOTE — TELEPHONE ENCOUNTER
BRADLEY, patient wanted me to let you know that she is taking her  to the [facility] and will not be here for the next week, she has her EKG's scheduled for 7/18 and 8/1.      Tammie Arguello on 7/12/2022 at 8:19 AM

## 2022-07-15 ENCOUNTER — OFFICE VISIT (OUTPATIENT)
Dept: FAMILY MEDICINE | Facility: OTHER | Age: 77
End: 2022-07-15
Attending: NURSE PRACTITIONER
Payer: COMMERCIAL

## 2022-07-15 VITALS
WEIGHT: 160.7 LBS | TEMPERATURE: 98.3 F | OXYGEN SATURATION: 100 % | DIASTOLIC BLOOD PRESSURE: 68 MMHG | RESPIRATION RATE: 16 BRPM | HEART RATE: 112 BPM | SYSTOLIC BLOOD PRESSURE: 114 MMHG | BODY MASS INDEX: 24.8 KG/M2

## 2022-07-15 DIAGNOSIS — L29.89 PRURITIC ERYTHEMATOUS RASH: Primary | ICD-10-CM

## 2022-07-15 PROCEDURE — G0463 HOSPITAL OUTPT CLINIC VISIT: HCPCS

## 2022-07-15 PROCEDURE — 99213 OFFICE O/P EST LOW 20 MIN: CPT | Performed by: NURSE PRACTITIONER

## 2022-07-15 RX ORDER — APIXABAN 5 MG/1
5 TABLET, FILM COATED ORAL 2 TIMES DAILY
COMMUNITY
Start: 2022-05-26 | End: 2024-08-15

## 2022-07-15 RX ORDER — VENLAFAXINE HYDROCHLORIDE 75 MG/1
75 CAPSULE, EXTENDED RELEASE ORAL DAILY
COMMUNITY
Start: 2022-06-29 | End: 2022-12-21

## 2022-07-15 RX ORDER — PREDNISONE 20 MG/1
TABLET ORAL
Qty: 20 TABLET | Refills: 0 | Status: SHIPPED | OUTPATIENT
Start: 2022-07-15 | End: 2022-12-21

## 2022-07-15 ASSESSMENT — PAIN SCALES - GENERAL: PAINLEVEL: NO PAIN (0)

## 2022-07-15 NOTE — PROGRESS NOTES
ASSESSMENT/PLAN:  I have reviewed the nursing notes.  I have reviewed the findings, diagnosis, plan and need for follow up with the patient.    1. Pruritic erythematous rash  Rash that Zoey is experiencing has characteristics that are consistent with contact dermatitis, however she does not feel that she has been in contact with any new products or any poison ivy/poison oak.  Pruritic rash is severe, opted to treat with oral prednisone taper.  Recommend follow-up with PCP early next week to ensure symptoms are improving.  She strongly feels this may be related to her recent increase in her Effexor medication by Dr. Soto.  I cannot rule this out, however also not certain this is the cause as she has been on it for quite some time.  As she is feeling well mentally, and she is not having any shortness of breath or respiratory concerns I do not feel that she should decrease this dose or stop taking it as I discussed with her in the office today.  - predniSONE (DELTASONE) 20 MG tablet; Take 3 tabs by mouth daily x 3 days, then 2 tabs daily x 3 days, then 1 tab daily x 3 days, then 1/2 tab daily x 3 days.  Dispense: 20 tablet; Refill: 0    Follow up if symptoms persist or worsen or concerns    I explained my diagnostic considerations and recommendations to the patient, who voiced understanding and agreement with the treatment plan. All questions were answered. We discussed potential side effects of any prescribed or recommended therapies, as well as expectations for response to treatments.    Sheila Eng NP  7/15/2022  5:10 PM    HPI:  Zoey Fan is a 77 year old female who presents [facility] today for concerns of rash on her legs, left shoulder, and abdomen that started about 6-7 days ago somewhat spontaneously.  The rash started on her left shoulder and then spread from there to her bilateral lower extremities bilateral upper extremities abdomen and back, fingers, and even her face. She has been  using benadryl gel with some mild relief.   Rash is red, slightly raised and extremely itchy.    On June 27, Dr. Mcelroy increased venlafaxine (effexor) to 75 mgs per day. She had previously been on 37.5 mg for a while without any rash or intolerance.   Eliquis is new for her as well in recent months.     She had been on prozac historically; but causes prolonged QT so working with Dr. Pérez to get off of this. This is why she was switched to effexor.     Has not been out in the yard. Does have a dog; but dog does not go outside too often.     No fevers, chills or any other symptoms are associated with the rash that Don is experiencing.    Past Medical History:   Diagnosis Date     Atrial fibrillation (H)     No Comments Provided     Complete tear of right rotator cuff     6/15/2017     Essential (primary) hypertension     No Comments Provided     Impingement syndrome of right shoulder     5/23/2017     Insomnia     No Comments Provided     Long Q-T syndrome 2010     Major depressive disorder, single episode     being managed     Other shoulder lesions, right shoulder     5/23/2017     Personal history of other infectious and parasitic diseases     As a child     Personal history of other medical treatment (CODE)     G2, P2     Primary osteoarthritis of shoulder     5/23/2017     Pure hypercholesterolemia     No Comments Provided     Past Surgical History:   Procedure Laterality Date     ARTHROSCOPY SHOULDER ROTATOR CUFF REPAIR      2004,Right shoulder     COLONOSCOPY  12/14/2007 2007,Normal, follow up in 10 years     COLONOSCOPY  08/10/2017    08/10/2017,no follow up due to age being greater than 80 at next screening interval     IMPLANT PACEMAKER      2011,Post ablation, due to junctional rythym     LAPAROSCOPIC TUBAL LIGATION      No Comments Provided     OTHER SURGICAL HISTORY      2004,600000,OTHER,Left knee     OTHER SURGICAL HISTORY      2011,206595,EP STUDY /ABLATION,Cardiac ablation     OTHER SURGICAL  HISTORY      2015,[id number],CARDIAC ELECTROPHYSIOLOGY STUDY AND ABLATION     TONSILLECTOMY, ADENOIDECTOMY, COMBINED      As a child     Social History     Tobacco Use     Smoking status: Former Smoker     Packs/day: 0.50     Years: 30.00     Pack years: 15.00     Types: Cigarettes     Start date: 1968     Quit date: 2002     Years since quittin.2     Smokeless tobacco: Never Used   Substance Use Topics     Alcohol use: Yes     Alcohol/week: 2.0 standard drinks     Types: 2 Glasses of wine per week     Comment: wine: 1-2 glasses per day     Current Outpatient Medications   Medication Sig Dispense Refill     atorvastatin (LIPITOR) 40 MG tablet TAKE ONE-HALF TABLET BY  MOUTH AT BEDTIME 45 tablet 3     calcium carbonate (OSCAL 500) 1250 (500 CA) MG TABS tablet Take 500 mg by mouth daily with food       ELIQUIS ANTICOAGULANT 5 MG tablet Take 5 mg by mouth 2 times daily       furosemide (LASIX) 20 MG tablet Take 1 tablet (20 mg) by mouth 2 times daily 180 tablet 3     lisinopril (ZESTRIL) 10 MG tablet TAKE 1 TABLET BY MOUTH  DAILY 90 tablet 3     nadolol (CORGARD) 80 MG tablet TAKE 1 AND 1/2 TABLETS BY  MOUTH TWICE DAILY 270 tablet 3     omeprazole (PRILOSEC) 20 MG DR capsule TAKE 1 CAPSULE BY MOUTH  ONCE DAILY BEFORE A MEAL 90 capsule 3     oxybutynin ER (DITROPAN-XL) 10 MG 24 hr tablet TAKE 1 TABLET BY MOUTH  DAILY 90 tablet 3     predniSONE (DELTASONE) 20 MG tablet Take 3 tabs by mouth daily x 3 days, then 2 tabs daily x 3 days, then 1 tab daily x 3 days, then 1/2 tab daily x 3 days. 20 tablet 0     sildenafil (REVATIO) 20 MG tablet 3 times daily        UNABLE TO FIND MEDICATION NAME: AREDS ( for eye health)       venlafaxine (EFFEXOR XR) 75 MG 24 hr capsule Take 75 mg by mouth daily       vitamin B-12 (CYANOCOBALAMIN) 1000 MCG CR tablet Take 1,000 mcg by mouth daily       VITAMIN D, CHOLECALCIFEROL, PO Take 1,000 Units by mouth daily       ferrous sulfate (FEROSUL) 325 (65 Fe) MG tablet Take 1 tablet (325  mg) by mouth 2 times daily (Patient not taking: Reported on 7/15/2022) 180 tablet 3     warfarin ANTICOAGULANT (COUMADIN) 5 MG tablet TAKE 1 TABLET BY MOUTH 3  DAYS A WEEK AND 1/2 TAB 4  DAYS A WEEK OR AS DIRECTED  BY PROTIME CLINIC (Patient not taking: Reported on 7/15/2022) 65 tablet 3     Allergies   Allergen Reactions     Ciprofloxacin      Other reaction(s): Other - Describe In Comment Field  Patient reports she has Long QT syndrome     Duloxetine      Other reaction(s): Angioedema     Neomycin Itching     Swelling , redness     Neosporin [Neomycin-Polymyx-Gramicid]      Sulfa Drugs Nausea and Vomiting     Abdominal pain     Unknown  [No Clinical Screening - See Comments]      Other reaction(s): Cardiac Arrest  Please verify with pharmacist prior to prescribing any medications due to her QT Syndrome     Liquid Adhesive Rash     Reaction also to EKG lead pads.      Tobramycin Rash     Past medical history, past surgical history, current medications and allergies reviewed and accurate to the best of my knowledge.      ROS:  Refer to HPI    /68   Pulse 112   Temp 98.3  F (36.8  C) (Tympanic)   Resp 16   Wt 72.9 kg (160 lb 11.2 oz)   LMP  (LMP Unknown)   SpO2 100%   BMI 24.80 kg/m      EXAM:  General Appearance: Well appearing 77 year old female, appropriate appearance for age. No acute distress   Respiratory: normal chest wall and respirations.  Normal effort.  Clear to auscultation bilaterally, no wheezing, crackles or rhonchi.  No increased work of breathing.  No cough appreciated.  Cardiac: RRR with no murmurs  Dermatological: + bilateral lower extremities; upper extremities including hands and in between fingers, face, abdomen, and back: Slightly raised, erythematous, linear rashes observed to all of the above areas.  There is no drainage or skin.  In between her fingers does appear to be blistered.  There are few areas that have linear appearance as well on her lower extremities.  Psychological:  normal affect, alert, oriented, and pleasant.

## 2022-07-15 NOTE — PATIENT INSTRUCTIONS
Follow up next week. Prednisone for pruritic, rash. Discussed, does look like a contact dermatitis but you think unlikely exposure. Unsure of cause possibly medication related (effexor) but not 100% sold on this idea either. As you are feeling well on this medication and risk of stopping it abruptly, I do NOT want to make any changes to this medication. Recommend seeing PCP and talking with Dr. Pérez about this as well.

## 2022-07-15 NOTE — NURSING NOTE
"Chief Complaint   Patient presents with     Derm Problem     Legs, left shoulder, abdomen       Patient is here for a rash on her legs, left shoulder and abdomen that started 4-5 days ago. Patient states the rash is itchy and she has been using benadryl gel with some relief.     Initial /68   Pulse 112   Temp 98.3  F (36.8  C) (Tympanic)   Resp 16   Wt 72.9 kg (160 lb 11.2 oz)   LMP  (LMP Unknown)   SpO2 100%   BMI 24.80 kg/m   Estimated body mass index is 24.8 kg/m  as calculated from the following:    Height as of 9/17/21: 1.715 m (5' 7.5\").    Weight as of this encounter: 72.9 kg (160 lb 11.2 oz).  Medication Reconciliation: complete    Faith Mathias LPN  "

## 2022-07-18 ENCOUNTER — OFFICE VISIT (OUTPATIENT)
Dept: PEDIATRICS | Facility: OTHER | Age: 77
End: 2022-07-18
Attending: INTERNAL MEDICINE
Payer: COMMERCIAL

## 2022-07-18 VITALS
SYSTOLIC BLOOD PRESSURE: 138 MMHG | RESPIRATION RATE: 20 BRPM | BODY MASS INDEX: 24.71 KG/M2 | DIASTOLIC BLOOD PRESSURE: 72 MMHG | OXYGEN SATURATION: 99 % | WEIGHT: 160.1 LBS | TEMPERATURE: 98.1 F | HEART RATE: 59 BPM

## 2022-07-18 DIAGNOSIS — I45.81 LONG Q-T SYNDROME: Primary | ICD-10-CM

## 2022-07-18 DIAGNOSIS — I48.3 TYPICAL ATRIAL FLUTTER (H): ICD-10-CM

## 2022-07-18 DIAGNOSIS — R00.2 HEART PALPITATIONS: ICD-10-CM

## 2022-07-18 DIAGNOSIS — R21 RASH AND NONSPECIFIC SKIN ERUPTION: ICD-10-CM

## 2022-07-18 DIAGNOSIS — Z95.0 S/P PLACEMENT OF CARDIAC PACEMAKER: ICD-10-CM

## 2022-07-18 LAB
ATRIAL RATE - MUSE: 93 BPM
DIASTOLIC BLOOD PRESSURE - MUSE: NORMAL MMHG
INTERPRETATION ECG - MUSE: NORMAL
P AXIS - MUSE: NORMAL DEGREES
PR INTERVAL - MUSE: 216 MS
QRS DURATION - MUSE: 98 MS
QT - MUSE: 402 MS
QTC - MUSE: 499 MS
R AXIS - MUSE: 23 DEGREES
SYSTOLIC BLOOD PRESSURE - MUSE: NORMAL MMHG
T AXIS - MUSE: -14 DEGREES
VENTRICULAR RATE- MUSE: 93 BPM

## 2022-07-18 PROCEDURE — G0463 HOSPITAL OUTPT CLINIC VISIT: HCPCS

## 2022-07-18 PROCEDURE — 93010 ELECTROCARDIOGRAM REPORT: CPT | Performed by: INTERNAL MEDICINE

## 2022-07-18 PROCEDURE — 99214 OFFICE O/P EST MOD 30 MIN: CPT | Performed by: INTERNAL MEDICINE

## 2022-07-18 PROCEDURE — 93005 ELECTROCARDIOGRAM TRACING: CPT | Performed by: INTERNAL MEDICINE

## 2022-07-18 ASSESSMENT — PAIN SCALES - GENERAL: PAINLEVEL: NO PAIN (0)

## 2022-07-18 ASSESSMENT — PATIENT HEALTH QUESTIONNAIRE - PHQ9
SUM OF ALL RESPONSES TO PHQ QUESTIONS 1-9: 4
10. IF YOU CHECKED OFF ANY PROBLEMS, HOW DIFFICULT HAVE THESE PROBLEMS MADE IT FOR YOU TO DO YOUR WORK, TAKE CARE OF THINGS AT HOME, OR GET ALONG WITH OTHER PEOPLE: SOMEWHAT DIFFICULT
SUM OF ALL RESPONSES TO PHQ QUESTIONS 1-9: 4

## 2022-07-18 NOTE — NURSING NOTE
"Chief Complaint   Patient presents with     Rash     Follow up regarding rash  poison ivy   [facility]        Medication reconciliation completed.    FOOD SECURITY SCREENING QUESTIONS:    The next two questions are to help us understand your food security.  If you are feeling you need any assistance in this area, we have resources available to support you today.    Hunger Vital Signs:  Within the past 12 months we worried whether our food would run out before we got money to buy more. Never  Within the past 12 months the food we bought just didn't last and we didn't have money to get more. Never    Initial /72 (BP Location: Right arm, Patient Position: Sitting, Cuff Size: Adult Regular)   Pulse 59   Temp 98.1  F (36.7  C) (Temporal)   Resp 20   Wt 72.6 kg (160 lb 1.6 oz)   LMP  (LMP Unknown)   SpO2 99%   Breastfeeding No   BMI 24.71 kg/m   Estimated body mass index is 24.71 kg/m  as calculated from the following:    Height as of 9/17/21: 1.715 m (5' 7.5\").    Weight as of this encounter: 72.6 kg (160 lb 1.6 oz).       Lilian Bartlett LPN .......  7/18/2022  9:35 AM  "

## 2022-07-18 NOTE — PROGRESS NOTES
Assessment & Plan   1. Long Q-T syndrome  - EKG 12-lead, tracing only    2. Rash and nonspecific skin eruption  It is possible that her rash was consistent with poison ivy however the spread throughout the body seems unusual.  She does not sleep with her dog.  It is possible that she had ongoing exposure to poison ivy oil and did not know it.  We also considered the possibility of adverse medication reaction and suspect it would be Effexor if possible because this is the most recent medication adjustment.  I recommend she reach out to her psychiatrist if rash rebounds after prednisone is discontinued to see if this can be discontinued.    3. Heart palpitations  4. S/P placement of cardiac pacemaker  5. Typical atrial flutter (H)  Her EKG today is consistent with atrial flutter with a 2-1 conduction pattern.  With regards to her episode on Friday night differential diagnosis includes atrial flutter with rapid ventricular response, SVT, ventricular tachycardia, others.  We had a lengthy discussion today with regard to these findings.  Reassuringly her QT interval has shortened compared to priors.  I recommend she contact Dr. Walker's office, and see if they can interrogate the pacemaker remotely to see what occurred on Friday night.      Signed, Vijay Mackay MD, FAAP, FACP  Internal Medicine & Pediatrics    Subjective   Zoey Fan is a 77 year old female who presents for discuss a few concerns.  [facility]  She developed a rash in between the fingers on her hands which quickly spread to her whole body.  It was extremely itchy.  They suspected it was poison ivy possibly from contact with gardening over the dog.  She is currently on a course of prednisone and the rash is 90% better at this time.  She recently spoke with Dr. Mcelroy for telephone visit.  The dosage of Effexor was increased.  She is due for an EKG to monitor her QT interval.  On Friday night she had an episode where her  heart was pounding fast and erratic.  She felt scared and it lasted about 20 minutes.  She had no preceding anxiety or symptoms.  She wonders if she should send the tracing to her cardiologist.  She has a lot of stress in her life.  Her  is developing memory loss.    Objective   Vitals: /72 (BP Location: Right arm, Patient Position: Sitting, Cuff Size: Adult Regular)   Pulse 59   Temp 98.1  F (36.7  C) (Temporal)   Resp 20   Wt 72.6 kg (160 lb 1.6 oz)   LMP  (LMP Unknown)   SpO2 99%   Breastfeeding No   BMI 24.71 kg/m      Psych: Tearful at times.  Normal affect.    Review and Analysis of Data   I personally reviewed the following:  External notes: No  Results: Yes I reviewed her EKG and report with her today.  Use of an independent historian: No  Independent review of a test performed by another physician: No  Discussion of management with another physician: No  Moderate risk of morbidity from additional diagnostic testing and/or treatment.    Results for orders placed or performed in visit on 07/18/22 (from the past 48 hour(s))   EKG 12-lead, tracing only   Result Value Ref Range    Systolic Blood Pressure  mmHg    Diastolic Blood Pressure  mmHg    Ventricular Rate 93 BPM    Atrial Rate 93 BPM    MA Interval 216 ms    QRS Duration 98 ms     ms    QTc 499 ms    P Axis  degrees    R AXIS 23 degrees    T Axis -14 degrees    Interpretation ECG       Atrial flutter  Incomplete right bundle branch block  Nonspecific ST abnormality  Prolonged QT  Abnormal ECG  When compared with ECG of 07-APR-2022 13:16,  Atrial flutter has replaced Electronic ventricular pacemaker  Confirmed by MD PASHA, JOSE ([id number]) on 7/18/2022 10:20:52 AM

## 2022-07-18 NOTE — LETTER
7/18/2022        RE: Zoey Fan  [address]   [place] [id number]        Assessment & Plan   1. Long Q-T syndrome  - EKG 12-lead, tracing only    2. Rash and nonspecific skin eruption  It is possible that her rash was consistent with poison ivy however the spread throughout the body seems unusual.  She does not sleep with her dog.  It is possible that she had ongoing exposure to poison ivy oil and did not know it.  We also considered the possibility of adverse medication reaction and suspect it would be Effexor if possible because this is the most recent medication adjustment.  I recommend she reach out to her psychiatrist if rash rebounds after prednisone is discontinued to see if this can be discontinued.    3. Heart palpitations  4. S/P placement of cardiac pacemaker  5. Typical atrial flutter (H)  Her EKG today is consistent with atrial flutter with a 2-1 conduction pattern.  With regards to her episode on Friday night differential diagnosis includes atrial flutter with rapid ventricular response, SVT, ventricular tachycardia, others.  We had a lengthy discussion today with regard to these findings.  Reassuringly her QT interval has shortened compared to priors.  I recommend she contact Dr. Walker's office, and see if they can interrogate the pacemaker remotely to see what occurred on Friday night.      Signed, Vijay Mackay MD, FAAP, FACP  Internal Medicine & Pediatrics    Subjective   Zoey Fan is a 77 year old female who presents for discuss a few concerns.  She recently went to [facility].  She developed a rash in between the fingers on her hands which quickly spread to her whole body.  It was extremely itchy.  They suspected it was poison ivy possibly from contact with gardening over the dog.  She is currently on a course of prednisone and the rash is 90% better at this time.  She recently spoke with Dr. Mcelroy for telephone visit.  The dosage of Effexor was increased.  She is  due for an EKG to monitor her QT interval.  On Friday night she had an episode where her heart was pounding fast and erratic.  She felt scared and it lasted about 20 minutes.  She had no preceding anxiety or symptoms.  She wonders if she should send the tracing to her cardiologist.  She has a lot of stress in her life.  Her  is developing memory loss.    Objective   Vitals: /72 (BP Location: Right arm, Patient Position: Sitting, Cuff Size: Adult Regular)   Pulse 59   Temp 98.1  F (36.7  C) (Temporal)   Resp 20   Wt 72.6 kg (160 lb 1.6 oz)   LMP  (LMP Unknown)   SpO2 99%   Breastfeeding No   BMI 24.71 kg/m      Psych: Tearful at times.  Normal affect.    Review and Analysis of Data   I personally reviewed the following:  External notes: No  Results: Yes I reviewed her EKG and report with her today.  Use of an independent historian: No  Independent review of a test performed by another physician: No  Discussion of management with another physician: No  Moderate risk of morbidity from additional diagnostic testing and/or treatment.    Results for orders placed or performed in visit on 07/18/22 (from the past 48 hour(s))   EKG 12-lead, tracing only   Result Value Ref Range    Systolic Blood Pressure  mmHg    Diastolic Blood Pressure  mmHg    Ventricular Rate 93 BPM    Atrial Rate 93 BPM    PA Interval 216 ms    QRS Duration 98 ms     ms    QTc 499 ms    P Axis  degrees    R AXIS 23 degrees    T Axis -14 degrees    Interpretation ECG       Atrial flutter  Incomplete right bundle branch block  Nonspecific ST abnormality  Prolonged QT  Abnormal ECG  When compared with ECG of 07-APR-2022 13:16,  Atrial flutter has replaced Electronic ventricular pacemaker  Confirmed by MD PSAHA, VIJAY ([id number]) on 7/18/2022 10:20:52 AM               Sincerely,        Vijay Mackay MD

## 2022-08-01 ENCOUNTER — TELEPHONE (OUTPATIENT)
Dept: PEDIATRICS | Facility: OTHER | Age: 77
End: 2022-08-01

## 2022-08-01 ENCOUNTER — ALLIED HEALTH/NURSE VISIT (OUTPATIENT)
Dept: FAMILY MEDICINE | Facility: OTHER | Age: 77
End: 2022-08-01
Attending: INTERNAL MEDICINE
Payer: MEDICARE

## 2022-08-01 DIAGNOSIS — I45.81 LONG Q-T SYNDROME: Primary | ICD-10-CM

## 2022-08-01 DIAGNOSIS — R05.9 COUGH: Primary | ICD-10-CM

## 2022-08-01 PROCEDURE — 93010 ELECTROCARDIOGRAM REPORT: CPT | Performed by: INTERNAL MEDICINE

## 2022-08-01 PROCEDURE — C9803 HOPD COVID-19 SPEC COLLECT: HCPCS

## 2022-08-01 PROCEDURE — U0005 INFEC AGEN DETEC AMPLI PROBE: HCPCS | Mod: ZL

## 2022-08-01 PROCEDURE — G0463 HOSPITAL OUTPT CLINIC VISIT: HCPCS | Mod: 25

## 2022-08-01 PROCEDURE — 93005 ELECTROCARDIOGRAM TRACING: CPT

## 2022-08-01 NOTE — PROGRESS NOTES
Pt presented to nurse injection room to get an EKG. EKG ordered by Terrell Walker of [facility] due to Long Q-T syndrome and increase in Venlafaxine. Pt seen at [facility]. EKG performed and tracing faxed to ordering provider and [place]. EKG transmitted and imported to [employer], then tracing and written order sent for scanning into Epic. Abnormal EKG readings reviewed by Dr. Thomas. Pt ok to leave clinic.    Sheryl Godinez RN ....................  8/1/2022   4:36 PM

## 2022-08-01 NOTE — PROGRESS NOTES
Patient here today for Covid test. Has a cough.     Cristiana Waldrop CNA .............................on 8/1/2022 at 10:26 AM

## 2022-08-01 NOTE — TELEPHONE ENCOUNTER
Patient is returning a call back to Nurse Colleen (Injection Nurse).     Nkechi Heller on 8/1/2022 at 3:14 PM

## 2022-08-02 ENCOUNTER — OFFICE VISIT (OUTPATIENT)
Dept: FAMILY MEDICINE | Facility: OTHER | Age: 77
End: 2022-08-02
Attending: PHYSICIAN ASSISTANT
Payer: MEDICARE

## 2022-08-02 ENCOUNTER — HOSPITAL ENCOUNTER (OUTPATIENT)
Dept: GENERAL RADIOLOGY | Facility: OTHER | Age: 77
Discharge: HOME | End: 2022-08-02
Attending: PHYSICIAN ASSISTANT
Payer: MEDICARE

## 2022-08-02 VITALS
RESPIRATION RATE: 16 BRPM | TEMPERATURE: 98.4 F | WEIGHT: 161.5 LBS | DIASTOLIC BLOOD PRESSURE: 87 MMHG | HEIGHT: 67 IN | HEART RATE: 100 BPM | SYSTOLIC BLOOD PRESSURE: 137 MMHG | BODY MASS INDEX: 25.35 KG/M2 | OXYGEN SATURATION: 99 %

## 2022-08-02 DIAGNOSIS — J06.9 VIRAL URI WITH COUGH: Primary | ICD-10-CM

## 2022-08-02 LAB — SARS-COV-2 RNA RESP QL NAA+PROBE: NEGATIVE

## 2022-08-02 PROCEDURE — G0463 HOSPITAL OUTPT CLINIC VISIT: HCPCS | Mod: 25

## 2022-08-02 PROCEDURE — 71046 X-RAY EXAM CHEST 2 VIEWS: CPT

## 2022-08-02 PROCEDURE — G0463 HOSPITAL OUTPT CLINIC VISIT: HCPCS

## 2022-08-02 PROCEDURE — 99213 OFFICE O/P EST LOW 20 MIN: CPT | Performed by: PHYSICIAN ASSISTANT

## 2022-08-02 RX ORDER — SILDENAFIL CITRATE 20 MG/1
20 TABLET ORAL
COMMUNITY
Start: 2022-07-22 | End: 2022-12-21

## 2022-08-02 ASSESSMENT — PAIN SCALES - GENERAL: PAINLEVEL: NO PAIN (0)

## 2022-08-02 NOTE — PROGRESS NOTES
ASSESSMENT/PLAN:    I have reviewed the nursing notes.  I have reviewed the findings, diagnosis, plan and need for follow up with the patient.    1. Viral URI with cough  - XR Chest 2 Views  - Vital signs stable. PE consistent with URI. Testing results were as follows: clear chest on x-ray. Symptoms of 8 days duration, therefore, we did not perform COVID test as she would have already completed her quarantine period.     Recommend: increased fluids, rest, use of humidifier, antitussives (cough medication for adults), alternating tylenol and ibuprofen every 4-6 hours as needed (if able to take these medications), salt water gargles for sore throats or throat lozenges, honey, netti pots/saline rinses for sinus/congestion, as well as other home remedies. If worsening fevers, chills, uncontrollable nausea/vomiting, dehydration,etc., or other worsening signs occur, patient is agreeable to follow up for reevaluation.     Patient is in agreement and understanding of the above treatment plan. All questions and concerns were addressed and answered to patient's satisfaction. AVS reviewed with patient.     Discussed warning signs/symptoms indicative of need to f/u    Follow up if symptoms persist or worsen or concerns    I explained my diagnostic considerations and recommendations to the patient, who voiced understanding and agreement with the treatment plan. All questions were answered. We discussed potential side effects of any prescribed or recommended therapies, as well as expectations for response to treatments.    Bibiana Seth PA-C  8/2/2022  12:21 PM    HPI:    Zoey Fan is a 77 year old female  who presents to [facility] today for concerns of URI, cough, headache and congestion for 8 days now.    Symptoms:  Yes fevers or chills. Fever, highest reported temperature, unsure  Yes sore throat/pharyngitis/tonsillitis.   Yes allergy/URI Symptoms - clear  Yes Muffled Sounds/Change in Hearing  Yes Sensation of  Fullness in Ear(s)  No Ringing in Ears/Tinnitus  No Balance Changes  No Dizziness  Yes Congestion (head/nasal/chest)  Yes Cough/Productive Cough  Yes Post Nasal Drip - color: clear  Yes Headache  Yes Sinus Pain/Pressure  Yes Myalgias  Yes Otalgia  Activity Level Changes: Yes: tired  Appetite/Liquid Intake Changes: No  Changes to Bowel Habits: No  Changes to Bladder Habits: No  Additional Symptoms to Report: No  History of similar symptoms: No  Prior workup: No    Treatments tried: Fluids and Rest    Site of exposure: not known.  Type of exposure: not known    Vaccination status:   - Influenza: 9/17/21  - COVID: 2/16/21, 3/9/21, 10/7/21, 4/20/22    Cardiopulmonary History:  Recent Infections (Pneumonia, etc): No  Smoker: No  Asthma: No  COPD: No  Additional History: No  Cystic Fibrosis: No  Cardiac History Including Stroke/Stent Placement/STEMI/STEMI, etc.: No    Allergies UTD    PCP: MD Thompson    Past Medical History:   Diagnosis Date     Atrial fibrillation (H)     No Comments Provided     Complete tear of right rotator cuff     6/15/2017     Essential (primary) hypertension     No Comments Provided     Impingement syndrome of right shoulder     5/23/2017     Insomnia     No Comments Provided     Long Q-T syndrome 2010     Major depressive disorder, single episode     being managed     Other shoulder lesions, right shoulder     5/23/2017     Personal history of other infectious and parasitic diseases     As a child     Personal history of other medical treatment (CODE)     G2, P2     Primary osteoarthritis of shoulder     5/23/2017     Pure hypercholesterolemia     No Comments Provided     Past Surgical History:   Procedure Laterality Date     ARTHROSCOPY SHOULDER ROTATOR CUFF REPAIR      2004,Right shoulder     COLONOSCOPY  12/14/2007 2007,Normal, follow up in 10 years     COLONOSCOPY  08/10/2017    08/10/2017,no follow up due to age being greater than 80 at next screening interval     IMPLANT PACEMAKER       ,Post ablation, due to junctional rythym     LAPAROSCOPIC TUBAL LIGATION      No Comments Provided     OTHER SURGICAL HISTORY      2004,[id number],OTHER,Left knee     OTHER SURGICAL HISTORY      ,2065,EP STUDY /ABLATION,Cardiac ablation     OTHER SURGICAL HISTORY      ,[id number],CARDIAC ELECTROPHYSIOLOGY STUDY AND ABLATION     TONSILLECTOMY, ADENOIDECTOMY, COMBINED      As a child     Social History     Tobacco Use     Smoking status: Former Smoker     Packs/day: 0.50     Years: 30.00     Pack years: 15.00     Types: Cigarettes     Start date: 1968     Quit date: 2002     Years since quittin.2     Smokeless tobacco: Never Used   Substance Use Topics     Alcohol use: Yes     Alcohol/week: 2.0 standard drinks     Types: 2 Glasses of wine per week     Comment: wine: 1-2 glasses per day     Current Outpatient Medications   Medication Sig Dispense Refill     atorvastatin (LIPITOR) 40 MG tablet TAKE ONE-HALF TABLET BY  MOUTH AT BEDTIME 45 tablet 3     calcium carbonate (OSCAL 500) 1250 (500 CA) MG TABS tablet Take 500 mg by mouth daily with food       ELIQUIS ANTICOAGULANT 5 MG tablet Take 5 mg by mouth 2 times daily       furosemide (LASIX) 20 MG tablet Take 1 tablet (20 mg) by mouth 2 times daily 180 tablet 3     lisinopril (ZESTRIL) 10 MG tablet TAKE 1 TABLET BY MOUTH  DAILY 90 tablet 3     nadolol (CORGARD) 80 MG tablet TAKE 1 AND 1/2 TABLETS BY  MOUTH TWICE DAILY 270 tablet 3     omeprazole (PRILOSEC) 20 MG DR capsule TAKE 1 CAPSULE BY MOUTH  ONCE DAILY BEFORE A MEAL 90 capsule 3     oxybutynin ER (DITROPAN-XL) 10 MG 24 hr tablet TAKE 1 TABLET BY MOUTH  DAILY 90 tablet 3     predniSONE (DELTASONE) 20 MG tablet Take 3 tabs by mouth daily x 3 days, then 2 tabs daily x 3 days, then 1 tab daily x 3 days, then 1/2 tab daily x 3 days. 20 tablet 0     sildenafil (REVATIO) 20 MG tablet Take 20 mg by mouth       sildenafil (REVATIO) 20 MG tablet 3 times daily        UNABLE TO FIND MEDICATION NAME: AREDS (  "for eye health)       venlafaxine (EFFEXOR XR) 75 MG 24 hr capsule Take 75 mg by mouth daily       vitamin B-12 (CYANOCOBALAMIN) 1000 MCG CR tablet Take 1,000 mcg by mouth daily       VITAMIN D, CHOLECALCIFEROL, PO Take 1,000 Units by mouth daily       Allergies   Allergen Reactions     Ciprofloxacin      Other reaction(s): Other - Describe In Comment Field  Patient reports she has Long QT syndrome     Duloxetine      Other reaction(s): Angioedema     Neomycin Itching     Swelling , redness     Neosporin [Neomycin-Polymyx-Gramicid]      Sulfa Drugs Nausea and Vomiting     Abdominal pain     Unknown  [No Clinical Screening - See Comments]      Other reaction(s): Cardiac Arrest  Please verify with pharmacist prior to prescribing any medications due to her QT Syndrome     Liquid Adhesive Rash     Reaction also to EKG lead pads.      Tobramycin Rash     Past medical history, past surgical history, current medications and allergies reviewed and accurate to the best of my knowledge.      ROS:  Refer to HPI    /87 (BP Location: Right arm, Patient Position: Sitting, Cuff Size: Adult Regular)   Pulse 100   Temp 98.4  F (36.9  C) (Tympanic)   Resp 16   Ht 1.702 m (5' 7\")   Wt 73.3 kg (161 lb 8 oz)   LMP  (LMP Unknown)   SpO2 99%   Breastfeeding No   BMI 25.29 kg/m      EXAM:  General Appearance: Well appearing 77 year old female, appropriate appearance for age. No acute distress   Ears: Left TM intact, translucent with bony landmarks appreciated, no erythema, no effusion, no bulging, no purulence.  Right TM intact, translucent with bony landmarks appreciated, no erythema, no effusion, no bulging, no purulence.  Left auditory canal clear.  Right auditory canal clear.  Normal external ears, non tender.  Eyes: conjunctivae normal without erythema or irritation, corneas clear, no drainage or crusting, no eyelid swelling, pupils equal   Oropharynx: moist mucous membranes, posterior pharynx without erythema, tonsils " symmetric, no erythema, no exudates or petechiae, no post nasal drip seen, no trismus, voice clear.    Sinuses:  No sinus tenderness upon palpation of the frontal or maxillary sinuses  Nose:  Bilateral nares: no erythema, no edema, no drainage or congestion   Neck: supple without adenopathy  Respiratory: normal chest wall and respirations.  Normal effort.  Clear to auscultation bilaterally, no wheezing, crackles or rhonchi.  No increased work of breathing.  Dry cough  Cardiac: RRR with no murmurs  Dermatological: no rashes noted of exposed skin  Psychological: normal affect, alert, oriented, and pleasant.     Labs:  none    Xray:  Results for orders placed or performed in visit on 08/02/22   XR Chest 2 Views     Status: None    Narrative    Exam:  XR CHEST 2 VIEWS    HISTORY: Cough.    COMPARISON:  10/24/2020, 1/9/2020, 12/10/2018    FINDINGS: There is a left chest pacemaker generator with unchanged  position of the leads.    The cardiomediastinal contours are stable.      No focal consolidation, effusion, or pneumothorax.      No acute osseous abnormality.       Impression    IMPRESSION:      No acute cardiopulmonary process.      KIERAN GARCIA MD         SYSTEM ID:  [id number]

## 2022-08-02 NOTE — NURSING NOTE
Pt presents to clinic today for a cough, headache and congestion for 8 days now.  Pt has a negative Covid test yesterday.        FOOD SECURITY SCREENING QUESTIONS:    The next two questions are to help us understand your food security.  If you are feeling you need any assistance in this area, we have resources available to support you today.    Hunger Vital Signs:  Within the past 12 months we worried whether our food would run out before we got money to buy more. Never  Within the past 12 months the food we bought just didn't last and we didn't have money to get more. Never      \  Medication Reconciliation: complete  Sonali Fernando, HEATHER,LPN on 8/2/2022 at 12:10 PM

## 2022-08-09 ENCOUNTER — ALLIED HEALTH/NURSE VISIT (OUTPATIENT)
Dept: FAMILY MEDICINE | Facility: OTHER | Age: 77
End: 2022-08-09
Attending: INTERNAL MEDICINE
Payer: MEDICARE

## 2022-08-09 DIAGNOSIS — I45.81 LONG Q-T SYNDROME: Primary | Chronic | ICD-10-CM

## 2022-08-09 PROCEDURE — 93005 ELECTROCARDIOGRAM TRACING: CPT

## 2022-08-09 PROCEDURE — 93010 ELECTROCARDIOGRAM REPORT: CPT | Performed by: INTERNAL MEDICINE

## 2022-08-09 NOTE — PROGRESS NOTES
Pt presented to nurse injection room to get an EKG. EKG ordered by [facility] due to PROLONGED QT. EKG performed and tracing faxed to ordering provider. EKG transmitted and imported to [facility], then tracing and written order sent for scanning into Epic.     Meme Barton RN ....................  8/9/2022   8:25 AM

## 2022-08-10 LAB
ATRIAL RATE - MUSE: 96 BPM
DIASTOLIC BLOOD PRESSURE - MUSE: NORMAL MMHG
INTERPRETATION ECG - MUSE: NORMAL
P AXIS - MUSE: NORMAL DEGREES
PR INTERVAL - MUSE: NORMAL MS
QRS DURATION - MUSE: 84 MS
QT - MUSE: 378 MS
QTC - MUSE: 506 MS
R AXIS - MUSE: 31 DEGREES
SYSTOLIC BLOOD PRESSURE - MUSE: NORMAL MMHG
T AXIS - MUSE: -50 DEGREES
VENTRICULAR RATE- MUSE: 108 BPM

## 2022-08-15 ENCOUNTER — TELEPHONE (OUTPATIENT)
Dept: PEDIATRICS | Facility: OTHER | Age: 77
End: 2022-08-15

## 2022-08-15 DIAGNOSIS — I45.81 LONG Q-T SYNDROME: Primary | ICD-10-CM

## 2022-08-16 ENCOUNTER — TELEPHONE (OUTPATIENT)
Dept: PEDIATRICS | Facility: OTHER | Age: 77
End: 2022-08-16

## 2022-08-16 NOTE — TELEPHONE ENCOUNTER
[facility] called to see if we can do an EKG before 08.17.22 to see if patient is back in normal rhythm       Please call and advise  Thank You  Martha Killian on 8/16/2022 at 11:30 AM

## 2022-08-17 LAB
ATRIAL RATE - MUSE: 19 BPM
DIASTOLIC BLOOD PRESSURE - MUSE: NORMAL MMHG
INTERPRETATION ECG - MUSE: NORMAL
P AXIS - MUSE: NORMAL DEGREES
PR INTERVAL - MUSE: NORMAL MS
QRS DURATION - MUSE: 98 MS
QT - MUSE: 428 MS
QTC - MUSE: 560 MS
R AXIS - MUSE: 58 DEGREES
SYSTOLIC BLOOD PRESSURE - MUSE: NORMAL MMHG
T AXIS - MUSE: 3 DEGREES
VENTRICULAR RATE- MUSE: 103 BPM

## 2022-08-22 DIAGNOSIS — K21.9 GASTROESOPHAGEAL REFLUX DISEASE, UNSPECIFIED WHETHER ESOPHAGITIS PRESENT: ICD-10-CM

## 2022-08-22 DIAGNOSIS — N39.41 URGE URINARY INCONTINENCE: ICD-10-CM

## 2022-08-23 RX ORDER — OXYBUTYNIN CHLORIDE 10 MG/1
TABLET, EXTENDED RELEASE ORAL
Qty: 90 TABLET | Refills: 3 | Status: SHIPPED | OUTPATIENT
Start: 2022-08-23 | End: 2023-07-12

## 2022-08-23 NOTE — TELEPHONE ENCOUNTER
[facility] sent Rx request for the following:    Requesting 1 year supply     Requested Prescriptions   Pending Prescriptions Disp Refills     omeprazole (PRILOSEC) 20 MG DR capsule [Pharmacy Med Name: Omeprazole 20 MG Oral Capsule Delayed Release] 90 capsule 3     Sig: TAKE 1 CAPSULE BY MOUTH  ONCE DAILY BEFORE A MEAL   Last Prescription Date:   10/4/21  Last Fill Qty/Refills:         90, R-3      PPI Protocol Failed - 8/23/2022  3:41 PM        Failed - No diagnosis of osteoporosis on record        oxybutynin ER (DITROPAN XL) 10 MG 24 hr tablet [Pharmacy Med Name: Oxybutynin Chloride ER 10 MG Oral Tablet Extended Release 24 Hour] 90 tablet 3     Sig: TAKE 1 TABLET BY MOUTH  DAILY   Last Prescription Date:   10/4/21  Last Fill Qty/Refills:         90, R-3      Last Office Visit:              7/18/22   Future Office visit:           None    Sheryl Godinez RN .............. 8/23/2022  3:42 PM

## 2022-10-28 ENCOUNTER — TELEPHONE (OUTPATIENT)
Dept: PEDIATRICS | Facility: OTHER | Age: 77
End: 2022-10-28

## 2022-10-28 NOTE — TELEPHONE ENCOUNTER
The patient would like to know if she should get latest covid booster and flu shot.  Please advise.

## 2022-10-28 NOTE — TELEPHONE ENCOUNTER
Patient states she doesn't know if she had her flu shot this year. Writer informed her we do not have documentation of her receiving a flu shot this year. She also was wondering if she should get another Covid vaccine. Writer informed her since she has gotten 2 initial and 2 boosters it is recommended to continue and there is a new Covid vaccine out.     Sheila Penn, GRACIELA on 10/28/2022 at 3:08 PM

## 2022-11-06 DIAGNOSIS — I50.32 CHRONIC HEART FAILURE WITH PRESERVED EJECTION FRACTION (H): ICD-10-CM

## 2022-11-08 RX ORDER — LISINOPRIL 10 MG/1
TABLET ORAL
Qty: 90 TABLET | Refills: 1 | Status: SHIPPED | OUTPATIENT
Start: 2022-11-08 | End: 2022-12-21

## 2022-11-08 NOTE — TELEPHONE ENCOUNTER
[facility] sent Rx request for the following:      Requested Prescriptions   Pending Prescriptions Disp Refills     lisinopril (ZESTRIL) 10 MG tablet [Pharmacy Med Name: Lisinopril 10 MG Oral Tablet] 90 tablet 3     Sig: TAKE 1 TABLET BY MOUTH  DAILY   Last Prescription Date:   12/17/21  Last Fill Qty/Refills:         90, R-3    Last Office Visit:              7/18/22   Future Office visit:           None    Sheryl Godinez RN .............. 11/8/2022  12:35 PM

## 2022-11-18 ENCOUNTER — IMMUNIZATION (OUTPATIENT)
Dept: FAMILY MEDICINE | Facility: OTHER | Age: 77
End: 2022-11-18
Attending: INTERNAL MEDICINE
Payer: MEDICARE

## 2022-11-18 DIAGNOSIS — Z23 HIGH PRIORITY FOR 2019-NCOV VACCINE: ICD-10-CM

## 2022-11-18 DIAGNOSIS — Z23 NEED FOR PROPHYLACTIC VACCINATION AND INOCULATION AGAINST INFLUENZA: ICD-10-CM

## 2022-11-18 PROCEDURE — 91312 COVID-19,PF,PFIZER BOOSTER BIVALENT: CPT

## 2022-11-18 PROCEDURE — G0008 ADMIN INFLUENZA VIRUS VAC: HCPCS

## 2022-12-21 ENCOUNTER — OFFICE VISIT (OUTPATIENT)
Dept: PEDIATRICS | Facility: OTHER | Age: 77
End: 2022-12-21
Attending: INTERNAL MEDICINE
Payer: COMMERCIAL

## 2022-12-21 VITALS
SYSTOLIC BLOOD PRESSURE: 120 MMHG | TEMPERATURE: 97.3 F | HEART RATE: 81 BPM | DIASTOLIC BLOOD PRESSURE: 74 MMHG | WEIGHT: 150.3 LBS | RESPIRATION RATE: 16 BRPM | OXYGEN SATURATION: 99 % | BODY MASS INDEX: 23.54 KG/M2

## 2022-12-21 DIAGNOSIS — I27.20 SEVERE PULMONARY HYPERTENSION (H): ICD-10-CM

## 2022-12-21 DIAGNOSIS — I50.32 CHRONIC HEART FAILURE WITH PRESERVED EJECTION FRACTION (H): ICD-10-CM

## 2022-12-21 DIAGNOSIS — Z86.79 S/P ABLATION OF ATRIAL FIBRILLATION: Chronic | ICD-10-CM

## 2022-12-21 DIAGNOSIS — Z95.810 S/P IMPLANTATION OF AUTOMATIC CARDIOVERTER/DEFIBRILLATOR (AICD): ICD-10-CM

## 2022-12-21 DIAGNOSIS — I47.29 PAROXYSMAL VENTRICULAR TACHYCARDIA (H): ICD-10-CM

## 2022-12-21 DIAGNOSIS — I45.81 LONG Q-T SYNDROME: Chronic | ICD-10-CM

## 2022-12-21 DIAGNOSIS — Z09 HOSPITAL DISCHARGE FOLLOW-UP: Primary | ICD-10-CM

## 2022-12-21 DIAGNOSIS — I51.89 GRADE II DIASTOLIC DYSFUNCTION: Chronic | ICD-10-CM

## 2022-12-21 DIAGNOSIS — Z13.0 SCREENING, ANEMIA, DEFICIENCY, IRON: ICD-10-CM

## 2022-12-21 DIAGNOSIS — Z98.890 S/P ABLATION OF ATRIAL FIBRILLATION: Chronic | ICD-10-CM

## 2022-12-21 PROBLEM — K13.79 BLEEDING IN MOUTH: Status: RESOLVED | Noted: 2021-09-19 | Resolved: 2022-12-21

## 2022-12-21 LAB
ANION GAP SERPL CALCULATED.3IONS-SCNC: 8 MMOL/L (ref 7–15)
BUN SERPL-MCNC: 12.1 MG/DL (ref 8–23)
CALCIUM SERPL-MCNC: 9.5 MG/DL (ref 8.8–10.2)
CHLORIDE SERPL-SCNC: 104 MMOL/L (ref 98–107)
CREAT SERPL-MCNC: 0.83 MG/DL (ref 0.51–0.95)
DEPRECATED HCO3 PLAS-SCNC: 32 MMOL/L (ref 22–29)
DIGOXIN SERPL-MCNC: 1.6 NG/ML (ref 0.6–2)
ERYTHROCYTE [DISTWIDTH] IN BLOOD BY AUTOMATED COUNT: 18.6 % (ref 10–15)
GFR SERPL CREATININE-BSD FRML MDRD: 72 ML/MIN/1.73M2
GLUCOSE SERPL-MCNC: 108 MG/DL (ref 70–99)
HCT VFR BLD AUTO: 40.6 % (ref 35–47)
HGB BLD-MCNC: 12.7 G/DL (ref 11.7–15.7)
MCH RBC QN AUTO: 27.4 PG (ref 26.5–33)
MCHC RBC AUTO-ENTMCNC: 31.3 G/DL (ref 31.5–36.5)
MCV RBC AUTO: 88 FL (ref 78–100)
PLATELET # BLD AUTO: 203 10E3/UL (ref 150–450)
POTASSIUM SERPL-SCNC: 4.4 MMOL/L (ref 3.4–5.3)
RBC # BLD AUTO: 4.63 10E6/UL (ref 3.8–5.2)
SODIUM SERPL-SCNC: 144 MMOL/L (ref 136–145)
WBC # BLD AUTO: 8.1 10E3/UL (ref 4–11)

## 2022-12-21 PROCEDURE — 85014 HEMATOCRIT: CPT | Mod: ZL | Performed by: INTERNAL MEDICINE

## 2022-12-21 PROCEDURE — 80162 ASSAY OF DIGOXIN TOTAL: CPT | Mod: ZL | Performed by: INTERNAL MEDICINE

## 2022-12-21 PROCEDURE — 80048 BASIC METABOLIC PNL TOTAL CA: CPT | Mod: ZL | Performed by: INTERNAL MEDICINE

## 2022-12-21 PROCEDURE — 99215 OFFICE O/P EST HI 40 MIN: CPT | Performed by: INTERNAL MEDICINE

## 2022-12-21 PROCEDURE — G0463 HOSPITAL OUTPT CLINIC VISIT: HCPCS

## 2022-12-21 PROCEDURE — 36415 COLL VENOUS BLD VENIPUNCTURE: CPT | Mod: ZL | Performed by: INTERNAL MEDICINE

## 2022-12-21 RX ORDER — SPIRONOLACTONE 25 MG/1
12.5 TABLET ORAL
COMMUNITY
Start: 2022-12-17 | End: 2023-03-09

## 2022-12-21 RX ORDER — LISINOPRIL 30 MG/1
TABLET ORAL 2 TIMES DAILY
COMMUNITY
Start: 2022-12-16 | End: 2022-12-21

## 2022-12-21 RX ORDER — MIRTAZAPINE 15 MG/1
1 TABLET, FILM COATED ORAL AT BEDTIME
COMMUNITY
Start: 2022-12-16 | End: 2023-03-09

## 2022-12-21 RX ORDER — ATORVASTATIN CALCIUM 20 MG/1
TABLET, FILM COATED ORAL
COMMUNITY
Start: 2022-12-07 | End: 2022-12-21

## 2022-12-21 RX ORDER — SILDENAFIL CITRATE 20 MG/1
20 TABLET ORAL
COMMUNITY
Start: 2022-10-25 | End: 2022-12-21

## 2022-12-21 RX ORDER — LISINOPRIL 5 MG/1
15 TABLET ORAL 2 TIMES DAILY
Qty: 540 TABLET | Refills: 3 | Status: SHIPPED | OUTPATIENT
Start: 2022-12-21 | End: 2023-03-09

## 2022-12-21 RX ORDER — DIGOXIN 250 MCG
250 TABLET ORAL
COMMUNITY
Start: 2022-12-15 | End: 2023-03-09

## 2022-12-21 RX ORDER — FUROSEMIDE 20 MG
20 TABLET ORAL DAILY
Qty: 90 TABLET | Refills: 3 | Status: SHIPPED | OUTPATIENT
Start: 2022-12-21 | End: 2023-03-09

## 2022-12-21 RX ORDER — ATORVASTATIN CALCIUM 20 MG/1
20 TABLET, FILM COATED ORAL
COMMUNITY
End: 2022-12-21

## 2022-12-21 ASSESSMENT — ANXIETY QUESTIONNAIRES
GAD7 TOTAL SCORE: 4
1. FEELING NERVOUS, ANXIOUS, OR ON EDGE: SEVERAL DAYS
6. BECOMING EASILY ANNOYED OR IRRITABLE: SEVERAL DAYS
GAD7 TOTAL SCORE: 4
7. FEELING AFRAID AS IF SOMETHING AWFUL MIGHT HAPPEN: SEVERAL DAYS
IF YOU CHECKED OFF ANY PROBLEMS ON THIS QUESTIONNAIRE, HOW DIFFICULT HAVE THESE PROBLEMS MADE IT FOR YOU TO DO YOUR WORK, TAKE CARE OF THINGS AT HOME, OR GET ALONG WITH OTHER PEOPLE: SOMEWHAT DIFFICULT
2. NOT BEING ABLE TO STOP OR CONTROL WORRYING: NOT AT ALL
7. FEELING AFRAID AS IF SOMETHING AWFUL MIGHT HAPPEN: SEVERAL DAYS
4. TROUBLE RELAXING: NOT AT ALL
3. WORRYING TOO MUCH ABOUT DIFFERENT THINGS: NOT AT ALL
8. IF YOU CHECKED OFF ANY PROBLEMS, HOW DIFFICULT HAVE THESE MADE IT FOR YOU TO DO YOUR WORK, TAKE CARE OF THINGS AT HOME, OR GET ALONG WITH OTHER PEOPLE?: SOMEWHAT DIFFICULT
GAD7 TOTAL SCORE: 4
5. BEING SO RESTLESS THAT IT IS HARD TO SIT STILL: SEVERAL DAYS

## 2022-12-21 ASSESSMENT — PATIENT HEALTH QUESTIONNAIRE - PHQ9
10. IF YOU CHECKED OFF ANY PROBLEMS, HOW DIFFICULT HAVE THESE PROBLEMS MADE IT FOR YOU TO DO YOUR WORK, TAKE CARE OF THINGS AT HOME, OR GET ALONG WITH OTHER PEOPLE: SOMEWHAT DIFFICULT
SUM OF ALL RESPONSES TO PHQ QUESTIONS 1-9: 5
SUM OF ALL RESPONSES TO PHQ QUESTIONS 1-9: 5

## 2022-12-21 ASSESSMENT — PAIN SCALES - GENERAL: PAINLEVEL: NO PAIN (0)

## 2022-12-21 NOTE — PROGRESS NOTES
"Assessment & Plan   1. Hospital discharge follow-up    2. Paroxysmal ventricular tachycardia  3. Long Q-T syndrome  I was not able to find the actual tracings from her pacemaker interrogation however notes indicate that she had up to 2 minutes of polymorphic ventricular tachycardia.  Unknown if this was a perfusing rhythm or not as she was asleep at the time.  We will obtain lab work as requested in the discharge summary.  - Digoxin; Future  - Digoxin    4. Grade II diastolic dysfunction  At goal, no change.  - lisinopril (ZESTRIL) 5 MG tablet; Take 3 tablets (15 mg) by mouth 2 times daily  Dispense: 540 tablet; Refill: 3    5. Severe pulmonary hypertension (H)  Her pulmonary hypertension is worsened.  She is on sildenafil.  She has seen multiple cardiology specialists however I am requesting that she has a consultation with a cardiologist who specializes in pulmonary hypertension.  - Adult Cardiology [facility] Referral; Future    6. S/P ablation of atrial fibrillation  At goal, no change.    7. Chronic heart failure with preserved ejection fraction (H)  At goal, no change.  - furosemide (LASIX) 20 MG tablet; Take 1 tablet (20 mg) by mouth daily  Dispense: 90 tablet; Refill: 3    8. S/P implantation of automatic cardioverter/defibrillator (AICD)  I personally removed her dressings today  - Basic metabolic panel; Future  - Digoxin; Future  - Digoxin  - Basic metabolic panel    9. Screening, anemia, deficiency, iron  - CBC with platelets; Future  - CBC with platelets    Signed, Vijay Mackay MD, FAAP, FACP  Internal Medicine & Pediatrics    Subjective   Zoey Fan is a 77 year old female who presents for hospital follow-up, removal of dressings.  She recently had a routine follow-up with her cardiology clinic.  They interrogated her pacemaker and told her that she \"did not have a pulse for 90 seconds.\"  She was admitted to the hospital the next day.  She underwent some testing including a right heart cath.  " She had placement of a new AICD/pacemaker.  She currently has a dual-chamber ICD.  She had been feeling some fatigue most notably with exertion.  No chest pains or dyspnea.  She feels she has done well since her hospitalization.  She has had to do some soul searching about how serious of a condition this was.  She has stress in her life related to dementia and health problems with her .    Objective   Vitals: /74 (BP Location: Right arm, Patient Position: Sitting, Cuff Size: Adult Regular)   Pulse 81   Temp 97.3  F (36.3  C) (Tympanic)   Resp 16   Wt 68.2 kg (150 lb 4.8 oz)   LMP  (LMP Unknown)   SpO2 99%   BMI 23.54 kg/m      Cardiovascular: Regular, no murmur  Pulmonary: Clear  Chest: 2 dressings are present left and right upper chest.  I personally removed both today.  Underlying there are sutures covered by Steri-Strips.  The incision sites appear clean, dry and intact.  There is some bruising of the chest wall which 1 would expect postoperatively.  No induration or significant tenderness to palpation.    Review and Analysis of Data   I personally reviewed the following:  External notes: Yes I reviewed the available notes from [facility]  Results: Yes I reviewed the available results from [facility] including right heart cath, echocardiogram report, etc.  Use of an independent historian: No  Independent review of a test performed by another physician: No  Discussion of management with another physician: No  High risk of morbidity from additional diagnostic testing and/or treatment.    Total time spent in the preparation, care care of this patient, coordination of care, documentation: 40-54 minutes.

## 2022-12-22 ENCOUNTER — TELEPHONE (OUTPATIENT)
Dept: PEDIATRICS | Facility: OTHER | Age: 77
End: 2022-12-22

## 2022-12-22 NOTE — TELEPHONE ENCOUNTER
[facility] department is needing clarification on some clinical notes regarding this patient.    Caller states anyone who answers the phone can help with this patients account.    Mable De on 12/22/2022 at 11:09 AM'

## 2022-12-23 NOTE — TELEPHONE ENCOUNTER
I called [facility] back at [phone] and spoke to Blas RONDON at 11:13 and answered the question that they had regarding the Lisinopril - Call Reference # [id number].    Marisol Lion on 12/23/2022 at 11:16 AM

## 2022-12-23 NOTE — TELEPHONE ENCOUNTER
[employer] is calling to check the status of yesterdays message that was sent to [facility]. They did fax over the request and this needs to be addressed before 12/25/22 6:05AM.     They are wondering if there is a medical reason why patient was not prescirbed or can not do the 30 mg tab of the Lisinopril instead of the 5 mg tab.     Nkechi Heller on 12/23/2022 at 11:06 AM

## 2022-12-29 ENCOUNTER — TELEPHONE (OUTPATIENT)
Dept: PEDIATRICS | Facility: OTHER | Age: 77
End: 2022-12-29

## 2022-12-29 NOTE — TELEPHONE ENCOUNTER
Called patient and verified name and date of birth. Patient calling with questions on recent cardiology referral.   Per last office note, from 12/21 her pulmonary hypertension has worsened. Dr. Mackay placed referral for her to see a cardiologist who specializes in pulmonary HTN.   Patient agrees with this plan and is grateful for the explanation.  Bibiana Melo RN on 12/29/2022 at 11:56 AM

## 2022-12-29 NOTE — TELEPHONE ENCOUNTER
Patient was called on 12/28 in reference to the referral set up for Severe pulmonary hypertension. She was unsure about what it was for and is looking for clarification by her care team.

## 2023-01-10 DIAGNOSIS — F39 MOOD DISORDER (H): Primary | ICD-10-CM

## 2023-01-10 RX ORDER — MIRTAZAPINE 15 MG/1
15 TABLET, FILM COATED ORAL AT BEDTIME
Qty: 90 TABLET | Refills: 4 | Status: SHIPPED | OUTPATIENT
Start: 2023-01-10 | End: 2023-03-09

## 2023-01-10 NOTE — TELEPHONE ENCOUNTER
Patient was put on a new anti-depressant at [facility] when she was in the hospital.     Reason for call: Medication or medication refill    Name of medication requested: Mirtazapine 15 mg    Are you out of the medication? Yes    What pharmacy do you use? [facility]    Preferred method for responding to this message: Telephone Call    Phone number patient can be reached at: Cell number on file:    Telephone Information:   Mobile [phone]       If we cannot reach you directly, may we leave a detailed response at the number you provided? Yes     Diya Shen on 1/10/2023 at 12:06 PM

## 2023-01-10 NOTE — TELEPHONE ENCOUNTER
mirtazapine (REMERON) 15 MG tablet   12/16/2022  --   Sig - Route: Take 1 tablet by mouth At Bedtime - Oral     Historically reported by patient.     Last OV: 12/21/22  Future OV: None    Sarah Pressley RN on 1/10/2023 at 12:18 PM

## 2023-01-17 ENCOUNTER — TRANSFERRED RECORDS (OUTPATIENT)
Dept: HEALTH INFORMATION MANAGEMENT | Facility: OTHER | Age: 78
End: 2023-01-17
Payer: COMMERCIAL

## 2023-01-25 ENCOUNTER — TELEPHONE (OUTPATIENT)
Dept: PEDIATRICS | Facility: OTHER | Age: 78
End: 2023-01-25
Payer: COMMERCIAL

## 2023-01-25 DIAGNOSIS — I50.32 CHRONIC HEART FAILURE WITH PRESERVED EJECTION FRACTION (H): Chronic | ICD-10-CM

## 2023-01-25 DIAGNOSIS — Z98.890 S/P ABLATION OF ATRIAL FIBRILLATION: Primary | Chronic | ICD-10-CM

## 2023-01-25 DIAGNOSIS — Z86.79 S/P ABLATION OF ATRIAL FIBRILLATION: Primary | Chronic | ICD-10-CM

## 2023-01-25 NOTE — TELEPHONE ENCOUNTER
Pt would like a [facility]  It was previously prescribed by a Dr at [facility].  Please call.    Jordon De La Fuente on 1/25/2023 at 1:43 PM

## 2023-01-26 RX ORDER — DIGOXIN 125 MCG
125 TABLET ORAL DAILY
Qty: 90 TABLET | Refills: 3 | Status: SHIPPED | OUTPATIENT
Start: 2023-01-26 | End: 2024-02-09

## 2023-01-26 RX ORDER — DIGOXIN 250 MCG
250 TABLET ORAL
Status: CANCELLED | OUTPATIENT
Start: 2023-01-26

## 2023-01-31 ENCOUNTER — HOSPITAL ENCOUNTER (OUTPATIENT)
Dept: MAMMOGRAPHY | Facility: OTHER | Age: 78
Discharge: HOME | End: 2023-01-31
Attending: INTERNAL MEDICINE | Admitting: INTERNAL MEDICINE
Payer: MEDICARE

## 2023-01-31 DIAGNOSIS — Z12.31 VISIT FOR SCREENING MAMMOGRAM: ICD-10-CM

## 2023-01-31 PROCEDURE — 77067 SCR MAMMO BI INCL CAD: CPT

## 2023-02-02 ENCOUNTER — MEDICAL CORRESPONDENCE (OUTPATIENT)
Dept: HEALTH INFORMATION MANAGEMENT | Facility: OTHER | Age: 78
End: 2023-02-02
Payer: COMMERCIAL

## 2023-02-06 ENCOUNTER — ALLIED HEALTH/NURSE VISIT (OUTPATIENT)
Dept: FAMILY MEDICINE | Facility: OTHER | Age: 78
End: 2023-02-06
Attending: INTERNAL MEDICINE
Payer: MEDICARE

## 2023-02-06 ENCOUNTER — LAB (OUTPATIENT)
Dept: LAB | Facility: OTHER | Age: 78
End: 2023-02-06
Attending: INTERNAL MEDICINE
Payer: MEDICARE

## 2023-02-06 DIAGNOSIS — I45.81 LONG Q-T SYNDROME: Chronic | ICD-10-CM

## 2023-02-06 DIAGNOSIS — I45.81 LONG Q-T SYNDROME: Primary | ICD-10-CM

## 2023-02-06 LAB
DIGOXIN SERPL-MCNC: 0.7 NG/ML (ref 0.6–2)
HOLD SPECIMEN: NORMAL

## 2023-02-06 PROCEDURE — 93010 ELECTROCARDIOGRAM REPORT: CPT | Performed by: INTERNAL MEDICINE

## 2023-02-06 PROCEDURE — 93005 ELECTROCARDIOGRAM TRACING: CPT

## 2023-02-06 PROCEDURE — 80162 ASSAY OF DIGOXIN TOTAL: CPT | Mod: ZL

## 2023-02-06 PROCEDURE — 36415 COLL VENOUS BLD VENIPUNCTURE: CPT | Mod: ZL

## 2023-02-06 NOTE — PROGRESS NOTES
Pt presented to nurse injection room to get an EKG. EKG ordered by Joy Sidhu NP due to Long Q-T syndrome and Atrial fib EKG performed and tracing faxed to ordering provider. EKG transmitted and imported to [employer], then tracing and written order sent for scanning into Epic.     Ev Montalvo RN ....................  2/6/2023   3:01 PM

## 2023-02-07 LAB
ATRIAL RATE - MUSE: 67 BPM
DIASTOLIC BLOOD PRESSURE - MUSE: NORMAL MMHG
INTERPRETATION ECG - MUSE: NORMAL
P AXIS - MUSE: NORMAL DEGREES
PR INTERVAL - MUSE: 184 MS
QRS DURATION - MUSE: 162 MS
QT - MUSE: 470 MS
QTC - MUSE: 565 MS
R AXIS - MUSE: -82 DEGREES
SYSTOLIC BLOOD PRESSURE - MUSE: NORMAL MMHG
T AXIS - MUSE: 96 DEGREES
VENTRICULAR RATE- MUSE: 87 BPM

## 2023-03-09 ENCOUNTER — OFFICE VISIT (OUTPATIENT)
Dept: CARDIOLOGY | Facility: OTHER | Age: 78
End: 2023-03-09
Attending: NURSE PRACTITIONER
Payer: COMMERCIAL

## 2023-03-09 VITALS
HEART RATE: 110 BPM | RESPIRATION RATE: 20 BRPM | BODY MASS INDEX: 24.33 KG/M2 | DIASTOLIC BLOOD PRESSURE: 82 MMHG | HEIGHT: 67 IN | OXYGEN SATURATION: 99 % | TEMPERATURE: 97.7 F | WEIGHT: 155 LBS | SYSTOLIC BLOOD PRESSURE: 118 MMHG

## 2023-03-09 DIAGNOSIS — I51.89 GRADE II DIASTOLIC DYSFUNCTION: Chronic | ICD-10-CM

## 2023-03-09 DIAGNOSIS — Z95.810 PRESENCE OF COMBINATION INTERNAL CARDIAC DEFIBRILLATOR (ICD) AND PACEMAKER: ICD-10-CM

## 2023-03-09 DIAGNOSIS — I07.1 MODERATE TRICUSPID REGURGITATION: ICD-10-CM

## 2023-03-09 DIAGNOSIS — I51.89 RIGHT VENTRICULAR DYSFUNCTION, NYHA CLASS 2: ICD-10-CM

## 2023-03-09 DIAGNOSIS — I10 PRIMARY HYPERTENSION: ICD-10-CM

## 2023-03-09 DIAGNOSIS — I48.0 PAROXYSMAL ATRIAL FIBRILLATION (H): ICD-10-CM

## 2023-03-09 DIAGNOSIS — I27.20 PULMONARY HYPERTENSION (H): ICD-10-CM

## 2023-03-09 DIAGNOSIS — I45.81 LONG Q-T SYNDROME: Primary | Chronic | ICD-10-CM

## 2023-03-09 DIAGNOSIS — I77.810 MILD ASCENDING AORTA DILATATION (H): ICD-10-CM

## 2023-03-09 DIAGNOSIS — I47.20 SUSTAINED VT (VENTRICULAR TACHYCARDIA) (H): ICD-10-CM

## 2023-03-09 DIAGNOSIS — E78.00 HYPERCHOLESTEROLEMIA: ICD-10-CM

## 2023-03-09 DIAGNOSIS — Z98.890 HISTORY OF CARDIAC RADIOFREQUENCY ABLATION: ICD-10-CM

## 2023-03-09 PROCEDURE — G0463 HOSPITAL OUTPT CLINIC VISIT: HCPCS | Mod: 25

## 2023-03-09 PROCEDURE — 93005 ELECTROCARDIOGRAM TRACING: CPT | Performed by: NURSE PRACTITIONER

## 2023-03-09 PROCEDURE — 93010 ELECTROCARDIOGRAM REPORT: CPT | Performed by: INTERNAL MEDICINE

## 2023-03-09 PROCEDURE — 99215 OFFICE O/P EST HI 40 MIN: CPT | Performed by: NURSE PRACTITIONER

## 2023-03-09 RX ORDER — DIGOXIN 125 MCG
125 TABLET ORAL DAILY
COMMUNITY
Start: 2023-01-26 | End: 2024-08-15

## 2023-03-09 RX ORDER — VENLAFAXINE HYDROCHLORIDE 75 MG/1
75 CAPSULE, EXTENDED RELEASE ORAL DAILY
COMMUNITY
Start: 2023-01-30 | End: 2024-08-15

## 2023-03-09 RX ORDER — LISINOPRIL 10 MG/1
10 TABLET ORAL DAILY
COMMUNITY
Start: 2022-12-16 | End: 2023-06-01

## 2023-03-09 RX ORDER — SPIRONOLACTONE 25 MG/1
12.5 TABLET ORAL EVERY MORNING
Qty: 90 TABLET | Refills: 3 | Status: SHIPPED | OUTPATIENT
Start: 2023-03-09 | End: 2024-06-18

## 2023-03-09 RX ORDER — FUROSEMIDE 20 MG
TABLET ORAL
Qty: 90 TABLET | Refills: 3 | Status: SHIPPED | OUTPATIENT
Start: 2023-03-09 | End: 2023-07-16

## 2023-03-09 RX ORDER — MULTIPLE VITAMINS W/ MINERALS TAB 9MG-400MCG
1 TAB ORAL DAILY
COMMUNITY
Start: 2023-03-09

## 2023-03-09 RX ORDER — NADOLOL 80 MG/1
TABLET ORAL
COMMUNITY
Start: 2023-03-09 | End: 2024-08-15

## 2023-03-09 ASSESSMENT — PATIENT HEALTH QUESTIONNAIRE - PHQ9
SUM OF ALL RESPONSES TO PHQ QUESTIONS 1-9: 0
5. POOR APPETITE OR OVEREATING: NOT AT ALL

## 2023-03-09 ASSESSMENT — ANXIETY QUESTIONNAIRES
2. NOT BEING ABLE TO STOP OR CONTROL WORRYING: NOT AT ALL
3. WORRYING TOO MUCH ABOUT DIFFERENT THINGS: NOT AT ALL
1. FEELING NERVOUS, ANXIOUS, OR ON EDGE: NOT AT ALL
GAD7 TOTAL SCORE: 0
6. BECOMING EASILY ANNOYED OR IRRITABLE: NOT AT ALL
5. BEING SO RESTLESS THAT IT IS HARD TO SIT STILL: NOT AT ALL
7. FEELING AFRAID AS IF SOMETHING AWFUL MIGHT HAPPEN: NOT AT ALL
GAD7 TOTAL SCORE: 0
IF YOU CHECKED OFF ANY PROBLEMS ON THIS QUESTIONNAIRE, HOW DIFFICULT HAVE THESE PROBLEMS MADE IT FOR YOU TO DO YOUR WORK, TAKE CARE OF THINGS AT HOME, OR GET ALONG WITH OTHER PEOPLE: NOT DIFFICULT AT ALL

## 2023-03-09 ASSESSMENT — PAIN SCALES - GENERAL: PAINLEVEL: NO PAIN (0)

## 2023-03-09 NOTE — PROGRESS NOTES
[facility] HEART CARE   CARDIOLOGY CONSULT     Zoey Fan   [address] [place] [address]    Vijay Mackay     Chief Complaint   Patient presents with     Consult     Persistent A-Fib        HPI:   Mrs. Fan is a 78 year old female who presents to establish local cardiology care. Patient has a significant past cardiac history notable for the followin. Paroxysmal atrial arrhythmias.   A.  Status post direct current cardioversion for atrial flutter 2018.  B.  Status post complex ablation in  at [facility], Dr. Souza with repeat ablation in  at [facility].  C.  She was treated flecainide for a period of time.  D. Increased burden on device checks-although still low atrial arrhythmia burden on device checks from April and 2022 (5-7%).  E. A more persistent episode of atrial arrhythmia with suboptimal rate control (60% of the rate > 110 bpm) from mid July to mid 2022. Terminated spontaneously.  F. Some of her atrial arrhythmia were in the form of atrial tachycardia at 145 bpm. Mode switch to 140 bpm.    2. Sinus node dysfunction, noted immediately following her initial ablation procedure in ; status post dual-chamber permanent pacemaker implantation at that time.    A.  Her atrial lead was revised along with generator replacement  10/8/2020.  B. Chronic small P waves. During her persistent atrial arrhythmia in 2022, further decrease in her P waves leading to under sensing.    3. History of long QT-1 with a family history of sudden death in her sister.   A.  She is currently treated with nadolol and doing well with this.   B.  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   C. Sustained polymorphic VT noted on previous device check leading to D.  D. S/p upgrade of device to Cardioverter-Defibrillator at [facility] (2022)    4. Moderate TR  A. Functional TR, stable.    5. Pulmonary hypertension   A. Started Revatio 20 mg  NEYDA (10/2022).    6. Dilated ascending aorta, followed with periodic echocardiography.   A. 4.3 cm on echo in December 2022    7. CHADS-VASc risk score of 4 based on hypertension, age, and female gender  A. Previously on warfarin.  B. Transition to apixaban in April 2022    Patient has followed cardiology through [facility] in [place]/ [facility] locations. She has also followed with Electrophysiologist Dr. Walker. Admits that she is establishing cardiology care locally if she has any issues or can no longer drive with her husbands declining condition. She would like to continue her routine cardiac care through [facility]. She has been followed by cardiology team every 6 months.     Today, patient admits that she has been feeling very good. No concerns today, no chest pain or pressure. No palpitations. Energy level has been good. No lightheadedness or syncope. No discharges from her ICD, she follows device clinic through [facility] for monitoring of her dual chamber ICD device. No edema or weight gain. No increased dyspnea at rest or with exertion. Admits she is doing well on Revatio and given lack of shortness of breath, wishes to hold of on pulmonary hypertension consult at this time. Admits she would be interested in this if decline in function or increased respiratory symptoms    RELEVANT TESTING REVIEWED:  Echocardiogram 12/13/2022  1. Normal LV size, normal wall thickness, normal global systolic function with an estimated EF of 60 - 65%.  2. Moderately enlarged left atrium.  3. Right ventricular cavity size is normal, global systolic RV function is not well visualized.  4. The aortic valve is trileaflet and calcified, no stenosis and no regurgitation.  5. The mitral valve is sclerotic, mild mitral regurgitation.  6. Moderate tricuspid regurgitation.  7. The inferior vena cava is dilated, respiratory size variation less than 50%, consistent with elevated right atrial pressure.  8. The ascending aorta is dilated with a maximal diameter  of 4.3 cm.  9. Severely increased estimated pulmonary pressures by tricuspid regurgitation velocity and right atrial pressure (60 mmHg plus RAP).  10. Since prior, RV function appears worse (although not optimally visualized).    PAST MEDICAL HISTORY:   Past Medical History:   Diagnosis Date     Atrial fibrillation (H)     No Comments Provided     Complete tear of right rotator cuff     6/15/2017     Essential (primary) hypertension     No Comments Provided     Impingement syndrome of right shoulder     2017     Insomnia     No Comments Provided     Long Q-T syndrome      Major depressive disorder, single episode     being managed     Other shoulder lesions, right shoulder     2017     Personal history of other infectious and parasitic diseases     As a child     Personal history of other medical treatment (CODE)     G2, P2     Primary osteoarthritis of shoulder     2017     Pure hypercholesterolemia     No Comments Provided          FAMILY HISTORY:   Family History   Problem Relation Age of Onset     Arthritis Father         Arthritis     Cancer Father         Cancer,skin cancer     Other - See Comments Father         Stroke,He is 87, has a history of CVA/Alzheimer's     Hypertension Mother         Hypertension     Hyperlipidemia Mother         Hyperlipidemia,elevated cholesterol     Other - See Comments Mother         Stroke,She  at age 86.  She had stroke as a complication of carotid endarterectomy surgery.     Other - See Comments Sister          of long QT syndrome.     Other - See Comments Son         Psychiatric illness,Bipolar,  due to suicide     Breast Cancer Maternal Aunt 50        Cancer-breast     Colon Cancer No family hx of         Cancer-colon     Anesthesia Reaction No family hx of         Anesthesia Malignant Hyperthermia     Bleeding Disorder No family hx of           PAST SURGICAL HISTORY:   Past Surgical History:   Procedure Laterality Date     ARTHROSCOPY SHOULDER  ROTATOR CUFF REPAIR      ,Right shoulder     COLONOSCOPY  2007    2007,Normal, follow up in 10 years     COLONOSCOPY  08/10/2017    08/10/2017,no follow up due to age being greater than 80 at next screening interval     IMPLANT PACEMAKER      ,Post ablation, due to junctional rythym     LAPAROSCOPIC TUBAL LIGATION      No Comments Provided     OTHER SURGICAL HISTORY      2004,614163,OTHER,Left knee     OTHER SURGICAL HISTORY      ,050507,EP STUDY /ABLATION,Cardiac ablation     OTHER SURGICAL HISTORY      2015,[id number],CARDIAC ELECTROPHYSIOLOGY STUDY AND ABLATION     TONSILLECTOMY, ADENOIDECTOMY, COMBINED      As a child          SOCIAL HISTORY:   Social History     Socioeconomic History     Marital status:      Spouse name: None     Number of children: None     Years of education: None     Highest education level: None   Tobacco Use     Smoking status: Former     Packs/day: 0.50     Years: 30.00     Pack years: 15.00     Types: Cigarettes     Start date: 1968     Quit date: 2002     Years since quittin.8     Smokeless tobacco: Never   Vaping Use     Vaping Use: Never used   Substance and Sexual Activity     Alcohol use: Yes     Alcohol/week: 2.0 standard drinks     Types: 2 Glasses of wine per week     Comment: wine: 1-2 glasses per day     Drug use: Never     Sexual activity: Yes     Partners: Male   Social History Narrative    Patient is a retired RN. She moved here from [place], [place] in the . Her  was an ED doc and worked in [employer] before retiring. They lived on [address]. Patient's   suddenly of a likely arrhythmia in his mid 60s. Patient's son was bi    polar and ended up committing suicide after being convicted for murder. The patient has a [place] that works for [employer]. They recently traveled to the [facility] a [facility] and have plans to go     on a cruise in [place] to find polar bears.         Remarried in May 2019.          CURRENT MEDICATIONS:   Current Outpatient Medications   Medication     apixaban ANTICOAGULANT (ELIQUIS) 5 MG tablet     atorvastatin (LIPITOR) 40 MG tablet     Calcium Magnesium Zinc 333-133-5 MG TABS     digoxin (LANOXIN) 125 MCG tablet     digoxin (LANOXIN) 125 MCG tablet     ELIQUIS ANTICOAGULANT 5 MG tablet     furosemide (LASIX) 20 MG tablet     lisinopril (ZESTRIL) 10 MG tablet     multivitamin w/minerals (MULTIVITAMINS W/MINERALS) tablet     nadolol (CORGARD) 80 MG tablet     nadolol (CORGARD) 80 MG tablet     omeprazole (PRILOSEC) 20 MG DR capsule     oxybutynin ER (DITROPAN XL) 10 MG 24 hr tablet     sildenafil (REVATIO) 20 MG tablet     spironolactone (ALDACTONE) 25 MG tablet     UNABLE TO FIND     venlafaxine (EFFEXOR XR) 75 MG 24 hr capsule     vitamin B-12 (CYANOCOBALAMIN) 1000 MCG CR tablet     VITAMIN D, CHOLECALCIFEROL, PO     No current facility-administered medications for this visit.       ALLERGIES:   Allergies   Allergen Reactions     Ciprofloxacin      Other reaction(s): Other - Describe In Comment Field  Patient reports she has Long QT syndrome     Duloxetine      Other reaction(s): Angioedema     Neomycin Itching     Swelling , redness     Neomycin-Polymyxin-Gramicidin Unknown     Neosporin [Neomycin-Polymyx-Gramicid]      Sulfa Drugs Nausea and Vomiting     Abdominal pain     Unknown  [No Clinical Screening - See Comments]      Other reaction(s): Cardiac Arrest  Please verify with pharmacist prior to prescribing any medications due to her QT Syndrome     Liquid Adhesive Rash     Reaction also to EKG lead pads.      Tobramycin Rash        ROS:   CONSTITUTIONAL: No reported fever or chills. No changes in weight.  ENT: No visual disturbance, ear ache, epistaxis or sore throat.   CARDIOVASCULAR: No chest pain, chest pressure or chest discomfort. No palpitations or lower extremity edema.   RESPIRATORY: Chronic dyspnea which has not increased or worsened,  "minimal symptoms. No cough, wheezing or hemoptysis. No orthopnea or PND.   GI: No reported abdominal pain, no melena or hematochezia.   : No reported hematuria or dysuria.   NEUROLOGICAL: No lightheadedness, dizziness, syncope, ataxia, paresthesias or weakness.   HEMATOLOGIC: No history of anemia. No bleeding or excessive bruising. No history of blood clots.   MUSCULOSKELETAL: No new joint pain or swelling, no muscle pain.  ENDOCRINOLOGIC: No temperature intolerance. No hair or skin changes.  SKIN: No abnormal rashes or sores, no unusual itching.  PSYCHIATRIC: Positive for history of depression. No changes in mood, feeling down or anxious. No changes in sleep.      PHYSICAL EXAM:   /82 (BP Location: Right arm, Patient Position: Sitting, Cuff Size: Adult Regular)   Pulse 110   Temp 97.7  F (36.5  C) (Tympanic)   Resp 20   Ht 1.702 m (5' 7\")   Wt 70.3 kg (155 lb)   LMP  (LMP Unknown)   SpO2 99%   BMI 24.28 kg/m    GENERAL: The patient is a well-developed, well-nourished, in no apparent distress.  HEENT: Head is normocephalic and atraumatic. Eyes are symmetrical with normal visual tracking. No icterus, no xanthelasmas. Nares appeared normal without nasal drainage. Mucous membranes are moist, no cyanosis.  NECK: Supple, no cervical bruits, JVP not visible.   CHEST/ LUNGS: Lungs clear to auscultation, no rales, rhonchi or wheezes, no use of accessory muscles, no retractions, respirations unlabored and normal respiratory rate.   CARDIO: Regular rate and rhythm normal with S1 and S2, no S3 or S4 and no murmur, click or rub. Precordium quiet with normal PMI.    ABD: Abdomen is nondistended.   EXTREMITIES: No lower extremity edema present.   MUSCULOSKELETAL: No visible joint swelling.   NEUROLOGIC: Alert and oriented X3. Normal speech, gait and affect. No focal neurologic deficits.   SKIN: No jaundice. No rashes or visible skin lesions present. No ecchymosis.     EKG:    AV dual paced rhythm, rate 86 bpm, QTc " 574 ms,  ms.     LAB RESULTS:   Lab on 02/06/2023   Component Date Value Ref Range Status     Digoxin 02/06/2023 0.7  0.6 - 2.0 ng/mL Final     Hold Specimen 02/06/2023 x   Final   Allied Health/Nurse Visit on 02/06/2023   Component Date Value Ref Range Status     Ventricular Rate 02/06/2023 87  BPM Final     Atrial Rate 02/06/2023 67  BPM Final     NJ Interval 02/06/2023 184  ms Final     QRS Duration 02/06/2023 162  ms Final     QT 02/06/2023 470  ms Final     QTc 02/06/2023 565  ms Final     R AXIS 02/06/2023 -82  degrees Final     T Twining 02/06/2023 96  degrees Final     Interpretation ECG 02/06/2023    Final                    Value:AV dual-paced rhythm  Abnormal ECG  When compared with ECG of 09-AUG-2022 08:12,  Vent. rate has decreased BY  16 BPM  Confirmed by MD BRITNI, RACHAEL ([id number]) on 2/7/2023 6:51:53 PM     Office Visit on 12/21/2022   Component Date Value Ref Range Status     Digoxin 12/21/2022 1.6  0.6 - 2.0 ng/mL Final     Sodium 12/21/2022 144  136 - 145 mmol/L Final     Potassium 12/21/2022 4.4  3.4 - 5.3 mmol/L Final     Chloride 12/21/2022 104  98 - 107 mmol/L Final     Carbon Dioxide (CO2) 12/21/2022 32 (H)  22 - 29 mmol/L Final     Anion Gap 12/21/2022 8  7 - 15 mmol/L Final     Urea Nitrogen 12/21/2022 12.1  8.0 - 23.0 mg/dL Final     Creatinine 12/21/2022 0.83  0.51 - 0.95 mg/dL Final     Calcium 12/21/2022 9.5  8.8 - 10.2 mg/dL Final     Glucose 12/21/2022 108 (H)  70 - 99 mg/dL Final     GFR Estimate 12/21/2022 72  >60 mL/min/1.73m2 Final     WBC Count 12/21/2022 8.1  4.0 - 11.0 10e3/uL Final     RBC Count 12/21/2022 4.63  3.80 - 5.20 10e6/uL Final     Hemoglobin 12/21/2022 12.7  11.7 - 15.7 g/dL Final     Hematocrit 12/21/2022 40.6  35.0 - 47.0 % Final     MCV 12/21/2022 88  78 - 100 fL Final     MCH 12/21/2022 27.4  26.5 - 33.0 pg Final     MCHC 12/21/2022 31.3 (L)  31.5 - 36.5 g/dL Final     RDW 12/21/2022 18.6 (H)  10.0 - 15.0 % Final     Platelet Count 12/21/2022 203  150 - 450  10e3/uL Final        ASSESSMENT:   Zoey Fan presents for cardiology evaluation with history of paroxysmal atrial arrhythmias,  Sinus node dysfunction s/p PPM (2012), prolonged QT syndrome with sustained VT requiring device upgrade to defibrillator (12/2022), RV dysfunction, PH, moderate TR and dilation of ascending aorta.     Patient has followed cardiology through [facility] in [place]/ [facility] locations. She has also followed with Electrophysiologist Dr. Walker. Admits that she is establishing cardiology care locally if she has any issues or can no longer drive with her husbands declining condition. She would like to continue her routine cardiac care through [facility]. She has been followed by cardiology team every 6 months.       PLAN:   1. Long Q-T syndrome  -She is currently treated with nadolol 120 mg BID and doing well with this.   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   -Sustained polymorphic VT noted on previous device check leading to upgrade of device to Cardioverter-Defibrillator at [facility] (12/2022). No device discharged.     - EKG 12-lead, tracing only (Same Day)    2. Presence of combination internal cardiac defibrillator (ICD) and pacemaker  See above.   -Offered transferring device clinic care to [facility] through [facility], she would like to continue with device monitoring through [facility] at this time.     3. Hypercholesterolemia  -Atorvastatin 40 mg nightly.     4. Right Ventricular Dysfunction  -Last limited TTE at [facility] revealing hyperdynamic LV systolic function with an LVEF of 71%, the RV cavity size was mildly enlarged with mildly reduced global RV systolic function.  Severe biatrial enlargement.   -She does have a history of pulmonary hypertension.   -Repeat TTE in June, 6 month interval from last limited TTE at [facility].     5. Grade II diastolic dysfunction  Stable.   -She has been on Lasix 20-60 mg as needed daily. She is also on spironolactone 15 mg daily.   -Consider  starting SGLT2 Inhibitor with HFpEF.    6. Paroxysmal atrial fibrillation (H)  Continue on Eliquis oral anticoagulation for stroke prevention with [id number] score >2.  Rate controlled on digoxin 125 mcg daily and Nadolol providing benefit.     - EKG 12-lead, tracing only (Same Day)  - apixaban ANTICOAGULANT (ELIQUIS) 5 MG tablet; Take 1 tablet (5 mg) by mouth 2 times daily  Dispense: 180 tablet; Refill: 3    7. Primary hypertension  Controlled    8. History of cardiac radiofrequency ablation (2012-[facility], 2016 [facility])  -S/p DCCV for atrial flutter February 2018.  -S/p complex ablation in 2011 at [facility], Dr. Souza with repeat ablation in 2014 at [facility].    9. Sustained VT (ventricular tachycardia)  -S/p upgrade of device to Cardioverter-Defibrillator at [facility] (12/2022)  -Continue on Nadolol    10. Pulmonary hypertension (H)  -Symptoms stable, currently on sildenafil 20 mg 3 times daily  -She would like to hold off on pulmonary hypertension referral due to controlled symptoms at this time.   -Repeat TTE in June, 6 month interval from last limited TTE at [facility].     11. Moderate tricuspid regurgitation    12. Mild ascending aorta dilatation (H)    Thank you for allowing me to participate in the care of your patient. Please do not hesitate to contact me if you have any questions.     Total time 89 minutes on date of encounter spent reviewing records, face-to-face time obtaining HPI, physical exam, reviewing results of recent testing and counseling on the above diagnoses and recommended plan of care.    Allegra Ramsay, APRN CNP CHFN

## 2023-03-09 NOTE — NURSING NOTE
"Patient presents to the clinic for consult for Persistent A-Fib.  No ASA-on Eliquis and taking Statin.    FOOD SECURITY SCREENING QUESTIONS:    The next two questions are to help us understand your food security.  If you are feeling you need any assistance in this area, we have resources available to support you today.    Hunger Vital Signs:  Within the past 12 months we worried whether our food would run out before we got money to buy more. Never  Within the past 12 months the food we bought just didn't last and we didn't have money to get more. Never    Advance Care Directive on file? yes  Advance Care Directive provided to patient? Declined.      Chief Complaint   Patient presents with     Consult     Persistant A-Fib       Initial /82 (BP Location: Right arm, Patient Position: Sitting, Cuff Size: Adult Regular)   Pulse 110   Temp 97.7  F (36.5  C) (Tympanic)   Resp 20   Ht 1.702 m (5' 7\")   Wt 70.3 kg (155 lb)   LMP  (LMP Unknown)   SpO2 99%   BMI 24.28 kg/m   Estimated body mass index is 24.28 kg/m  as calculated from the following:    Height as of this encounter: 1.702 m (5' 7\").    Weight as of this encounter: 70.3 kg (155 lb).  Medication Reconciliation: complete        Key Adams LPN       "

## 2023-03-13 LAB
ATRIAL RATE - MUSE: 90 BPM
DIASTOLIC BLOOD PRESSURE - MUSE: NORMAL MMHG
INTERPRETATION ECG - MUSE: NORMAL
P AXIS - MUSE: NORMAL DEGREES
PR INTERVAL - MUSE: 188 MS
QRS DURATION - MUSE: 158 MS
QT - MUSE: 480 MS
QTC - MUSE: 574 MS
R AXIS - MUSE: -85 DEGREES
SYSTOLIC BLOOD PRESSURE - MUSE: NORMAL MMHG
T AXIS - MUSE: 98 DEGREES
VENTRICULAR RATE- MUSE: 86 BPM

## 2023-03-24 ENCOUNTER — OFFICE VISIT (OUTPATIENT)
Dept: PEDIATRICS | Facility: OTHER | Age: 78
End: 2023-03-24
Attending: INTERNAL MEDICINE
Payer: COMMERCIAL

## 2023-03-24 VITALS
DIASTOLIC BLOOD PRESSURE: 60 MMHG | SYSTOLIC BLOOD PRESSURE: 124 MMHG | HEIGHT: 67 IN | HEART RATE: 88 BPM | TEMPERATURE: 98.7 F | RESPIRATION RATE: 16 BRPM | BODY MASS INDEX: 23.93 KG/M2 | OXYGEN SATURATION: 97 % | WEIGHT: 152.5 LBS

## 2023-03-24 DIAGNOSIS — I45.81 LONG Q-T SYNDROME: Chronic | ICD-10-CM

## 2023-03-24 DIAGNOSIS — N30.01 ACUTE CYSTITIS WITH HEMATURIA: Primary | ICD-10-CM

## 2023-03-24 DIAGNOSIS — N39.9 URINARY PROBLEM IN FEMALE: ICD-10-CM

## 2023-03-24 LAB
ALBUMIN UR-MCNC: 30 MG/DL
APPEARANCE UR: ABNORMAL
BACTERIA #/AREA URNS HPF: ABNORMAL /HPF
BILIRUB UR QL STRIP: NEGATIVE
COLOR UR AUTO: YELLOW
GLUCOSE UR STRIP-MCNC: NEGATIVE MG/DL
HGB UR QL STRIP: ABNORMAL
KETONES UR STRIP-MCNC: NEGATIVE MG/DL
LEUKOCYTE ESTERASE UR QL STRIP: ABNORMAL
MUCOUS THREADS #/AREA URNS LPF: PRESENT /LPF
NITRATE UR QL: POSITIVE
PH UR STRIP: 7 [PH] (ref 5–9)
RBC URINE: 153 /HPF
SP GR UR STRIP: 1.01 (ref 1–1.03)
UROBILINOGEN UR STRIP-MCNC: NORMAL MG/DL
WBC CLUMPS #/AREA URNS HPF: PRESENT /HPF
WBC URINE: >182 /HPF

## 2023-03-24 PROCEDURE — 81003 URINALYSIS AUTO W/O SCOPE: CPT | Mod: ZL | Performed by: INTERNAL MEDICINE

## 2023-03-24 PROCEDURE — G0463 HOSPITAL OUTPT CLINIC VISIT: HCPCS

## 2023-03-24 PROCEDURE — 99213 OFFICE O/P EST LOW 20 MIN: CPT | Performed by: INTERNAL MEDICINE

## 2023-03-24 PROCEDURE — 87186 SC STD MICRODIL/AGAR DIL: CPT | Mod: ZL | Performed by: INTERNAL MEDICINE

## 2023-03-24 RX ORDER — CEPHALEXIN 500 MG/1
500 CAPSULE ORAL 4 TIMES DAILY
Qty: 20 CAPSULE | Refills: 0 | Status: SHIPPED | OUTPATIENT
Start: 2023-03-24 | End: 2023-03-29

## 2023-03-24 ASSESSMENT — PAIN SCALES - GENERAL: PAINLEVEL: NO PAIN (0)

## 2023-03-24 ASSESSMENT — PATIENT HEALTH QUESTIONNAIRE - PHQ9
SUM OF ALL RESPONSES TO PHQ QUESTIONS 1-9: 0
10. IF YOU CHECKED OFF ANY PROBLEMS, HOW DIFFICULT HAVE THESE PROBLEMS MADE IT FOR YOU TO DO YOUR WORK, TAKE CARE OF THINGS AT HOME, OR GET ALONG WITH OTHER PEOPLE: NOT DIFFICULT AT ALL
SUM OF ALL RESPONSES TO PHQ QUESTIONS 1-9: 0

## 2023-03-24 NOTE — PATIENT INSTRUCTIONS
-- Keflex  -- Eat yogurt 1-2 times per day while on antibiotics (and for a few weeks after) to reduce the chances of diarrhea   -- Return if worse

## 2023-03-24 NOTE — NURSING NOTE
"Chief Complaint   Patient presents with     Urinary Problem     Burning with urination, urgency and frequency         Initial /60   Pulse 88   Temp 98.7  F (37.1  C) (Tympanic)   Resp 16   Ht 1.702 m (5' 7\")   Wt 69.2 kg (152 lb 8 oz)   LMP  (LMP Unknown)   SpO2 97%   Breastfeeding No   BMI 23.88 kg/m   Estimated body mass index is 23.88 kg/m  as calculated from the following:    Height as of this encounter: 1.702 m (5' 7\").    Weight as of this encounter: 69.2 kg (152 lb 8 oz).         Norma J. Gosselin, HEATHER   "

## 2023-03-24 NOTE — PROGRESS NOTES
"Assessment & Plan   1. Acute cystitis with hematuria  Laboratory data and history consistent with urinary tract infection.  Given allergies as well as history of long QT syndrome I reviewed antibiotic options and decided on cephalexin.    - cephALEXin (KEFLEX) 500 MG capsule; Take 1 capsule (500 mg) by mouth 4 times daily for 5 days  Dispense: 20 capsule; Refill: 0    2. Urinary problem in female  - UA reflex to Microscopic and Culture  - Urine Culture    3. Long Q-T syndrome      Patient Instructions    -- Keflex  -- Eat yogurt 1-2 times per day while on antibiotics (and for a few weeks after) to reduce the chances of diarrhea   -- Return if worse      Return if symptoms worsen or fail to improve.    Signed, Vijay Mackay MD, FAAP, FACP  Internal Medicine & Pediatrics    Subjective   Zoey Fan is a 78 year old female who presents for possible bladder infection.  She has been sick for 3 days associated with burning with urination, increased urgency and frequency.  Associated with foul-smelling urine.  She has been constipated which she attributed to dietary changes.  No blood in the urine.  No recurrent or frequent UTIs.  No fever or vomiting    Objective   Vitals: /60   Pulse 88   Temp 98.7  F (37.1  C) (Tympanic)   Resp 16   Ht 1.702 m (5' 7\")   Wt 69.2 kg (152 lb 8 oz)   LMP  (LMP Unknown)   SpO2 97%   Breastfeeding No   BMI 23.88 kg/m      Abdomen: Soft, nontender, nondistended.  Back: No CVA tenderness    Review and Analysis of Data   I personally reviewed the following:  External notes: No  Results: Yes Most recent urine testing reviewed  Use of an independent historian: No  Independent review of a test performed by another physician: No  Discussion of management with another physician: No  Moderate risk of morbidity from additional diagnostic testing and/or treatment.      "

## 2023-03-26 LAB — BACTERIA UR CULT: ABNORMAL

## 2023-04-07 ENCOUNTER — OFFICE VISIT (OUTPATIENT)
Dept: PEDIATRICS | Facility: OTHER | Age: 78
End: 2023-04-07
Attending: INTERNAL MEDICINE
Payer: COMMERCIAL

## 2023-04-07 VITALS
HEIGHT: 67 IN | OXYGEN SATURATION: 100 % | RESPIRATION RATE: 16 BRPM | SYSTOLIC BLOOD PRESSURE: 124 MMHG | DIASTOLIC BLOOD PRESSURE: 74 MMHG | HEART RATE: 96 BPM | WEIGHT: 148.6 LBS | BODY MASS INDEX: 23.32 KG/M2 | TEMPERATURE: 97.4 F

## 2023-04-07 DIAGNOSIS — Z09 HOSPITAL DISCHARGE FOLLOW-UP: Primary | ICD-10-CM

## 2023-04-07 DIAGNOSIS — B34.9 VIRAL ILLNESS: ICD-10-CM

## 2023-04-07 DIAGNOSIS — I45.81 LONG Q-T SYNDROME: Chronic | ICD-10-CM

## 2023-04-07 DIAGNOSIS — R18.8 FREE FLUID IN PELVIS: ICD-10-CM

## 2023-04-07 DIAGNOSIS — I51.7 RIGHT HEART ENLARGEMENT: ICD-10-CM

## 2023-04-07 DIAGNOSIS — Z95.0 PACEMAKER: Chronic | ICD-10-CM

## 2023-04-07 DIAGNOSIS — I27.20 PULMONARY HYPERTENSION (H): ICD-10-CM

## 2023-04-07 DIAGNOSIS — I50.32 CHRONIC HEART FAILURE WITH PRESERVED EJECTION FRACTION (H): Chronic | ICD-10-CM

## 2023-04-07 DIAGNOSIS — I51.89 GRADE II DIASTOLIC DYSFUNCTION: Chronic | ICD-10-CM

## 2023-04-07 DIAGNOSIS — G47.33 OSA ON CPAP: ICD-10-CM

## 2023-04-07 DIAGNOSIS — R79.89 TROPONIN LEVEL ELEVATED: ICD-10-CM

## 2023-04-07 PROCEDURE — G0463 HOSPITAL OUTPT CLINIC VISIT: HCPCS

## 2023-04-07 PROCEDURE — 99496 TRANSJ CARE MGMT HIGH F2F 7D: CPT | Performed by: INTERNAL MEDICINE

## 2023-04-07 PROCEDURE — G0463 HOSPITAL OUTPT CLINIC VISIT: HCPCS | Performed by: INTERNAL MEDICINE

## 2023-04-07 PROCEDURE — 99214 OFFICE O/P EST MOD 30 MIN: CPT | Performed by: INTERNAL MEDICINE

## 2023-04-07 ASSESSMENT — PAIN SCALES - GENERAL: PAINLEVEL: NO PAIN (0)

## 2023-04-07 ASSESSMENT — PATIENT HEALTH QUESTIONNAIRE - PHQ9
SUM OF ALL RESPONSES TO PHQ QUESTIONS 1-9: 1
10. IF YOU CHECKED OFF ANY PROBLEMS, HOW DIFFICULT HAVE THESE PROBLEMS MADE IT FOR YOU TO DO YOUR WORK, TAKE CARE OF THINGS AT HOME, OR GET ALONG WITH OTHER PEOPLE: NOT DIFFICULT AT ALL
SUM OF ALL RESPONSES TO PHQ QUESTIONS 1-9: 1

## 2023-04-07 NOTE — PROGRESS NOTES
Assessment & Plan       ICD-10-CM    1. Hospital discharge follow-up  Z09       2. Viral illness  B34.9       3. Free fluid in pelvis  R18.8       4. Right heart enlargement  I51.7       5. Troponin level elevated  R77.8       6. Chronic heart failure with preserved ejection fraction (H)  I50.32       7. Grade II diastolic dysfunction  I51.89       8. Pulmonary hypertension (H)  I27.20       9. TAMMY on CPAP  G47.33     Z99.89       10. Long Q-T syndrome  I45.81       11. Pacemaker  Z95.0           In retrospect it appears she may have had a viral illness given her symptoms of severe sore throat, cough, chest pain and fatigue.  After reviewing her laboratory, and imaging data no major changes had occurred.  Her serial echocardiograms reveal mild right-sided weakness associated with mild pulmonary hypertension which is chronic in nature and unchanged from prior.  Slightly elevated troponin level may be due to this right-sided enlargement versus heart failure.  Suspect secondary to mild COPD which was seen previously on pulmonary function testing also reviewed today.  Transaminases were slightly elevated however CT without evidence of biliary stone and causing obstruction or dilatation.  There was a small amount of free fluid in the pelvis.  Again suspect viral illness.  Differential diagnosis also includes viral hepatitides, infectious mononucleosis, autoimmune hepatitis, others.  Since she is feeling better at this point we decided on a watchful waiting approach.  However warning signs were discussed and prompt repeat evaluation recommended if symptoms persist or worsen.      Ms. Fan was recently hospitalized for fatigue, appears to be doing well since discharge.  Discharge summary and recommendations for outpatient provider are reviewed. Based on what occurred in the visit today:  Previous medication(s) were discontinued or altered? No  Previous medication(s) were suspended pending consultation? No  New  "medication(s) started? No     -- Medication reconciliation and management was performed today    Return if symptoms worsen or fail to improve.    Signed, Vijay Mackay MD, FAAP, FACP  Internal Medicine & Pediatrics    Subjective   Zoey Fan is a 78 year old female who presents for hospital discharge follow-up.    Hospital: [facility]  Date of discharge: 4/4/2023  Date of post discharge contact: 4/7/2023     She had been having severe sore throat, cough, chest pain and fatigue.  She presented to the [facility] emergency room.  She was admitted for monitoring.  She is starting to feel better.  She is not sure if they figured anything out or not.  Discharge summary is not yet available for my review.    Objective   Vitals: /74   Pulse 96   Temp 97.4  F (36.3  C) (Tympanic)   Resp 16   Ht 1.702 m (5' 7\")   Wt 67.4 kg (148 lb 9.6 oz)   LMP  (LMP Unknown)   SpO2 100%   Breastfeeding No   BMI 23.27 kg/m      Cardiovascular: Regular rate and rhythm, no murmur  Pulmonary: Clear  MSK: No edema    Review and Analysis of Data   I personally reviewed the following:  External notes: Yes [facility] notes reviewed.  D/c summary not available.  Results: Yes Available lab work reviewed.  Most recent abdominal CT report reviewed, most recent several echocardiogram reports reviewed.  Use of an independent historian: No  Independent review of a test performed by another physician: No  Discussion of management with another physician: No  Moderate risk of morbidity from additional diagnostic testing and/or treatment.    Billing:   Type of Medical Decision Making Face-to-Face Visit within 7 Days Face-to-Face Visit within 14 days   Moderate Complexity [id number]   High Complexity [id number]         "

## 2023-04-07 NOTE — NURSING NOTE
"Chief Complaint   Patient presents with     Hospital F/U         Initial /74   Pulse 96   Temp 97.4  F (36.3  C) (Tympanic)   Resp 16   Ht 1.702 m (5' 7\")   Wt 67.4 kg (148 lb 9.6 oz)   LMP  (LMP Unknown)   SpO2 100%   Breastfeeding No   BMI 23.27 kg/m   Estimated body mass index is 23.27 kg/m  as calculated from the following:    Height as of this encounter: 1.702 m (5' 7\").    Weight as of this encounter: 67.4 kg (148 lb 9.6 oz).         Norma J. Gosselin, HEATHER   "

## 2023-05-26 DIAGNOSIS — I10 PRIMARY HYPERTENSION: Primary | ICD-10-CM

## 2023-06-01 RX ORDER — LISINOPRIL 10 MG/1
10 TABLET ORAL DAILY
Qty: 90 TABLET | Refills: 1 | OUTPATIENT
Start: 2023-06-01

## 2023-06-01 NOTE — TELEPHONE ENCOUNTER
[facility] sent Rx request for the following:    lisinopril (ZESTRIL) 10 MG tablet  Last Prescription Date:   11/8/22  Last Fill Qty/Refills:         90, R-1    Last Office Visit:              4/7/23   Future Office visit:           None     This order was discontinued on 12/21/22, should patient continue to take?     In clinical absence of patient's primary, Vijay Mackay, patient is requesting that this message be sent to the covering provider for consideration please.  Bibiana Melo RN on 6/1/2023 at 7:57 AM

## 2023-06-03 RX ORDER — LISINOPRIL 10 MG/1
10 TABLET ORAL DAILY
Qty: 90 TABLET | Refills: 2 | Status: SHIPPED | OUTPATIENT
Start: 2023-06-03 | End: 2024-08-15

## 2023-07-06 ENCOUNTER — OFFICE VISIT (OUTPATIENT)
Dept: PEDIATRICS | Facility: OTHER | Age: 78
End: 2023-07-06
Attending: INTERNAL MEDICINE
Payer: COMMERCIAL

## 2023-07-06 VITALS
HEIGHT: 67 IN | HEART RATE: 90 BPM | OXYGEN SATURATION: 95 % | SYSTOLIC BLOOD PRESSURE: 120 MMHG | BODY MASS INDEX: 23.61 KG/M2 | WEIGHT: 150.4 LBS | TEMPERATURE: 97.8 F | RESPIRATION RATE: 16 BRPM | DIASTOLIC BLOOD PRESSURE: 76 MMHG

## 2023-07-06 DIAGNOSIS — R19.09 SUPRAPUBIC MASS: Primary | ICD-10-CM

## 2023-07-06 PROCEDURE — 99213 OFFICE O/P EST LOW 20 MIN: CPT | Performed by: INTERNAL MEDICINE

## 2023-07-06 PROCEDURE — G0463 HOSPITAL OUTPT CLINIC VISIT: HCPCS

## 2023-07-06 ASSESSMENT — PATIENT HEALTH QUESTIONNAIRE - PHQ9
10. IF YOU CHECKED OFF ANY PROBLEMS, HOW DIFFICULT HAVE THESE PROBLEMS MADE IT FOR YOU TO DO YOUR WORK, TAKE CARE OF THINGS AT HOME, OR GET ALONG WITH OTHER PEOPLE: NOT DIFFICULT AT ALL
SUM OF ALL RESPONSES TO PHQ QUESTIONS 1-9: 0
SUM OF ALL RESPONSES TO PHQ QUESTIONS 1-9: 0

## 2023-07-06 ASSESSMENT — PAIN SCALES - GENERAL: PAINLEVEL: NO PAIN (0)

## 2023-07-06 NOTE — PROGRESS NOTES
"Assessment & Plan   1. Suprapubic mass  I think the most likely etiology is a low lipoma however differential diagnosis also includes lymphadenopathy, others.  Recommend ultrasound to exclude occult malignancy and provided reassurance.  - US Abdomen Limited; Future      Return if symptoms worsen or fail to improve.    Signed, Vijay Mackay MD, FAAP, FACP  Internal Medicine & Pediatrics    Subjective   Zoey Fan is a 78 year old female who presents for evaluation of lump in pubic area.  Its been there about 4 months.  It feels grape sized.  Its right at the hairline to the right of center.  There is no discomfort.  Her  insisted she come to the doctor to be checked on.  No unintended weight loss but she has lost about 10 pounds in the last year.    Objective   Vitals: /76   Pulse 90   Temp 97.8  F (36.6  C) (Tympanic)   Resp 16   Ht 1.702 m (5' 7\")   Wt 68.2 kg (150 lb 6.4 oz)   LMP 07/06/2003 (Approximate)   SpO2 95%   Breastfeeding No   BMI 23.56 kg/m      Abdomen: There is a small area that is ill-defined to the right of midline over the pubic bone which appears to be an irregularly shaped fullness or mass.  It is nontender.  It is freely mobile.      "

## 2023-07-06 NOTE — NURSING NOTE
"Chief Complaint   Patient presents with     Mass     Pubic area x 3-4 months, no pain         Initial /76   Pulse 90   Temp 97.8  F (36.6  C) (Tympanic)   Resp 16   Ht 1.702 m (5' 7\")   Wt 68.2 kg (150 lb 6.4 oz)   LMP 07/06/2003 (Approximate)   SpO2 95%   Breastfeeding No   BMI 23.56 kg/m   Estimated body mass index is 23.56 kg/m  as calculated from the following:    Height as of this encounter: 1.702 m (5' 7\").    Weight as of this encounter: 68.2 kg (150 lb 6.4 oz).         Norma J. Gosselin, LPN   "

## 2023-07-09 DIAGNOSIS — K21.9 GASTROESOPHAGEAL REFLUX DISEASE, UNSPECIFIED WHETHER ESOPHAGITIS PRESENT: ICD-10-CM

## 2023-07-09 DIAGNOSIS — N39.41 URGE URINARY INCONTINENCE: ICD-10-CM

## 2023-07-12 NOTE — TELEPHONE ENCOUNTER
[facility]  sent Rx request for the following:      Requested Prescriptions   Pending Prescriptions Disp Refills     omeprazole (PRILOSEC) 20 MG DR capsule [Pharmacy Med Name: Omeprazole 20 MG Oral Capsule Delayed Release] 90 capsule 3     Sig: TAKE 1 CAPSULE BY MOUTH  ONCE DAILY BEFORE A MEAL   Last Prescription Date:   8/23/22  Last Fill Qty/Refills:         90, R-3      PPI Protocol Failed - 7/12/2023  1:49 PM        Failed - No diagnosis of osteoporosis on record        oxybutynin ER (DITROPAN XL) 10 MG 24 hr tablet [Pharmacy Med Name: Oxybutynin Chloride ER 10 MG Oral Tablet Extended Release 24 Hour] 90 tablet 3     Sig: TAKE 1 TABLET BY MOUTH  DAILY   Last Prescription Date:   8/23/22  Last Fill Qty/Refills:         90, R-3      Last Office Visit:              7/6/23   Future Office visit:           None    Sheryl Godinez RN .............. 7/12/2023  1:59 PM

## 2023-07-13 RX ORDER — OXYBUTYNIN CHLORIDE 10 MG/1
TABLET, EXTENDED RELEASE ORAL
Qty: 90 TABLET | Refills: 3 | Status: SHIPPED | OUTPATIENT
Start: 2023-07-13 | End: 2024-06-13

## 2023-07-16 DIAGNOSIS — I51.89 RIGHT VENTRICULAR DYSFUNCTION, NYHA CLASS 2: ICD-10-CM

## 2023-07-16 RX ORDER — FUROSEMIDE 20 MG
TABLET ORAL
Qty: 270 TABLET | Refills: 3 | Status: SHIPPED | OUTPATIENT
Start: 2023-07-16 | End: 2024-06-20

## 2023-08-02 ENCOUNTER — HOSPITAL ENCOUNTER (OUTPATIENT)
Dept: ULTRASOUND IMAGING | Facility: OTHER | Age: 78
Discharge: HOME | End: 2023-08-02
Attending: INTERNAL MEDICINE | Admitting: INTERNAL MEDICINE
Payer: MEDICARE

## 2023-08-02 DIAGNOSIS — R19.09 SUPRAPUBIC MASS: ICD-10-CM

## 2023-08-02 PROCEDURE — 76857 US EXAM PELVIC LIMITED: CPT

## 2023-10-12 DIAGNOSIS — I27.20 PULMONARY HYPERTENSION (H): Primary | ICD-10-CM

## 2023-10-12 DIAGNOSIS — R06.02 SHORTNESS OF BREATH: ICD-10-CM

## 2023-10-25 ENCOUNTER — ALLIED HEALTH/NURSE VISIT (OUTPATIENT)
Dept: FAMILY MEDICINE | Facility: OTHER | Age: 78
End: 2023-10-25
Attending: INTERNAL MEDICINE
Payer: MEDICARE

## 2023-10-25 DIAGNOSIS — Z23 HIGH PRIORITY FOR 2019-NCOV VACCINE: ICD-10-CM

## 2023-10-25 DIAGNOSIS — Z23 NEED FOR PROPHYLACTIC VACCINATION AND INOCULATION AGAINST INFLUENZA: Primary | ICD-10-CM

## 2023-10-25 PROCEDURE — G0008 ADMIN INFLUENZA VIRUS VAC: HCPCS

## 2023-10-25 PROCEDURE — 91320 SARSCV2 VAC 30MCG TRS-SUC IM: CPT

## 2023-10-25 NOTE — PROGRESS NOTES
We are scheduling all people aged 6 months and older for updated 6237-2929 COVID-19 vaccines.  [facility] will stock Pfizer COVID-19 vaccines in the clinics.  Patients who are up to date with COVID-19 vaccine will only need a single dose of the updated 0127-7030 vaccine, at least 8 weeks after last dose. Unvaccinated patients or those not up to date may have a different dosing schedule.  To schedule a COVID-19 vaccine appointment, please log in to [employer] using this link to see when and where we have openings (to schedule a child s vaccine, you will need to log into their [employer] account).     [facility] retail pharmacies also administer Pfizer COVID Vaccines to patients 5 years and older. [facility] offer Novavax primary COVID-19 vaccine.  To schedule at a [facility] pharmacy please go to [email or web address].    To learn more about COVID-19 vaccines in general, please visit the CDC website: https://www.cdc.gov/coronavirus/2019-ncov/vaccines/index.html     If you have technical difficulty using [employer], call [phone], option 1 for assistance.    More information about vaccine effectiveness at reducing spread of disease, hospitalizations, and death as well as vaccine safety and answers to other questions can be found on our website: [email or web address].    Margarita Cintron LPN on 10/25/2023 at 3:18 PM

## 2023-11-10 ENCOUNTER — LAB (OUTPATIENT)
Dept: LAB | Facility: OTHER | Age: 78
End: 2023-11-10
Payer: MEDICARE

## 2023-11-10 DIAGNOSIS — I50.32 CHRONIC DIASTOLIC HEART FAILURE (H): ICD-10-CM

## 2023-11-10 DIAGNOSIS — R06.02 SHORTNESS OF BREATH: ICD-10-CM

## 2023-11-10 DIAGNOSIS — I27.20 PORTOPULMONARY HYPERTENSION (H): ICD-10-CM

## 2023-11-10 DIAGNOSIS — I27.20 PULMONARY HYPERTENSION (H): ICD-10-CM

## 2023-11-10 DIAGNOSIS — K76.6 PORTOPULMONARY HYPERTENSION (H): ICD-10-CM

## 2023-11-10 LAB
ANION GAP SERPL CALCULATED.3IONS-SCNC: 11 MMOL/L (ref 7–15)
BASOPHILS # BLD AUTO: 0.1 10E3/UL (ref 0–0.2)
BASOPHILS NFR BLD AUTO: 1 %
BUN SERPL-MCNC: 25.1 MG/DL (ref 8–23)
CALCIUM SERPL-MCNC: 9.6 MG/DL (ref 8.8–10.2)
CHLORIDE SERPL-SCNC: 101 MMOL/L (ref 98–107)
CREAT SERPL-MCNC: 1.21 MG/DL (ref 0.51–0.95)
DEPRECATED HCO3 PLAS-SCNC: 28 MMOL/L (ref 22–29)
EGFRCR SERPLBLD CKD-EPI 2021: 46 ML/MIN/1.73M2
EOSINOPHIL # BLD AUTO: 0.1 10E3/UL (ref 0–0.7)
EOSINOPHIL NFR BLD AUTO: 1 %
ERYTHROCYTE [DISTWIDTH] IN BLOOD BY AUTOMATED COUNT: 14.2 % (ref 10–15)
GLUCOSE SERPL-MCNC: 107 MG/DL (ref 70–99)
HCT VFR BLD AUTO: 40.3 % (ref 35–47)
HGB BLD-MCNC: 13.1 G/DL (ref 11.7–15.7)
IMM GRANULOCYTES # BLD: 0 10E3/UL
IMM GRANULOCYTES NFR BLD: 0 %
LYMPHOCYTES # BLD AUTO: 2 10E3/UL (ref 0.8–5.3)
LYMPHOCYTES NFR BLD AUTO: 30 %
MCH RBC QN AUTO: 31.3 PG (ref 26.5–33)
MCHC RBC AUTO-ENTMCNC: 32.5 G/DL (ref 31.5–36.5)
MCV RBC AUTO: 96 FL (ref 78–100)
MONOCYTES # BLD AUTO: 0.7 10E3/UL (ref 0–1.3)
MONOCYTES NFR BLD AUTO: 11 %
NEUTROPHILS # BLD AUTO: 3.7 10E3/UL (ref 1.6–8.3)
NEUTROPHILS NFR BLD AUTO: 57 %
NRBC # BLD AUTO: 0 10E3/UL
NRBC BLD AUTO-RTO: 0 /100
NT-PROBNP SERPL-MCNC: 2058 PG/ML (ref 0–1800)
NT-PROBNP SERPL-MCNC: 2058 PG/ML (ref 0–1800)
PLATELET # BLD AUTO: 177 10E3/UL (ref 150–450)
POTASSIUM SERPL-SCNC: 4.5 MMOL/L (ref 3.4–5.3)
RBC # BLD AUTO: 4.19 10E6/UL (ref 3.8–5.2)
SODIUM SERPL-SCNC: 140 MMOL/L (ref 135–145)
WBC # BLD AUTO: 6.5 10E3/UL (ref 4–11)

## 2023-11-10 PROCEDURE — 83880 ASSAY OF NATRIURETIC PEPTIDE: CPT | Mod: ZL

## 2023-11-10 PROCEDURE — 85004 AUTOMATED DIFF WBC COUNT: CPT | Mod: ZL

## 2023-11-10 PROCEDURE — 80048 BASIC METABOLIC PNL TOTAL CA: CPT | Mod: ZL

## 2023-11-10 PROCEDURE — 36415 COLL VENOUS BLD VENIPUNCTURE: CPT | Mod: ZL

## 2023-11-24 ENCOUNTER — LAB (OUTPATIENT)
Dept: LAB | Facility: OTHER | Age: 78
End: 2023-11-24
Payer: MEDICARE

## 2023-11-24 DIAGNOSIS — R79.89 ELEVATED SERUM CREATININE: ICD-10-CM

## 2023-11-24 LAB
ANION GAP SERPL CALCULATED.3IONS-SCNC: 9 MMOL/L (ref 7–15)
BUN SERPL-MCNC: 21.3 MG/DL (ref 8–23)
CALCIUM SERPL-MCNC: 10 MG/DL (ref 8.8–10.2)
CHLORIDE SERPL-SCNC: 103 MMOL/L (ref 98–107)
CREAT SERPL-MCNC: 1.12 MG/DL (ref 0.51–0.95)
DEPRECATED HCO3 PLAS-SCNC: 30 MMOL/L (ref 22–29)
EGFRCR SERPLBLD CKD-EPI 2021: 50 ML/MIN/1.73M2
GLUCOSE SERPL-MCNC: 106 MG/DL (ref 70–99)
POTASSIUM SERPL-SCNC: 4.7 MMOL/L (ref 3.4–5.3)
SODIUM SERPL-SCNC: 142 MMOL/L (ref 135–145)

## 2023-11-24 PROCEDURE — 80048 BASIC METABOLIC PNL TOTAL CA: CPT | Mod: ZL

## 2023-11-24 PROCEDURE — 36415 COLL VENOUS BLD VENIPUNCTURE: CPT | Mod: ZL

## 2023-12-16 ENCOUNTER — HEALTH MAINTENANCE LETTER (OUTPATIENT)
Age: 78
End: 2023-12-16

## 2024-02-06 DIAGNOSIS — Z98.890 S/P ABLATION OF ATRIAL FIBRILLATION: Chronic | ICD-10-CM

## 2024-02-06 DIAGNOSIS — I50.32 CHRONIC HEART FAILURE WITH PRESERVED EJECTION FRACTION (H): Chronic | ICD-10-CM

## 2024-02-06 DIAGNOSIS — Z86.79 S/P ABLATION OF ATRIAL FIBRILLATION: Chronic | ICD-10-CM

## 2024-02-09 RX ORDER — DIGOXIN 125 MCG
125 TABLET ORAL DAILY
Qty: 90 TABLET | Refills: 0 | Status: SHIPPED | OUTPATIENT
Start: 2024-02-09 | End: 2024-08-15

## 2024-02-09 NOTE — TELEPHONE ENCOUNTER
-- schedule mcw & med mgmt    Signed, Vijay Mackay MD, FAAP, FACP  Internal Medicine & Pediatrics

## 2024-02-09 NOTE — TELEPHONE ENCOUNTER
"[facility] sent Rx request for the following:      Requested Prescriptions   Pending Prescriptions Disp Refills    digoxin (LANOXIN) 125 MCG tablet [Pharmacy Med Name: DIGOXIN 0.125MG TABLETS (YELLOW)] 90 tablet 3     Sig: TAKE 1 TABLET(125 MCG) BY MOUTH DAILY       Cardiac Glycoside Agents Protocol Failed - 2/6/2024  1:16 PM        Failed - Normal serum creatinine on file in past 12 mos     Recent Labs   Lab Test 11/24/23  1306   CR 1.12*       Ok to refill medication if creatinine is low          Failed - Recent (6 mo) or future (30 days) visit within the authorizing provider's specialty     Patient had office visit in the last 6 months or has a visit in the next 30 days with authorizing provider or within the authorizing provider's specialty.  See \"Patient Info\" tab in inbasket, or \"Choose Columns\" in Meds & Orders section of the refill encounter.       Last Prescription Date:   01/26/23  Last Fill Qty/Refills:         -, R--  Historical      Last Office Visit:              07/06/23   Future Office visit:           none    Routing refill request to provider for review/approval.    Rosenda Arce RN on 2/9/2024 at 10:43 AM        "

## 2024-02-12 NOTE — TELEPHONE ENCOUNTER
February 12, 2024  Fred Louis   to Me   AB    2/12/24  2:24 PM  Spoke to patient, she will call back to schedule this appt

## 2024-03-01 ENCOUNTER — HOSPITAL ENCOUNTER (OUTPATIENT)
Dept: MAMMOGRAPHY | Facility: OTHER | Age: 79
Discharge: HOME | End: 2024-03-01
Attending: INTERNAL MEDICINE | Admitting: INTERNAL MEDICINE
Payer: MEDICARE

## 2024-03-01 DIAGNOSIS — Z12.31 VISIT FOR SCREENING MAMMOGRAM: ICD-10-CM

## 2024-03-01 PROCEDURE — 77063 BREAST TOMOSYNTHESIS BI: CPT

## 2024-03-11 NOTE — PROGRESS NOTES
ANTICOAGULATION FOLLOW-UP CLINIC VISIT    Patient Name:  Zoey Fan  Date:  4/20/2022  Contact Type:  Face to Face    SUBJECTIVE:  Patient Findings         Clinical Outcomes     Negatives:  Major bleeding event, Thromboembolic event, Anticoagulation-related hospital admission, Anticoagulation-related ED visit, Anticoagulation-related fatality           OBJECTIVE    Recent labs: (last 7 days)     04/20/22  1338   INR 3.5*       ASSESSMENT / PLAN  INR assessment THER    Recheck INR In: 2 WEEKS    INR Location Clinic      Anticoagulation Summary  As of 4/20/2022    INR goal:  2.0-3.0   TTR:  59.5 % (1 y)   INR used for dosing:  3.5 (4/20/2022)   Warfarin maintenance plan:  5 mg (5 mg x 1) every Mon, Fri; 2.5 mg (5 mg x 0.5) all other days   Full warfarin instructions:  4/20: Hold; Otherwise 5 mg every Mon, Fri; 2.5 mg all other days   Weekly warfarin total:  22.5 mg   Plan last modified:  Bibiana Melo RN (4/20/2022)   Next INR check:  5/4/2022   Priority:  Maintenance   Target end date:  Indefinite    Indications    Anticoagulation monitoring  INR range 2-3 [Z79.01]  Paroxysmal atrial fibrillation (H) [I48.0]             Anticoagulation Episode Summary     INR check location:      Preferred lab:      Send INR reminders to:  [facility]    Comments:        Anticoagulation Care Providers     Provider Role Specialty Phone number    Vijay Mackay MD Referring Internal Medicine [phone]            See the Encounter Report to view Anticoagulation Flowsheet and Dosing Calendar (Go to Encounters tab in chart review, and find the Anticoagulation Therapy Visit)        Bibiana Melo RN                  Malignant neoplasm of breast

## 2024-03-21 ENCOUNTER — NURSE TRIAGE (OUTPATIENT)
Dept: NURSING | Facility: CLINIC | Age: 79
End: 2024-03-21

## 2024-03-21 ENCOUNTER — TELEPHONE (OUTPATIENT)
Dept: PEDIATRICS | Facility: OTHER | Age: 79
End: 2024-03-21
Payer: COMMERCIAL

## 2024-03-21 ENCOUNTER — OFFICE VISIT (OUTPATIENT)
Dept: FAMILY MEDICINE | Facility: OTHER | Age: 79
End: 2024-03-21
Attending: STUDENT IN AN ORGANIZED HEALTH CARE EDUCATION/TRAINING PROGRAM
Payer: MEDICARE

## 2024-03-21 VITALS
OXYGEN SATURATION: 100 % | HEART RATE: 57 BPM | SYSTOLIC BLOOD PRESSURE: 128 MMHG | RESPIRATION RATE: 16 BRPM | WEIGHT: 162.2 LBS | TEMPERATURE: 97.3 F | DIASTOLIC BLOOD PRESSURE: 82 MMHG | BODY MASS INDEX: 25.46 KG/M2 | HEIGHT: 67 IN

## 2024-03-21 DIAGNOSIS — B34.9 VIRAL ILLNESS: Primary | ICD-10-CM

## 2024-03-21 DIAGNOSIS — R53.83 OTHER FATIGUE: ICD-10-CM

## 2024-03-21 DIAGNOSIS — R09.81 NASAL CONGESTION: ICD-10-CM

## 2024-03-21 DIAGNOSIS — R53.1 WEAKNESS: ICD-10-CM

## 2024-03-21 LAB
FLUAV RNA SPEC QL NAA+PROBE: NEGATIVE
FLUBV RNA RESP QL NAA+PROBE: NEGATIVE
RSV RNA SPEC NAA+PROBE: NEGATIVE
SARS-COV-2 RNA RESP QL NAA+PROBE: NEGATIVE

## 2024-03-21 PROCEDURE — 99213 OFFICE O/P EST LOW 20 MIN: CPT

## 2024-03-21 PROCEDURE — 87637 SARSCOV2&INF A&B&RSV AMP PRB: CPT | Mod: ZL

## 2024-03-21 PROCEDURE — G0463 HOSPITAL OUTPT CLINIC VISIT: HCPCS

## 2024-03-21 ASSESSMENT — PAIN SCALES - GENERAL: PAINLEVEL: MODERATE PAIN (4)

## 2024-03-21 NOTE — PROGRESS NOTES
ASSESSMENT/PLAN:    I have reviewed the nursing notes.  I have reviewed the findings, diagnosis, plan and need for follow up with the patient.    1. Viral illness  2. Nasal congestion  3. Other fatigue  4. Weakness  - Symptomatic Influenza A/B, RSV, & SARS-CoV2 PCR (COVID-19) Nose    Patient presents with nasal congestion, fatigue, weakness.  Patient's vitals are stable and she appears nontoxic.  Multiplex test was negative. Discussed with patient that symptoms and exam are consistent with viral illness.  Discussed that symptomatic treatment is appropriate but not with antibiotics.  Discussed symptomatic treatment - Encouraged fluids, elevation, humidifier, sinus rinse/netti pot, lozenges, tea, topical vapor rub, popsicles, rest, etc. May use over-the-counter Tylenol or ibuprofen PRN.    Discussed warning signs/symptoms indicative of need to f/u    Follow up if symptoms persist or worsen or concerns    I explained my diagnostic considerations and recommendations to the patient, who voiced understanding and agreement with the treatment plan. All questions were answered. We discussed potential side effects of any prescribed or recommended therapies, as well as expectations for response to treatments.    Mina Landers, LENI CNP  3/21/2024  4:27 PM    HPI:    Zoey aFn is a 79 year old female  who presents to [facility] today for concerns of URI symptoms    URI, x symptoms started this morning    Symptoms:  No fevers or chills.  No sore throat/pharyngitis/tonsillitis.   YES: +  allergy/URI Symptoms  No muffled sounds/change in hearing  No sensation of fullness in ear(s)  No ringing in ears/tinnitus  No balance changes  No dizziness  YES: +  congestion (head/nasal/chest)  No cough/productive cough  No post nasal drip   YES: +  headache  No sinus pain/pressure  No myalgias  No otalgia  No rash  Activity Level Changes: Yes: increased fatigue and generalized weakness  Appetite/Liquid Intake Changes: No  Changes  to Bowel Habits: No  Changes to Bladder Habits: No  Additional Symptoms to Report: No  History of similar symptoms: No  Prior workup: No    Treatments tried: Fluids and Rest    Site of exposure: not known.  Type of exposure: not known    Other Pertinent History: heart disease and s/p tonsillectomy    Allergies: reviewed    PCP: [facility]    Past Medical History:   Diagnosis Date    Atrial fibrillation (H)     No Comments Provided    Complete tear of right rotator cuff     6/15/2017    Essential (primary) hypertension     No Comments Provided    Impingement syndrome of right shoulder     5/23/2017    Insomnia     No Comments Provided    Long Q-T syndrome 2010    Major depressive disorder, single episode     being managed    Other shoulder lesions, right shoulder     5/23/2017    Personal history of other infectious and parasitic diseases     As a child    Personal history of other medical treatment (CODE)     G2, P2    Primary osteoarthritis of shoulder     5/23/2017    Pure hypercholesterolemia     No Comments Provided     Past Surgical History:   Procedure Laterality Date    ARTHROSCOPY SHOULDER ROTATOR CUFF REPAIR      2004,Right shoulder    COLONOSCOPY  12/14/2007 2007,Normal, follow up in 10 years    COLONOSCOPY  08/10/2017    08/10/2017,no follow up due to age being greater than 80 at next screening interval    IMPLANT PACEMAKER      2011,Post ablation, due to junctional rythym    LAPAROSCOPIC TUBAL LIGATION      No Comments Provided    OTHER SURGICAL HISTORY      2004,490288,OTHER,Left knee    OTHER SURGICAL HISTORY      2011,548251,EP STUDY /ABLATION,Cardiac ablation    OTHER SURGICAL HISTORY      2015,[id number],CARDIAC ELECTROPHYSIOLOGY STUDY AND ABLATION    TONSILLECTOMY, ADENOIDECTOMY, COMBINED      As a child     Social History     Tobacco Use    Smoking status: Former     Packs/day: 0.50     Years: 30.00     Additional pack years: 0.00     Total pack years: 15.00     Types: Cigarettes     Start date:  1968     Quit date: 2002     Years since quittin.9     Passive exposure: Past    Smokeless tobacco: Never   Substance Use Topics    Alcohol use: Yes     Alcohol/week: 8.0 standard drinks of alcohol     Types: 8 Glasses of wine per week     Comment: wine: 1-2 glasses per day     Current Outpatient Medications   Medication Sig Dispense Refill    apixaban ANTICOAGULANT (ELIQUIS) 5 MG tablet Take 1 tablet (5 mg) by mouth 2 times daily 180 tablet 3    atorvastatin (LIPITOR) 40 MG tablet TAKE ONE-HALF TABLET BY  MOUTH AT BEDTIME 45 tablet 3    Calcium Magnesium Zinc 333-133-5 MG TABS Take 1 tablet by mouth daily      digoxin (LANOXIN) 125 MCG tablet TAKE 1 TABLET(125 MCG) BY MOUTH DAILY 90 tablet 0    digoxin (LANOXIN) 125 MCG tablet Take 125 mcg by mouth daily      ELIQUIS ANTICOAGULANT 5 MG tablet Take 5 mg by mouth 2 times daily      furosemide (LASIX) 20 MG tablet TAKE 1 TABLET BY MOUTH DAILY ,  MAY TAKE UP TO 3 TABLETS DAILY  FOR INCREASE IN SWELLING 270 tablet 3    lisinopril (ZESTRIL) 10 MG tablet Take 1 tablet (10 mg) by mouth daily 90 tablet 2    multivitamin w/minerals (THERA-VIT-M) tablet Take 1 tablet by mouth daily      nadolol (CORGARD) 80 MG tablet Take 1.5 tab twice daily      nadolol (CORGARD) 80 MG tablet TAKE 1 AND 1/2 TABLETS BY  MOUTH TWICE DAILY 270 tablet 3    omeprazole (PRILOSEC) 20 MG DR capsule TAKE 1 CAPSULE BY MOUTH  ONCE DAILY BEFORE A MEAL 90 capsule 3    oxybutynin ER (DITROPAN XL) 10 MG 24 hr tablet TAKE 1 TABLET BY MOUTH  DAILY 90 tablet 3    sildenafil (REVATIO) 20 MG tablet 3 times daily       spironolactone (ALDACTONE) 25 MG tablet Take 0.5 tablets (12.5 mg) by mouth every morning 90 tablet 3    UNABLE TO FIND MEDICATION NAME: AREDS ( for eye health)      venlafaxine (EFFEXOR XR) 75 MG 24 hr capsule Take 75 mg by mouth daily      vitamin B-12 (CYANOCOBALAMIN) 1000 MCG CR tablet Take 1,000 mcg by mouth daily      VITAMIN D, CHOLECALCIFEROL, PO Take 1,000 Units by mouth  "daily       Allergies   Allergen Reactions    Adhesive Tape Rash     Reaction also to EKG lead pads.   Reaction also to EKG lead pads.     Ciprofloxacin      Other reaction(s): Other - Describe In Comment Field  Patient reports she has Long QT syndrome    Duloxetine      Other reaction(s): Angioedema    Neomycin Itching     Swelling , redness    Neomycin-Polymyxin-Gramicidin Unknown    Neosporin [Neomycin-Polymyxin-Gramicidin]     Sulfa Antibiotics Nausea and Vomiting     Abdominal pain    Unknown  [No Clinical Screening - See Comments]      Other reaction(s): Cardiac Arrest  Please verify with pharmacist prior to prescribing any medications due to her QT Syndrome    Liquid Adhesive Rash     Reaction also to EKG lead pads.     Tobramycin Rash     Past medical history, past surgical history, current medications and allergies reviewed and accurate to the best of my knowledge.      ROS:  Refer to HPI    /82 (BP Location: Left arm, Patient Position: Chair, Cuff Size: Adult Regular)   Pulse 57   Temp 97.3  F (36.3  C) (Temporal)   Resp 16   Ht 1.702 m (5' 7\")   Wt 73.6 kg (162 lb 3.2 oz)   LMP 07/06/2003 (Approximate)   SpO2 100%   Breastfeeding No   BMI 25.40 kg/m      EXAM:  General Appearance: Well appearing 79 year old female, appropriate appearance for age. No acute distress   Ears: Left TM intact, translucent with bony landmarks appreciated, no erythema, no effusion, no bulging, no purulence.  Right TM intact, translucent with bony landmarks appreciated, no erythema, no effusion, no bulging, no purulence.  Left auditory canal clear.  Right auditory canal clear.  Normal external ears, non tender.  Eyes: conjunctivae normal without erythema or irritation, corneas clear, no drainage or crusting, no eyelid swelling, pupils equal   Oropharynx: moist mucous membranes, posterior pharynx without erythema, tonsils absent, no post nasal drip seen, no trismus, voice clear.    Nose:  Bilateral nares: no " erythema, no edema, no drainage or congestion   Neck: supple without adenopathy  Respiratory: normal chest wall and respirations.  Normal effort.  Clear to auscultation bilaterally, no wheezing, crackles or rhonchi.  No increased work of breathing.  No cough appreciated.  Cardiac: RRR with no murmurs  Musculoskeletal:  Equal movement of bilateral upper extremities.  Equal movement of bilateral lower extremities.  Normal gait.    Dermatological: no rashes noted of exposed skin  Neuro: Alert and oriented to person, place, and time.    Psychological: normal affect, alert, oriented, and pleasant.     Labs:  Results for orders placed or performed in visit on 03/21/24   Symptomatic Influenza A/B, RSV, & SARS-CoV2 PCR (COVID-19) Nose     Status: Normal    Specimen: Nose; Swab   Result Value Ref Range    Influenza A PCR Negative Negative    Influenza B PCR Negative Negative    RSV PCR Negative Negative    SARS CoV2 PCR Negative Negative    Narrative    Testing was performed using the Xpert Xpress CoV2/Flu/RSV Assay on the [employer] GeneXpert Instrument. This test should be ordered for the detection of SARS-CoV-2, influenza, and RSV viruses in individuals who meet clinical and/or epidemiological criteria. Test performance is unknown in asymptomatic patients. This test is for in vitro diagnostic use under the FDA EUA for laboratories certified under CLIA to perform high or moderate complexity testing. This test has not been FDA cleared or approved. A negative result does not rule out the presence of PCR inhibitors in the specimen or target RNA in concentration below the limit of detection for the assay. If only one viral target is positive but coinfection with multiple targets is suspected, the sample should be re-tested with another FDA cleared, approved, or authorized test, if coinfection would change clinical management. This test was validated by the [facility]. These laboratories are certified under the  Clinical Laboratory Improvement Amendments of 1988 (CLIA-88) as qualified to perform high complexity laboratory testing.

## 2024-03-21 NOTE — TELEPHONE ENCOUNTER
Received call from patient stating that she had a COVID test done this afternoon and she missed a call from the clinic. She believes it was to give her the lab results. Upon reviewing the chart, found the following note from provider:        Relayed the above information to patient. She had no further questions.    Reason for Disposition   [1] Follow-up call to recent contact AND [2] information only call, no triage required    Protocols used: Information Only Call - No Triage-A-

## 2024-03-21 NOTE — NURSING NOTE
"Chief Complaint   Patient presents with    Headache     Headache, Runny Nose-Started Today        Initial /82 (BP Location: Left arm, Patient Position: Chair, Cuff Size: Adult Regular)   Pulse 57   Temp 97.3  F (36.3  C) (Temporal)   Resp 16   Ht 1.702 m (5' 7\")   Wt 73.6 kg (162 lb 3.2 oz)   LMP 07/06/2003 (Approximate)   SpO2 100%   Breastfeeding No   BMI 25.40 kg/m   Estimated body mass index is 25.4 kg/m  as calculated from the following:    Height as of this encounter: 1.702 m (5' 7\").    Weight as of this encounter: 73.6 kg (162 lb 3.2 oz).    FOOD SECURITY SCREENING QUESTIONS:    The next two questions are to help us understand your food security.  If you are feeling you need any assistance in this area, we have resources available to support you today.    Hunger Vital Signs:  Within the past 12 months we worried whether our food would run out before we got money to buy more. Never  Within the past 12 months the food we bought just didn't last and we didn't have money to get more. Never    Medication Reconciliation: Complete.       Libby Handley LPN on 3/21/2024 at 4:24 PM     "

## 2024-03-21 NOTE — TELEPHONE ENCOUNTER
Called and spoke with patient and patient states she just does not feel good.  States she has come down with a cough, runny nose and just sneezing.  Patient states she is [facility] the [facility] and states that she was going to be seen because if it is COVID she could be treated.    Fatoumata Schmitt RN on 3/21/2024 at 4:13 PM

## 2024-03-21 NOTE — TELEPHONE ENCOUNTER
States she has been coughing and sneezing and has had a runny nose for the last couple hours. Would like to know if she should go in to the rapid clinic to be seen or what would be recommended

## 2024-03-28 DIAGNOSIS — I51.89 GRADE II DIASTOLIC DYSFUNCTION: Chronic | ICD-10-CM

## 2024-03-29 RX ORDER — SPIRONOLACTONE 25 MG/1
TABLET ORAL
Qty: 45 TABLET | Refills: 3 | OUTPATIENT
Start: 2024-03-29

## 2024-03-29 NOTE — TELEPHONE ENCOUNTER
[facility] sent Rx request for the following:      Requested Prescriptions   Pending Prescriptions Disp Refills    spironolactone (ALDACTONE) 25 MG tablet [Pharmacy Med Name: Spironolactone 25 MG Oral Tablet] 45 tablet 3     Sig: TAKE ONE-HALF TABLET BY MOUTH IN THE MORNING       Diuretics (Including Combos) Protocol Failed - 3/28/2024  9:57 PM        Failed - Has GFR on file in past 12 months and most recent value is normal        Failed - Medication indicated for associated diagnosis     Medication is associated with one or more of the following diagnoses:     Hypertension   Heart Failure   Hyperaldosteronism   Acne   Hirsutism   Hypokalemia   Hepatic Cirrhosis   Nephrotic Syndrome   Myocardial Infarction   Transgender Female          Failed - Recent (12 mo) or future (90 days) visit within the authorizing provider's specialty     The patient must have completed an in-person or virtual visit within the past 12 months or has a future visit scheduled within the next 90 days with the authorizing provider s specialty.  Urgent care and e-visits do not quality as an office visit for this protocol.       Last Prescription Date:   3/9/23  Last Fill Qty/Refills:         90, R-3    Last Office Visit:              3/9/23   Future Office visit:           none    Should have refills on file. Patient takes 1/2 tab daily. Last prescription sent was 2 year.   Erum Sabillon RN on 3/29/2024 at 3:13 PM

## 2024-06-08 DIAGNOSIS — K21.9 GASTROESOPHAGEAL REFLUX DISEASE, UNSPECIFIED WHETHER ESOPHAGITIS PRESENT: ICD-10-CM

## 2024-06-08 DIAGNOSIS — N39.41 URGE URINARY INCONTINENCE: ICD-10-CM

## 2024-06-12 NOTE — TELEPHONE ENCOUNTER
[facility] sent Rx request for the following:      Requested Prescriptions   Pending Prescriptions Disp Refills    omeprazole (PRILOSEC) 20 MG DR capsule [Pharmacy Med Name: Omeprazole 20 MG Oral Capsule Delayed Release] 90 capsule 3     Sig: TAKE 1 CAPSULE BY MOUTH ONCE  DAILY BEFORE A MEAL   Last Prescription Date:   7/13/23  Last Fill Qty/Refills:         90, R-3        oxyBUTYnin ER (DITROPAN XL) 10 MG 24 hr tablet [Pharmacy Med Name: Oxybutynin Chloride ER 10 MG Oral Tablet Extended Release 24 Hour] 90 tablet 3     Sig: TAKE 1 TABLET BY MOUTH DAILY   Last Prescription Date:   7/13/23  Last Fill Qty/Refills:         90, R-3      Last Office Visit:                7/6/23 4/7/23 (hospital discharge)    Future Office visit:            None    Pt due for annual exam. Routing to provider for refill consideration. Routing to Unit scheduling pool, to assist Pt in scheduling appointment.     Unable to complete prescription refill per RN Medication Refill Policy.     Sheryl Godinez RN .............. 6/12/2024  11:59 AM

## 2024-06-13 RX ORDER — OXYBUTYNIN CHLORIDE 10 MG/1
TABLET, EXTENDED RELEASE ORAL
Qty: 90 TABLET | Refills: 0 | Status: SHIPPED | OUTPATIENT
Start: 2024-06-13 | End: 2024-08-15

## 2024-06-18 DIAGNOSIS — I51.89 RIGHT VENTRICULAR DYSFUNCTION, NYHA CLASS 2: ICD-10-CM

## 2024-06-18 DIAGNOSIS — I51.89 GRADE II DIASTOLIC DYSFUNCTION: Chronic | ICD-10-CM

## 2024-06-18 RX ORDER — SPIRONOLACTONE 25 MG/1
12.5 TABLET ORAL EVERY MORNING
Qty: 45 TABLET | Refills: 2 | Status: SHIPPED | OUTPATIENT
Start: 2024-06-18

## 2024-06-18 NOTE — TELEPHONE ENCOUNTER
Reason for call: Medication or medication refill    Have you contacted your pharmacy regarding this refill? yes    If No, direct the patient to contact the Pharmacy and discontinue telephone note using Erroneous Encounter.  If Yes, continue.    Name of medication requested: spironolactone    How many days of medication do you have left? 6 days     What pharmacy do you use? [facility]    Preferred method for responding to this message: Telephone Call    Phone number patient can be reached at: Cell number on file:    Telephone Information:   Mobile [phone]       If we cannot reach you directly, may we leave a detailed response at the number you provided? Yes

## 2024-06-18 NOTE — TELEPHONE ENCOUNTER
Requested Prescriptions   Pending Prescriptions Disp Refills    spironolactone (ALDACTONE) 25 MG tablet 90 tablet 3     Sig: Take 0.5 tablets (12.5 mg) by mouth every morning       Diuretics (Including Combos) Protocol Failed - 6/18/2024  4:14 PM        Failed - Has GFR on file in past 12 months and most recent value is normal        Failed - Medication indicated for associated diagnosis     Medication is associated with one or more of the following diagnoses:     Hypertension   Heart Failure   Hyperaldosteronism   Acne   Hirsutism   Hypokalemia   Hepatic Cirrhosis   Nephrotic Syndrome   Myocardial Infarction   Transgender Female     Last Prescription Date:   3/9/23  Last Fill Qty/Refills:         90, R-3    Last Office Visit:                7/6/23 4/7/23 (hospital follow up)      Future Office visit:       None    Pt due for annual exam. Routing to provider for refill consideration. Routing to Unit scheduling pool, to assist Pt in scheduling appointment.     Unable to complete prescription refill per RN Medication Refill Policy. Sheryl Godinez RN .............. 6/18/2024  4:17 PM

## 2024-06-20 RX ORDER — FUROSEMIDE 20 MG
TABLET ORAL
Qty: 270 TABLET | Refills: 3 | Status: SHIPPED | OUTPATIENT
Start: 2024-06-20

## 2024-06-20 NOTE — TELEPHONE ENCOUNTER
[facility] sent Rx request for the following:      Requested Prescriptions   Pending Prescriptions Disp Refills    furosemide (LASIX) 20 MG tablet [Pharmacy Med Name: Furosemide 20 MG Oral Tablet] 270 tablet 3     Sig: TAKE 1 TABLET BY MOUTH DAILY ,  MAY TAKE UP TO 3 TABLETS DAILY  FOR INCREASE IN SWELLING       Diuretics (Including Combos) Protocol Failed - 6/18/2024  9:46 PM        Failed - Has GFR on file in past 12 months and most recent value is normal        Failed - Medication indicated for associated diagnosis     Medication is associated with one or more of the following diagnoses:     Edema   Hypertension   Hypercalcemia   Heart Failure   Chronic Kidney Disease (CKD)          Failed - Recent (12 mo) or future (90 days) visit within the authorizing provider's specialty     The patient must have completed an in-person or virtual visit within the past 12 months or has a future visit scheduled within the next 90 days with the authorizing provider s specialty.  Urgent care and e-visits do not quality as an office visit for this protocol.            Last Prescription Date:   7/16/23  Last Fill Qty/Refills:         270, R-3    Last Office Visit:              4/7/23   Future Office visit:          none    Will route to unit schedulers, patient is due for Medicare wellness visit.  Routing refill request to provider for review/approval because:  Patient needs to be seen because it has been more than 1 year since last office visit.    Skye Campbell RN on 6/20/2024 at 3:30 PM

## 2024-07-20 ENCOUNTER — NURSE TRIAGE (OUTPATIENT)
Dept: NURSING | Facility: CLINIC | Age: 79
End: 2024-07-20
Payer: COMMERCIAL

## 2024-07-21 NOTE — TELEPHONE ENCOUNTER
S-(situation): nosebleed    B-(background):   Started 30 minutes ago. Pt on Eliquis and missed 2 doses.  Pt started having cold/sinus symptoms on Wednesday evening.  Negative COVID test.    A-(assessment):   Nosebleed 30 minutes ago, has a nose clamp. Removed the clamp and bleeding has stopped.  No dizziness    Pt asks if she should take tonight's dose of Eliquis      R-(recommendations):   FNA advised to be seen within 24 hours.  Regarding her question on Eliquis: Advised to call prescriber (Jaquan [facility]) to give medication guidance.    Pt will call them tonight and she verbalized understanding.    Dulce Simon RN/[place] Nurse Advisor      Reason for Disposition   Taking Coumadin (warfarin) or other strong blood thinner, or known bleeding disorder (e.g., thrombocytopenia)    Additional Information   Negative: Fainted or too weak to stand following large blood loss   Negative: Sounds like a life-threatening emergency to the triager   Negative: Nosebleed followed a nose injury   Negative: [1] Bleeding present > 30 minutes AND [2] using correct method of direct pressure   Negative: [1] Bleeding now AND [2] second call after being instructed in correct technique of direct pressure   Negative: Dizziness or lightheadedness   Negative: Pale skin (pallor) of new-onset or worsening   Negative: [1] Has nasal packing (inserted by health care provider to control bleeding) AND [2] new rash   Negative: [1] Has nasal packing AND [2] now bleeding around the packing  (Exception: Few drops or ooze.)   Negative: Patient sounds very sick or weak to the triager   Negative: [1] Bleeding recurs 3 or more times in 24 hours AND [2] direct pressure applied correctly   Negative: [1] Skin bruises or bleeding gums AND [2] not caused by an injury   Negative: [1] Large amount of blood has been lost (e.g., 1 cup or 240 ml) AND [2] bleeding now controlled (stopped)   Negative: [1] Has nasal packing AND [2] fever > 100.4  F (38.0 C)    Protocols used: Nosebleed-STEPHANIE

## 2024-07-22 ENCOUNTER — OFFICE VISIT (OUTPATIENT)
Dept: PEDIATRICS | Facility: OTHER | Age: 79
End: 2024-07-22
Attending: INTERNAL MEDICINE
Payer: COMMERCIAL

## 2024-07-22 VITALS
RESPIRATION RATE: 16 BRPM | HEIGHT: 66 IN | TEMPERATURE: 98.7 F | DIASTOLIC BLOOD PRESSURE: 99 MMHG | OXYGEN SATURATION: 98 % | SYSTOLIC BLOOD PRESSURE: 167 MMHG | BODY MASS INDEX: 23.63 KG/M2 | HEART RATE: 91 BPM | WEIGHT: 147 LBS

## 2024-07-22 DIAGNOSIS — R19.7 DIARRHEA OF PRESUMED INFECTIOUS ORIGIN: ICD-10-CM

## 2024-07-22 DIAGNOSIS — I47.20 SUSTAINED VT (VENTRICULAR TACHYCARDIA) (H): ICD-10-CM

## 2024-07-22 DIAGNOSIS — I50.43 ACUTE ON CHRONIC COMBINED SYSTOLIC AND DIASTOLIC CONGESTIVE HEART FAILURE (H): ICD-10-CM

## 2024-07-22 DIAGNOSIS — I77.810 MILD ASCENDING AORTA DILATATION (H): ICD-10-CM

## 2024-07-22 DIAGNOSIS — F33.9 EPISODE OF RECURRENT MAJOR DEPRESSIVE DISORDER, UNSPECIFIED DEPRESSION EPISODE SEVERITY (H): ICD-10-CM

## 2024-07-22 DIAGNOSIS — Z63.6 CAREGIVER BURDEN: ICD-10-CM

## 2024-07-22 DIAGNOSIS — I50.33 ACUTE ON CHRONIC HEART FAILURE WITH PRESERVED EJECTION FRACTION (HFPEF) (H): ICD-10-CM

## 2024-07-22 DIAGNOSIS — F43.9 STRESS AT HOME: ICD-10-CM

## 2024-07-22 DIAGNOSIS — J06.9 VIRAL URI WITH COUGH: Primary | ICD-10-CM

## 2024-07-22 DIAGNOSIS — I27.20 PULMONARY HYPERTENSION (H): ICD-10-CM

## 2024-07-22 DIAGNOSIS — Z91.138 MEDICATION DOSE MISSED: ICD-10-CM

## 2024-07-22 PROCEDURE — G2211 COMPLEX E/M VISIT ADD ON: HCPCS | Performed by: INTERNAL MEDICINE

## 2024-07-22 PROCEDURE — 99214 OFFICE O/P EST MOD 30 MIN: CPT | Performed by: INTERNAL MEDICINE

## 2024-07-22 PROCEDURE — G0463 HOSPITAL OUTPT CLINIC VISIT: HCPCS

## 2024-07-22 SDOH — SOCIAL STABILITY - SOCIAL INSECURITY: DEPENDENT RELATIVE NEEDING CARE AT HOME: Z63.6

## 2024-07-22 ASSESSMENT — PAIN SCALES - GENERAL: PAINLEVEL: NO PAIN (0)

## 2024-07-22 NOTE — PROGRESS NOTES
Assessment & Plan   1. Viral URI with cough  2. Diarrhea of presumed infectious origin  Think it is most probable that she had COVID-19.  She took a single home test which has a 30% false-negative rate.  In the future I encouraged her to take 3 test  by 24 hours each or come in for an in clinic test.  Either way should be outside of her treatment and contagious timeframe.  Reassurance provided.    3. Medication dose missed  She has gotten back on her regular regimen.  No recurrent epistaxis.    4. Acute on chronic combined systolic and diastolic congestive heart failure (H)  It seems like she could have a little bit of volume retention.  Her blood pressure is up and she feels some orthopnea.  Recommend a brief increase to twice daily dosing of furosemide.  Warning signs were discussed and prompt repeat evaluation recommended if symptoms persist or worsen    5. Pulmonary hypertension (H)  6. Episode of recurrent major depressive disorder, unspecified depression episode severity (H24)  7. Sustained VT (ventricular tachycardia) (H)  8. Mild ascending aorta dilatation (H24)  She follows with cardiology    9. Stress at home  10. Caregiver burden  She is experiencing significant stress related to caring for her  who has advanced dementia.  I recommend a referral to the care coordination team for respite care, etc.  - Primary Care - Care Coordination Referral; Future    11. Acute on chronic heart failure with preserved ejection fraction (HFpEF) (H)      Patient Instructions    -- Increase furosemide to AM and 2pm for about 5 days     -- Daily BP check   -- Send log to Dr. Mackay in 2 weeks      Check your Blood Pressure   -- Sit in a chair, feet flat on the floor for 5 minutes   -- Avoid caffeine, exercise and smoking for 30 minutes before checking   -- Have your arm at the level of your heart   -- Make sure you have the correct size cuff   -- Write them down, bring your log book in to your appointment   --  "Goal blood pressure 120/70     -- Learn about DASH Diet for dietary ways to reduce blood pressure  Google: [facility] DASH diet  [facility] site: https://www.nhlbi.nih.gov/health-topics/dash-eating-plan  PDF from [facility]: https://www.nhlbi.nih.gov/files/docs/public/heart/new_dash.pdf    What should I do?   -- Reduce salt intake (box, can, package, restaurant, salt shaker)   -- Reduce caffeine   -- Reduce alcohol   -- Work on 5% weight loss   -- Daily physical exercise (American Heart recommends 30-45 minutes of brisk walking 5 days per week)        No follow-ups on file.    Signed, Vijay Mackay MD, FAAP, FACP  Internal Medicine & Pediatrics    Subjective   Zoey Fan is a 79 year old female who presents for RECHECK ([facility] with [facility]).  Last weekend she became ill with an illness including sinus headaches, cough, sore throat followed by diarrhea.  She was so tired that she slept and missed about 2 days of medications.  She called and spoke to the nurse triage lines.  Prior to resuming her Eliquis she developed nosebleeds.  She was able to get it to stop with direct pressure.  She thinks she may be developing a little bit of water retention/heart failure exacerbation.  She is felt short of breath with lying down and has some water retention.    Objective   Vitals: BP (!) 167/99   Pulse 91   Temp 98.7  F (37.1  C) (Tympanic)   Resp 16   Ht 1.683 m (5' 6.25\")   Wt 66.7 kg (147 lb)   LMP 07/06/2003 (Approximate)   SpO2 98%   BMI 23.55 kg/m      Cardiovascular: Irregularly irregular, no murmur  Pulmonary: Clear, no wheezing or rhonchi  MSK: No edema    Review and Analysis of Data   I personally reviewed the following:  External notes: Yes, triage note from [facility]  Results: Yes kidney panel  Use of an independent historian: No  Independent review of a test performed by another physician: No  Discussion of management with another physician: No  Moderate risk of morbidity from additional diagnostic testing and/or " treatment.

## 2024-07-22 NOTE — PATIENT INSTRUCTIONS
-- Increase furosemide to AM and 2pm for about 5 days     -- Daily BP check   -- Send log to Dr. Mackay in 2 weeks      Check your Blood Pressure   -- Sit in a chair, feet flat on the floor for 5 minutes   -- Avoid caffeine, exercise and smoking for 30 minutes before checking   -- Have your arm at the level of your heart   -- Make sure you have the correct size cuff   -- Write them down, bring your log book in to your appointment   -- Goal blood pressure 120/70     -- Learn about DASH Diet for dietary ways to reduce blood pressure  Google: NIH DASH diet  NIH site: https://www.nhlbi.nih.gov/health-topics/dash-eating-plan  PDF from [facility]: https://www.nhlbi.nih.gov/files/docs/public/heart/new_dash.pdf    What should I do?   -- Reduce salt intake (box, can, package, restaurant, salt shaker)   -- Reduce caffeine   -- Reduce alcohol   -- Work on 5% weight loss   -- Daily physical exercise (American Heart recommends 30-45 minutes of brisk walking 5 days per week)

## 2024-07-23 ENCOUNTER — PATIENT OUTREACH (OUTPATIENT)
Dept: CARE COORDINATION | Facility: CLINIC | Age: 79
End: 2024-07-23
Payer: COMMERCIAL

## 2024-07-24 NOTE — PROGRESS NOTES
"Clinic Care Coordination Contact  Care Team Conversations    Referral received to help with resources for respite and caregiver burnout. Patient did return call and her message said she is just too busy at the moment between her 's needs and her daughter who just had a surgery.     She states I will connect with you at some point later, but right now \"my hair is on fire.\"      Plan:  Care coordinator will send an introductory MyChart letter and leave it to her to reach out at a good time.     Sheryl Mattson RN on 7/24/2024 at 3:58 PM      Clinic Care Coordination Contact  [facility]/Voicemail      Left message on patient's voicemail with call back information and requested return call.    Plan: Care Coordinator will attempt again.   Sheryl Mattson RN on 7/24/2024 at 1:27 PM    "

## 2024-07-31 ENCOUNTER — PATIENT OUTREACH (OUTPATIENT)
Dept: CARE COORDINATION | Facility: CLINIC | Age: 79
End: 2024-07-31
Payer: COMMERCIAL

## 2024-07-31 NOTE — PROGRESS NOTES
Clinic Care Coordination Contact  Follow Up Progress Note      Assessment: Talked with patient and her , Terrell. They are looking for options to improve strength and balance and be more active. This may work into a respite option for her as he has dementia, but she was careful to introduce it as a health benefit for them both to be at the [facility].       Intervention/Education provided during outreach: Introduction to care coordination given to both over the speaker phone     Plan:   Patient and  accept a referral to [facility]  to meet them at the [facility] and introduce services like adult day stay and programs.   Care Coordinator will support as needed.  Sheryl Mattson, RN on 7/31/2024 at 4:08 PM

## 2024-08-15 ENCOUNTER — OFFICE VISIT (OUTPATIENT)
Dept: PEDIATRICS | Facility: OTHER | Age: 79
End: 2024-08-15
Attending: INTERNAL MEDICINE
Payer: COMMERCIAL

## 2024-08-15 VITALS
RESPIRATION RATE: 16 BRPM | WEIGHT: 148.3 LBS | TEMPERATURE: 97.7 F | HEART RATE: 98 BPM | OXYGEN SATURATION: 96 % | DIASTOLIC BLOOD PRESSURE: 70 MMHG | HEIGHT: 66 IN | SYSTOLIC BLOOD PRESSURE: 112 MMHG | BODY MASS INDEX: 23.83 KG/M2

## 2024-08-15 DIAGNOSIS — K21.9 GASTROESOPHAGEAL REFLUX DISEASE, UNSPECIFIED WHETHER ESOPHAGITIS PRESENT: ICD-10-CM

## 2024-08-15 DIAGNOSIS — I77.89 AORTIC ROOT ENLARGEMENT (H): ICD-10-CM

## 2024-08-15 DIAGNOSIS — F39 MOOD DISORDER (H): ICD-10-CM

## 2024-08-15 DIAGNOSIS — Z98.890 S/P ABLATION OF ATRIAL FIBRILLATION: Chronic | ICD-10-CM

## 2024-08-15 DIAGNOSIS — Z95.0 PACEMAKER: Chronic | ICD-10-CM

## 2024-08-15 DIAGNOSIS — Z00.00 ENCOUNTER FOR MEDICARE ANNUAL WELLNESS EXAM: Primary | ICD-10-CM

## 2024-08-15 DIAGNOSIS — I50.32 CHRONIC HEART FAILURE WITH PRESERVED EJECTION FRACTION (H): Chronic | ICD-10-CM

## 2024-08-15 DIAGNOSIS — G47.33 OSA ON CPAP: ICD-10-CM

## 2024-08-15 DIAGNOSIS — I27.20 MODERATE TO SEVERE PULMONARY HYPERTENSION (H): ICD-10-CM

## 2024-08-15 DIAGNOSIS — I45.81 LONG Q-T SYNDROME: Chronic | ICD-10-CM

## 2024-08-15 DIAGNOSIS — Z29.11 NEED FOR VACCINATION AGAINST RESPIRATORY SYNCYTIAL VIRUS: ICD-10-CM

## 2024-08-15 DIAGNOSIS — I10 PRIMARY HYPERTENSION: ICD-10-CM

## 2024-08-15 DIAGNOSIS — N39.41 URGE URINARY INCONTINENCE: ICD-10-CM

## 2024-08-15 DIAGNOSIS — Z23 NEED FOR TDAP VACCINATION: ICD-10-CM

## 2024-08-15 DIAGNOSIS — Z86.79 S/P ABLATION OF ATRIAL FIBRILLATION: Chronic | ICD-10-CM

## 2024-08-15 DIAGNOSIS — I51.89 GRADE II DIASTOLIC DYSFUNCTION: Chronic | ICD-10-CM

## 2024-08-15 DIAGNOSIS — E78.00 HYPERCHOLESTEROLEMIA: ICD-10-CM

## 2024-08-15 DIAGNOSIS — R73.09 ELEVATED GLUCOSE: ICD-10-CM

## 2024-08-15 DIAGNOSIS — I07.1 TRICUSPID VALVE INSUFFICIENCY, UNSPECIFIED ETIOLOGY: ICD-10-CM

## 2024-08-15 DIAGNOSIS — Z13.29 SCREENING FOR THYROID DISORDER: ICD-10-CM

## 2024-08-15 DIAGNOSIS — I48.0 PAROXYSMAL ATRIAL FIBRILLATION (H): ICD-10-CM

## 2024-08-15 LAB
ALT SERPL W P-5'-P-CCNC: 20 U/L (ref 0–50)
ANION GAP SERPL CALCULATED.3IONS-SCNC: 11 MMOL/L (ref 7–15)
BUN SERPL-MCNC: 14.5 MG/DL (ref 8–23)
CALCIUM SERPL-MCNC: 10.5 MG/DL (ref 8.8–10.4)
CHLORIDE SERPL-SCNC: 99 MMOL/L (ref 98–107)
CHOLEST SERPL-MCNC: 140 MG/DL
CREAT SERPL-MCNC: 0.96 MG/DL (ref 0.51–0.95)
EGFRCR SERPLBLD CKD-EPI 2021: 60 ML/MIN/1.73M2
FASTING STATUS PATIENT QL REPORTED: NO
FASTING STATUS PATIENT QL REPORTED: NO
GLUCOSE SERPL-MCNC: 86 MG/DL (ref 70–99)
HBA1C MFR BLD: 5.8 % (ref 4–6.2)
HCO3 SERPL-SCNC: 28 MMOL/L (ref 22–29)
HDLC SERPL-MCNC: 54 MG/DL
LDLC SERPL CALC-MCNC: 65 MG/DL
NONHDLC SERPL-MCNC: 86 MG/DL
POTASSIUM SERPL-SCNC: 4.4 MMOL/L (ref 3.4–5.3)
SODIUM SERPL-SCNC: 138 MMOL/L (ref 135–145)
T4 FREE SERPL-MCNC: 1.58 NG/DL (ref 0.9–1.7)
TRIGL SERPL-MCNC: 106 MG/DL
TSH SERPL DL<=0.005 MIU/L-ACNC: 4.8 UIU/ML (ref 0.3–4.2)

## 2024-08-15 PROCEDURE — 84443 ASSAY THYROID STIM HORMONE: CPT | Mod: ZL | Performed by: INTERNAL MEDICINE

## 2024-08-15 PROCEDURE — 80048 BASIC METABOLIC PNL TOTAL CA: CPT | Mod: ZL | Performed by: INTERNAL MEDICINE

## 2024-08-15 PROCEDURE — 84460 ALANINE AMINO (ALT) (SGPT): CPT | Mod: ZL | Performed by: INTERNAL MEDICINE

## 2024-08-15 PROCEDURE — 99214 OFFICE O/P EST MOD 30 MIN: CPT | Mod: 25 | Performed by: INTERNAL MEDICINE

## 2024-08-15 PROCEDURE — 83036 HEMOGLOBIN GLYCOSYLATED A1C: CPT | Mod: ZL | Performed by: INTERNAL MEDICINE

## 2024-08-15 PROCEDURE — G0439 PPPS, SUBSEQ VISIT: HCPCS | Performed by: INTERNAL MEDICINE

## 2024-08-15 PROCEDURE — G0463 HOSPITAL OUTPT CLINIC VISIT: HCPCS

## 2024-08-15 PROCEDURE — 84439 ASSAY OF FREE THYROXINE: CPT | Mod: ZL | Performed by: INTERNAL MEDICINE

## 2024-08-15 PROCEDURE — 80061 LIPID PANEL: CPT | Mod: ZL | Performed by: INTERNAL MEDICINE

## 2024-08-15 PROCEDURE — 36415 COLL VENOUS BLD VENIPUNCTURE: CPT | Mod: ZL | Performed by: INTERNAL MEDICINE

## 2024-08-15 RX ORDER — DIGOXIN 125 MCG
125 TABLET ORAL DAILY
Qty: 90 TABLET | Refills: 0 | Status: CANCELLED | OUTPATIENT
Start: 2024-08-15

## 2024-08-15 RX ORDER — ATORVASTATIN CALCIUM 20 MG/1
20 TABLET, FILM COATED ORAL DAILY
COMMUNITY
Start: 2024-07-18 | End: 2024-08-15

## 2024-08-15 RX ORDER — OXYBUTYNIN CHLORIDE 10 MG/1
10 TABLET, EXTENDED RELEASE ORAL DAILY
Qty: 90 TABLET | Refills: 4 | Status: SHIPPED | OUTPATIENT
Start: 2024-08-15

## 2024-08-15 RX ORDER — ATORVASTATIN CALCIUM 20 MG/1
20 TABLET, FILM COATED ORAL DAILY
Qty: 90 TABLET | Refills: 4 | Status: SHIPPED | OUTPATIENT
Start: 2024-08-15

## 2024-08-15 RX ORDER — NADOLOL 80 MG/1
TABLET ORAL
Qty: 270 TABLET | Refills: 4 | Status: SHIPPED | OUTPATIENT
Start: 2024-08-15

## 2024-08-15 RX ORDER — ANTIOX #8/OM3/DHA/EPA/LUT/ZEAX 250-2.5 MG
1 CAPSULE ORAL 2 TIMES DAILY
COMMUNITY

## 2024-08-15 RX ORDER — VENLAFAXINE HYDROCHLORIDE 75 MG/1
75 CAPSULE, EXTENDED RELEASE ORAL DAILY
Qty: 90 CAPSULE | Refills: 4 | Status: SHIPPED | OUTPATIENT
Start: 2024-08-15

## 2024-08-15 RX ORDER — LISINOPRIL 10 MG/1
10 TABLET ORAL DAILY
Qty: 90 TABLET | Refills: 2 | Status: SHIPPED | OUTPATIENT
Start: 2024-08-15

## 2024-08-15 RX ORDER — ATORVASTATIN CALCIUM 40 MG/1
TABLET, FILM COATED ORAL
Qty: 45 TABLET | Refills: 3 | Status: SHIPPED | OUTPATIENT
Start: 2024-08-15

## 2024-08-15 SDOH — HEALTH STABILITY: PHYSICAL HEALTH: ON AVERAGE, HOW MANY DAYS PER WEEK DO YOU ENGAGE IN MODERATE TO STRENUOUS EXERCISE (LIKE A BRISK WALK)?: 2 DAYS

## 2024-08-15 ASSESSMENT — PAIN SCALES - GENERAL: PAINLEVEL: NO PAIN (0)

## 2024-08-15 ASSESSMENT — SOCIAL DETERMINANTS OF HEALTH (SDOH): HOW OFTEN DO YOU GET TOGETHER WITH FRIENDS OR RELATIVES?: ONCE A WEEK

## 2024-08-15 NOTE — PATIENT INSTRUCTIONS
-- Get a flu and RSV in October   -- Get COVID when able   -- Get Tdap now at pharmacy      Patient Education   Preventive Care Advice   This is general advice given by our system to help you stay healthy. However, your care team may have specific advice just for you. Please talk to your care team about your preventive care needs.  Nutrition  Eat 5 or more servings of fruits and vegetables each day.  Try wheat bread, brown rice and whole grain pasta (instead of white bread, rice, and pasta).  Get enough calcium and vitamin D. Check the label on foods and aim for 100% of the RDA (recommended daily allowance).  Lifestyle  Exercise at least 150 minutes each week  (30 minutes a day, 5 days a week).  Do muscle strengthening activities 2 days a week. These help control your weight and prevent disease.  No smoking.  Wear sunscreen to prevent skin cancer.  Have a dental exam and cleaning every 6 months.  Yearly exams  See your health care team every year to talk about:  Any changes in your health.  Any medicines your care team has prescribed.  Preventive care, family planning, and ways to prevent chronic diseases.  Shots (vaccines)   HPV shots (up to age 26), if you've never had them before.  Hepatitis B shots (up to age 59), if you've never had them before.  COVID-19 shot: Get this shot when it's due.  Flu shot: Get a flu shot every year.  Tetanus shot: Get a tetanus shot every 10 years.  Pneumococcal, hepatitis A, and RSV shots: Ask your care team if you need these based on your risk.  Shingles shot (for age 50 and up)  General health tests  Diabetes screening:  Starting at age 35, Get screened for diabetes at least every 3 years.  If you are younger than age 35, ask your care team if you should be screened for diabetes.  Cholesterol test: At age 39, start having a cholesterol test every 5 years, or more often if advised.  Bone density scan (DEXA): At age 50, ask your care team if you should have this scan for  osteoporosis (brittle bones).  Hepatitis C: Get tested at least once in your life.  STIs (sexually transmitted infections)  Before age 24: Ask your care team if you should be screened for STIs.  After age 24: Get screened for STIs if you're at risk. You are at risk for STIs (including HIV) if:  You are sexually active with more than one person.  You don't use condoms every time.  You or a partner was diagnosed with a sexually transmitted infection.  If you are at risk for HIV, ask about PrEP medicine to prevent HIV.  Get tested for HIV at least once in your life, whether you are at risk for HIV or not.  Cancer screening tests  Cervical cancer screening: If you have a cervix, begin getting regular cervical cancer screening tests starting at age 21.  Breast cancer scan (mammogram): If you've ever had breasts, begin having regular mammograms starting at age 40. This is a scan to check for breast cancer.  Colon cancer screening: It is important to start screening for colon cancer at age 45.  Have a colonoscopy test every 10 years (or more often if you're at risk) Or, ask your provider about stool tests like a FIT test every year or Cologuard test every 3 years.  To learn more about your testing options, visit:   .  For help making a decision, visit:   https://bit.ly/rp67119.  Prostate cancer screening test: If you have a prostate, ask your care team if a prostate cancer screening test (PSA) at age 55 is right for you.  Lung cancer screening: If you are a current or former smoker ages 50 to 80, ask your care team if ongoing lung cancer screenings are right for you.  For informational purposes only. Not to replace the advice of your health care provider. Copyright   2023 [employer] Services. All rights reserved. Clinically reviewed by the [facility] Transitions Program. [employer] 648254 - REV 01/24.  Learning About Sleeping Well  What does sleeping well mean?     Sleeping well means getting enough sleep to  feel good and stay healthy. How much sleep is enough varies among people.  The number of hours you sleep and how you feel when you wake up are both important. If you do not feel refreshed, you probably need more sleep. Another sign of not getting enough sleep is feeling tired during the day.  Experts recommend that adults get at least 7 or more hours of sleep per day. Children and older adults need more sleep.  Why is getting enough sleep important?  Getting enough quality sleep is a basic part of good health. When your sleep suffers, your physical health, mood, and your thoughts can suffer too. You may find yourself feeling more grumpy or stressed. Not getting enough sleep also can lead to serious problems, including injury, accidents, anxiety, and depression.  What might cause poor sleeping?  Many things can cause sleep problems, including:  Changes to your sleep schedule.  Stress. Stress can be caused by fear about a single event, such as giving a speech. Or you may have ongoing stress, such as worry about work or school.  Depression, anxiety, and other mental or emotional conditions.  Changes in your sleep habits or surroundings. This includes changes that happen where you sleep, such as noise, light, or sleeping in a different bed. It also includes changes in your sleep pattern, such as having jet lag or working a late shift.  Health problems, such as pain, breathing problems, and restless legs syndrome.  Lack of regular exercise.  Using alcohol, nicotine, or caffeine before bed.  How can you help yourself?  Here are some tips that may help you sleep more soundly and wake up feeling more refreshed.  Your sleeping area   Use your bedroom only for sleeping and sex. A bit of light reading may help you fall asleep. But if it doesn't, do your reading elsewhere in the house. Try not to use your TV, computer, smartphone, or tablet while you are in bed.  Be sure your bed is big enough to stretch out comfortably,  "especially if you have a sleep partner.  Keep your bedroom quiet, dark, and cool. Use curtains, blinds, or a sleep mask to block out light. To block out noise, use earplugs, soothing music, or a \"white noise\" machine.  Your evening and bedtime routine   Create a relaxing bedtime routine. You might want to take a warm shower or bath, or listen to soothing music.  Go to bed at the same time every night. And get up at the same time every morning, even if you feel tired.  What to avoid   Limit caffeine (coffee, tea, caffeinated sodas) during the day, and don't have any for at least 6 hours before bedtime.  Avoid drinking alcohol before bedtime. Alcohol can cause you to wake up more often during the night.  Try not to smoke or use tobacco, especially in the evening. Nicotine can keep you awake.  Limit naps during the day, especially close to bedtime.  Avoid lying in bed awake for too long. If you can't fall asleep or if you wake up in the middle of the night and can't get back to sleep within about 20 minutes, get out of bed and go to another room until you feel sleepy.  Avoid taking medicine right before bed that may keep you awake or make you feel hyper or energized. Your doctor can tell you if your medicine may do this and if you can take it earlier in the day.  If you can't sleep   Imagine yourself in a peaceful, pleasant scene. Focus on the details and feelings of being in a place that is relaxing.  Get up and do a quiet or boring activity until you feel sleepy.  Avoid drinking any liquids before going to bed to help prevent waking up often to use the bathroom.  Where can you learn more?  Go to https://www.[employer].net/patiented  Enter J942 in the search box to learn more about \"Learning About Sleeping Well.\"  Current as of: July 10, 2023  Content Version: 14.1    0397-3544 [employer]   Care instructions adapted under license by your healthcare professional. If you have questions about a medical " condition or this instruction, always ask your healthcare professional. [employer], [facility] disclaims any warranty or liability for your use of this information.    Bladder Training: Care Instructions  Your Care Instructions     Bladder training is used to treat urge incontinence and stress incontinence. Urge incontinence means that the need to urinate comes on so fast that you can't get to a toilet in time. Stress incontinence means that you leak urine because of pressure on your bladder. For example, it may happen when you laugh, cough, or lift something heavy.  Bladder training can increase how long you can wait before you have to urinate. It can also help your bladder hold more urine. And it can give you better control over the urge to urinate.  It is important to remember that bladder training takes a few weeks to a few months to make a difference. You may not see results right away, but don't give up.  Follow-up care is a key part of your treatment and safety. Be sure to make and go to all appointments, and call your doctor if you are having problems. It's also a good idea to know your test results and keep a list of the medicines you take.  How can you care for yourself at home?  Work with your doctor to come up with a bladder training program that is right for you. You may use one or more of the following methods.  Delayed urination  In the beginning, try to keep from urinating for 5 minutes after you first feel the need to go.  While you wait, take deep, slow breaths to relax. Kegel exercises can also help you delay the need to go to the bathroom.  After some practice, when you can easily wait 5 minutes to urinate, try to wait 10 minutes before you urinate.  Slowly increase the waiting period until you are able to control when you have to urinate.  Scheduled urination  Empty your bladder when you first wake up in the morning.  Schedule times throughout the day when you will urinate.  Start by going to  "the bathroom every hour, even if you don't need to go.  Slowly increase the time between trips to the bathroom.  When you have found a schedule that works well for you, keep doing it.  If you wake up during the night and have to urinate, do it. Apply your schedule to waking hours only.  Kegel exercises  These tighten and strengthen pelvic muscles, which can help you control the flow of urine. (If doing these exercises causes pain, stop doing them and talk with your doctor.) To do Kegel exercises:  Squeeze your muscles as if you were trying not to pass gas. Or squeeze your muscles as if you were stopping the flow of urine. Your belly, legs, and buttocks shouldn't move.  Hold the squeeze for 3 seconds, then relax for 5 to 10 seconds.  Start with 3 seconds, then add 1 second each week until you are able to squeeze for 10 seconds.  Repeat the exercise 10 times a session. Do 3 to 8 sessions a day.  When should you call for help?  Watch closely for changes in your health, and be sure to contact your doctor if:    Your incontinence is getting worse.     You do not get better as expected.   Where can you learn more?  Go to https://www.[employer].net/patiented  Enter V684 in the search box to learn more about \"Bladder Training: Care Instructions.\"  Current as of: November 15, 2023               Content Version: 14.0    1491-6924 [employer].   Care instructions adapted under license by your healthcare professional. If you have questions about a medical condition or this instruction, always ask your healthcare professional. [employer] disclaims any warranty or liability for your use of this information.         "

## 2024-08-15 NOTE — LETTER
"My Heart Failure Action Plan  Name: Zoey Fan   YOB: 1945  Date: 8/15/2024   My doctor:   Vijay Mackay   [facility]   [address]  [place] [address] [phone] My Diagnosis: HF-rEF (EF < 40%)  My Ejection Fraction: No results found for: \"LVEF\"  Over 50%  My Exercise Goal: Start exercise slowly - to begin, do a few minutes of exercise, several times a day. Increase your time and speed [id number] to build tolerance, with a goal of 30 minutes of exercise daily. Steady, slow, and consistent exercise is both safe and healthy. Stop and rest when you feel tired or become short of breath. Do not push yourself on days when you don t feel well.       My Weight Plan:   Wt Readings from Last 2 Encounters:   08/15/24 67.3 kg (148 lb 4.8 oz)   07/22/24 66.7 kg (147 lb)     Weigh yourself daily using the same scale. If you gain more than 2 pounds in 24 hours or 5 pounds in 7 days. call the clinic    My Diet Goal: No added salt    Emergency Room Visits:    Our goal is to improve your quality of life and help you avoid a visit to the emergency room or hospital.  If we work together, we can achieve this goal. But, if you feel you need to call 911 or go to the emergency room, please do so.  If you go to the emergency room, please bring your list of medicines and your daily weight chart with you.    Each day ask yourself:  Is my weight up?  Do I have swelling?  Do I have trouble breathing?  How did I sleep?  Other problems?       GREEN ZONE     Weight gained is no more than 2 pounds a day or 5 pounds a in 7 days.  No swelling in feet, ankles, legs or stomach.  No more swelling than usual.  No more trouble breathing than usual.  No change in my sleep.  No other problems. What should I do?  I am doing fine. I will take my medicine, follow my diet, see my doctor, exercise, and watch for symptoms.           YELLOW ZONE         Weight gain of more than 2 pounds " in one day or 5 pounds in 7 days.  New swelling in ankle, leg, knee or thigh.  Bloating in belly, pants feel tighter.  Swelling in hands or face.  Coughing or trouble breathing while walking or talking.  Harder to breathe last night.  Have trouble sleeping, wake up short of breath.  Unusually tired.  Not eating.  Nausea, vomiting, or diarrhea  Pain in my chest or bad  leg cramps.  Feel weak or dizzy. What should I do?  I need to take action and call my doctor or nurse today.                 RED ZONE         Weight gain of 5 pounds overnight.  Chest pain or pressure that does not go away.  Feel less alert.  Wheezing or have trouble breathing when at rest.  Cannot sleep lying down.  Cannot take my medicines.  Pass out or faint. What should I do?  I need to call my doctor or nurse now!  Call 911 if I have chest pain or cannot breathe.

## 2024-08-15 NOTE — NURSING NOTE
"Chief Complaint   Patient presents with    Medicare Visit     annual    Recheck Medication       Initial /70   Pulse 98   Temp 97.7  F (36.5  C) (Tympanic)   Resp 16   Ht 1.676 m (5' 6\")   Wt 67.3 kg (148 lb 4.8 oz)   LMP 07/06/2003 (Approximate)   SpO2 96%   Breastfeeding No   BMI 23.94 kg/m   Estimated body mass index is 23.94 kg/m  as calculated from the following:    Height as of this encounter: 1.676 m (5' 6\").    Weight as of this encounter: 67.3 kg (148 lb 4.8 oz).  Medication Review: complete    The next two questions are to help us understand your food security.  If you are feeling you need any assistance in this area, we have resources available to support you today.          8/15/2024   SDOH- Food Insecurity   Within the past 12 months, did you worry that your food would run out before you got money to buy more? N   Within the past 12 months, did the food you bought just not last and you didn t have money to get more? N            Health Care Directive:  Patient has a Health Care Directive on file      Norma J. Gosselin, LPN      "

## 2024-08-15 NOTE — PROGRESS NOTES
Preventive Care Visit  [facility]  Vijay Mackay MD, Internal Medicine  Aug 15, 2024      1. Encounter for Medicare annual wellness exam  Discussed colon cancer screening deferred due to age.  Plan on annual mammography, up-to-date.    2. Paroxysmal atrial fibrillation (H)  Follows with [facility].  Had been told to discontinue digoxin at her last visit.  - apixaban ANTICOAGULANT (ELIQUIS) 5 MG tablet; Take 1 tablet (5 mg) by mouth 2 times daily  Dispense: 180 tablet; Refill: 4  - nadolol (CORGARD) 80 MG tablet; Take 1.5 tab twice daily  Dispense: 270 tablet; Refill: 4  - ALT (SGPT); Future  - ALT (SGPT)    3. Hypercholesterolemia  Continue statin due for lipid  - atorvastatin (LIPITOR) 40 MG tablet; TAKE ONE-HALF TABLET BY  MOUTH AT BEDTIME  Dispense: 45 tablet; Refill: 3  - atorvastatin (LIPITOR) 20 MG tablet; Take 1 tablet (20 mg) by mouth daily  Dispense: 90 tablet; Refill: 4  - Lipid Profile; Future  - Lipid Profile    4. S/P ablation of atrial fibrillation  5. Chronic heart failure with preserved ejection fraction (H)  Follows with [facility]    6. Primary hypertension  Home blood pressure log personally reviewed today, see media tab.  Predominantly has diastolic hypertension at this time.  Recommend reduction of sodium and alcohol.  No change to antihypertensive regiment.  - lisinopril (ZESTRIL) 10 MG tablet; Take 1 tablet (10 mg) by mouth daily  Dispense: 90 tablet; Refill: 2  - Basic metabolic panel; Future  - Basic metabolic panel    7. Gastroesophageal reflux disease, unspecified whether esophagitis present  At goal, no change.  - omeprazole (PRILOSEC) 20 MG DR capsule; TAKE 1 CAPSULE BY MOUTH  ONCE DAILY BEFORE A MEAL  Dispense: 90 capsule; Refill: 4    8. Urge urinary incontinence  At goal, no change.  - oxyBUTYnin ER (DITROPAN XL) 10 MG 24 hr tablet; Take 1 tablet (10 mg) by mouth daily  Dispense: 90 tablet; Refill: 4    9. Need for Tdap vaccination  10. Need  for vaccination against respiratory syncytial virus    11. Moderate to severe pulmonary hypertension (H)  12. Tricuspid valve insufficiency, unspecified etiology  13. Aortic root enlargement (H24)  14. Long Q-T syndrome  15. Pacemaker  16. Grade II diastolic dysfunction  Has not been short of breath.  Follows with cardiology.    17. TAMMY on CPAP  He has not been using her CPAP for the last few years for minor reasons.  She did tolerate it before.  We discussed pathophysiology.  I recommend retrying it based on her history of moderate to severe pulmonary hypertension.  I have placed a referral back to sleep medicine for assistance with updating settings and mask.  - Adult Sleep Eval & Management  Referral; Future    18. Elevated glucose  - Hemoglobin A1c; Future  - Hemoglobin A1c    19. Mood disorder (H24)  At goal, no change.  - venlafaxine (EFFEXOR XR) 75 MG 24 hr capsule; Take 1 capsule (75 mg) by mouth daily  Dispense: 90 capsule; Refill: 4    20. Screening for thyroid disorder  - TSH with free T4 reflex; Future  - TSH with free T4 reflex      Patient Instructions    -- Get a flu and RSV in October   -- Get COVID when able   -- Get Tdap now at pharmacy             Signed, Vijay Mackay MD, FAAP, FACP  Internal Medicine & Pediatrics      Subjective   Zoey is a 79 year old, presenting for the following:  Medicare Visit (annual) and Recheck Medication          Health Care Directive  Patient has a Health Care Directive on file  Advance care planning document is on file and is current.    HPI              8/15/2024   General Health   How would you rate your overall physical health? Good   Feel stress (tense, anxious, or unable to sleep) To some extent      (!) STRESS CONCERN      8/15/2024   Nutrition   Diet: Low salt            8/15/2024   Exercise   Days per week of moderate/strenous exercise 2 days      (!) EXERCISE CONCERN      8/15/2024   Social Factors   Frequency of gathering with friends or relatives  Once a week   Worry food won't last until get money to buy more No   Food not last or not have enough money for food? No   Do you have housing? (Housing is defined as stable permanent housing and does not include staying ouside in a car, in a tent, in an abandoned building, in an overnight shelter, or couch-surfing.) Yes   Are you worried about losing your housing? No   Lack of transportation? No   Unable to get utilities (heat,electricity)? No            8/15/2024   Fall Risk   Fallen 2 or more times in the past year? No   Trouble with walking or balance? No             8/15/2024   Activities of Daily Living- Home Safety   Needs help with the following daily activites None of the above   Safety concerns in the home None of the above            8/15/2024   Dental   Dentist two times every year? (!) NO            8/15/2024   Hearing Screening   Hearing concerns? None of the above            8/15/2024   Driving Risk Screening   Patient/family members have concerns about driving No            8/15/2024   General Alertness/Fatigue Screening   Have you been more tired than usual lately? (!) YES            8/15/2024   Urinary Incontinence Screening   Bothered by leaking urine in past 6 months Yes                       8/15/2024   Substance Use   Alcohol more than 3/day or more than 7/wk No   Do you have a current opioid prescription? No   How severe/bad is pain from 1 to 10? 0/10 (No Pain)   Do you use any other substances recreationally? No        Social History     Tobacco Use    Smoking status: Former     Current packs/day: 0.00     Average packs/day: 0.5 packs/day for 34.3 years (17.2 ttl pk-yrs)     Types: Cigarettes     Start date: 1968     Quit date: 2002     Years since quittin.3     Passive exposure: Past    Smokeless tobacco: Never   Vaping Use    Vaping status: Never Used   Substance Use Topics    Alcohol use: Yes     Alcohol/week: 8.0 standard drinks of alcohol     Types: 8 Glasses of wine per  week     Comment: wine: 1-2 glasses per day    Drug use: Never           3/1/2024   LAST FHS-7 RESULTS   1st degree relative breast or ovarian cancer No    No   Any relative bilateral breast cancer No    No   Any male have breast cancer No    No   Any ONE woman have BOTH breast AND ovarian cancer No    No   Any woman with breast cancer before 50yrs No   2 or more relatives with breast AND/OR ovarian cancer No   2 or more relatives with breast AND/OR bowel cancer No       Multiple values from one day are sorted in reverse-chronological order            ASCVD Risk   The 10-year ASCVD risk score (Nathaniel HOWARD, et al., 2019) is: 25.2%    Values used to calculate the score:      Age: 79 years      Sex: Female      Is Non- : No      Diabetic: No      Tobacco smoker: No      Systolic Blood Pressure: 112 mmHg      Is BP treated: Yes      HDL Cholesterol: 54 mg/dL      Total Cholesterol: 140 mg/dL            Reviewed and updated as needed this visit by Provider   Tobacco  Allergies  Meds  Problems  Med Hx  Surg Hx  Fam Hx              Current providers sharing in care for this patient include:  Patient Care Team:  Vijay Mackay MD as PCP - General (Pediatrics)  Vijay Mackay MD as Assigned PCP  Allegra Ramsay APRN CNP as Assigned Heart and Vascular Provider  Sheryl Mattson RN as Lead Care Coordinator (Primary Care - CC)    The following health maintenance items are reviewed in Epic and correct as of today:  Health Maintenance   Topic Date Due    HF ACTION PLAN  Never done    RSV VACCINE (Pregnancy & 60+) (1 - 1-dose 60+ series) Never done    IPV IMMUNIZATION (2 of 3 - Adult catch-up series) 02/06/2013    DTAP/TDAP/TD IMMUNIZATION (2 - Td or Tdap) 01/09/2023    COVID-19 Vaccine (7 - 2023-24 season) 02/25/2024    INFLUENZA VACCINE (1) 09/01/2024    PHQ-9  09/21/2024    CBC  11/10/2024    BMP  02/15/2025    MEDICARE ANNUAL WELLNESS VISIT  08/15/2025    ALT   "08/15/2025    LIPID  08/15/2025    FALL RISK ASSESSMENT  08/15/2025    GLUCOSE  08/15/2027    ADVANCE CARE PLANNING  08/15/2029    DEXA  07/20/2032    TSH W/FREE T4 REFLEX  Completed    HEPATITIS C SCREENING  Completed    DEPRESSION ACTION PLAN  Completed    Pneumococcal Vaccine: 65+ Years  Completed    ZOSTER IMMUNIZATION  Completed    HPV IMMUNIZATION  Aged Out    MENINGITIS IMMUNIZATION  Aged Out    RSV MONOCLONAL ANTIBODY  Aged Out    MAMMO SCREENING  Discontinued    COLORECTAL CANCER SCREENING  Discontinued            Objective    Exam  /70   Pulse 98   Temp 97.7  F (36.5  C) (Tympanic)   Resp 16   Ht 1.676 m (5' 6\")   Wt 67.3 kg (148 lb 4.8 oz)   LMP 07/06/2003 (Approximate)   SpO2 96%   Breastfeeding No   BMI 23.94 kg/m     Estimated body mass index is 23.94 kg/m  as calculated from the following:    Height as of this encounter: 1.676 m (5' 6\").    Weight as of this encounter: 67.3 kg (148 lb 4.8 oz).    Physical Exam  Gen: Alert, NAD.  Neck: No carotid bruits  CV: Irregularly irregular, no murmur  Pulm: CTAB, no w/r/r. No increased work of breathing  Msk: No LE edema  Neuro: Grossly intact  Skin: No concerning lesions.  Psychiatric: Normal affect and insight. Does not appear anxious or depressed.          8/15/2024   Mini Cog   Clock Draw Score 2 Normal   3 Item Recall 3 objects recalled   Mini Cog Total Score 5                 Signed Electronically by: Vijay Mackay MD    "

## 2024-08-21 ENCOUNTER — PATIENT OUTREACH (OUTPATIENT)
Dept: CARE COORDINATION | Facility: CLINIC | Age: 79
End: 2024-08-21
Payer: COMMERCIAL

## 2024-08-21 NOTE — PROGRESS NOTES
Clinic Care Coordination Contact    Situation: Patient chart reviewed by care coordinator.    Background: Outreach was made twice for support with caregiving of her , Terrell, who has dementia.  She took information and accepted referral for [facility]     Assessment: She did get information for both of them to take fitness classes at the [facility] or gini chi at the [facility]. Per , she did not feel ready to move forward with discussions of respite, services in the home or transitioning.     Plan/Recommendations: Care coordinator will move to maintenance as she has been provided the resources she requested, but states she is not ready to move forward with more. Understands she can call with changes or concerns.   Sheryl Phelps RN on 8/21/2024 at 9:20 AM

## 2024-09-03 NOTE — NURSING NOTE
to bedside for evaluation of upper abd/chest discomfort and elevated BP. Pt ambulated which he states reduced the pain. Tylenol and Hydralazine given per order. Trop drawn   09/03/24 1615   Heart Rate   Heart Rate 69   Blood Pressure   BP (!) 190/96   MAP (mmHg) (!) 136   Respirations/Oxygenation   Resp 14   SpO2 99 %        "Chief Complaint   Patient presents with    RECHECK     On Issues with [facility]       Initial LMP 07/06/2003 (Approximate)  Estimated body mass index is 25.4 kg/m  as calculated from the following:    Height as of 3/21/24: 1.702 m (5' 7\").    Weight as of 3/21/24: 73.6 kg (162 lb 3.2 oz).  Medication Review: complete    The next two questions are to help us understand your food security.  If you are feeling you need any assistance in this area, we have resources available to support you today.           No data to display                  Health Care Directive:  Patient has a Health Care Directive on file      Juli Garza CNA      "

## 2024-10-25 DIAGNOSIS — I27.20 PULMONARY HTN (H): Primary | ICD-10-CM

## 2024-10-25 DIAGNOSIS — I50.43 ACUTE ON CHRONIC COMBINED SYSTOLIC AND DIASTOLIC CONGESTIVE HEART FAILURE (H): ICD-10-CM

## 2024-10-31 ENCOUNTER — LAB (OUTPATIENT)
Dept: LAB | Facility: OTHER | Age: 79
End: 2024-10-31
Attending: INTERNAL MEDICINE
Payer: MEDICARE

## 2024-10-31 ENCOUNTER — HOSPITAL ENCOUNTER (OUTPATIENT)
Dept: CARDIOLOGY | Facility: OTHER | Age: 79
Discharge: HOME | End: 2024-10-31
Attending: INTERNAL MEDICINE
Payer: MEDICARE

## 2024-10-31 DIAGNOSIS — I27.20 PULMONARY HYPERTENSION (H): ICD-10-CM

## 2024-10-31 DIAGNOSIS — I50.43 ACUTE ON CHRONIC COMBINED SYSTOLIC AND DIASTOLIC CONGESTIVE HEART FAILURE (H): ICD-10-CM

## 2024-10-31 DIAGNOSIS — I27.20 PULMONARY HTN (H): ICD-10-CM

## 2024-10-31 DIAGNOSIS — R06.02 SHORTNESS OF BREATH: ICD-10-CM

## 2024-10-31 LAB
ANION GAP SERPL CALCULATED.3IONS-SCNC: 9 MMOL/L (ref 7–15)
BUN SERPL-MCNC: 18.9 MG/DL (ref 8–23)
CALCIUM SERPL-MCNC: 10 MG/DL (ref 8.8–10.4)
CHLORIDE SERPL-SCNC: 100 MMOL/L (ref 98–107)
CREAT SERPL-MCNC: 0.9 MG/DL (ref 0.51–0.95)
EGFRCR SERPLBLD CKD-EPI 2021: 65 ML/MIN/1.73M2
ERYTHROCYTE [DISTWIDTH] IN BLOOD BY AUTOMATED COUNT: 17.8 % (ref 10–15)
GLUCOSE SERPL-MCNC: 112 MG/DL (ref 70–99)
HCO3 SERPL-SCNC: 29 MMOL/L (ref 22–29)
HCT VFR BLD AUTO: 41.5 % (ref 35–47)
HGB BLD-MCNC: 13.1 G/DL (ref 11.7–15.7)
LVEF ECHO: NORMAL
MCH RBC QN AUTO: 29.4 PG (ref 26.5–33)
MCHC RBC AUTO-ENTMCNC: 31.6 G/DL (ref 31.5–36.5)
MCV RBC AUTO: 93 FL (ref 78–100)
NT-PROBNP SERPL-MCNC: 2042 PG/ML (ref 0–1800)
PLATELET # BLD AUTO: 166 10E3/UL (ref 150–450)
POTASSIUM SERPL-SCNC: 4.4 MMOL/L (ref 3.4–5.3)
RBC # BLD AUTO: 4.46 10E6/UL (ref 3.8–5.2)
SODIUM SERPL-SCNC: 138 MMOL/L (ref 135–145)
WBC # BLD AUTO: 5.1 10E3/UL (ref 4–11)

## 2024-10-31 PROCEDURE — 85014 HEMATOCRIT: CPT | Mod: ZL

## 2024-10-31 PROCEDURE — 83880 ASSAY OF NATRIURETIC PEPTIDE: CPT | Mod: ZL

## 2024-10-31 PROCEDURE — 80048 BASIC METABOLIC PNL TOTAL CA: CPT | Mod: ZL

## 2024-10-31 PROCEDURE — 36415 COLL VENOUS BLD VENIPUNCTURE: CPT | Mod: ZL

## 2024-10-31 PROCEDURE — 93306 TTE W/DOPPLER COMPLETE: CPT | Mod: 26 | Performed by: INTERNAL MEDICINE

## 2024-10-31 PROCEDURE — 93306 TTE W/DOPPLER COMPLETE: CPT

## 2024-12-02 ENCOUNTER — PATIENT OUTREACH (OUTPATIENT)
Dept: CARE COORDINATION | Facility: CLINIC | Age: 79
End: 2024-12-02
Payer: COMMERCIAL

## 2024-12-12 NOTE — PROGRESS NOTES
Clinic Care Coordination Contact  Follow Up Progress Note      Assessment: She was feeling very stressed about her 's health needs and instructions yesterday. 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 Report that today is a better day. They are settling in at [facility] and overall she is feeling better about the stress level.    Care Gaps:    Health Maintenance Due   Topic Date Due    RSV VACCINE (1 - 1-dose 75+ series) Never done    DTAP/TDAP/TD IMMUNIZATION (2 - Td or Tdap) 01/09/2023    INFLUENZA VACCINE (1) 09/01/2024    COVID-19 Vaccine (7 - 2024-25 season) 09/01/2024    PHQ-9  09/21/2024       Intervention/Education provided during outreach: She has previously been introduced to resources which they haven't needed yet       Plan:   She agreed to call if she has any struggles navigating her or her 's healthcare. Gave her clarified instructions about his anticoagulant prior to his biopsy. She was pleased and agreed that I do not need to reach out unless they call me with changes.      Care Coordinator will support as needed.   Sheryl Phelps, RN on 12/12/2024 at 2:37 PM

## 2025-01-02 ENCOUNTER — TELEPHONE (OUTPATIENT)
Dept: PEDIATRICS | Facility: OTHER | Age: 80
End: 2025-01-02
Payer: COMMERCIAL

## 2025-03-28 NOTE — TELEPHONE ENCOUNTER
After verifying patient's name and date of birth, told patient that EKG order came from her cardiologist from [facility]. Patient verbalized understanding. She is in [place] and states they will not accept orders from another state. Told patient that she should get in touch with her cardiologist on next step for EKG. She verbalized understanding.   Jaye Osorio LPN....3/22/2022 12:59 PM    
[facility] is waiting on faxed order for her EKG-she is there to have it.   Fax # [phone] Att Patients name and is there to have EKG   
22

## 2025-03-31 ENCOUNTER — ANCILLARY ORDERS (OUTPATIENT)
Dept: PEDIATRICS | Facility: OTHER | Age: 80
End: 2025-03-31
Payer: COMMERCIAL

## 2025-03-31 DIAGNOSIS — Z12.31 VISIT FOR SCREENING MAMMOGRAM: Primary | ICD-10-CM

## 2025-03-31 NOTE — TELEPHONE ENCOUNTER
ACC Review   Please call Ms. Zoey DON Damiensam.     -- Schedule a nurse only BP check   -- If BP is greater than or equal to 140/90, schedule an office visit for blood pressure with Dr. Mackay.     Signed, Vijay Mackay MD, FAAP, FACP  Internal Medicine & Pediatrics     SCM

## 2025-04-08 ENCOUNTER — HOSPITAL ENCOUNTER (OUTPATIENT)
Dept: MAMMOGRAPHY | Facility: OTHER | Age: 80
Discharge: HOME | End: 2025-04-08
Attending: INTERNAL MEDICINE | Admitting: INTERNAL MEDICINE
Payer: MEDICARE

## 2025-04-08 DIAGNOSIS — Z12.31 VISIT FOR SCREENING MAMMOGRAM: ICD-10-CM

## 2025-04-08 PROCEDURE — 77063 BREAST TOMOSYNTHESIS BI: CPT

## 2025-04-26 ENCOUNTER — HEALTH MAINTENANCE LETTER (OUTPATIENT)
Age: 80
End: 2025-04-26

## 2025-05-14 DIAGNOSIS — I10 PRIMARY HYPERTENSION: ICD-10-CM

## 2025-05-14 DIAGNOSIS — I51.89 RIGHT VENTRICULAR DYSFUNCTION, NYHA CLASS 2: ICD-10-CM

## 2025-05-15 RX ORDER — FUROSEMIDE 20 MG/1
TABLET ORAL
Qty: 270 TABLET | Refills: 3 | Status: SHIPPED | OUTPATIENT
Start: 2025-05-15

## 2025-05-15 RX ORDER — LISINOPRIL 10 MG/1
10 TABLET ORAL DAILY
Qty: 90 TABLET | Refills: 3 | Status: SHIPPED | OUTPATIENT
Start: 2025-05-15

## 2025-05-15 NOTE — TELEPHONE ENCOUNTER
[facility] sent Rx request for the following:      Requested Prescriptions   Pending Prescriptions Disp Refills    furosemide (LASIX) 20 MG tablet [Pharmacy Med Name: Furosemide 20 MG Oral Tablet] 270 tablet 3     Sig: TAKE 1 TABLET BY MOUTH DAILY ,  MAY TAKE UP TO 3 TABLETS DAILY  FOR INCREASE IN SWELLING       Diuretics (Including Combos) Protocol Passed - 5/15/2025  2:43 PM   Last Prescription Date:   06/20/2024  Last Fill Qty/Refills:         270, R-3      lisinopril (ZESTRIL) 10 MG tablet [Pharmacy Med Name: Lisinopril 10 MG Oral Tablet] 90 tablet 3     Sig: TAKE 1 TABLET BY MOUTH DAILY       ACE Inhibitors (Including Combos) Protocol Passed - 5/15/2025  2:43 PM     Last Prescription Date:   08/15/2024  Last Fill Qty/Refills:         90, R-2    Last Office Visit:              04/07/2023   Future Office visit:           08/18/2025  Prescription approved per [facility] Refill Protocol.   Hazel Barton RN on 5/15/2025 at 2:50 PM

## 2025-07-03 DIAGNOSIS — I51.89 GRADE II DIASTOLIC DYSFUNCTION: Chronic | ICD-10-CM

## 2025-07-08 RX ORDER — SPIRONOLACTONE 25 MG/1
TABLET ORAL
Qty: 45 TABLET | Refills: 0 | Status: SHIPPED | OUTPATIENT
Start: 2025-07-08

## 2025-07-08 NOTE — TELEPHONE ENCOUNTER
[facility] sent Rx request for the following:      Requested Prescriptions   Pending Prescriptions Disp Refills    spironolactone (ALDACTONE) 25 MG tablet [Pharmacy Med Name: Spironolactone 25 MG Oral Tablet] 45 tablet 3     Sig: TAKE ONE-HALF TABLET BY MOUTH  EVERY MORNING       Diuretics (Including Combos) Protocol Passed - 7/8/2025  3:47 PM        Last Prescription Date:   5/9/25  Last Fill Qty/Refills:         45, R-0    Last Office Visit:              8/15/24   Future Office visit:           8/18/25    Prescription approved per [facility] Refill Protocol.    Overridden by Elias Bernard MD on May 9, 2025 3:24 PM   Drug-Drug   1. POTASSIUM-SPARING DIURETICS / ANGIOTENSIN-CONVERTING ENZYME INHIBITORS [Level: Major] [Reason: Will monitor drug levels/drug effects ]   Other Orders: lisinopril (ZESTRIL) 10 MG tablet          KIMMY MASTERS RN on 7/8/2025 at 3:49 PM

## 2025-07-16 DIAGNOSIS — K21.9 GASTROESOPHAGEAL REFLUX DISEASE, UNSPECIFIED WHETHER ESOPHAGITIS PRESENT: ICD-10-CM

## 2025-07-16 DIAGNOSIS — N39.41 URGE URINARY INCONTINENCE: ICD-10-CM

## 2025-07-21 RX ORDER — OXYBUTYNIN CHLORIDE 10 MG/1
10 TABLET, EXTENDED RELEASE ORAL DAILY
Qty: 90 TABLET | Refills: 0 | Status: SHIPPED | OUTPATIENT
Start: 2025-07-21

## 2025-07-21 RX ORDER — OMEPRAZOLE 20 MG/1
CAPSULE, DELAYED RELEASE ORAL
Qty: 90 CAPSULE | Refills: 0 | Status: SHIPPED | OUTPATIENT
Start: 2025-07-21

## 2025-07-21 NOTE — TELEPHONE ENCOUNTER
[facility] sent Rx request for the following:      Requested Prescriptions   Pending Prescriptions Disp Refills    oxyBUTYnin ER (DITROPAN XL) 10 MG 24 hr tablet [Pharmacy Med Name: Oxybutynin Chloride ER 10 MG Oral Tablet Extended Release 24 Hour] 90 tablet 3     Sig: TAKE 1 TABLET BY MOUTH DAILY       Muscarinic Antagonists (Urinary Incontinence Agents) Passed - 7/21/2025 11:27 AM        Passed - Medication is Oxybutynin and patient is 5 years of age or older        Passed - Patient does not have a diagnosis of glaucoma on the problem list     If glaucoma diagnosis is new, refer refill to physician.          Passed - Medication is active on med list and the sig matches. RN to manually verify dose and sig if red X/fail.     If the protocol passes (green check), you do not need to verify med dose and sig.    A prescription matches if they are the same clinical intention.    For Example: once daily and every morning are the same.    The protocol can not identify upper and lower case letters as matching and will fail.     For Example: Take 1 tablet (50 mg) by mouth daily     TAKE 1 TABLET (50 MG) BY MOUTH DAILY    For all fails (red x), verify dose and sig.    If the refill does match what is on file, the RN can still proceed to approve the refill request.       If they do not match, route to the appropriate provider.             Passed - Recent (12 month) or future (90 days) visit with authorizing provider's specialty (provided they have been seen in the past 15 months)     The patient must have completed an in-person or virtual visit within the past 12 months or has a future visit scheduled within the next 90 days with the authorizing provider s specialty.  Urgent care and e-visits do not qualify as an office visit for this protocol.          Passed - Medication indicated for associated diagnosis     Medication is associated with one or more of the following diagnoses:  Bladder pain  Disorder of the GI  tract  Hyperhidrosis  Neurogenic bladder  Neurogenic detrusor overactivity  Overactive Bladder  Urge incontinence  Urgent desire to urinate  Incontinence  Spasm of urinary bladder          Passed - Patient is 18 years of age or older     Last Prescription Date:   8/1524  Last Fill Qty/Refills:         90, R-4             omeprazole (PRILOSEC) 20 MG DR capsule [Pharmacy Med Name: Omeprazole 20 MG Oral Capsule Delayed Release] 90 capsule 3     Sig: TAKE 1 CAPSULE BY MOUTH ONCE  DAILY BEFORE A MEAL       PPI Protocol Passed - 7/21/2025 11:27 AM        Passed - Medication is active on med list and the sig matches. RN to manually verify dose and sig if red X/fail.     If the protocol passes (green check), you do not need to verify med dose and sig.    A prescription matches if they are the same clinical intention.    For Example: once daily and every morning are the same.    The protocol can not identify upper and lower case letters as matching and will fail.     For Example: Take 1 tablet (50 mg) by mouth daily     TAKE 1 TABLET (50 MG) BY MOUTH DAILY    For all fails (red x), verify dose and sig.    If the refill does match what is on file, the RN can still proceed to approve the refill request.       If they do not match, route to the appropriate provider.             Passed - Medication indicated for associated diagnosis     The medication is prescribed for one or more of the following conditions:     Erosive esophagitis    Gastritis   Gastric hypersecretion   Gastric ulcer   Gastroesophageal reflux disease   Helicobacter pylori gastrointestinal tract infection   Ulcer of duodenum   Drug-induced peptic ulcer   Esophageal stricture   Gastrointestinal hemorrhage   Indigestion   Stress ulcer   Zollinger-Kruse syndrome   Lauren s esophagus   Laryngopharyngeal reflux   Epigastric Pain   Morbid Obesity   Cough   History of Peptic Ulcer   Esophageal Atresia, Stenosis and Fistula   Cystic Fibrosis  Bronchiectasis           Passed - Recent (12 month) or future (90 days) visit with authorizing provider's specialty (provided they have been seen in the past 15 months)     The patient must have completed an in-person or virtual visit within the past 12 months or has a future visit scheduled within the next 90 days with the authorizing provider s specialty.  Urgent care and e-visits do not qualify as an office visit for this protocol.          Passed - Patient is age 18 or older        Passed - No active pregnacy on record        Passed - No positive pregnancy test in past 12 months             Last Prescription Date:   8/1524  Last Fill Qty/Refills:         90, R-4    Last Office Visit:              8/15/25   Future Office visit:             Next 5 appointments (look out 90 days)      Aug 18, 2025 10:40 AM  (Arrive by 10:25 AM)  Annual Wellness Visit with Vijay Mackay MD  [facility] ([facility] [address]  [place] [address]  [phone]           Prescription approved per [facility] Refill Protocol.  Skye Campbell RN on 7/21/2025 at 11:29 AM

## (undated) RX ORDER — TETRACAINE HYDROCHLORIDE 5 MG/ML
SOLUTION OPHTHALMIC
Status: DISPENSED
Start: 2020-01-09

## (undated) RX ORDER — MECLIZINE HCL 12.5 MG 12.5 MG/1
TABLET ORAL
Status: DISPENSED
Start: 2018-12-10

## (undated) RX ORDER — LISINOPRIL 5 MG/1
TABLET ORAL
Status: DISPENSED
Start: 2021-09-13

## (undated) RX ORDER — HYDROCODONE BITARTRATE AND ACETAMINOPHEN 5; 325 MG/1; MG/1
TABLET ORAL
Status: DISPENSED
Start: 2019-12-20

## (undated) RX ORDER — LIDOCAINE HYDROCHLORIDE 10 MG/ML
INJECTION, SOLUTION INFILTRATION; PERINEURAL
Status: DISPENSED
Start: 2020-10-12

## (undated) RX ORDER — ACETAMINOPHEN 500 MG
TABLET ORAL
Status: DISPENSED
Start: 2021-09-13

## (undated) RX ORDER — LIDOCAINE HYDROCHLORIDE AND EPINEPHRINE 10; 10 MG/ML; UG/ML
INJECTION, SOLUTION INFILTRATION; PERINEURAL
Status: DISPENSED
Start: 2020-10-12